# Patient Record
Sex: FEMALE | Race: WHITE | NOT HISPANIC OR LATINO | Employment: OTHER | URBAN - NONMETROPOLITAN AREA
[De-identification: names, ages, dates, MRNs, and addresses within clinical notes are randomized per-mention and may not be internally consistent; named-entity substitution may affect disease eponyms.]

---

## 2017-01-10 ENCOUNTER — OFFICE VISIT (OUTPATIENT)
Dept: CARDIOLOGY | Facility: CLINIC | Age: 82
End: 2017-01-10

## 2017-01-10 VITALS
DIASTOLIC BLOOD PRESSURE: 64 MMHG | HEIGHT: 63 IN | SYSTOLIC BLOOD PRESSURE: 140 MMHG | BODY MASS INDEX: 28.35 KG/M2 | WEIGHT: 160 LBS | HEART RATE: 76 BPM

## 2017-01-10 DIAGNOSIS — I49.5 SICK SINUS SYNDROME (HCC): Primary | ICD-10-CM

## 2017-01-10 DIAGNOSIS — E78.2 MIXED HYPERLIPIDEMIA: ICD-10-CM

## 2017-01-10 DIAGNOSIS — I47.1 SVT (SUPRAVENTRICULAR TACHYCARDIA) (HCC): ICD-10-CM

## 2017-01-10 DIAGNOSIS — R06.09 DYSPNEA ON EXERTION: ICD-10-CM

## 2017-01-10 DIAGNOSIS — I10 ESSENTIAL HYPERTENSION: ICD-10-CM

## 2017-01-10 DIAGNOSIS — R73.09 ELEVATED HEMOGLOBIN A1C: ICD-10-CM

## 2017-01-10 PROCEDURE — 99214 OFFICE O/P EST MOD 30 MIN: CPT | Performed by: INTERNAL MEDICINE

## 2017-01-10 PROCEDURE — 93000 ELECTROCARDIOGRAM COMPLETE: CPT | Performed by: INTERNAL MEDICINE

## 2017-01-10 RX ORDER — ISOSORBIDE MONONITRATE 30 MG/1
30 TABLET, EXTENDED RELEASE ORAL DAILY
Qty: 90 TABLET | Refills: 3 | Status: SHIPPED | OUTPATIENT
Start: 2017-01-10 | End: 2018-01-04 | Stop reason: SDUPTHER

## 2017-01-10 RX ORDER — VITAMIN A ACETATE, .BETA.-CAROTENE, ASCORBIC ACID, CHOLECALCIFEROL, .ALPHA.-TOCOPHEROL ACETATE, DL-, THIAMINE MONONITRATE, RIBOFLAVIN, NIACINAMIDE, PYRIDOXINE HYDROCHLORIDE, FOLIC ACID, CYANOCOBALAMIN, CALCIUM CARBONATE, FERROUS FUMARATE, ZINC OXIDE, AND CUPRIC OXIDE 2000; 2000; 120; 400; 22; 1.84; 3; 20; 10; 1; 12; 200; 27; 25; 2 [IU]/1; [IU]/1; MG/1; [IU]/1; MG/1; MG/1; MG/1; MG/1; MG/1; MG/1; UG/1; MG/1; MG/1; MG/1; MG/1
TABLET ORAL
COMMUNITY
Start: 2016-10-18 | End: 2017-10-12 | Stop reason: ALTCHOICE

## 2017-01-10 NOTE — MR AVS SNAPSHOT
Violet Fajardo   1/10/2017 8:00 AM   Office Visit    Dept Phone:  [phone]   Encounter #:  [id number]    Provider:  Chalo Torres MD   Department:  [facility]                Your Full Care Plan              Today's Medication Changes          These changes are accurate as of: 1/10/17  8:40 AM.  If you have any questions, ask your nurse or doctor.               Stop taking medication(s)listed here:     calcium citrate-vitamin d 315-250 MG-UNIT tablet tablet   Commonly known as:  CALCITRATE   Stopped by:  Chalo Torres MD           montelukast 10 MG tablet   Commonly known as:  SINGULAIR   Stopped by:  Chalo Torres MD                Where to Get Your Medications      These medications were sent to [facility] - [place], [place] - [address] [place] [address] - [phone]  - [phone] [address] [address]     Phone:  [phone]     isosorbide mononitrate 30 MG 24 hr tablet                  Your Updated Medication List          This list is accurate as of: 1/10/17  8:40 AM.  Always use your most recent med list.                aspirin 81 MG EC tablet       fish oil 1000 MG capsule capsule       hydrochlorothiazide 12.5 MG capsule   Commonly known as:  MICROZIDE   Take 1 capsule by mouth Daily.       isosorbide mononitrate 30 MG 24 hr tablet   Commonly known as:  IMDUR   Take 1 tablet by mouth Daily.       levothyroxine 25 MCG tablet   Commonly known as:  SYNTHROID, LEVOTHROID       metoprolol succinate XL 25 MG 24 hr tablet   Commonly known as:  TOPROL-XL   Take 3 tablets by mouth Daily.       OCUVITE ADULT 50+ PO       PNV PRENATAL PLUS MULTIVITAMIN 27-1 MG tablet       potassium chloride 10 MEQ CR tablet   Commonly known as:  K-DUR   Take 1 tablet by mouth Daily.       Zolpidem Tartrate 10 MG sublingual tablet               We Performed the Following     ECG 12 Lead       You Were Diagnosed With        Codes Comments     Sick sinus syndrome    -  Primary ICD-10-CM: I49.5  ICD-9-CM: 427.81     SVT (supraventricular tachycardia)     ICD-10-CM: I47.1  ICD-9-CM: 427.89     Essential hypertension     ICD-10-CM: I10  ICD-9-CM: 401.9     Dyspnea on exertion     ICD-10-CM: R06.09  ICD-9-CM: 786.09     Elevated hemoglobin A1c     ICD-10-CM: R73.09  ICD-9-CM: 790.29     Mixed hyperlipidemia     ICD-10-CM: E78.2  ICD-9-CM: 272.2       Instructions     None    Patient Instructions History      Upcoming Appointments     Visit Type Date Time Department    FOLLOW UP 1/10/2017  8:00 AM [facility]    NURSE/MA VISIT 2017  8:00 AM [facility]    FOLLOW UP 10/12/2017  8:00 AM [facility]      MyChart Signup     [facility] [place] [employer] allows you to send messages to your doctor, view your test results, renew your prescriptions, schedule appointments, and more. To sign up, go to [employer] and click on the Sign Up Now link in the New User? box. Enter your Sh[employer] Activation Code exactly as it appears below along with the last four digits of your Social Security Number and your Date of Birth () to complete the sign-up process. If you do not sign up before the expiration date, you must request a new code.    Shoutitout Activation Code: [email or web address]  Expires: 2017  8:26 AM    If you have questions, you can email [email or web address] or call [phone] to talk to our Shoutitout staff. Remember, Shoutitout is NOT to be used for urgent needs. For medical emergencies, dial 911.               Other Info from Your Visit           Your Appointments     May 24, 2017  8:00 AM CDT   Nurse Visit with [facility]   [facility] (--)    [address]  [facility] [address]   [phone]            Oct 12, 2017  8:00 AM CDT   Follow Up with Chalo Torres MD   [facility] (--)    [address]  [facility] [address]  "  [phone]           Arrive 15 minutes prior to appointment.              Allergies     Latex      Barley Grass      Codeine      Contrast Dye        Reason for Visit     Follow-up SVT. Sick Sinus. Had questions about testing that was ordered     Medication Problem Potassium Chloride hurts her stomach really bad     Results From Echo in Nov.     Med Refill Isosorbide       Vital Signs     Blood Pressure Pulse Height Weight Body Mass Index Smoking Status    140/64 (BP Location: Right arm, Patient Position: Sitting, Cuff Size: Adult) 76 63\" (160 cm) 160 lb (72.6 kg) 28.34 kg/m2 Never Smoker      Problems and Diagnoses Noted     Breathlessness on exertion    Elevated hemoglobin A1c    High blood pressure    Mixed hyperlipidemia    Sick sinus syndrome    SVT (supraventricular tachycardia)      Results     ECG 12 Lead               "

## 2017-01-10 NOTE — LETTER
January 10, 2017     No Recipients    Patient: Violet Fajardo   YOB: 1934   Date of Visit: 1/10/2017       Dear Dr. Tobias Recipients:    Thank you for referring Violet Fajardo to me for evaluation. Below are the relevant portions of my assessment and plan of care.    If you have questions, please do not hesitate to call me. I look forward to following Violet along with you.         Sincerely,        Chalo Torres MD        CC: No Recipients  Chalo Torres MD  1/10/2017  8:37 AM  Sign at close encounter  Violet Fajardo  [id number]  1934  82 y.o.  female    Referring Provider: Michael Manzo MD    Reason for Follow-up Visit: Sick sinus syndrome    Overall doing well  Denies any chest pain or excessive shortness of breath  Compliant with medications    History of present illness:  Violet Fajardo is a 82 y.o. yo female with history of SSS who presents today for   Chief Complaint   Patient presents with   • Follow-up     SVT. Sick Sinus. Had questions about testing that was ordered    • Medication Problem     Potassium Chloride hurts her stomach really bad    • Results     From Echo in Nov.    • Med Refill     Isosorbide    .    History  Past Medical History   Diagnosis Date   • Chest pain    • CHF (congestive heart failure)    • Dermatomyositis    • Dyspnea    • Hyperlipidemia    • Hypothyroidism    • Mitral valve disorder    • Mitral valve disorder    • Pacemaker    • Respiratory infection    • Sleep apnea    • Stroke    • SVT (supraventricular tachycardia)    ,   Past Surgical History   Procedure Laterality Date   • Hysterectomy     • Foot surgery     • Cholecystectomy     • Pacemaker implantation     • Insert / replace / remove pacemaker     ,   Family History   Problem Relation Age of Onset   • Coronary artery disease Mother    • Coronary artery disease Father    ,   Social History   Substance Use Topics   • Smoking status: Never Smoker   • Smokeless tobacco: Never Used   • Alcohol  "use No   ,     Medications  Current Outpatient Prescriptions   Medication Sig Dispense Refill   • aspirin 81 MG EC tablet Take 81 mg by mouth Daily.     • hydrochlorothiazide (MICROZIDE) 12.5 MG capsule Take 1 capsule by mouth Daily. 90 capsule 3   • isosorbide mononitrate (IMDUR) 30 MG 24 hr tablet Take 1 tablet by mouth Daily. 14 tablet 0   • levothyroxine (SYNTHROID, LEVOTHROID) 25 MCG tablet Take 25 mcg by mouth Daily.     • metoprolol succinate XL (TOPROL-XL) 25 MG 24 hr tablet Take 3 tablets by mouth Daily. (Patient taking differently: Take 50 mg by mouth 2 (Two) Times a Day.) 270 tablet 3   • Multiple Vitamins-Minerals (OCUVITE ADULT 50+ PO) Take  by mouth.     • Omega-3 Fatty Acids (FISH OIL) 1000 MG capsule capsule Take  by mouth Daily With Breakfast.     • Zolpidem Tartrate 10 MG sublingual tablet Place  under the tongue.     • potassium chloride (K-DUR) 10 MEQ CR tablet Take 1 tablet by mouth Daily. 90 tablet 3   • Prenatal Vit-Fe Fumarate-FA (PNV PRENATAL PLUS MULTIVITAMIN) 27-1 MG tablet        No current facility-administered medications for this visit.        Allergies:  Latex; Barley grass; Codeine; and Contrast dye    Review of Systems  Review of Systems   Constitution: Positive for malaise/fatigue.   HENT: Negative.    Eyes: Negative.    Cardiovascular: Positive for dyspnea on exertion. Negative for chest pain, claudication, cyanosis, irregular heartbeat, leg swelling, near-syncope, orthopnea, palpitations, paroxysmal nocturnal dyspnea and syncope.   Respiratory: Negative.    Endocrine: Negative.    Hematologic/Lymphatic: Negative.    Skin: Negative.    Gastrointestinal: Negative for anorexia.   Genitourinary: Negative.    Neurological: Negative.    Psychiatric/Behavioral: Negative.        Objective     Physical Exam:  Visit Vitals   • /64 (BP Location: Right arm, Patient Position: Sitting, Cuff Size: Adult)   • Pulse 76   • Ht 63\" (160 cm)   • Wt 160 lb (72.6 kg)   • BMI 28.34 kg/m2 "     Physical Exam   Constitutional: She appears well-developed.   HENT:   Head: Normocephalic.   Neck: Normal carotid pulses and no JVD present. No tracheal tenderness present. Carotid bruit is not present. No tracheal deviation and no edema present.   Cardiovascular: Regular rhythm and normal pulses.    Murmur heard.   Systolic murmur is present with a grade of 2/6   Pulmonary/Chest: Effort normal. No stridor.   Abdominal: Soft.   Neurological: She is alert. She has normal strength. No cranial nerve deficit or sensory deficit.   Skin: Skin is warm.   Psychiatric: She has a normal mood and affect. Her speech is normal and behavior is normal.       Results Review:       ECG 12 Lead  Date/Time: 1/10/2017 8:32 AM  Performed by: DIRK PEARSON  Authorized by: DIRK PEARSON   Comparison: compared with previous ECG from 10/19/2016  Similar to previous ECG  Rhythm: paced  Comments: A paced V sensed            Assessment/Plan   Patient Active Problem List   Diagnosis   • Dyspnea on exertion   • Sick sinus syndrome   • SVT (supraventricular tachycardia)   • Essential hypertension   • Elevated hemoglobin A1c   • Mixed hyperlipidemia       Stable doing well. No exertional chest pain or excessive dyspnea. Chronic shortness of breath. No palpitations. No significant pedal edema. Compliant with medications and diet. Latest labs and medications reviewed.    Plan:    Close follow up with you as scheduled.  Intensive factor modifications.  See order list.    Counseled regarding disease appropriate diet, fluid, caffeine, stimulants and sodium intake as well as importance of compliance to diet, exercise and regular follow up.    Return in about 9 months (around 10/10/2017).

## 2017-01-10 NOTE — PROGRESS NOTES
Violet Fajardo  [id number]  1934  82 y.o.  female    Referring Provider: Michael Manzo MD    Reason for Follow-up Visit: Sick sinus syndrome    Overall doing well  Denies any chest pain or excessive shortness of breath  Compliant with medications    History of present illness:  Violet Fajardo is a 82 y.o. yo female with history of SSS who presents today for   Chief Complaint   Patient presents with   • Follow-up     SVT. Sick Sinus. Had questions about testing that was ordered    • Medication Problem     Potassium Chloride hurts her stomach really bad    • Results     From Echo in Nov.    • Med Refill     Isosorbide    .    History  Past Medical History   Diagnosis Date   • Chest pain    • CHF (congestive heart failure)    • Dermatomyositis    • Dyspnea    • Hyperlipidemia    • Hypothyroidism    • Mitral valve disorder    • Mitral valve disorder    • Pacemaker    • Respiratory infection    • Sleep apnea    • Stroke    • SVT (supraventricular tachycardia)    ,   Past Surgical History   Procedure Laterality Date   • Hysterectomy     • Foot surgery     • Cholecystectomy     • Pacemaker implantation     • Insert / replace / remove pacemaker     ,   Family History   Problem Relation Age of Onset   • Coronary artery disease Mother    • Coronary artery disease Father    ,   Social History   Substance Use Topics   • Smoking status: Never Smoker   • Smokeless tobacco: Never Used   • Alcohol use No   ,     Medications  Current Outpatient Prescriptions   Medication Sig Dispense Refill   • aspirin 81 MG EC tablet Take 81 mg by mouth Daily.     • hydrochlorothiazide (MICROZIDE) 12.5 MG capsule Take 1 capsule by mouth Daily. 90 capsule 3   • isosorbide mononitrate (IMDUR) 30 MG 24 hr tablet Take 1 tablet by mouth Daily. 14 tablet 0   • levothyroxine (SYNTHROID, LEVOTHROID) 25 MCG tablet Take 25 mcg by mouth Daily.     • metoprolol succinate XL (TOPROL-XL) 25 MG 24 hr tablet Take 3 tablets by mouth Daily.  "(Patient taking differently: Take 50 mg by mouth 2 (Two) Times a Day.) 270 tablet 3   • Multiple Vitamins-Minerals (OCUVITE ADULT 50+ PO) Take  by mouth.     • Omega-3 Fatty Acids (FISH OIL) 1000 MG capsule capsule Take  by mouth Daily With Breakfast.     • Zolpidem Tartrate 10 MG sublingual tablet Place  under the tongue.     • potassium chloride (K-DUR) 10 MEQ CR tablet Take 1 tablet by mouth Daily. 90 tablet 3   • Prenatal Vit-Fe Fumarate-FA (PNV PRENATAL PLUS MULTIVITAMIN) 27-1 MG tablet        No current facility-administered medications for this visit.        Allergies:  Latex; Barley grass; Codeine; and Contrast dye    Review of Systems  Review of Systems   Constitution: Positive for malaise/fatigue.   HENT: Negative.    Eyes: Negative.    Cardiovascular: Positive for dyspnea on exertion. Negative for chest pain, claudication, cyanosis, irregular heartbeat, leg swelling, near-syncope, orthopnea, palpitations, paroxysmal nocturnal dyspnea and syncope.   Respiratory: Negative.    Endocrine: Negative.    Hematologic/Lymphatic: Negative.    Skin: Negative.    Gastrointestinal: Negative for anorexia.   Genitourinary: Negative.    Neurological: Negative.    Psychiatric/Behavioral: Negative.        Objective     Physical Exam:  Visit Vitals   • /64 (BP Location: Right arm, Patient Position: Sitting, Cuff Size: Adult)   • Pulse 76   • Ht 63\" (160 cm)   • Wt 160 lb (72.6 kg)   • BMI 28.34 kg/m2     Physical Exam   Constitutional: She appears well-developed.   HENT:   Head: Normocephalic.   Neck: Normal carotid pulses and no JVD present. No tracheal tenderness present. Carotid bruit is not present. No tracheal deviation and no edema present.   Cardiovascular: Regular rhythm and normal pulses.    Murmur heard.   Systolic murmur is present with a grade of 2/6   Pulmonary/Chest: Effort normal. No stridor.   Abdominal: Soft.   Neurological: She is alert. She has normal strength. No cranial nerve deficit or sensory " deficit.   Skin: Skin is warm.   Psychiatric: She has a normal mood and affect. Her speech is normal and behavior is normal.       Results Review:       ECG 12 Lead  Date/Time: 1/10/2017 8:32 AM  Performed by: DIRK PEARSON  Authorized by: DIRK PEARSON   Comparison: compared with previous ECG from 10/19/2016  Similar to previous ECG  Rhythm: paced  Comments: A paced V sensed            Assessment/Plan   Patient Active Problem List   Diagnosis   • Dyspnea on exertion   • Sick sinus syndrome   • SVT (supraventricular tachycardia)   • Essential hypertension   • Elevated hemoglobin A1c   • Mixed hyperlipidemia       Stable doing well. No exertional chest pain or excessive dyspnea. Chronic shortness of breath. No palpitations. No significant pedal edema. Compliant with medications and diet. Latest labs and medications reviewed.    Plan:    Close follow up with you as scheduled.  Intensive factor modifications.  See order list.    Counseled regarding disease appropriate diet, fluid, caffeine, stimulants and sodium intake as well as importance of compliance to diet, exercise and regular follow up.    Return in about 9 months (around 10/10/2017).

## 2017-01-14 RX ORDER — ZOLPIDEM TARTRATE 10 MG/1
10 TABLET ORAL NIGHTLY PRN
Qty: 90 TABLET | Refills: 0 | Status: SHIPPED | OUTPATIENT
Start: 2017-01-14 | End: 2017-01-28

## 2017-03-28 ENCOUNTER — TELEPHONE (OUTPATIENT)
Dept: CARDIOLOGY | Facility: CLINIC | Age: 82
End: 2017-03-28

## 2017-03-28 NOTE — TELEPHONE ENCOUNTER
----- Message from JON Muro sent at 3/27/2017  2:12 PM CDT -----  Please notify pt, normal pumping function/LVEF on echo. Thanks.   ----- Message -----     From: Denise Mazariegos     Sent: 3/27/2017  12:23 PM       To: JON Muro

## 2017-03-29 ENCOUNTER — OFFICE VISIT (OUTPATIENT)
Dept: PRIMARY CARE CLINIC | Age: 82
End: 2017-03-29
Payer: MEDICARE

## 2017-03-29 VITALS
BODY MASS INDEX: 27.76 KG/M2 | HEIGHT: 64 IN | HEART RATE: 96 BPM | OXYGEN SATURATION: 97 % | DIASTOLIC BLOOD PRESSURE: 64 MMHG | SYSTOLIC BLOOD PRESSURE: 116 MMHG | WEIGHT: 162.6 LBS

## 2017-03-29 DIAGNOSIS — I10 ESSENTIAL HYPERTENSION: ICD-10-CM

## 2017-03-29 DIAGNOSIS — M33.90 DERMATOMYOSITIS (HCC): ICD-10-CM

## 2017-03-29 DIAGNOSIS — E55.9 VITAMIN D DEFICIENCY: ICD-10-CM

## 2017-03-29 DIAGNOSIS — E53.8 VITAMIN B12 DEFICIENCY: ICD-10-CM

## 2017-03-29 DIAGNOSIS — J01.00 ACUTE NON-RECURRENT MAXILLARY SINUSITIS: ICD-10-CM

## 2017-03-29 DIAGNOSIS — E03.9 HYPOTHYROIDISM, UNSPECIFIED TYPE: Primary | ICD-10-CM

## 2017-03-29 DIAGNOSIS — F51.01 PRIMARY INSOMNIA: ICD-10-CM

## 2017-03-29 LAB
ANION GAP SERPL CALCULATED.3IONS-SCNC: 18 MMOL/L (ref 7–19)
BUN BLDV-MCNC: 14 MG/DL (ref 8–23)
CALCIUM SERPL-MCNC: 9.5 MG/DL (ref 8.8–10.2)
CHLORIDE BLD-SCNC: 99 MMOL/L (ref 98–111)
CO2: 23 MMOL/L (ref 22–29)
CREAT SERPL-MCNC: 0.8 MG/DL (ref 0.5–0.9)
GFR NON-AFRICAN AMERICAN: >60
GLUCOSE BLD-MCNC: 105 MG/DL (ref 74–109)
POTASSIUM SERPL-SCNC: 4.4 MMOL/L (ref 3.5–5)
SODIUM BLD-SCNC: 140 MMOL/L (ref 136–145)
VITAMIN B-12: 278 PG/ML (ref 211–946)
VITAMIN D 25-HYDROXY: 26.3 NG/ML

## 2017-03-29 PROCEDURE — G8400 PT W/DXA NO RESULTS DOC: HCPCS | Performed by: FAMILY MEDICINE

## 2017-03-29 PROCEDURE — 1090F PRES/ABSN URINE INCON ASSESS: CPT | Performed by: FAMILY MEDICINE

## 2017-03-29 PROCEDURE — G8484 FLU IMMUNIZE NO ADMIN: HCPCS | Performed by: FAMILY MEDICINE

## 2017-03-29 PROCEDURE — 1123F ACP DISCUSS/DSCN MKR DOCD: CPT | Performed by: FAMILY MEDICINE

## 2017-03-29 PROCEDURE — 99214 OFFICE O/P EST MOD 30 MIN: CPT | Performed by: FAMILY MEDICINE

## 2017-03-29 PROCEDURE — 1036F TOBACCO NON-USER: CPT | Performed by: FAMILY MEDICINE

## 2017-03-29 PROCEDURE — G8420 CALC BMI NORM PARAMETERS: HCPCS | Performed by: FAMILY MEDICINE

## 2017-03-29 PROCEDURE — 4040F PNEUMOC VAC/ADMIN/RCVD: CPT | Performed by: FAMILY MEDICINE

## 2017-03-29 PROCEDURE — G8427 DOCREV CUR MEDS BY ELIG CLIN: HCPCS | Performed by: FAMILY MEDICINE

## 2017-03-29 RX ORDER — CYANOCOBALAMIN 1000 UG/ML
1000 INJECTION INTRAMUSCULAR; SUBCUTANEOUS ONCE
Status: CANCELLED | OUTPATIENT
Start: 2017-03-29 | End: 2017-03-29

## 2017-03-29 RX ORDER — HYDROCHLOROTHIAZIDE 12.5 MG/1
CAPSULE, GELATIN COATED ORAL
COMMUNITY
Start: 2017-02-01 | End: 2021-07-12 | Stop reason: SDUPTHER

## 2017-03-29 RX ORDER — METOPROLOL SUCCINATE 25 MG
25 TABLET, EXTENDED RELEASE 24 HR ORAL 3 TIMES DAILY
COMMUNITY
Start: 2016-12-30 | End: 2022-08-22

## 2017-03-29 RX ORDER — ZOLPIDEM TARTRATE 10 MG/1
TABLET ORAL NIGHTLY PRN
COMMUNITY
End: 2017-03-29 | Stop reason: SDUPTHER

## 2017-03-29 RX ORDER — POTASSIUM CHLORIDE 750 MG/1
10 TABLET, EXTENDED RELEASE ORAL DAILY
COMMUNITY
Start: 2017-02-01

## 2017-03-29 RX ORDER — DOXYCYCLINE 100 MG/1
100 CAPSULE ORAL 2 TIMES DAILY
Qty: 20 CAPSULE | Refills: 0 | Status: SHIPPED | OUTPATIENT
Start: 2017-03-29 | End: 2017-06-29 | Stop reason: HOSPADM

## 2017-03-29 RX ORDER — ZOLPIDEM TARTRATE 10 MG/1
10 TABLET ORAL NIGHTLY PRN
Qty: 90 TABLET | Refills: 0 | Status: SHIPPED | OUTPATIENT
Start: 2017-03-29 | End: 2017-10-04 | Stop reason: SDUPTHER

## 2017-03-29 ASSESSMENT — ENCOUNTER SYMPTOMS
SHORTNESS OF BREATH: 0
COUGH: 0
COLOR CHANGE: 0

## 2017-04-03 ENCOUNTER — TELEPHONE (OUTPATIENT)
Dept: UROLOGY | Age: 82
End: 2017-04-03

## 2017-04-07 ENCOUNTER — TELEPHONE (OUTPATIENT)
Dept: PRIMARY CARE CLINIC | Age: 82
End: 2017-04-07

## 2017-04-07 RX ORDER — ERGOCALCIFEROL 1.25 MG/1
50000 CAPSULE ORAL WEEKLY
Qty: 12 CAPSULE | Refills: 3 | Status: SHIPPED | OUTPATIENT
Start: 2017-04-07 | End: 2017-11-02 | Stop reason: SDUPTHER

## 2017-05-24 ENCOUNTER — CLINICAL SUPPORT (OUTPATIENT)
Dept: CARDIOLOGY | Facility: CLINIC | Age: 82
End: 2017-05-24

## 2017-05-24 DIAGNOSIS — I49.5 SICK SINUS SYNDROME (HCC): ICD-10-CM

## 2017-05-24 DIAGNOSIS — Z95.0 CARDIAC PACEMAKER IN SITU: Primary | ICD-10-CM

## 2017-05-24 PROCEDURE — 93294 REM INTERROG EVL PM/LDLS PM: CPT | Performed by: INTERNAL MEDICINE

## 2017-05-24 PROCEDURE — 93296 REM INTERROG EVL PM/IDS: CPT | Performed by: INTERNAL MEDICINE

## 2017-06-29 ENCOUNTER — OFFICE VISIT (OUTPATIENT)
Dept: PRIMARY CARE CLINIC | Age: 82
End: 2017-06-29
Payer: MEDICARE

## 2017-06-29 VITALS
HEART RATE: 84 BPM | OXYGEN SATURATION: 96 % | WEIGHT: 160 LBS | DIASTOLIC BLOOD PRESSURE: 68 MMHG | BODY MASS INDEX: 28.35 KG/M2 | SYSTOLIC BLOOD PRESSURE: 130 MMHG | TEMPERATURE: 97.5 F | HEIGHT: 63 IN | RESPIRATION RATE: 18 BRPM

## 2017-06-29 DIAGNOSIS — E03.9 HYPOTHYROIDISM, UNSPECIFIED TYPE: ICD-10-CM

## 2017-06-29 DIAGNOSIS — B37.2 CANDIDAL INTERTRIGO: Primary | ICD-10-CM

## 2017-06-29 DIAGNOSIS — E53.8 VITAMIN B12 DEFICIENCY: ICD-10-CM

## 2017-06-29 DIAGNOSIS — E55.9 VITAMIN D DEFICIENCY: ICD-10-CM

## 2017-06-29 DIAGNOSIS — K13.0 ANGULAR CHEILITIS DUE TO BACTERIAL INFECTION: ICD-10-CM

## 2017-06-29 DIAGNOSIS — F51.01 PRIMARY INSOMNIA: ICD-10-CM

## 2017-06-29 DIAGNOSIS — A49.9 ANGULAR CHEILITIS DUE TO BACTERIAL INFECTION: ICD-10-CM

## 2017-06-29 DIAGNOSIS — Z23 NEED FOR PNEUMOCOCCAL VACCINATION: ICD-10-CM

## 2017-06-29 LAB
ALBUMIN SERPL-MCNC: 4.5 G/DL (ref 3.5–5.2)
ALP BLD-CCNC: 104 U/L (ref 35–104)
ALT SERPL-CCNC: 18 U/L (ref 5–33)
ANION GAP SERPL CALCULATED.3IONS-SCNC: 16 MMOL/L (ref 7–19)
AST SERPL-CCNC: 24 U/L (ref 5–32)
BILIRUB SERPL-MCNC: 0.3 MG/DL (ref 0.2–1.2)
BUN BLDV-MCNC: 21 MG/DL (ref 8–23)
CALCIUM SERPL-MCNC: 9.3 MG/DL (ref 8.8–10.2)
CHLORIDE BLD-SCNC: 94 MMOL/L (ref 98–111)
CO2: 26 MMOL/L (ref 22–29)
CREAT SERPL-MCNC: 0.8 MG/DL (ref 0.5–0.9)
GFR NON-AFRICAN AMERICAN: >60
GLUCOSE BLD-MCNC: 106 MG/DL (ref 74–109)
POTASSIUM SERPL-SCNC: 4.4 MMOL/L (ref 3.5–5)
SODIUM BLD-SCNC: 136 MMOL/L (ref 136–145)
T4 FREE: 1.4 NG/ML (ref 0.9–1.7)
TOTAL PROTEIN: 7.4 G/DL (ref 6.6–8.7)
TSH SERPL DL<=0.05 MIU/L-ACNC: 2.08 UIU/ML (ref 0.27–4.2)
VITAMIN B-12: 239 PG/ML (ref 211–946)
VITAMIN D 25-HYDROXY: 35.2 NG/ML

## 2017-06-29 PROCEDURE — 99214 OFFICE O/P EST MOD 30 MIN: CPT | Performed by: FAMILY MEDICINE

## 2017-06-29 PROCEDURE — 1036F TOBACCO NON-USER: CPT | Performed by: FAMILY MEDICINE

## 2017-06-29 PROCEDURE — 1123F ACP DISCUSS/DSCN MKR DOCD: CPT | Performed by: FAMILY MEDICINE

## 2017-06-29 PROCEDURE — 90670 PCV13 VACCINE IM: CPT | Performed by: FAMILY MEDICINE

## 2017-06-29 PROCEDURE — 4040F PNEUMOC VAC/ADMIN/RCVD: CPT | Performed by: FAMILY MEDICINE

## 2017-06-29 PROCEDURE — G8419 CALC BMI OUT NRM PARAM NOF/U: HCPCS | Performed by: FAMILY MEDICINE

## 2017-06-29 PROCEDURE — G8427 DOCREV CUR MEDS BY ELIG CLIN: HCPCS | Performed by: FAMILY MEDICINE

## 2017-06-29 PROCEDURE — 1090F PRES/ABSN URINE INCON ASSESS: CPT | Performed by: FAMILY MEDICINE

## 2017-06-29 PROCEDURE — G8400 PT W/DXA NO RESULTS DOC: HCPCS | Performed by: FAMILY MEDICINE

## 2017-06-29 PROCEDURE — G0009 ADMIN PNEUMOCOCCAL VACCINE: HCPCS | Performed by: FAMILY MEDICINE

## 2017-06-29 RX ORDER — ZOLPIDEM TARTRATE 10 MG/1
10 TABLET ORAL NIGHTLY PRN
Qty: 90 TABLET | Refills: 0 | Status: CANCELLED | OUTPATIENT
Start: 2017-06-29

## 2017-06-29 RX ORDER — CLOTRIMAZOLE 1 %
CREAM (GRAM) TOPICAL
Qty: 45 G | Refills: 0 | Status: SHIPPED | OUTPATIENT
Start: 2017-06-29 | End: 2017-07-06

## 2017-06-29 RX ORDER — ZOLPIDEM TARTRATE 10 MG/1
10 TABLET ORAL NIGHTLY PRN
Qty: 90 TABLET | Refills: 0 | Status: SHIPPED | OUTPATIENT
Start: 2017-06-29 | End: 2017-07-13

## 2017-06-29 ASSESSMENT — ENCOUNTER SYMPTOMS
DIARRHEA: 0
EYES NEGATIVE: 1
COUGH: 0
EYE ITCHING: 0
COLOR CHANGE: 0
RHINORRHEA: 0
RESPIRATORY NEGATIVE: 1
VOMITING: 0
SORE THROAT: 0
BACK PAIN: 0
BLOOD IN STOOL: 0
SHORTNESS OF BREATH: 0
GASTROINTESTINAL NEGATIVE: 1
NAUSEA: 0
WHEEZING: 0
EYE REDNESS: 0

## 2017-07-03 ENCOUNTER — TELEPHONE (OUTPATIENT)
Dept: PRIMARY CARE CLINIC | Age: 82
End: 2017-07-03

## 2017-07-05 NOTE — PROGRESS NOTES
I have reviewed the notes, assessments, and/or procedures performed by Gertrude Bagley , I concur with her  documentation of Violet Fajardo.

## 2017-07-17 RX ORDER — VITAMIN A ACETATE, .BETA.-CAROTENE, ASCORBIC ACID, CHOLECALCIFEROL, .ALPHA.-TOCOPHEROL ACETATE, DL-, THIAMINE MONONITRATE, RIBOFLAVIN, NIACINAMIDE, PYRIDOXINE HYDROCHLORIDE, FOLIC ACID, CYANOCOBALAMIN, CALCIUM CARBONATE, FERROUS FUMARATE, ZINC OXIDE, AND CUPRIC OXIDE 2000; 2000; 120; 400; 22; 1.84; 3; 20; 10; 1; 12; 200; 27; 25; 2 [IU]/1; [IU]/1; MG/1; [IU]/1; MG/1; MG/1; MG/1; MG/1; MG/1; MG/1; UG/1; MG/1; MG/1; MG/1; MG/1
TABLET ORAL
Qty: 90 TABLET | Refills: 2 | Status: SHIPPED | OUTPATIENT
Start: 2017-07-17 | End: 2017-11-27

## 2017-08-07 ENCOUNTER — TELEPHONE (OUTPATIENT)
Dept: PRIMARY CARE CLINIC | Age: 82
End: 2017-08-07

## 2017-08-07 DIAGNOSIS — M33.90 DERMATOMYOSITIS (HCC): Primary | ICD-10-CM

## 2017-08-08 DIAGNOSIS — M33.90 DERMATOMYOSITIS (HCC): ICD-10-CM

## 2017-08-08 LAB
ALBUMIN SERPL-MCNC: 4.2 G/DL (ref 3.5–5.2)
ALP BLD-CCNC: 94 U/L (ref 35–104)
ALT SERPL-CCNC: 16 U/L (ref 5–33)
ANION GAP SERPL CALCULATED.3IONS-SCNC: 16 MMOL/L (ref 7–19)
AST SERPL-CCNC: 23 U/L (ref 5–32)
BILIRUB SERPL-MCNC: 0.3 MG/DL (ref 0.2–1.2)
BUN BLDV-MCNC: 15 MG/DL (ref 8–23)
C-REACTIVE PROTEIN: 0.1 MG/DL (ref 0–0.5)
CALCIUM SERPL-MCNC: 9.6 MG/DL (ref 8.8–10.2)
CHLORIDE BLD-SCNC: 95 MMOL/L (ref 98–111)
CO2: 27 MMOL/L (ref 22–29)
CREAT SERPL-MCNC: 0.7 MG/DL (ref 0.5–0.9)
GFR NON-AFRICAN AMERICAN: >60
GLUCOSE BLD-MCNC: 122 MG/DL (ref 74–109)
LACTATE DEHYDROGENASE: 254 U/L (ref 91–215)
POTASSIUM SERPL-SCNC: 3.7 MMOL/L (ref 3.5–5)
SEDIMENTATION RATE, ERYTHROCYTE: 7 MM/HR (ref 0–25)
SODIUM BLD-SCNC: 138 MMOL/L (ref 136–145)
TOTAL CK: 109 U/L (ref 26–192)
TOTAL PROTEIN: 7.1 G/DL (ref 6.6–8.7)

## 2017-08-14 ENCOUNTER — TELEPHONE (OUTPATIENT)
Dept: PRIMARY CARE CLINIC | Age: 82
End: 2017-08-14

## 2017-08-14 RX ORDER — LEVOTHYROXINE SODIUM 0.03 MG/1
TABLET ORAL
Qty: 90 TABLET | Refills: 2 | Status: SHIPPED | OUTPATIENT
Start: 2017-08-14 | End: 2018-03-12 | Stop reason: SDUPTHER

## 2017-09-26 RX ORDER — METOPROLOL SUCCINATE 25 MG/1
TABLET, EXTENDED RELEASE ORAL
Qty: 270 TABLET | Refills: 3 | Status: SHIPPED | OUTPATIENT
Start: 2017-09-26 | End: 2018-04-04 | Stop reason: SDUPTHER

## 2017-10-04 ENCOUNTER — OFFICE VISIT (OUTPATIENT)
Dept: PRIMARY CARE CLINIC | Age: 82
End: 2017-10-04
Payer: MEDICARE

## 2017-10-04 VITALS
WEIGHT: 162.6 LBS | HEART RATE: 86 BPM | OXYGEN SATURATION: 96 % | TEMPERATURE: 97.5 F | SYSTOLIC BLOOD PRESSURE: 136 MMHG | BODY MASS INDEX: 28.81 KG/M2 | DIASTOLIC BLOOD PRESSURE: 80 MMHG | HEIGHT: 63 IN | RESPIRATION RATE: 17 BRPM

## 2017-10-04 DIAGNOSIS — F51.01 PRIMARY INSOMNIA: ICD-10-CM

## 2017-10-04 DIAGNOSIS — R19.7 DIARRHEA, UNSPECIFIED TYPE: Primary | ICD-10-CM

## 2017-10-04 DIAGNOSIS — Z13.820 OSTEOPOROSIS SCREENING: ICD-10-CM

## 2017-10-04 DIAGNOSIS — R19.7 DIARRHEA, UNSPECIFIED TYPE: ICD-10-CM

## 2017-10-04 LAB
ALBUMIN SERPL-MCNC: 4.2 G/DL (ref 3.5–5.2)
ALP BLD-CCNC: 87 U/L (ref 35–104)
ALT SERPL-CCNC: 22 U/L (ref 5–33)
ANION GAP SERPL CALCULATED.3IONS-SCNC: 14 MMOL/L (ref 7–19)
AST SERPL-CCNC: 23 U/L (ref 5–32)
BILIRUB SERPL-MCNC: <0.2 MG/DL (ref 0.2–1.2)
BUN BLDV-MCNC: 13 MG/DL (ref 8–23)
CALCIUM SERPL-MCNC: 9.2 MG/DL (ref 8.8–10.2)
CHLORIDE BLD-SCNC: 101 MMOL/L (ref 98–111)
CO2: 28 MMOL/L (ref 22–29)
CREAT SERPL-MCNC: 0.7 MG/DL (ref 0.5–0.9)
GFR NON-AFRICAN AMERICAN: >60
GLUCOSE BLD-MCNC: 97 MG/DL (ref 74–109)
HCT VFR BLD CALC: 35.8 % (ref 37–47)
HEMOGLOBIN: 11.4 G/DL (ref 12–16)
MCH RBC QN AUTO: 29.5 PG (ref 27–31)
MCHC RBC AUTO-ENTMCNC: 31.8 G/DL (ref 33–37)
MCV RBC AUTO: 92.5 FL (ref 81–99)
PDW BLD-RTO: 14.2 % (ref 11.5–14.5)
PLATELET # BLD: 235 K/UL (ref 130–400)
PMV BLD AUTO: 10.4 FL (ref 9.4–12.3)
POTASSIUM SERPL-SCNC: 4 MMOL/L (ref 3.5–5)
RBC # BLD: 3.87 M/UL (ref 4.2–5.4)
SODIUM BLD-SCNC: 143 MMOL/L (ref 136–145)
TOTAL PROTEIN: 6.7 G/DL (ref 6.6–8.7)
TSH SERPL DL<=0.05 MIU/L-ACNC: 1.88 UIU/ML (ref 0.27–4.2)
WBC # BLD: 7.5 K/UL (ref 4.8–10.8)

## 2017-10-04 PROCEDURE — G8427 DOCREV CUR MEDS BY ELIG CLIN: HCPCS | Performed by: NURSE PRACTITIONER

## 2017-10-04 PROCEDURE — 1123F ACP DISCUSS/DSCN MKR DOCD: CPT | Performed by: NURSE PRACTITIONER

## 2017-10-04 PROCEDURE — G8419 CALC BMI OUT NRM PARAM NOF/U: HCPCS | Performed by: NURSE PRACTITIONER

## 2017-10-04 PROCEDURE — 4040F PNEUMOC VAC/ADMIN/RCVD: CPT | Performed by: NURSE PRACTITIONER

## 2017-10-04 PROCEDURE — 99214 OFFICE O/P EST MOD 30 MIN: CPT | Performed by: NURSE PRACTITIONER

## 2017-10-04 PROCEDURE — G8400 PT W/DXA NO RESULTS DOC: HCPCS | Performed by: NURSE PRACTITIONER

## 2017-10-04 PROCEDURE — 1036F TOBACCO NON-USER: CPT | Performed by: NURSE PRACTITIONER

## 2017-10-04 PROCEDURE — G8484 FLU IMMUNIZE NO ADMIN: HCPCS | Performed by: NURSE PRACTITIONER

## 2017-10-04 PROCEDURE — 1090F PRES/ABSN URINE INCON ASSESS: CPT | Performed by: NURSE PRACTITIONER

## 2017-10-04 RX ORDER — CIPROFLOXACIN 500 MG/1
500 TABLET, FILM COATED ORAL 2 TIMES DAILY
Qty: 20 TABLET | Refills: 0 | Status: SHIPPED | OUTPATIENT
Start: 2017-10-04 | End: 2017-10-14

## 2017-10-04 RX ORDER — ZOLPIDEM TARTRATE 10 MG/1
10 TABLET ORAL NIGHTLY PRN
Qty: 90 TABLET | Refills: 0 | Status: CANCELLED | OUTPATIENT
Start: 2017-10-04

## 2017-10-04 ASSESSMENT — ENCOUNTER SYMPTOMS
ABDOMINAL PAIN: 0
DIARRHEA: 1
NAUSEA: 0
VOMITING: 0
EYES NEGATIVE: 1
ANAL BLEEDING: 0
ABDOMINAL DISTENTION: 0
RESPIRATORY NEGATIVE: 1
BLOOD IN STOOL: 1

## 2017-10-04 NOTE — PROGRESS NOTES
motion and no tenderness. Thoracic back: Normal. She exhibits normal range of motion and no tenderness. Lumbar back: Normal. She exhibits normal range of motion and no tenderness. Neurological: She is alert and oriented to person, place, and time. She has normal strength. Skin: Skin is warm, dry and intact. Psychiatric: She has a normal mood and affect. Her speech is normal and behavior is normal. Judgment and thought content normal. Cognition and memory are normal.   Nursing note and vitals reviewed. /80  Pulse 86  Temp 97.5 °F (36.4 °C) (Temporal)   Resp 17  Ht 5' 3\" (1.6 m)  Wt 162 lb 9.6 oz (73.8 kg)  SpO2 96%  BMI 28.8 kg/m2    Assessment:     1. Diarrhea, unspecified type  ciprofloxacin (CIPRO) 500 MG tablet    CBC    Comprehensive Metabolic Panel    TSH without Reflex   2. Osteoporosis screening  DEXA Bone Density Axial Skeleton   3. Primary insomnia         No results found for this visit on 10/04/17. Plan:     I am sending in cipro for 3-4 weeks of diarrhea. I am treating her prophylactic. I am also checking labs today to rule out dehydration. [facility]. DEXA scan scheduled. Next follow up if ok plan to give her vit b12 injection in office. Diatherix swab obtained in office and sent to labs. Return in about 4 weeks (around 11/1/2017) for diarrhea .     Orders Placed This Encounter   Procedures    DEXA Bone Density Axial Skeleton     Standing Status:   Future     Standing Expiration Date:   10/4/2018    CBC     Standing Status:   Future     Number of Occurrences:   1     Standing Expiration Date:   10/4/2018    Comprehensive Metabolic Panel     Standing Status:   Future     Number of Occurrences:   1     Standing Expiration Date:   10/4/2018    TSH without Reflex     Standing Status:   Future     Number of Occurrences:   1     Standing Expiration Date:   10/4/2018       Orders Placed This Encounter   Medications   

## 2017-10-04 NOTE — MR AVS SNAPSHOT
After Visit Summary             Dimitri Crabtree   10/4/2017 12:45 PM   Office Visit    Description:  Female : 1934   Provider:  JAZMIN Peres   Department:  [facility]              Your Follow-Up and Future Appointments         Below is a list of your follow-up and future appointments. This may not be a complete list as you may have made appointments directly with providers that we are not aware of or your providers may have made some for you. Please call your providers to confirm appointments. It is important to keep your appointments. Please bring your current insurance card, photo ID, co-pay, and all medication bottles to your appointment. If self-pay, payment is expected at the time of service. Your To-Do List     Future Appointments Provider Department Dept Phone    10/11/2017 2:30 PM [facility] DEXA RM 1 [facility] [address] in [address] on the fourth floor of the [address] [place] [address] at the scheduled time    2017 1:00 PM JAZMIN Peres [facility] [phone]    Please arrive 15 minutes prior to appointment, bring photo ID and insurance card. 2017 11:15 AM Elba Doll MD [address] [facility]    Future Orders Complete By Expires    CBC [HHL118 Custom]  10/4/2017 10/4/2018    Comprehensive Metabolic Panel [VPO61 Custom]  10/4/2017 10/4/2018    TSH without Reflex [DDW285 Custom]  10/4/2017 10/4/2018    DEXA Bone Density Axial Skeleton [97170 Custom]  11/3/2017 10/4/2018    Follow-Up    Return in about 4 weeks (around 2017) for diarrhea .          Information from Your Visit        Department     Name Address Phone Fax    [facility] [address] [address] 547 994 [phone]      You Were Seen for:         Comments    Diarrhea, unspecified type   [[id number]]         Vital Signs     Blood Pressure Pulse Temperature Respirations Height Weight 136/80 86 97.5 °F (36.4 °C) (Temporal) 17 5' 3\" (1.6 m) 162 lb 9.6 oz (73.8 kg)    Oxygen Saturation Body Mass Index Smoking Status             96% 28.8 kg/m2 Never Smoker         Additional Information about your Body Mass Index (BMI)           Your BMI as listed above is considered overweight (25.0-29.9). BMI is an estimate of body fat, calculated from your height and weight. The higher your BMI, the greater your risk of heart disease, high blood pressure, type 2 diabetes, stroke, gallstones, arthritis, sleep apnea, and certain cancers. BMI is not perfect. It may overestimate body fat in athletes and people who are more muscular. If your body fat is high you can improve your BMI by decreasing your calorie intake and becoming more physically active. Learn more at: [employer]             Today's Medication Changes          These changes are accurate as of: 10/4/17  1:31 PM.  If you have any questions, ask your nurse or doctor. START taking these medications           ciprofloxacin 500 MG tablet   Commonly known as:  CIPRO   Instructions: Take 1 tablet by mouth 2 times daily for 10 days   Quantity:  20 tablet   Refills:  0   Started by:  JAZMIN Hernandez            Where to Get Your Medications      These medications were sent to [facility], [address] - [phone]  [address] [place] [address]     Phone:  [phone]     ciprofloxacin 500 MG tablet               Your Current Medications Are              ciprofloxacin (CIPRO) 500 MG tablet Take 1 tablet by mouth 2 times daily for 10 days    levothyroxine (SYNTHROID) 25 MCG tablet TAKE 1 TABLET DAILY    Prenatal Vit-Fe Fumarate-FA (PNV PRENATAL PLUS MULTIVITAMIN) 27-1 MG TABS TAKE 1 TABLET DAILY    aspirin 81 MG tablet Take 81 mg by mouth daily    miconazole (ZEASORB-AF) 2 % powder Apply topically 2 times daily. 3. Enter your [employer] Access Code exactly as it appears below. You will not need to use this code after youve completed the sign-up process. If you do not sign up before the expiration date, you must request a new code. [employer] Access Code: [email or web address]  Expires: 12/3/2017  1:31 PM    4. Enter your Social Security Number ([id number]) and Date of Birth (mm/dd/yyyy) as indicated and click Submit. You will be taken to the next sign-up page. 5. Create a [employer] ID. This will be your [employer] login ID and cannot be changed, so think of one that is secure and easy to remember. 6. Create a [employer] password. You can change your password at any time. 7. Enter your Password Reset Question and Answer. This can be used at a later time if you forget your password. 8. Enter your e-mail address. You will receive e-mail notification when new information is available in [address]. 9. Click Sign Up. You can now view your medical record. Additional Information  If you have questions, please contact the physician practice where you receive care. Remember, [employer] is NOT to be used for urgent needs. For medical emergencies, dial 911. For questions regarding your [employer] account call [phone]. If you have a clinical question, please call your doctor's office.

## 2017-10-05 ENCOUNTER — TELEPHONE (OUTPATIENT)
Dept: PRIMARY CARE CLINIC | Age: 82
End: 2017-10-05

## 2017-10-05 RX ORDER — ZOLPIDEM TARTRATE 10 MG/1
10 TABLET ORAL NIGHTLY PRN
Qty: 90 TABLET | Refills: 1 | Status: SHIPPED | OUTPATIENT
Start: 2017-10-05 | End: 2018-08-14 | Stop reason: SDUPTHER

## 2017-10-06 ENCOUNTER — TELEPHONE (OUTPATIENT)
Dept: PRIMARY CARE CLINIC | Age: 82
End: 2017-10-06

## 2017-10-06 NOTE — TELEPHONE ENCOUNTER
----- Message from JAZMIN Fuller sent at 10/5/2017 10:38 AM CDT -----  Please let patient know that labs were ok and she was not dehydrated. Still waiting for diatherix to return. Thanks!

## 2017-10-10 ENCOUNTER — TELEPHONE (OUTPATIENT)
Dept: PRIMARY CARE CLINIC | Age: 82
End: 2017-10-10

## 2017-10-10 RX ORDER — PROMETHAZINE HYDROCHLORIDE 25 MG/1
25 TABLET ORAL EVERY 6 HOURS PRN
Qty: 30 TABLET | Refills: 0 | Status: SHIPPED | OUTPATIENT
Start: 2017-10-10 | End: 2017-10-17

## 2017-10-10 RX ORDER — AZITHROMYCIN 250 MG/1
TABLET, FILM COATED ORAL
Qty: 1 PACKET | Refills: 0 | Status: SHIPPED | OUTPATIENT
Start: 2017-10-10 | End: 2017-10-20

## 2017-10-12 ENCOUNTER — OFFICE VISIT (OUTPATIENT)
Dept: CARDIOLOGY | Facility: CLINIC | Age: 82
End: 2017-10-12

## 2017-10-12 VITALS
WEIGHT: 160 LBS | SYSTOLIC BLOOD PRESSURE: 100 MMHG | OXYGEN SATURATION: 97 % | DIASTOLIC BLOOD PRESSURE: 66 MMHG | BODY MASS INDEX: 28.35 KG/M2 | HEIGHT: 63 IN | HEART RATE: 83 BPM

## 2017-10-12 DIAGNOSIS — E78.2 MIXED HYPERLIPIDEMIA: ICD-10-CM

## 2017-10-12 DIAGNOSIS — I47.1 SVT (SUPRAVENTRICULAR TACHYCARDIA) (HCC): ICD-10-CM

## 2017-10-12 DIAGNOSIS — R53.83 FATIGUE, UNSPECIFIED TYPE: ICD-10-CM

## 2017-10-12 DIAGNOSIS — R09.02 EXERCISE HYPOXEMIA: ICD-10-CM

## 2017-10-12 DIAGNOSIS — I10 ESSENTIAL HYPERTENSION: ICD-10-CM

## 2017-10-12 DIAGNOSIS — I49.5 SICK SINUS SYNDROME (HCC): Primary | ICD-10-CM

## 2017-10-12 DIAGNOSIS — R73.09 ELEVATED HEMOGLOBIN A1C: ICD-10-CM

## 2017-10-12 DIAGNOSIS — R06.09 DYSPNEA ON EXERTION: ICD-10-CM

## 2017-10-12 PROCEDURE — 99214 OFFICE O/P EST MOD 30 MIN: CPT | Performed by: INTERNAL MEDICINE

## 2017-10-12 PROCEDURE — 93000 ELECTROCARDIOGRAM COMPLETE: CPT | Performed by: INTERNAL MEDICINE

## 2017-10-12 RX ORDER — AZITHROMYCIN 250 MG/1
TABLET, FILM COATED ORAL
COMMUNITY
Start: 2017-10-10 | End: 2017-10-20

## 2017-10-12 RX ORDER — ERGOCALCIFEROL 1.25 MG/1
CAPSULE ORAL
COMMUNITY
Start: 2017-04-07 | End: 2018-08-06

## 2017-10-12 NOTE — PROGRESS NOTES
Violet Fajardo  [id number]  1934  83 y.o.  female    Referring Provider: Onofre Nascimento MD    Reason for Follow-up Visit: Sick sinus syndrome  Dyspnea on exertion     Subjective  Marked Dyspnea on exertion   Overall not feeling well   No chest pain    No palpitations  Compliant with medications  Severely effort tolerance, cannot walk 1 block       History of present illness:  Violet Fajardo is a 83 y.o. yo female with history of sick sinus syndrome s/p pacemaker who presents today for   Chief Complaint   Patient presents with   • SSS     9 mo f/u   .    History  Past Medical History:   Diagnosis Date   • CHF (congestive heart failure)    • Dermatomyositis    • Dermatomyositis    • Dyspnea    • Fibromyalgia    • Hyperlipemia, mixed    • Hyperlipidemia    • Hypothyroidism    • Mitral valve disorder     History of MVP   • Mitral valve disorder    • Pacemaker    • Pacemaker    • Respiratory infection    • Sick sinus syndrome    • Sleep apnea    • Stroke    • SVT (supraventricular tachycardia)    ,   Past Surgical History:   Procedure Laterality Date   • CHOLECYSTECTOMY     • FOOT SURGERY     • HYSTERECTOMY     • INSERT / REPLACE / REMOVE PACEMAKER     • PACEMAKER IMPLANTATION  12/15/2011    [employer] (4/16/2012)-lead repositioning   ,   Family History   Problem Relation Age of Onset   • Coronary artery disease Mother    • Heart attack Mother    • Coronary artery disease Father    ,   Social History   Substance Use Topics   • Smoking status: Never Smoker   • Smokeless tobacco: Never Used   • Alcohol use No   ,     Medications  Current Outpatient Prescriptions   Medication Sig Dispense Refill   • aspirin 81 MG EC tablet Take 81 mg by mouth Daily.     • azithromycin (ZITHROMAX) 250 MG tablet Take 2 tabs (500 mg) on Day 1, and take 1 tab (250 mg) on days 2 through 5.     • hydrochlorothiazide (MICROZIDE) 12.5 MG capsule Take 1 capsule by mouth Daily. 90 capsule 3   • isosorbide mononitrate (IMDUR) 30  "MG 24 hr tablet Take 1 tablet by mouth Daily. 90 tablet 3   • levothyroxine (SYNTHROID, LEVOTHROID) 25 MCG tablet Take 25 mcg by mouth Daily.     • metoprolol succinate XL (TOPROL-XL) 25 MG 24 hr tablet TAKE 3 TABLETS DAILY 270 tablet 3   • Multiple Vitamins-Minerals (OCUVITE ADULT 50+ PO) Take  by mouth.     • Omega-3 Fatty Acids (FISH OIL) 1000 MG capsule capsule Take  by mouth Daily With Breakfast.     • potassium chloride (K-DUR) 10 MEQ CR tablet Take 1 tablet by mouth Daily. 90 tablet 3   • vitamin D (ERGOCALCIFEROL) 35153 units capsule capsule Take  by mouth.     • Zolpidem Tartrate 10 MG sublingual tablet Place  under the tongue.       No current facility-administered medications for this visit.        Allergies:  Latex; Amoxicillin-pot clavulanate; Barley grass; Codeine; Contrast dye; Fentanyl; and Midazolam    Review of Systems  Review of Systems   Constitution: Positive for malaise/fatigue.   HENT: Negative.    Eyes: Negative.    Cardiovascular: Positive for dyspnea on exertion and leg swelling. Negative for chest pain, claudication, cyanosis, irregular heartbeat, near-syncope, orthopnea, palpitations, paroxysmal nocturnal dyspnea and syncope.   Respiratory: Negative.    Endocrine: Negative.    Hematologic/Lymphatic: Negative.    Skin: Negative.    Gastrointestinal: Negative for anorexia.   Genitourinary: Negative.    Neurological: Negative.    Psychiatric/Behavioral: Negative.        Objective     Physical Exam:  /66  Pulse 83  Ht 63\" (160 cm)  Wt 160 lb (72.6 kg)  SpO2 97%  BMI 28.34 kg/m2  Physical Exam   Constitutional: She appears well-developed.   HENT:   Head: Normocephalic.   Neck: Normal carotid pulses and no JVD present. No tracheal tenderness present. Carotid bruit is not present. No tracheal deviation and no edema present.   Cardiovascular: Regular rhythm and normal pulses.    Murmur heard.   Systolic murmur is present with a grade of 2/6   Pulmonary/Chest: Effort normal. No stridor. "   Abdominal: Soft.   Neurological: She is alert. She has normal strength. No cranial nerve deficit or sensory deficit.   Skin: Skin is warm.   Psychiatric: She has a normal mood and affect. Her speech is normal and behavior is normal.       Results Review:       ECG 12 Lead  Date/Time: 10/12/2017 8:43 AM  Performed by: DIRK PEARSON  Authorized by: DIRK PEARSON   Comparison: compared with previous ECG from 1/10/2017  Comparison to previous ECG: Atrial pacemaker  Non specific ST T changes are new  Rhythm: paced  Rate: normal  Conduction: conduction normal  QRS axis: normal  Clinical impression: abnormal ECG and non-specific ECG            Assessment/Plan   Patient Active Problem List   Diagnosis   • Dyspnea on exertion   • Sick sinus syndrome   • SVT (supraventricular tachycardia)   • Essential hypertension   • Elevated hemoglobin A1c   • Mixed hyperlipidemia   • Exercise hypoxemia       Stable doing well. No exertional chest pain or excessive dyspnea. Chronic shortness of breath. No palpitations. No significant pedal edema. Compliant with medications and diet. Latest labs and medications reviewed.    Plan:    Check echocardiogram   CBC CMP BNP TSH    PFT  Flexible diuretic dosing   Close follow up with you as scheduled.  Intensive factor modifications.  See order list.    Counseled regarding disease appropriate diet, fluid, caffeine, stimulants and sodium intake as well as importance of compliance to diet, exercise and regular follow up.  Recommend oxygen therapy as as resting oxygen saturation is 95% . Desaturation with light exertion to 83%. 100 % with 2 LPM supplemental oxygen in recovery on exertion.     Return in about 4 weeks (around 11/9/2017).

## 2017-10-18 ENCOUNTER — HOSPITAL ENCOUNTER (OUTPATIENT)
Dept: PULMONOLOGY | Facility: HOSPITAL | Age: 82
Discharge: HOME | End: 2017-10-18
Attending: INTERNAL MEDICINE

## 2017-10-18 ENCOUNTER — HOSPITAL ENCOUNTER (OUTPATIENT)
Dept: CARDIOLOGY | Facility: HOSPITAL | Age: 82
Discharge: HOME | End: 2017-10-18
Attending: INTERNAL MEDICINE | Admitting: INTERNAL MEDICINE

## 2017-10-18 VITALS
WEIGHT: 160 LBS | DIASTOLIC BLOOD PRESSURE: 68 MMHG | BODY MASS INDEX: 28.35 KG/M2 | HEIGHT: 63 IN | SYSTOLIC BLOOD PRESSURE: 100 MMHG

## 2017-10-18 DIAGNOSIS — R06.09 DYSPNEA ON EXERTION: ICD-10-CM

## 2017-10-18 PROCEDURE — 93306 TTE W/DOPPLER COMPLETE: CPT

## 2017-10-18 PROCEDURE — 94729 DIFFUSING CAPACITY: CPT

## 2017-10-18 PROCEDURE — 94060 EVALUATION OF WHEEZING: CPT

## 2017-10-18 PROCEDURE — 63710000001 ALBUTEROL PER 1 MG: Performed by: INTERNAL MEDICINE

## 2017-10-18 PROCEDURE — 93306 TTE W/DOPPLER COMPLETE: CPT | Performed by: INTERNAL MEDICINE

## 2017-10-18 PROCEDURE — 94726 PLETHYSMOGRAPHY LUNG VOLUMES: CPT

## 2017-10-18 PROCEDURE — A9270 NON-COVERED ITEM OR SERVICE: HCPCS | Performed by: INTERNAL MEDICINE

## 2017-10-18 RX ORDER — ALBUTEROL SULFATE 2.5 MG/3ML
2.5 SOLUTION RESPIRATORY (INHALATION) ONCE
Status: COMPLETED | OUTPATIENT
Start: 2017-10-18 | End: 2017-10-18

## 2017-10-18 RX ADMIN — ALBUTEROL SULFATE 2.5 MG: 2.5 SOLUTION RESPIRATORY (INHALATION) at 10:23

## 2017-10-21 LAB
BH CV ECHO MEAS - AO MAX PG (FULL): 2.2 MMHG
BH CV ECHO MEAS - AO MAX PG: 5 MMHG
BH CV ECHO MEAS - AO MEAN PG (FULL): 1 MMHG
BH CV ECHO MEAS - AO MEAN PG: 2 MMHG
BH CV ECHO MEAS - AO ROOT AREA (BSA CORRECTED): 1.8
BH CV ECHO MEAS - AO ROOT AREA: 7.5 CM^2
BH CV ECHO MEAS - AO ROOT DIAM: 3.1 CM
BH CV ECHO MEAS - AO V2 MAX: 112 CM/SEC
BH CV ECHO MEAS - AO V2 MEAN: 70.8 CM/SEC
BH CV ECHO MEAS - AO V2 VTI: 22.6 CM
BH CV ECHO MEAS - AVA(I,A): 2.2 CM^2
BH CV ECHO MEAS - AVA(I,D): 2.2 CM^2
BH CV ECHO MEAS - AVA(V,A): 1.9 CM^2
BH CV ECHO MEAS - AVA(V,D): 1.9 CM^2
BH CV ECHO MEAS - BSA(HAYCOCK): 1.8 M^2
BH CV ECHO MEAS - BSA: 1.8 M^2
BH CV ECHO MEAS - BZI_BMI: 28.3 KILOGRAMS/M^2
BH CV ECHO MEAS - BZI_METRIC_HEIGHT: 160 CM
BH CV ECHO MEAS - BZI_METRIC_WEIGHT: 72.6 KG
BH CV ECHO MEAS - EDV(CUBED): 39.3 ML
BH CV ECHO MEAS - EDV(TEICH): 47.4 ML
BH CV ECHO MEAS - EF(CUBED): 72.9 %
BH CV ECHO MEAS - EF(TEICH): 65.8 %
BH CV ECHO MEAS - ESV(CUBED): 10.6 ML
BH CV ECHO MEAS - ESV(TEICH): 16.2 ML
BH CV ECHO MEAS - FS: 35.3 %
BH CV ECHO MEAS - IVS/LVPW: 1.1
BH CV ECHO MEAS - IVSD: 1 CM
BH CV ECHO MEAS - LA DIMENSION: 2.6 CM
BH CV ECHO MEAS - LA/AO: 0.84
BH CV ECHO MEAS - LAT PEAK E' VEL: 7.9 CM/SEC
BH CV ECHO MEAS - LV MASS(C)D: 91.8 GRAMS
BH CV ECHO MEAS - LV MASS(C)DI: 52.2 GRAMS/M^2
BH CV ECHO MEAS - LV MAX PG: 2.8 MMHG
BH CV ECHO MEAS - LV MEAN PG: 1 MMHG
BH CV ECHO MEAS - LV V1 MAX: 83.5 CM/SEC
BH CV ECHO MEAS - LV V1 MEAN: 54 CM/SEC
BH CV ECHO MEAS - LV V1 VTI: 19.8 CM
BH CV ECHO MEAS - LVIDD: 3.4 CM
BH CV ECHO MEAS - LVIDS: 2.2 CM
BH CV ECHO MEAS - LVOT AREA (M): 2.5 CM^2
BH CV ECHO MEAS - LVOT AREA: 2.5 CM^2
BH CV ECHO MEAS - LVOT DIAM: 1.8 CM
BH CV ECHO MEAS - LVPWD: 0.9 CM
BH CV ECHO MEAS - MED PEAK E' VEL: 8.49 CM/SEC
BH CV ECHO MEAS - MV A MAX VEL: 91.7 CM/SEC
BH CV ECHO MEAS - MV DEC TIME: 0.23 SEC
BH CV ECHO MEAS - MV E MAX VEL: 101 CM/SEC
BH CV ECHO MEAS - MV E/A: 1.1
BH CV ECHO MEAS - RAP SYSTOLE: 5 MMHG
BH CV ECHO MEAS - RVSP: 24.2 MMHG
BH CV ECHO MEAS - SI(AO): 97 ML/M^2
BH CV ECHO MEAS - SI(CUBED): 16.3 ML/M^2
BH CV ECHO MEAS - SI(LVOT): 28.6 ML/M^2
BH CV ECHO MEAS - SI(TEICH): 17.8 ML/M^2
BH CV ECHO MEAS - SV(AO): 170.6 ML
BH CV ECHO MEAS - SV(CUBED): 28.7 ML
BH CV ECHO MEAS - SV(LVOT): 50.4 ML
BH CV ECHO MEAS - SV(TEICH): 31.2 ML
BH CV ECHO MEAS - TR MAX VEL: 219 CM/SEC
E/E' RATIO: 11.9
LEFT ATRIUM VOLUME INDEX: 14.5 ML/M2
LEFT ATRIUM VOLUME: 25.5 CM3
LV EF 2D ECHO EST: 60 %

## 2017-10-30 RX ORDER — POTASSIUM CHLORIDE 750 MG/1
TABLET, EXTENDED RELEASE ORAL
Qty: 90 TABLET | Refills: 3 | Status: SHIPPED | OUTPATIENT
Start: 2017-10-30 | End: 2018-03-30 | Stop reason: SDUPTHER

## 2017-10-30 RX ORDER — HYDROCHLOROTHIAZIDE 12.5 MG/1
CAPSULE, GELATIN COATED ORAL
Qty: 90 CAPSULE | Refills: 3 | Status: SHIPPED | OUTPATIENT
Start: 2017-10-30 | End: 2019-06-05 | Stop reason: SDUPTHER

## 2017-11-02 ENCOUNTER — OFFICE VISIT (OUTPATIENT)
Dept: PRIMARY CARE CLINIC | Age: 82
End: 2017-11-02
Payer: MEDICARE

## 2017-11-02 VITALS
HEIGHT: 63 IN | TEMPERATURE: 96.6 F | SYSTOLIC BLOOD PRESSURE: 122 MMHG | HEART RATE: 69 BPM | WEIGHT: 158.5 LBS | DIASTOLIC BLOOD PRESSURE: 68 MMHG | BODY MASS INDEX: 28.08 KG/M2 | OXYGEN SATURATION: 96 %

## 2017-11-02 DIAGNOSIS — A49.8: ICD-10-CM

## 2017-11-02 DIAGNOSIS — E55.9 VITAMIN D DEFICIENCY: ICD-10-CM

## 2017-11-02 DIAGNOSIS — A09 TRAVELER'S DIARRHEA: Primary | ICD-10-CM

## 2017-11-02 PROCEDURE — 1123F ACP DISCUSS/DSCN MKR DOCD: CPT | Performed by: NURSE PRACTITIONER

## 2017-11-02 PROCEDURE — 1036F TOBACCO NON-USER: CPT | Performed by: NURSE PRACTITIONER

## 2017-11-02 PROCEDURE — G8400 PT W/DXA NO RESULTS DOC: HCPCS | Performed by: NURSE PRACTITIONER

## 2017-11-02 PROCEDURE — G8484 FLU IMMUNIZE NO ADMIN: HCPCS | Performed by: NURSE PRACTITIONER

## 2017-11-02 PROCEDURE — G8427 DOCREV CUR MEDS BY ELIG CLIN: HCPCS | Performed by: NURSE PRACTITIONER

## 2017-11-02 PROCEDURE — 1090F PRES/ABSN URINE INCON ASSESS: CPT | Performed by: NURSE PRACTITIONER

## 2017-11-02 PROCEDURE — G8419 CALC BMI OUT NRM PARAM NOF/U: HCPCS | Performed by: NURSE PRACTITIONER

## 2017-11-02 PROCEDURE — 4040F PNEUMOC VAC/ADMIN/RCVD: CPT | Performed by: NURSE PRACTITIONER

## 2017-11-02 PROCEDURE — 99214 OFFICE O/P EST MOD 30 MIN: CPT | Performed by: NURSE PRACTITIONER

## 2017-11-02 RX ORDER — SULFAMETHOXAZOLE AND TRIMETHOPRIM 800; 160 MG/1; MG/1
1 TABLET ORAL 2 TIMES DAILY
Qty: 14 TABLET | Refills: 0 | Status: SHIPPED | OUTPATIENT
Start: 2017-11-02 | End: 2017-11-09

## 2017-11-02 RX ORDER — ERGOCALCIFEROL (VITAMIN D2) 1250 MCG
50000 CAPSULE ORAL WEEKLY
Qty: 12 CAPSULE | Refills: 2 | Status: SHIPPED | OUTPATIENT
Start: 2017-11-02 | End: 2019-10-29

## 2017-11-02 RX ORDER — ERGOCALCIFEROL 1.25 MG/1
50000 CAPSULE ORAL WEEKLY
Qty: 12 CAPSULE | Refills: 3 | Status: CANCELLED | OUTPATIENT
Start: 2017-11-02 | End: 2018-05-03

## 2017-11-02 ASSESSMENT — ENCOUNTER SYMPTOMS
CONSTIPATION: 0
ANAL BLEEDING: 0
BLOOD IN STOOL: 0
EYES NEGATIVE: 1
ABDOMINAL PAIN: 0
DIARRHEA: 1
ABDOMINAL DISTENTION: 0
NAUSEA: 0
RESPIRATORY NEGATIVE: 1
VOMITING: 0

## 2017-11-02 NOTE — PROGRESS NOTES
[facility]  [address]  [address] [facility] [address]  Dept: [phone]  Dept Fax: [phone]  Loc: [phone]    Mary Ann Haines is a 80 y.o. female who presents today for her medical conditions/complaints as noted below. Mary Ann Haines is c/o of Follow-up (Follow up on diarrhea. Improving having 2 bowel movements daily. )      Chief Complaint   Patient presents with    Follow-up     Follow up on diarrhea. Improving having 2 bowel movements daily. HPI:     HPI  Patient here for follow up on diarrhea. She was seen approx 1 month ago and diagnosed with ecoli in the stool. She was unable to tolerate Cipro and was switched to azithromycin. She reports still having 2-3 episodes of loose stool daily. She denies foul odor or abd pain. Past Medical History:   Diagnosis Date    Dermatomyositis ([address]     Pancreatitis         Past Surgical History:   Procedure Laterality Date    CHOLECYSTECTOMY      HYSTERECTOMY         Social History   Substance Use Topics    Smoking status: Never Smoker    Smokeless tobacco: Not on file    Alcohol use No        Current Outpatient Prescriptions   Medication Sig Dispense Refill    sulfamethoxazole-trimethoprim (BACTRIM DS) 800-160 MG per tablet Take 1 tablet by mouth 2 times daily for 7 days 14 tablet 0    ergocalciferol (ERGOCALCIFEROL) 48280 units capsule Take 1 capsule by mouth once a week 12 capsule 2    zolpidem (AMBIEN) 10 MG tablet Take 1 tablet by mouth nightly as needed for Sleep 90 tablet 1    levothyroxine (SYNTHROID) 25 MCG tablet TAKE 1 TABLET DAILY 90 tablet 2    Prenatal Vit-Fe Fumarate-FA (PNV PRENATAL PLUS MULTIVITAMIN) 27-1 MG TABS TAKE 1 TABLET DAILY 90 tablet 2    aspirin 81 MG tablet Take 81 mg by mouth daily      miconazole (ZEASORB-AF) 2 % powder Apply topically 2 times daily.  45 g 1    hydrochlorothiazide (MICROZIDE) 12.5 MG capsule       TOPROL XL 25 MG extended release tablet       KLOR-CON M10 10 MEQ extended release tablet       isosorbide mononitrate (IMDUR) 30 MG CR tablet Take 30 mg by mouth daily        No current facility-administered medications for this visit. Allergies   Allergen Reactions    Latex     Augmentin [Amoxicillin-Pot Clavulanate]     Codeine     Fentanyl     Iodine     Versed [Midazolam]        History reviewed. No pertinent family history. Subjective:      Review of Systems   Constitutional: Negative. HENT: Negative. Eyes: Negative. Respiratory: Negative. Cardiovascular: Negative. Gastrointestinal: Positive for diarrhea. Negative for abdominal distention, abdominal pain, anal bleeding, blood in stool, constipation, nausea and vomiting. Endocrine: Negative. Genitourinary: Negative. Musculoskeletal: Negative. Skin: Negative. Neurological: Negative. Hematological: Negative. Psychiatric/Behavioral: Negative. Objective:     Physical Exam   Constitutional: She is oriented to person, place, and time. Vital signs are normal. She appears well-developed and well-nourished. HENT:   Head: Normocephalic and atraumatic. Right Ear: Hearing, tympanic membrane, external ear and ear canal normal.   Left Ear: Hearing, tympanic membrane, external ear and ear canal normal.   Nose: Nose normal.   Mouth/Throat: Uvula is midline, oropharynx is clear and moist and mucous membranes are normal.   Eyes: Conjunctivae, EOM and lids are normal. Pupils are equal, round, and reactive to light. Neck: Trachea normal and normal range of motion. Neck supple. No thyroid mass and no thyromegaly present. Cardiovascular: Normal rate, regular rhythm, normal heart sounds and normal pulses. Pulmonary/Chest: Effort normal and breath sounds normal.   Abdominal: Soft. Normal appearance. Bowel sounds are increased. There is no tenderness. Musculoskeletal: Normal range of motion.         Cervical back: Normal. She exhibits normal range of motion and no tenderness. Thoracic back: Normal. She exhibits normal range of motion and no tenderness. Lumbar back: Normal. She exhibits normal range of motion and no tenderness. Neurological: She is alert and oriented to person, place, and time. She has normal strength. Skin: Skin is warm, dry and intact. Psychiatric: She has a normal mood and affect. Her speech is normal and behavior is normal. Judgment and thought content normal. Cognition and memory are normal.   Nursing note and vitals reviewed. /68   Pulse 69   Temp 96.6 °F (35.9 °C) (Temporal)   Ht 5' 3\" (1.6 m)   Wt 158 lb 8 oz (71.9 kg)   SpO2 96%   BMI 28.08 kg/m²     Assessment:     1. Traveler's diarrhea  sulfamethoxazole-trimethoprim (BACTRIM DS) 800-160 MG per tablet   2. Infection, E coli     3. Vitamin D deficiency  ergocalciferol (ERGOCALCIFEROL) 45439 units capsule       No results found for this visit on 11/02/17. Plan:     Patient reports she can not tolerate Cipro very well, therefore I am starting her on BactrimDS as indicated for traveler's diarrhea. She is to take this for the next 7 days. If she continues to have symptoms after completion of this abx she is to call for appointment to discuss next step. If symptoms/diarrhea resolves she can wait for follow up as scheduled in Dec with Dr Helen Whitmore. Patient verbalized understanding and no questions were asked. Return for Keep follow up as scheduled. No orders of the defined types were placed in this encounter. Orders Placed This Encounter   Medications    sulfamethoxazole-trimethoprim (BACTRIM DS) 800-160 MG per tablet     Sig: Take 1 tablet by mouth 2 times daily for 7 days     Dispense:  14 tablet     Refill:  0    ergocalciferol (ERGOCALCIFEROL) 29335 units capsule     Sig: Take 1 capsule by mouth once a week     Dispense:  12 capsule     Refill:  2        Patient given educational materials - see patient instructions. Discussed use, benefit, and side effects of prescribed medications. All patient questions answered. Pt voiced understanding. Reviewed health maintenance. Instructed to continue current medications, diet and exercise. Patient agreed with treatment plan. Follow up as directed.            Electronically signed by JAZMIN Villarreal on 11/2/2017 at 3:14 PM

## 2017-11-06 ENCOUNTER — LAB (OUTPATIENT)
Dept: LAB | Facility: HOSPITAL | Age: 82
End: 2017-11-06
Attending: INTERNAL MEDICINE

## 2017-11-06 ENCOUNTER — OFFICE VISIT (OUTPATIENT)
Dept: CARDIOLOGY | Facility: CLINIC | Age: 82
End: 2017-11-06

## 2017-11-06 VITALS
OXYGEN SATURATION: 98 % | SYSTOLIC BLOOD PRESSURE: 132 MMHG | BODY MASS INDEX: 28 KG/M2 | WEIGHT: 158 LBS | HEART RATE: 91 BPM | DIASTOLIC BLOOD PRESSURE: 74 MMHG | HEIGHT: 63 IN

## 2017-11-06 DIAGNOSIS — R06.09 DYSPNEA ON EXERTION: ICD-10-CM

## 2017-11-06 DIAGNOSIS — I10 ESSENTIAL HYPERTENSION: ICD-10-CM

## 2017-11-06 DIAGNOSIS — Z01.810 PREOP CARDIOVASCULAR EXAM: ICD-10-CM

## 2017-11-06 DIAGNOSIS — I49.5 SICK SINUS SYNDROME (HCC): Primary | ICD-10-CM

## 2017-11-06 DIAGNOSIS — R73.09 ELEVATED HEMOGLOBIN A1C: ICD-10-CM

## 2017-11-06 DIAGNOSIS — R53.83 FATIGUE, UNSPECIFIED TYPE: ICD-10-CM

## 2017-11-06 DIAGNOSIS — E78.2 MIXED HYPERLIPIDEMIA: ICD-10-CM

## 2017-11-06 DIAGNOSIS — I47.1 SVT (SUPRAVENTRICULAR TACHYCARDIA) (HCC): ICD-10-CM

## 2017-11-06 DIAGNOSIS — R09.02 EXERCISE HYPOXEMIA: ICD-10-CM

## 2017-11-06 LAB
ALBUMIN SERPL-MCNC: 4.7 G/DL (ref 3.5–5)
ALBUMIN/GLOB SERPL: 1.8 G/DL (ref 1.1–2.5)
ALP SERPL-CCNC: 102 U/L (ref 24–120)
ALT SERPL W P-5'-P-CCNC: 34 U/L (ref 0–54)
ANION GAP SERPL CALCULATED.3IONS-SCNC: 12 MMOL/L (ref 4–13)
AST SERPL-CCNC: 33 U/L (ref 7–45)
BASOPHILS # BLD AUTO: 0.07 10*3/MM3 (ref 0–0.2)
BASOPHILS NFR BLD AUTO: 1.1 % (ref 0–2)
BILIRUB SERPL-MCNC: 0.3 MG/DL (ref 0.1–1)
BUN BLD-MCNC: 18 MG/DL (ref 5–21)
BUN/CREAT SERPL: 15.5 (ref 7–25)
CALCIUM SPEC-SCNC: 9.6 MG/DL (ref 8.4–10.4)
CHLORIDE SERPL-SCNC: 97 MMOL/L (ref 98–110)
CO2 SERPL-SCNC: 28 MMOL/L (ref 24–31)
CREAT BLD-MCNC: 1.16 MG/DL (ref 0.5–1.4)
DEPRECATED RDW RBC AUTO: 46.4 FL (ref 40–54)
EOSINOPHIL # BLD AUTO: 0.29 10*3/MM3 (ref 0–0.7)
EOSINOPHIL NFR BLD AUTO: 4.4 % (ref 0–4)
ERYTHROCYTE [DISTWIDTH] IN BLOOD BY AUTOMATED COUNT: 14.1 % (ref 12–15)
GFR SERPL CREATININE-BSD FRML MDRD: 45 ML/MIN/1.73
GLOBULIN UR ELPH-MCNC: 2.6 GM/DL
GLUCOSE BLD-MCNC: 103 MG/DL (ref 70–100)
HCT VFR BLD AUTO: 36.8 % (ref 37–47)
HGB BLD-MCNC: 11.9 G/DL (ref 12–16)
IMM GRANULOCYTES # BLD: 0.02 10*3/MM3 (ref 0–0.03)
IMM GRANULOCYTES NFR BLD: 0.3 % (ref 0–5)
LYMPHOCYTES # BLD AUTO: 2.56 10*3/MM3 (ref 0.72–4.86)
LYMPHOCYTES NFR BLD AUTO: 38.7 % (ref 15–45)
MCH RBC QN AUTO: 29 PG (ref 28–32)
MCHC RBC AUTO-ENTMCNC: 32.3 G/DL (ref 33–36)
MCV RBC AUTO: 89.8 FL (ref 82–98)
MONOCYTES # BLD AUTO: 0.68 10*3/MM3 (ref 0.19–1.3)
MONOCYTES NFR BLD AUTO: 10.3 % (ref 4–12)
NEUTROPHILS # BLD AUTO: 2.99 10*3/MM3 (ref 1.87–8.4)
NEUTROPHILS NFR BLD AUTO: 45.2 % (ref 39–78)
NT-PROBNP SERPL-MCNC: 381 PG/ML (ref 0–1800)
PLATELET # BLD AUTO: 223 10*3/MM3 (ref 130–400)
PMV BLD AUTO: 10.6 FL (ref 6–12)
POTASSIUM BLD-SCNC: 4.4 MMOL/L (ref 3.5–5.3)
PROT SERPL-MCNC: 7.3 G/DL (ref 6.3–8.7)
RBC # BLD AUTO: 4.1 10*6/MM3 (ref 4.2–5.4)
SODIUM BLD-SCNC: 137 MMOL/L (ref 135–145)
TSH SERPL DL<=0.05 MIU/L-ACNC: 1.52 MIU/ML (ref 0.47–4.68)
WBC NRBC COR # BLD: 6.61 10*3/MM3 (ref 4.8–10.8)

## 2017-11-06 PROCEDURE — 80053 COMPREHEN METABOLIC PANEL: CPT | Performed by: INTERNAL MEDICINE

## 2017-11-06 PROCEDURE — 85025 COMPLETE CBC W/AUTO DIFF WBC: CPT | Performed by: INTERNAL MEDICINE

## 2017-11-06 PROCEDURE — 36415 COLL VENOUS BLD VENIPUNCTURE: CPT

## 2017-11-06 PROCEDURE — 99214 OFFICE O/P EST MOD 30 MIN: CPT | Performed by: INTERNAL MEDICINE

## 2017-11-06 PROCEDURE — 84443 ASSAY THYROID STIM HORMONE: CPT | Performed by: INTERNAL MEDICINE

## 2017-11-06 PROCEDURE — 83880 ASSAY OF NATRIURETIC PEPTIDE: CPT | Performed by: INTERNAL MEDICINE

## 2017-11-06 RX ORDER — SULFAMETHOXAZOLE AND TRIMETHOPRIM 800; 160 MG/1; MG/1
TABLET ORAL
COMMUNITY
Start: 2017-11-02 | End: 2017-11-09

## 2017-11-06 RX ORDER — LEVALBUTEROL TARTRATE 45 UG/1
1-2 AEROSOL, METERED ORAL EVERY 4 HOURS PRN
Qty: 1 INHALER | Refills: 11 | Status: SHIPPED | OUTPATIENT
Start: 2017-11-06 | End: 2018-02-06 | Stop reason: SDUPTHER

## 2017-11-06 NOTE — PROGRESS NOTES
Violet Fajardo  [id number]  1934  83 y.o.  female    Referring Provider: Onofre Nascimento MD    Reason for Follow-up Visit:  Routine follow up.   Here for follow up after cardiac testing.   Sick sinus syndrome  Dyspnea on exertion     Subjective    Marked Dyspnea on exertion   Overall not feeling well   No chest pain    No palpitations  Compliant with medications  Severely effort tolerance, cannot walk 1 block  Moderate to severe exertional shortness of breath on exertion relieved with rest  No significant cough or wheezing  Going on for several months  No palpitations  No associated chest pain  No significant pedal edema  No fever or chills  No significant expectoration  No hemoptysis  No presyncope or syncope        History of present illness:  Violet Fajardo is a 83 y.o. yo female with history of sick sinus syndrome s/p pacemaker who presents today for   Chief Complaint   Patient presents with   • SSS     4 WK FU-TEST RESULTS   • Shortness of Breath     WITH EXERTION    .    History  Past Medical History:   Diagnosis Date   • CHF (congestive heart failure)    • Dermatomyositis    • Dermatomyositis    • Dyspnea    • Fibromyalgia    • Hyperlipemia, mixed    • Hyperlipidemia    • Hypothyroidism    • Mitral valve disorder     History of MVP   • Mitral valve disorder    • Pacemaker    • Pacemaker    • Respiratory infection    • Sick sinus syndrome    • Sleep apnea    • Stroke    • SVT (supraventricular tachycardia)    ,   Past Surgical History:   Procedure Laterality Date   • CARDIAC CATHETERIZATION     • CHOLECYSTECTOMY     • FOOT SURGERY     • HYSTERECTOMY     • INSERT / REPLACE / REMOVE PACEMAKER     • PACEMAKER IMPLANTATION  12/15/2011    [employer] (4/16/2012)-lead repositioning   ,   Family History   Problem Relation Age of Onset   • Coronary artery disease Mother    • Heart attack Mother    • Coronary artery disease Father    ,   Social History   Substance Use Topics   • Smoking status: Never  Smoker   • Smokeless tobacco: Never Used   • Alcohol use No   ,     Medications  Current Outpatient Prescriptions   Medication Sig Dispense Refill   • aspirin 81 MG EC tablet Take 81 mg by mouth Daily.     • ergocalciferol (ERGOCALCIFEROL) 35314 units capsule Take  by mouth.     • hydrochlorothiazide (MICROZIDE) 12.5 MG capsule TAKE 1 CAPSULE DAILY 90 capsule 3   • isosorbide mononitrate (IMDUR) 30 MG 24 hr tablet Take 1 tablet by mouth Daily. 90 tablet 3   • levothyroxine (SYNTHROID, LEVOTHROID) 25 MCG tablet Take 25 mcg by mouth Daily.     • metoprolol succinate XL (TOPROL-XL) 25 MG 24 hr tablet TAKE 3 TABLETS DAILY 270 tablet 3   • Multiple Vitamins-Minerals (OCUVITE ADULT 50+ PO) Take  by mouth.     • Omega-3 Fatty Acids (FISH OIL) 1000 MG capsule capsule Take  by mouth Daily With Breakfast.     • potassium chloride (K-DUR,KLOR-CON) 10 MEQ CR tablet TAKE 1 TABLET DAILY 90 tablet 3   • sulfamethoxazole-trimethoprim (BACTRIM DS,SEPTRA DS) 800-160 MG per tablet Take  by mouth.     • vitamin D (ERGOCALCIFEROL) 08757 units capsule capsule Take  by mouth.     • Zolpidem Tartrate 10 MG sublingual tablet Place  under the tongue.     • ipratropium (ATROVENT HFA) 17 MCG/ACT inhaler Inhale 2 puffs 4 (Four) Times a Day. 1 inhaler 11   • levalbuterol (XOPENEX HFA) 45 MCG/ACT inhaler Inhale 1-2 puffs Every 4 (Four) Hours As Needed for Wheezing. 1 inhaler 11     No current facility-administered medications for this visit.        Allergies:  Latex; Amoxicillin-pot clavulanate; Barley grass; Codeine; Contrast dye; Fentanyl; and Midazolam    Review of Systems  Review of Systems   Constitution: Positive for malaise/fatigue.   HENT: Negative.    Eyes: Negative.    Cardiovascular: Positive for dyspnea on exertion and leg swelling. Negative for chest pain, claudication, cyanosis, irregular heartbeat, near-syncope, orthopnea, palpitations, paroxysmal nocturnal dyspnea and syncope.   Respiratory: Negative.    Endocrine: Negative.   "  Hematologic/Lymphatic: Negative.    Skin: Negative.    Gastrointestinal: Negative for anorexia.   Genitourinary: Negative.    Neurological: Negative.    Psychiatric/Behavioral: Negative.        Objective     Physical Exam:  /74  Pulse 91  Ht 63\" (160 cm)  Wt 158 lb (71.7 kg)  SpO2 98%  BMI 27.99 kg/m2  Physical Exam   Constitutional: She appears well-developed.   HENT:   Head: Normocephalic.   Neck: Normal carotid pulses and no JVD present. No tracheal tenderness present. Carotid bruit is not present. No tracheal deviation and no edema present.   Cardiovascular: Regular rhythm and normal pulses.    Murmur heard.   Systolic murmur is present with a grade of 2/6   Pulmonary/Chest: Effort normal. No stridor.   Abdominal: Soft.   Neurological: She is alert. She has normal strength. No cranial nerve deficit or sensory deficit.   Skin: Skin is warm.   Psychiatric: She has a normal mood and affect. Her speech is normal and behavior is normal.       Results Review:     Procedures    Assessment/Plan   Patient Active Problem List   Diagnosis   • Dyspnea on exertion   • Sick sinus syndrome   • SVT (supraventricular tachycardia)   • Essential hypertension   • Elevated hemoglobin A1c   • Mixed hyperlipidemia   • Exercise hypoxemia   • Preop cardiovascular exam eye surgery for left intraocular mass       Stable doing well. No exertional chest pain or excessive dyspnea. Chronic shortness of breath. No palpitations. No significant pedal edema. Compliant with medications and diet. Latest labs and medications reviewed.  Echo normal LVEF  No pulmonary hypertension  CBC CMP BNP TSH  pending    Plan:    Increased but acceptable cardiovascular risk of eye surgery.  Can proceed with surgery with usual caution and perioperative hemodynamic and cardiac rhythm monitoring.   Need to monitor lung isues  Continue same medications   Keep f/u with Dr Romeo in 2 weeks  Flexible diuretic dosing   Close follow up with you as " scheduled.  Intensive factor modifications.  See order list.    Counseled regarding disease appropriate diet, fluid, caffeine, stimulants and sodium intake as well as importance of compliance to diet, exercise and regular follow up.  Continue continues oxygen therapy as as resting oxygen saturation is 95% . Desaturation with light exertion to 83%. 100 % with 2 LPM supplemental oxygen in recovery on exertion.  Atrovent inhaler 2 puffs QID  Xopenex 2 puffs QID  Monitor for any signs of bleeding including red or dark stools. Fall precautions.   Patient is asked to monitor BP at home or work, several times per month and return with written values at next office visit.   Monitor cardiac rhythm device function regularly per established schedule or PRN      Return in about 3 months (around 2/6/2018).

## 2017-11-14 ENCOUNTER — TELEPHONE (OUTPATIENT)
Dept: CARDIOLOGY | Facility: CLINIC | Age: 82
End: 2017-11-14

## 2017-11-14 NOTE — TELEPHONE ENCOUNTER
PT STATES THAT SHE SAW DR PARSONS & THAT HE FEELS AS IF SHE DOES NOT NEED OXYGEN & THAT THE INHALERS SHE HAS ARE ENOUGH. PT STATES SHE HAS BEEN DIAGNOSED WITH ASTHMA & COPD.    PT WANTS YOUR OPINION BEFORE DISCONTINUING OXYGEN THERAPY. IF YOU FEEL IT WOULD BENEFIT HER HEART, SHE WILL CONTINUE O2.     PLEASE ADVISE.

## 2017-11-27 ENCOUNTER — OFFICE VISIT (OUTPATIENT)
Dept: PRIMARY CARE CLINIC | Age: 82
End: 2017-11-27
Payer: MEDICARE

## 2017-11-27 VITALS
BODY MASS INDEX: 28.42 KG/M2 | DIASTOLIC BLOOD PRESSURE: 82 MMHG | HEIGHT: 63 IN | TEMPERATURE: 97.4 F | SYSTOLIC BLOOD PRESSURE: 124 MMHG | HEART RATE: 81 BPM | OXYGEN SATURATION: 99 % | WEIGHT: 160.4 LBS

## 2017-11-27 DIAGNOSIS — N89.8 VAGINAL DRYNESS: ICD-10-CM

## 2017-11-27 DIAGNOSIS — N30.00 ACUTE CYSTITIS WITHOUT HEMATURIA: ICD-10-CM

## 2017-11-27 DIAGNOSIS — K13.79 MOUTH SORE: Primary | ICD-10-CM

## 2017-11-27 LAB
APPEARANCE FLUID: NORMAL
BILIRUBIN, POC: NORMAL
BLOOD URINE, POC: NORMAL
CLARITY, POC: CLEAR
COLOR, POC: YELLOW
GLUCOSE URINE, POC: NORMAL
KETONES, POC: NORMAL
LEUKOCYTE EST, POC: NORMAL
NITRITE, POC: NORMAL
PH, POC: 5
PROTEIN, POC: NORMAL
SPECIFIC GRAVITY, POC: 1.01
UROBILINOGEN, POC: 0.2

## 2017-11-27 PROCEDURE — G8400 PT W/DXA NO RESULTS DOC: HCPCS | Performed by: FAMILY MEDICINE

## 2017-11-27 PROCEDURE — G8427 DOCREV CUR MEDS BY ELIG CLIN: HCPCS | Performed by: FAMILY MEDICINE

## 2017-11-27 PROCEDURE — 1036F TOBACCO NON-USER: CPT | Performed by: FAMILY MEDICINE

## 2017-11-27 PROCEDURE — 1090F PRES/ABSN URINE INCON ASSESS: CPT | Performed by: FAMILY MEDICINE

## 2017-11-27 PROCEDURE — 99213 OFFICE O/P EST LOW 20 MIN: CPT | Performed by: FAMILY MEDICINE

## 2017-11-27 PROCEDURE — G8419 CALC BMI OUT NRM PARAM NOF/U: HCPCS | Performed by: FAMILY MEDICINE

## 2017-11-27 PROCEDURE — 1123F ACP DISCUSS/DSCN MKR DOCD: CPT | Performed by: FAMILY MEDICINE

## 2017-11-27 PROCEDURE — G8484 FLU IMMUNIZE NO ADMIN: HCPCS | Performed by: FAMILY MEDICINE

## 2017-11-27 PROCEDURE — 4040F PNEUMOC VAC/ADMIN/RCVD: CPT | Performed by: FAMILY MEDICINE

## 2017-11-27 RX ORDER — NITROFURANTOIN 25; 75 MG/1; MG/1
100 CAPSULE ORAL 2 TIMES DAILY
Qty: 20 CAPSULE | Refills: 0 | Status: SHIPPED | OUTPATIENT
Start: 2017-11-27 | End: 2017-12-07

## 2017-11-27 RX ORDER — LEVALBUTEROL TARTRATE 45 UG/1
1-2 AEROSOL, METERED ORAL
COMMUNITY
Start: 2017-11-06 | End: 2021-11-11 | Stop reason: ALTCHOICE

## 2017-11-27 RX ORDER — ESTRADIOL 0.1 MG/G
1 CREAM VAGINAL DAILY
Qty: 1 TUBE | Refills: 3 | Status: SHIPPED | OUTPATIENT
Start: 2017-11-27 | End: 2019-10-31

## 2017-11-27 ASSESSMENT — ENCOUNTER SYMPTOMS
COUGH: 0
SHORTNESS OF BREATH: 0
COLOR CHANGE: 0

## 2017-11-29 LAB — URINE CULTURE, ROUTINE: NORMAL

## 2017-12-06 ENCOUNTER — CLINICAL SUPPORT (OUTPATIENT)
Dept: CARDIOLOGY | Facility: CLINIC | Age: 82
End: 2017-12-06

## 2017-12-06 DIAGNOSIS — I49.5 SICK SINUS SYNDROME (HCC): ICD-10-CM

## 2017-12-06 PROCEDURE — 93288 INTERROG EVL PM/LDLS PM IP: CPT | Performed by: PHYSICIAN ASSISTANT

## 2017-12-10 NOTE — PROGRESS NOTES
Dual Chamber Pacemaker Evaluation Report  Clinic Interrogation    December 10, 2017    Primary Cardiologist: Melissa  : [employer]onic Model: Adapta  Implant date: 2/24/16    Reason for evaluation: routine  Indication for pacemaker: sick sinus syndrome    Measurements  Atrial sensing - P wave: n/r  Atrial threshold: 0.5 V@ 0.4 ms  Atrial lead impedance: 357 ohms  Ventricular sensing - R wave: 5.6-11.2 mV  Ventricular threshold: 0.5 V @ 0.4 ms  Ventricular lead impedance:   1302 ohms     Diagnostic Data  Atrial paced: 60 %  Ventricular paced: 130 %  Other: 2 episodes of rapid af.  Longest is 4 hours  Battery status: satisfactory         Final Parameters  Mode:  AAIR+  Lower rate: 60 bpm   Upper rate: 130 bpm  AV Delay: paced- 150 ms  Sensed-120 ms  Atrial - Amplitude: 1.5 V   Pulse width: 0.4 ms   Sensitivity: 0.5 mV     Ventricular - Amplitude: 2.0 V  Pulse width: 0.4 ms  Sensitivity: 2.0 mV    Changes made: none  Conclusions: normal pacemaker function    Follow up: 6 months

## 2017-12-28 NOTE — PROGRESS NOTES
I have reviewed the notes, assessments, and/or procedures performed by  Myra Garcia PA-C  , I concur with her  documentation of   Violet Fajardo.

## 2017-12-28 NOTE — PROGRESS NOTES
I have reviewed the notes, assessments, and/or procedures performed by Julee Hui , I concur with her  documentation of  Violet Fajardo.

## 2018-01-08 RX ORDER — ISOSORBIDE MONONITRATE 30 MG/1
TABLET, EXTENDED RELEASE ORAL
Qty: 90 TABLET | Refills: 3 | Status: SHIPPED | OUTPATIENT
Start: 2018-01-08 | End: 2018-01-24 | Stop reason: SDUPTHER

## 2018-01-24 RX ORDER — ISOSORBIDE MONONITRATE 30 MG/1
30 TABLET, EXTENDED RELEASE ORAL DAILY
Qty: 90 TABLET | Refills: 3 | Status: SHIPPED | OUTPATIENT
Start: 2018-01-24 | End: 2018-02-06 | Stop reason: SDUPTHER

## 2018-02-06 ENCOUNTER — OFFICE VISIT (OUTPATIENT)
Dept: CARDIOLOGY | Facility: CLINIC | Age: 83
End: 2018-02-06

## 2018-02-06 VITALS
HEART RATE: 75 BPM | BODY MASS INDEX: 28.49 KG/M2 | SYSTOLIC BLOOD PRESSURE: 130 MMHG | WEIGHT: 160.8 LBS | HEIGHT: 63 IN | DIASTOLIC BLOOD PRESSURE: 68 MMHG

## 2018-02-06 DIAGNOSIS — I49.5 SICK SINUS SYNDROME (HCC): Primary | ICD-10-CM

## 2018-02-06 DIAGNOSIS — R73.09 ELEVATED HEMOGLOBIN A1C: ICD-10-CM

## 2018-02-06 DIAGNOSIS — I47.1 SVT (SUPRAVENTRICULAR TACHYCARDIA) (HCC): ICD-10-CM

## 2018-02-06 DIAGNOSIS — R09.02 EXERCISE HYPOXEMIA: ICD-10-CM

## 2018-02-06 DIAGNOSIS — E78.2 MIXED HYPERLIPIDEMIA: ICD-10-CM

## 2018-02-06 DIAGNOSIS — R06.09 DYSPNEA ON EXERTION: ICD-10-CM

## 2018-02-06 DIAGNOSIS — Z01.810 PREOP CARDIOVASCULAR EXAM: ICD-10-CM

## 2018-02-06 DIAGNOSIS — I10 ESSENTIAL HYPERTENSION: ICD-10-CM

## 2018-02-06 PROCEDURE — 99214 OFFICE O/P EST MOD 30 MIN: CPT | Performed by: INTERNAL MEDICINE

## 2018-02-06 PROCEDURE — 93000 ELECTROCARDIOGRAM COMPLETE: CPT | Performed by: INTERNAL MEDICINE

## 2018-02-06 RX ORDER — LEVALBUTEROL TARTRATE 45 UG/1
1-2 AEROSOL, METERED ORAL EVERY 4 HOURS PRN
Qty: 1 INHALER | Refills: 11 | Status: SHIPPED | OUTPATIENT
Start: 2018-02-06 | End: 2018-08-06 | Stop reason: ALTCHOICE

## 2018-02-06 RX ORDER — ISOSORBIDE MONONITRATE 30 MG/1
30 TABLET, EXTENDED RELEASE ORAL DAILY
Qty: 90 TABLET | Refills: 3 | Status: SHIPPED | OUTPATIENT
Start: 2018-02-06 | End: 2018-03-30 | Stop reason: SDUPTHER

## 2018-02-06 NOTE — PROGRESS NOTES
Violet Fajardo  [id number]  1934  83 y.o.  female    Referring Provider: Onofre Nascimento MD    Reason for Follow-up Visit:  Routine follow up.     Subjective    Subjective    Overall feeling well   No chest pain   shortness of breath markedly better after starting bronchodilators  No palpitations  No significant pedal edema  Compliant with medications and dietary advice  Decreased effort tolerance  No presyncope or syncope  Compliant with medications           History of present illness:  Violet Fajardo is a 83 y.o. yo female with history of sick sinus syndrome s/p pacemaker who presents today for   Chief Complaint   Patient presents with   • Sick Sinus Syndrome      3 month Follow up    • Med Refill   .    History  Past Medical History:   Diagnosis Date   • CHF (congestive heart failure)    • Dermatomyositis    • Dermatomyositis    • Dyspnea    • E-coli UTI    • Fibromyalgia    • Hyperlipemia, mixed    • Hyperlipidemia    • Hypothyroidism    • Mitral valve disorder     History of MVP   • Mitral valve disorder    • Pacemaker    • Pacemaker    • Respiratory infection    • Sick sinus syndrome    • Sleep apnea    • Stroke    • SVT (supraventricular tachycardia)    ,   Past Surgical History:   Procedure Laterality Date   • CARDIAC CATHETERIZATION     • CHOLECYSTECTOMY     • FOOT SURGERY     • HYSTERECTOMY     • INSERT / REPLACE / REMOVE PACEMAKER     • PACEMAKER IMPLANTATION  12/15/2011    [employer]hythm (4/16/2012)-lead repositioning   ,   Family History   Problem Relation Age of Onset   • Coronary artery disease Mother    • Heart attack Mother    • Coronary artery disease Father    ,   Social History   Substance Use Topics   • Smoking status: Never Smoker   • Smokeless tobacco: Never Used   • Alcohol use No   ,     Medications  Current Outpatient Prescriptions   Medication Sig Dispense Refill   • aspirin 81 MG EC tablet Take 81 mg by mouth Daily.     • hydrochlorothiazide (MICROZIDE) 12.5 MG capsule  "TAKE 1 CAPSULE DAILY 90 capsule 3   • ipratropium (ATROVENT HFA) 17 MCG/ACT inhaler Inhale 2 puffs 4 (Four) Times a Day. 1 inhaler 11   • isosorbide mononitrate (IMDUR) 30 MG 24 hr tablet Take 1 tablet by mouth Daily. 90 tablet 3   • levalbuterol (XOPENEX HFA) 45 MCG/ACT inhaler Inhale 1-2 puffs Every 4 (Four) Hours As Needed for Wheezing. 1 inhaler 11   • levothyroxine (SYNTHROID, LEVOTHROID) 25 MCG tablet Take 25 mcg by mouth Daily.     • metoprolol succinate XL (TOPROL-XL) 25 MG 24 hr tablet TAKE 3 TABLETS DAILY 270 tablet 3   • Multiple Vitamins-Minerals (OCUVITE ADULT 50+ PO) Take  by mouth.     • Omega-3 Fatty Acids (FISH OIL) 1000 MG capsule capsule Take  by mouth Daily With Breakfast.     • potassium chloride (K-DUR,KLOR-CON) 10 MEQ CR tablet TAKE 1 TABLET DAILY 90 tablet 3   • vitamin D (ERGOCALCIFEROL) 61173 units capsule capsule Take  by mouth.     • Zolpidem Tartrate 10 MG sublingual tablet Place  under the tongue.     • ergocalciferol (ERGOCALCIFEROL) 78586 units capsule Take  by mouth.       No current facility-administered medications for this visit.        Allergies:  Latex; Amoxicillin-pot clavulanate; Barley grass; Codeine; Contrast dye; Fentanyl; and Midazolam    Review of Systems  Review of Systems   Constitution: Positive for malaise/fatigue.   HENT: Negative.    Eyes: Negative.    Cardiovascular: Positive for dyspnea on exertion and leg swelling. Negative for chest pain, claudication, cyanosis, irregular heartbeat, near-syncope, orthopnea, palpitations, paroxysmal nocturnal dyspnea and syncope.   Respiratory: Negative.    Endocrine: Negative.    Hematologic/Lymphatic: Negative.    Skin: Negative.    Gastrointestinal: Negative for anorexia.   Genitourinary: Negative.    Neurological: Negative.    Psychiatric/Behavioral: Negative.        Objective     Physical Exam:  /68 (BP Location: Right arm, Patient Position: Sitting, Cuff Size: Adult)  Pulse 75  Ht 160 cm (63\")  Wt 72.9 kg (160 lb " 12.8 oz)  BMI 28.48 kg/m2  Physical Exam   Constitutional: She appears well-developed.   HENT:   Head: Normocephalic.   Neck: Normal carotid pulses and no JVD present. No tracheal tenderness present. Carotid bruit is not present. No tracheal deviation and no edema present.   Cardiovascular: Regular rhythm and normal pulses.    Murmur heard.   Systolic murmur is present with a grade of 2/6   Pulmonary/Chest: Effort normal. No stridor.   Abdominal: Soft.   Neurological: She is alert. She has normal strength. No cranial nerve deficit or sensory deficit.   Skin: Skin is warm.   Psychiatric: She has a normal mood and affect. Her speech is normal and behavior is normal.       Results Review:       ECG 12 Lead  Date/Time: 2/6/2018 9:52 AM  Performed by: DIRK PEARSON  Authorized by: DIRK PEARSON   Comparison: compared with previous ECG from 10/12/2017  Rhythm: paced  Rate: normal  T depression: II, III, aVF, V4, V5 and V6  QRS axis: normal  Clinical impression: abnormal ECG  Comments: atrial premature contractions            Assessment/Plan   Patient Active Problem List   Diagnosis   • Dyspnea on exertion   • Sick sinus syndrome   • SVT (supraventricular tachycardia)   • Essential hypertension   • Elevated hemoglobin A1c   • Mixed hyperlipidemia   • Exercise hypoxemia   • Preop cardiovascular exam eye surgery for left intraocular mass     Results for orders placed during the hospital encounter of 10/18/17   Adult Transthoracic Echo Complete W/ Cont if Necessary Per Protocol    Narrative · Left ventricular systolic function is normal. Estimated EF = 60%.  · No evidence of pulmonary hypertension is present.          Plan:    Continue same medications  Low salt/ HTN/ Heart healthy carbohydrate restricted cardiac diet as applicable to this patient's current diagnoses.   This handout has relevant information regarding shopping for food, preparing meals, what to eat at restaurants, tracking of food intake, information regarding  "sodium intake and salt content, how to read food labels, knowing what to eat, tips reagarding physical activity, calorie count and calorie expenditure. What foods to avoid. Information regarding alcoholic drinks along with \"good\" and \"bad\" fats.  Relevant printed educational materials given pertinent to above diagnoses     Keep using oxygen  Gave a copy of my notes and relevant tests/ prior ECG etc for the patient to review and follow specific advise and relevant findings if any, prognosis, along with my current and future plans.   Monitor CBC, CMP, TSH and Lipid Panel by primary  No additional cardiac testing required at this point in time.   Monitor for any signs of bleeding including red or dark stools. Fall precautions.   Patient is asked to monitor BP at home or work, several times per month and return with written values at next office visit.   Monitor cardiac rhythm device function regularly per established schedule or PRN    Requested Prescriptions     Signed Prescriptions Disp Refills   • isosorbide mononitrate (IMDUR) 30 MG 24 hr tablet 90 tablet 3     Sig: Take 1 tablet by mouth Daily.   • ipratropium (ATROVENT HFA) 17 MCG/ACT inhaler 1 inhaler 11     Sig: Inhale 2 puffs 4 (Four) Times a Day.   • levalbuterol (XOPENEX HFA) 45 MCG/ACT inhaler 1 inhaler 11     Sig: Inhale 1-2 puffs Every 4 (Four) Hours As Needed for Wheezing.   Weigh yourself frequently, at least weekly, preferably daily, call me if more than 2 pounds a day or 5 pounds a week weight gain      Return in about 6 months (around 8/6/2018).                "

## 2018-03-12 ENCOUNTER — CARE COORDINATION (OUTPATIENT)
Dept: CARE COORDINATION | Age: 83
End: 2018-03-12

## 2018-03-12 ENCOUNTER — OFFICE VISIT (OUTPATIENT)
Dept: PRIMARY CARE CLINIC | Age: 83
End: 2018-03-12
Payer: MEDICARE

## 2018-03-12 VITALS
BODY MASS INDEX: 27.9 KG/M2 | TEMPERATURE: 97.4 F | OXYGEN SATURATION: 98 % | HEART RATE: 70 BPM | DIASTOLIC BLOOD PRESSURE: 68 MMHG | SYSTOLIC BLOOD PRESSURE: 122 MMHG | WEIGHT: 163.4 LBS | HEIGHT: 64 IN

## 2018-03-12 DIAGNOSIS — I47.1 PAT (PAROXYSMAL ATRIAL TACHYCARDIA) (HCC): ICD-10-CM

## 2018-03-12 DIAGNOSIS — I10 ESSENTIAL HYPERTENSION: ICD-10-CM

## 2018-03-12 DIAGNOSIS — Z00.00 ROUTINE GENERAL MEDICAL EXAMINATION AT A HEALTH CARE FACILITY: Primary | ICD-10-CM

## 2018-03-12 DIAGNOSIS — E03.9 HYPOTHYROIDISM, UNSPECIFIED TYPE: ICD-10-CM

## 2018-03-12 DIAGNOSIS — F51.01 PRIMARY INSOMNIA: ICD-10-CM

## 2018-03-12 PROCEDURE — G0438 PPPS, INITIAL VISIT: HCPCS | Performed by: FAMILY MEDICINE

## 2018-03-12 RX ORDER — LEVOTHYROXINE SODIUM 0.03 MG/1
TABLET ORAL
Qty: 90 TABLET | Refills: 2 | Status: SHIPPED | OUTPATIENT
Start: 2018-03-12 | End: 2019-05-20 | Stop reason: SDUPTHER

## 2018-03-12 ASSESSMENT — ANXIETY QUESTIONNAIRES: GAD7 TOTAL SCORE: 0

## 2018-03-12 ASSESSMENT — LIFESTYLE VARIABLES: HOW OFTEN DO YOU HAVE A DRINK CONTAINING ALCOHOL: 0

## 2018-03-12 ASSESSMENT — PATIENT HEALTH QUESTIONNAIRE - PHQ9: SUM OF ALL RESPONSES TO PHQ QUESTIONS 1-9: 0

## 2018-03-13 NOTE — CARE COORDINATION
Ambulatory Care Coordination Note  3/13/2018  CM Risk Score: 1  Maureen Mortality Risk Score: 13.12    ACC: Audrey Petty, VALENCIA    Summary Note: Spoke with [address] today about patient concerns, Anatoliy Cannon asked that I have the patient reach out to [facility] AT [place] [phone] to see what assistance they can provide since she is now a self pay for medications and PADD office [phone] and ask for the [facility] coordinator. Pt called and given this information. She voiced understanding         Care Coordination Interventions    Program Enrollment:  Rising Risk  Referral from Primary Care Provider:  Yes  Suggested Interventions and Community Resources  Medication Assistance Program:  In Process (Comment[facility] )  Other Services: In Process (Comment: 3/13/18-REFERRAL TO PADD OFFICE FOR THE [facility] COORDINATOR [phone])  Other Services or Interventions:  3/12/15-ACC ENROLLMENT COMPLETED          Goals Addressed      Community Resource Goal                  I will complete referral to community agency Medication Assistance for assistance. I will complete application for assistance to Medication Assistance for HELP WITH DRUG COST R/T LOSE OF DRUG PLAN COVERAGE . Barriers: none  Plan for overcoming my barriers: N/A  Confidence: 6/10  Anticipated Goal Completion Date: 5/1/18       Medication Management                  I will notify my provider/Care Coordinator if I am unable to afford my medications. Barriers: none  Plan for overcoming my barriers: N/A  Confidence: 6/10  Anticipated Goal Completion Date: 5/1/18            Prior to Admission medications    Medication Sig Start Date End Date Taking?  Authorizing Provider   levothyroxine (SYNTHROID) 25 MCG tablet TAKE 1 TABLET DAILY 3/12/18   Kamryn Wilson MD   ipratropium (ATROVENT HFA) 17 MCG/ACT inhaler Inhale 2 puffs into the lungs 11/6/17   Historical Provider, MD   levalbuterol [facility] HFA) 45 MCG/ACT inhaler
hydrochlorothiazide (MICROZIDE) 12.5 MG capsule  2/1/17   Historical Provider, MD   TOPROL XL 25 MG extended release tablet  12/30/16   Historical Provider, MD   KLOR-CON M10 10 MEQ extended release tablet  2/1/17   Historical Provider, MD   isosorbide mononitrate (IMDUR) 30 MG CR tablet Take 30 mg by mouth daily  2/10/16   Historical Provider, MD       Future Appointments  Date Time Provider [facility]   3/14/2019 10:45 AM Umang Mcduffie MD [address]

## 2018-03-14 ENCOUNTER — CARE COORDINATION (OUTPATIENT)
Dept: CARE COORDINATION | Age: 83
End: 2018-03-14

## 2018-03-14 DIAGNOSIS — E03.9 HYPOTHYROIDISM, UNSPECIFIED TYPE: ICD-10-CM

## 2018-03-14 DIAGNOSIS — I10 ESSENTIAL HYPERTENSION: ICD-10-CM

## 2018-03-14 LAB
ALBUMIN SERPL-MCNC: 4.4 G/DL (ref 3.5–5.2)
ALP BLD-CCNC: 101 U/L (ref 35–104)
ALT SERPL-CCNC: 14 U/L (ref 5–33)
ANION GAP SERPL CALCULATED.3IONS-SCNC: 18 MMOL/L (ref 7–19)
AST SERPL-CCNC: 22 U/L (ref 5–32)
BILIRUB SERPL-MCNC: 0.4 MG/DL (ref 0.2–1.2)
BUN BLDV-MCNC: 22 MG/DL (ref 8–23)
CALCIUM SERPL-MCNC: 9.6 MG/DL (ref 8.8–10.2)
CHLORIDE BLD-SCNC: 97 MMOL/L (ref 98–111)
CHOLESTEROL, TOTAL: 268 MG/DL (ref 160–199)
CO2: 26 MMOL/L (ref 22–29)
CREAT SERPL-MCNC: 0.7 MG/DL (ref 0.5–0.9)
GFR NON-AFRICAN AMERICAN: >60
GLUCOSE BLD-MCNC: 111 MG/DL (ref 74–109)
HDLC SERPL-MCNC: 78 MG/DL (ref 65–121)
LDL CHOLESTEROL CALCULATED: 166 MG/DL
POTASSIUM SERPL-SCNC: 4.6 MMOL/L (ref 3.5–5)
SODIUM BLD-SCNC: 141 MMOL/L (ref 136–145)
TOTAL PROTEIN: 7.1 G/DL (ref 6.6–8.7)
TRIGL SERPL-MCNC: 118 MG/DL (ref 0–149)
TSH SERPL DL<=0.05 MIU/L-ACNC: 2.27 UIU/ML (ref 0.27–4.2)

## 2018-03-15 ENCOUNTER — TELEPHONE (OUTPATIENT)
Dept: PRIMARY CARE CLINIC | Age: 83
End: 2018-03-15

## 2018-03-20 ENCOUNTER — CARE COORDINATION (OUTPATIENT)
Dept: CARE COORDINATION | Age: 83
End: 2018-03-20

## 2018-03-20 NOTE — CARE COORDINATION
KLOR-CON M10 10 MEQ extended release tablet  2/1/17   Historical Provider, MD   isosorbide mononitrate (IMDUR) 30 MG CR tablet Take 30 mg by mouth daily  2/10/16   Historical Provider, MD       Future Appointments  Date Time Provider Marvin Lucero   3/14/2019 10:45 AM Umang Mcduffie MD [address]

## 2018-03-26 ENCOUNTER — TELEPHONE (OUTPATIENT)
Dept: PRIMARY CARE CLINIC | Age: 83
End: 2018-03-26

## 2018-04-02 RX ORDER — METOPROLOL SUCCINATE 25 MG/1
25 TABLET, EXTENDED RELEASE ORAL 3 TIMES DAILY
Qty: 270 TABLET | Refills: 3 | Status: CANCELLED | OUTPATIENT
Start: 2018-04-02

## 2018-04-03 RX ORDER — POTASSIUM CHLORIDE 750 MG/1
10 TABLET, EXTENDED RELEASE ORAL DAILY
Qty: 90 TABLET | Refills: 3 | Status: SHIPPED | OUTPATIENT
Start: 2018-04-03

## 2018-04-03 RX ORDER — ISOSORBIDE MONONITRATE 30 MG/1
30 TABLET, EXTENDED RELEASE ORAL DAILY
Qty: 90 TABLET | Refills: 3 | Status: SHIPPED | OUTPATIENT
Start: 2018-04-03 | End: 2019-06-05 | Stop reason: SDUPTHER

## 2018-04-03 NOTE — TELEPHONE ENCOUNTER
PLEASE REVIEW RX FOR METOPROLOL & CHANGE RX IF APPROPRIATE.     THIS NEEDS REFILLED & PRINTED AS WELL (90 DAY SUPPLY - 3 REFILLS)

## 2018-04-04 RX ORDER — METOPROLOL SUCCINATE 25 MG/1
75 TABLET, EXTENDED RELEASE ORAL DAILY
Qty: 270 TABLET | Refills: 3 | Status: SHIPPED | OUTPATIENT
Start: 2018-04-04 | End: 2018-07-03

## 2018-04-04 RX ORDER — METOPROLOL SUCCINATE 25 MG/1
75 TABLET, EXTENDED RELEASE ORAL DAILY
Qty: 270 TABLET | Refills: 3 | Status: SHIPPED | OUTPATIENT
Start: 2018-04-04 | End: 2018-04-04 | Stop reason: SDUPTHER

## 2018-04-13 ENCOUNTER — TELEPHONE (OUTPATIENT)
Dept: PRIMARY CARE CLINIC | Age: 83
End: 2018-04-13

## 2018-05-15 ENCOUNTER — TELEPHONE (OUTPATIENT)
Dept: PRIMARY CARE CLINIC | Age: 83
End: 2018-05-15

## 2018-05-17 ENCOUNTER — CARE COORDINATION (OUTPATIENT)
Dept: CARE COORDINATION | Age: 83
End: 2018-05-17

## 2018-08-06 ENCOUNTER — OFFICE VISIT (OUTPATIENT)
Dept: CARDIOLOGY | Facility: CLINIC | Age: 83
End: 2018-08-06

## 2018-08-06 VITALS
HEART RATE: 76 BPM | BODY MASS INDEX: 30.11 KG/M2 | OXYGEN SATURATION: 99 % | DIASTOLIC BLOOD PRESSURE: 62 MMHG | SYSTOLIC BLOOD PRESSURE: 110 MMHG | WEIGHT: 170 LBS

## 2018-08-06 DIAGNOSIS — R73.09 ELEVATED HEMOGLOBIN A1C: ICD-10-CM

## 2018-08-06 DIAGNOSIS — E78.2 MIXED HYPERLIPIDEMIA: ICD-10-CM

## 2018-08-06 DIAGNOSIS — I49.5 SICK SINUS SYNDROME (HCC): Primary | ICD-10-CM

## 2018-08-06 DIAGNOSIS — I47.1 SVT (SUPRAVENTRICULAR TACHYCARDIA) (HCC): ICD-10-CM

## 2018-08-06 DIAGNOSIS — I48.0 PAF (PAROXYSMAL ATRIAL FIBRILLATION) (HCC): ICD-10-CM

## 2018-08-06 DIAGNOSIS — R06.09 DYSPNEA ON EXERTION: ICD-10-CM

## 2018-08-06 DIAGNOSIS — I10 ESSENTIAL HYPERTENSION: ICD-10-CM

## 2018-08-06 PROBLEM — R09.02 EXERCISE HYPOXEMIA: Status: RESOLVED | Noted: 2017-10-12 | Resolved: 2018-08-06

## 2018-08-06 PROBLEM — Z01.810 PREOP CARDIOVASCULAR EXAM: Status: RESOLVED | Noted: 2017-11-06 | Resolved: 2018-08-06

## 2018-08-06 PROCEDURE — 93000 ELECTROCARDIOGRAM COMPLETE: CPT | Performed by: INTERNAL MEDICINE

## 2018-08-06 PROCEDURE — 99214 OFFICE O/P EST MOD 30 MIN: CPT | Performed by: INTERNAL MEDICINE

## 2018-08-06 RX ORDER — ALBUTEROL SULFATE 90 UG/1
2 AEROSOL, METERED RESPIRATORY (INHALATION) EVERY 4 HOURS PRN
COMMUNITY
End: 2019-06-05 | Stop reason: SDUPTHER

## 2018-08-06 NOTE — PROGRESS NOTES
Violet Fajardo  [id number]  1934  84 y.o.  female    Referring Provider: Onofre Nascimento MD    Reason for Follow-up Visit:  Routine follow up.     Subjective    Subjective    Overall feeling well   No chest pain   shortness of breath markedly better after starting bronchodilators  No palpitations  No significant pedal edema  Compliant with medications and dietary advice  Decreased effort tolerance  No presyncope or syncope  Compliant with medications           History of present illness:  Violet Fajardo is a 84 y.o. yo female with history of sick sinus syndrome s/p pacemaker who presents today for   Chief Complaint   Patient presents with   • sick sinus syndrome     6 mo f/u   • Shortness of Breath   • Edema     at night both feet   .    History  Past Medical History:   Diagnosis Date   • CHF (congestive heart failure) (CMS/HCC)    • Dermatomyositis (CMS/HCC)    • Dermatomyositis (CMS/HCC)    • Dyspnea    • E-coli UTI    • Fibromyalgia    • Hyperlipemia, mixed    • Hyperlipidemia    • Hypothyroidism    • Mitral valve disorder     History of MVP   • Mitral valve disorder    • Pacemaker    • Pacemaker    • PAF (paroxysmal atrial fibrillation) (CMS/HCC) new onset 8/6/2018   • Respiratory infection    • Sick sinus syndrome (CMS/HCC)    • Sleep apnea    • Stroke (CMS/HCC)    • SVT (supraventricular tachycardia) (CMS/HCC)    ,   Past Surgical History:   Procedure Laterality Date   • CARDIAC CATHETERIZATION     • CHOLECYSTECTOMY     • FOOT SURGERY     • HYSTERECTOMY     • INSERT / REPLACE / REMOVE PACEMAKER     • PACEMAKER IMPLANTATION  12/15/2011    [employer]Rhythm (4/16/2012)-lead repositioning   ,   Family History   Problem Relation Age of Onset   • Coronary artery disease Mother    • Heart attack Mother    • Coronary artery disease Father    ,   Social History   Substance Use Topics   • Smoking status: Never Smoker   • Smokeless tobacco: Never Used   • Alcohol use No   ,     Medications  Current  Outpatient Prescriptions   Medication Sig Dispense Refill   • albuterol (PROVENTIL HFA;VENTOLIN HFA) 108 (90 Base) MCG/ACT inhaler Inhale 2 puffs Every 4 (Four) Hours As Needed for Wheezing.     • aspirin 81 MG EC tablet Take 81 mg by mouth Daily.     • ipratropium (ATROVENT HFA) 17 MCG/ACT inhaler Inhale 2 puffs 4 (Four) Times a Day. 1 inhaler 11   • isosorbide mononitrate (IMDUR) 30 MG 24 hr tablet Take 1 tablet by mouth Daily. 90 tablet 3   • levothyroxine (SYNTHROID, LEVOTHROID) 25 MCG tablet Take 25 mcg by mouth Daily.     • Omega-3 Fatty Acids (FISH OIL) 1000 MG capsule capsule Take  by mouth Daily With Breakfast.     • ergocalciferol (ERGOCALCIFEROL) 39882 units capsule Take  by mouth.     • hydrochlorothiazide (MICROZIDE) 12.5 MG capsule TAKE 1 CAPSULE DAILY 90 capsule 3   • potassium chloride (K-DUR,KLOR-CON) 10 MEQ CR tablet Take 1 tablet by mouth Daily. 90 tablet 3   • Zolpidem Tartrate 10 MG sublingual tablet Place  under the tongue.       No current facility-administered medications for this visit.        Allergies:  Latex; Amoxicillin-pot clavulanate; Barley grass; Codeine; Contrast dye; Fentanyl; and Midazolam    Review of Systems  Review of Systems   Constitution: Positive for malaise/fatigue.   HENT: Negative.    Eyes: Negative.    Cardiovascular: Positive for dyspnea on exertion and leg swelling. Negative for chest pain, claudication, cyanosis, irregular heartbeat, near-syncope, orthopnea, palpitations, paroxysmal nocturnal dyspnea and syncope.   Respiratory: Negative.    Endocrine: Negative.    Hematologic/Lymphatic: Negative.    Skin: Negative.    Musculoskeletal: Positive for arthritis and joint pain.   Gastrointestinal: Negative for anorexia.   Genitourinary: Negative.    Neurological: Negative.    Psychiatric/Behavioral: Negative.        Objective     Physical Exam:  /62   Pulse 76   Wt 77.1 kg (170 lb)   SpO2 99%   BMI 30.11 kg/m²   Physical Exam   Constitutional: She appears  well-developed.   HENT:   Head: Normocephalic.   Neck: Normal carotid pulses and no JVD present. No tracheal tenderness present. Carotid bruit is not present. No tracheal deviation and no edema present.   Cardiovascular: Normal pulses.  An irregularly irregular rhythm present.   Murmur heard.   Systolic murmur is present with a grade of 2/6   Pulmonary/Chest: Effort normal. No stridor.   Abdominal: Soft.   Neurological: She is alert. She has normal strength. No cranial nerve deficit or sensory deficit.   Skin: Skin is warm.   Psychiatric: She has a normal mood and affect. Her speech is normal and behavior is normal.       Results Review:  Results for orders placed during the hospital encounter of 10/18/17   Adult Transthoracic Echo Complete W/ Cont if Necessary Per Protocol    Narrative · Left ventricular systolic function is normal. Estimated EF = 60%.  · No evidence of pulmonary hypertension is present.              ECG 12 Lead  Date/Time: 8/6/2018 12:04 PM  Performed by: DIRK PEARSON  Authorized by: DIRK PEARSON   Comparison: compared with previous ECG from 2/6/2018  Comparison to previous ECG: atrial fibrillation  replaced atrial pacing   Rhythm: atrial fibrillation  Ectopy: PVCs  Rate: normal  Conduction: conduction normal  ST Segments: ST segments normal  QRS axis: normal  Clinical impression: abnormal ECG            Assessment/Plan   Patient Active Problem List   Diagnosis   • Dyspnea on exertion   • Sick sinus syndrome (CMS/HCC)   • SVT (supraventricular tachycardia) (CMS/HCC)   • Essential hypertension   • Elevated hemoglobin A1c   • Mixed hyperlipidemia   • PAF (paroxysmal atrial fibrillation) (CMS/HCC) new onset     Results for orders placed during the hospital encounter of 10/18/17   Adult Transthoracic Echo Complete W/ Cont if Necessary Per Protocol    Narrative · Left ventricular systolic function is normal. Estimated EF = 60%.  · No evidence of pulmonary hypertension is present.     "      Plan:    Needs anticoagulation for atrial fibrillation   Due to monthly intraocular injections needs eye MD to approve anticoagulation with Coumadin/Xarelto/Eliquis etc    Low salt/ HTN/ Heart healthy carbohydrate restricted cardiac diet as applicable to this patient's current diagnoses.   This handout has relevant information regarding shopping for food, preparing meals, what to eat at restaurants, tracking of food intake, information regarding sodium intake and salt content, how to read food labels, knowing what to eat, tips reagarding physical activity, calorie count and calorie expenditure. What foods to avoid. Information regarding alcoholic drinks along with \"good\" and \"bad\" fats.  Reiterated prior recommendations     The patient is advised to reduce or avoid caffeine or other cardiac stimulants.     The patient was advised that NSAID-type medications have three  very important potential side effects: cardiovascular complications, gastrointestinal irritation including hemorrhage and renal injuries.  Do not use anti-inflammatories such as Motrin/ibuprofen, Alleve/naprosyn, Mobic and like medications Use Tylenol instead.The patient expresses understanding of these issues and questions were answered.   Monitor for any signs of bleeding including red or dark stools. Fall precautions.       Monitor for any signs of bleeding including red or dark stools. Fall precautions.   Patient is asked to monitor BP at home or work, several times per month and return with written values at next office visit.     Advised staying uptodate with immunizations per established standard guidelines.    Reviewed available prior notes, consults, prior visits, laboratory findings, radiology And cardiology relevant reports. Updated chart as applicable. I have reviewed the patient's medical history in detail and updated the computerized patient record as relevant.    Updated patient regarding any new or relevant abnormalities on " review of records or any new findings on physical exam. Mentioned to patient about purpose of visit and desirable health short and long term goals and objectives.    Offered to give patient  a copy of my notes and relevant tests/ prior ECG etc for the patient to review and follow specific advise and relevant findings if any, prognosis, along with my current and future plans.      Questions were encouraged, asked and answered to the patient's  understanding and satisfaction. Questions if any regarding current medications and side effects, need for refills and importance of compliance to medications stressed.    Reviewed available prior notes, consults, prior visits, laboratory findings, radiology and cardiology relevant reports. Updated chart as applicable. I have reviewed the patient's medical history in detail and updated the computerized patient record as relevant.    Updated patient regarding any new or relevant abnormalities on review of records or any new findings on physical exam. Mentioned to patient about purpose of visit and desirable health short and long term goals and objectives.    Monitor CBC, CMP, TSH (as indicated) and Lipid Panel by primary        Return in about 2 weeks (around 8/20/2018).

## 2018-08-07 ENCOUNTER — DOCUMENTATION (OUTPATIENT)
Dept: CARDIOLOGY | Facility: CLINIC | Age: 83
End: 2018-08-07

## 2018-08-07 NOTE — PROGRESS NOTES
PT BROUGHT IN CLEARANCE FOR BLOOD THINNERS FROM HER OPTHALMOLOGIST, DR MARY NASSAR.    PER DR PEARSON - PT CAN START ELIQUIS. SAMPLES GIVEN IN OFFICE. A PRINTED SCRIPT WAS ALSO GIVEN. PT USES [employer] FOR HER MEDICATIONS.    (OFFICE NOTE ADDENDED - SCRIPT WAS CHANGED FROM E-SCRIBE TO PRINT. [employer] WAS REMOVED FROM CHART)

## 2018-08-08 ENCOUNTER — TELEPHONE (OUTPATIENT)
Dept: CARDIOLOGY | Facility: CLINIC | Age: 83
End: 2018-08-08

## 2018-08-08 NOTE — TELEPHONE ENCOUNTER
PT WAS GIVEN ELIQUIS ON 8/06/2018. AND IS WANTING TO KNOW IF SHE CONTINUES HER ASPIRIN OR STOPS.     PLEASE ADVISE

## 2018-08-14 DIAGNOSIS — F51.01 PRIMARY INSOMNIA: ICD-10-CM

## 2018-08-15 ENCOUNTER — TELEPHONE (OUTPATIENT)
Dept: CARDIOLOGY | Facility: CLINIC | Age: 83
End: 2018-08-15

## 2018-08-16 RX ORDER — DABIGATRAN ETEXILATE 150 MG/1
150 CAPSULE ORAL EVERY 12 HOURS SCHEDULED
Qty: 180 CAPSULE | Refills: 3 | Status: SHIPPED | OUTPATIENT
Start: 2018-08-16 | End: 2018-08-20 | Stop reason: SDUPTHER

## 2018-08-17 ENCOUNTER — LAB (OUTPATIENT)
Dept: LAB | Facility: HOSPITAL | Age: 83
End: 2018-08-17
Attending: INTERNAL MEDICINE

## 2018-08-17 DIAGNOSIS — I48.0 PAF (PAROXYSMAL ATRIAL FIBRILLATION) (HCC): ICD-10-CM

## 2018-08-17 LAB
ALBUMIN SERPL-MCNC: 4.7 G/DL (ref 3.5–5)
ALBUMIN/GLOB SERPL: 1.6 G/DL (ref 1.1–2.5)
ALP SERPL-CCNC: 105 U/L (ref 24–120)
ALT SERPL W P-5'-P-CCNC: 19 U/L (ref 0–54)
ANION GAP SERPL CALCULATED.3IONS-SCNC: 10 MMOL/L (ref 4–13)
AST SERPL-CCNC: 33 U/L (ref 7–45)
BASOPHILS # BLD AUTO: 0.1 10*3/MM3 (ref 0–0.2)
BASOPHILS NFR BLD AUTO: 1.2 % (ref 0–2)
BILIRUB SERPL-MCNC: 0.4 MG/DL (ref 0.1–1)
BUN BLD-MCNC: 18 MG/DL (ref 5–21)
BUN/CREAT SERPL: 23.1 (ref 7–25)
CALCIUM SPEC-SCNC: 9.5 MG/DL (ref 8.4–10.4)
CHLORIDE SERPL-SCNC: 93 MMOL/L (ref 98–110)
CO2 SERPL-SCNC: 31 MMOL/L (ref 24–31)
CREAT BLD-MCNC: 0.78 MG/DL (ref 0.5–1.4)
DEPRECATED RDW RBC AUTO: 44.8 FL (ref 40–54)
EOSINOPHIL # BLD AUTO: 0.43 10*3/MM3 (ref 0–0.7)
EOSINOPHIL NFR BLD AUTO: 5.1 % (ref 0–4)
ERYTHROCYTE [DISTWIDTH] IN BLOOD BY AUTOMATED COUNT: 13.8 % (ref 12–15)
GFR SERPL CREATININE-BSD FRML MDRD: 70 ML/MIN/1.73
GLOBULIN UR ELPH-MCNC: 3 GM/DL
GLUCOSE BLD-MCNC: 94 MG/DL (ref 70–100)
HCT VFR BLD AUTO: 43 % (ref 37–47)
HGB BLD-MCNC: 13.8 G/DL (ref 12–16)
IMM GRANULOCYTES # BLD: 0.04 10*3/MM3 (ref 0–0.03)
IMM GRANULOCYTES NFR BLD: 0.5 % (ref 0–5)
LYMPHOCYTES # BLD AUTO: 3.13 10*3/MM3 (ref 0.72–4.86)
LYMPHOCYTES NFR BLD AUTO: 37.3 % (ref 15–45)
MCH RBC QN AUTO: 28.5 PG (ref 28–32)
MCHC RBC AUTO-ENTMCNC: 32.1 G/DL (ref 33–36)
MCV RBC AUTO: 88.7 FL (ref 82–98)
MONOCYTES # BLD AUTO: 0.91 10*3/MM3 (ref 0.19–1.3)
MONOCYTES NFR BLD AUTO: 10.8 % (ref 4–12)
NEUTROPHILS # BLD AUTO: 3.78 10*3/MM3 (ref 1.87–8.4)
NEUTROPHILS NFR BLD AUTO: 45.1 % (ref 39–78)
NRBC BLD MANUAL-RTO: 0 /100 WBC (ref 0–0)
PLATELET # BLD AUTO: 248 10*3/MM3 (ref 130–400)
PMV BLD AUTO: 10.3 FL (ref 6–12)
POTASSIUM BLD-SCNC: 4.4 MMOL/L (ref 3.5–5.3)
PROT SERPL-MCNC: 7.7 G/DL (ref 6.3–8.7)
RBC # BLD AUTO: 4.85 10*6/MM3 (ref 4.2–5.4)
SODIUM BLD-SCNC: 134 MMOL/L (ref 135–145)
WBC NRBC COR # BLD: 8.39 10*3/MM3 (ref 4.8–10.8)

## 2018-08-17 PROCEDURE — 80053 COMPREHEN METABOLIC PANEL: CPT | Performed by: INTERNAL MEDICINE

## 2018-08-17 PROCEDURE — 85025 COMPLETE CBC W/AUTO DIFF WBC: CPT | Performed by: INTERNAL MEDICINE

## 2018-08-17 PROCEDURE — 36415 COLL VENOUS BLD VENIPUNCTURE: CPT

## 2018-08-17 RX ORDER — ZOLPIDEM TARTRATE 10 MG/1
10 TABLET ORAL NIGHTLY PRN
Qty: 90 TABLET | Refills: 1 | Status: SHIPPED | OUTPATIENT
Start: 2018-08-17 | End: 2018-11-15

## 2018-08-20 ENCOUNTER — OFFICE VISIT (OUTPATIENT)
Dept: CARDIOLOGY | Facility: CLINIC | Age: 83
End: 2018-08-20

## 2018-08-20 VITALS
HEART RATE: 98 BPM | DIASTOLIC BLOOD PRESSURE: 70 MMHG | OXYGEN SATURATION: 100 % | HEIGHT: 63 IN | BODY MASS INDEX: 30.12 KG/M2 | SYSTOLIC BLOOD PRESSURE: 126 MMHG | WEIGHT: 170 LBS

## 2018-08-20 DIAGNOSIS — E78.2 MIXED HYPERLIPIDEMIA: ICD-10-CM

## 2018-08-20 DIAGNOSIS — I49.5 SICK SINUS SYNDROME (HCC): ICD-10-CM

## 2018-08-20 DIAGNOSIS — I48.0 PAF (PAROXYSMAL ATRIAL FIBRILLATION) (HCC): Primary | ICD-10-CM

## 2018-08-20 DIAGNOSIS — R73.03 PREDIABETES: ICD-10-CM

## 2018-08-20 DIAGNOSIS — I47.1 SVT (SUPRAVENTRICULAR TACHYCARDIA) (HCC): ICD-10-CM

## 2018-08-20 DIAGNOSIS — I10 ESSENTIAL HYPERTENSION: ICD-10-CM

## 2018-08-20 DIAGNOSIS — R06.09 DYSPNEA ON EXERTION: ICD-10-CM

## 2018-08-20 DIAGNOSIS — R73.09 ELEVATED HEMOGLOBIN A1C: ICD-10-CM

## 2018-08-20 PROCEDURE — 93000 ELECTROCARDIOGRAM COMPLETE: CPT | Performed by: INTERNAL MEDICINE

## 2018-08-20 PROCEDURE — 99214 OFFICE O/P EST MOD 30 MIN: CPT | Performed by: INTERNAL MEDICINE

## 2018-08-20 RX ORDER — DABIGATRAN ETEXILATE 150 MG/1
150 CAPSULE ORAL EVERY 12 HOURS SCHEDULED
Qty: 180 CAPSULE | Refills: 3 | Status: SHIPPED | OUTPATIENT
Start: 2018-08-20 | End: 2019-10-07 | Stop reason: SDUPTHER

## 2018-08-20 NOTE — PROGRESS NOTES
Violet Fajardo  [id number]  1934  84 y.o.  female    Referring Provider: Onofre Nascimento MD    Reason for Follow-up Visit:  Routine follow up.   sick sinus syndrome s/p pacemaker   Paroxysmal atrial fibrillation     Subjective    Subjective    Overall feeling well   No chest pain   shortness of breath markedly better after starting bronchodilators  No palpitations  No significant pedal edema  Compliant with medications and dietary advice  Decreased effort tolerance  No presyncope or syncope  Compliant with medications  Feels woozy at times           History of present illness:  Violet Fajardo is a 84 y.o. yo female with history of sick sinus syndrome s/p pacemaker who presents today for   Chief Complaint   Patient presents with   • Atrial Fibrillation     2 WK FU    • SSS   • Nausea   • Shortness of Breath   .    History  Past Medical History:   Diagnosis Date   • CHF (congestive heart failure) (CMS/HCC)    • Dermatomyositis (CMS/HCC)    • Dermatomyositis (CMS/HCC)    • Dyspnea    • E-coli UTI    • Fibromyalgia    • Hyperlipemia, mixed    • Hyperlipidemia    • Hypothyroidism    • Mitral valve disorder     History of MVP   • Mitral valve disorder    • Pacemaker    • Pacemaker    • PAF (paroxysmal atrial fibrillation) (CMS/HCC) new onset 8/6/2018   • Respiratory infection    • Sick sinus syndrome (CMS/HCC)    • Sleep apnea    • Stroke (CMS/HCC)    • SVT (supraventricular tachycardia) (CMS/HCC)    ,   Past Surgical History:   Procedure Laterality Date   • CARDIAC CATHETERIZATION     • CHOLECYSTECTOMY     • FOOT SURGERY     • HYSTERECTOMY     • INSERT / REPLACE / REMOVE PACEMAKER     • PACEMAKER IMPLANTATION  12/15/2011    [employer] (4/16/2012)-lead repositioning   ,   Family History   Problem Relation Age of Onset   • Coronary artery disease Mother    • Heart attack Mother    • Coronary artery disease Father    ,   Social History   Substance Use Topics   • Smoking status: Never Smoker   •  Smokeless tobacco: Never Used   • Alcohol use No   ,     Medications  Current Outpatient Prescriptions   Medication Sig Dispense Refill   • albuterol (PROVENTIL HFA;VENTOLIN HFA) 108 (90 Base) MCG/ACT inhaler Inhale 2 puffs Every 4 (Four) Hours As Needed for Wheezing.     • aspirin 81 MG EC tablet Take 81 mg by mouth Daily.     • dabigatran etexilate (PRADAXA) 150 MG capsu Take 1 capsule by mouth Every 12 (Twelve) Hours. 180 capsule 3   • ergocalciferol (ERGOCALCIFEROL) 07018 units capsule Take  by mouth.     • hydrochlorothiazide (MICROZIDE) 12.5 MG capsule TAKE 1 CAPSULE DAILY 90 capsule 3   • ipratropium (ATROVENT HFA) 17 MCG/ACT inhaler Inhale 2 puffs 4 (Four) Times a Day. 1 inhaler 11   • isosorbide mononitrate (IMDUR) 30 MG 24 hr tablet Take 1 tablet by mouth Daily. 90 tablet 3   • levothyroxine (SYNTHROID, LEVOTHROID) 25 MCG tablet Take 25 mcg by mouth Daily.     • Omega-3 Fatty Acids (FISH OIL) 1000 MG capsule capsule Take  by mouth Daily With Breakfast.     • potassium chloride (K-DUR,KLOR-CON) 10 MEQ CR tablet Take 1 tablet by mouth Daily. 90 tablet 3   • Zolpidem Tartrate 10 MG sublingual tablet Place  under the tongue.       No current facility-administered medications for this visit.        Allergies:  Latex; Amoxicillin-pot clavulanate; Barley grass; Codeine; Contrast dye; Fentanyl; and Midazolam    Review of Systems  Review of Systems   Constitution: Positive for malaise/fatigue.   HENT: Negative.    Eyes: Negative.    Cardiovascular: Positive for dyspnea on exertion and leg swelling. Negative for chest pain, claudication, cyanosis, irregular heartbeat, near-syncope, orthopnea, palpitations, paroxysmal nocturnal dyspnea and syncope.   Respiratory: Negative.    Endocrine: Negative.    Hematologic/Lymphatic: Negative.    Skin: Negative.    Musculoskeletal: Positive for arthritis and joint pain.   Gastrointestinal: Negative for anorexia.   Genitourinary: Negative.    Neurological: Negative.   "  Psychiatric/Behavioral: Negative.        Objective     Physical Exam:  /70   Pulse 98   Ht 160 cm (62.99\")   Wt 77.1 kg (170 lb)   SpO2 100%   BMI 30.12 kg/m²   Physical Exam   Constitutional: She appears well-developed.   HENT:   Head: Normocephalic.   Neck: Normal carotid pulses and no JVD present. No tracheal tenderness present. Carotid bruit is not present. No tracheal deviation and no edema present.   Cardiovascular: Normal pulses.  An irregularly irregular rhythm present.   Murmur heard.   Systolic murmur is present with a grade of 2/6   Pulmonary/Chest: Effort normal. No stridor.   Abdominal: Soft.   Neurological: She is alert. She has normal strength. No cranial nerve deficit or sensory deficit.   Skin: Skin is warm.   Psychiatric: She has a normal mood and affect. Her speech is normal and behavior is normal.       Results Review:  Results for orders placed during the hospital encounter of 10/18/17   Adult Transthoracic Echo Complete W/ Cont if Necessary Per Protocol    Narrative · Left ventricular systolic function is normal. Estimated EF = 60%.  · No evidence of pulmonary hypertension is present.              ECG 12 Lead  Date/Time: 8/20/2018 1:02 PM  Performed by: DIRK PEARSON  Authorized by: DIRK PEARSON   Comparison: compared with previous ECG from 8/6/2018  Comparison to previous ECG: V pacing replaced atrial fibrillation    Rhythm: ventricular tachycardia  Rate: normal            Assessment/Plan   Patient Active Problem List   Diagnosis   • Dyspnea on exertion   • Sick sinus syndrome (CMS/HCC)   • SVT (supraventricular tachycardia) (CMS/HCC)   • Essential hypertension   • Elevated hemoglobin A1c   • Mixed hyperlipidemia   • PAF (paroxysmal atrial fibrillation) (CMS/HCC) new onset   • Prediabetes     Results for orders placed during the hospital encounter of 10/18/17   Adult Transthoracic Echo Complete W/ Cont if Necessary Per Protocol    Narrative · Left ventricular systolic function is " "normal. Estimated EF = 60%.  · No evidence of pulmonary hypertension is present.          Plan:    Needs anticoagulation for atrial fibrillation   Due to monthly intraocular injections  eye MD approved anticoagulation with Pradaxa that her insurance will pay for  Requested Prescriptions     Signed Prescriptions Disp Refills   • dabigatran etexilate (PRADAXA) 150 MG capsu 180 capsule 3     Sig: Take 1 capsule by mouth Every 12 (Twelve) Hours.       Monitor cardiac rhythm device function regularly per established schedule or PRN     Low salt/ HTN/ Heart healthy carbohydrate restricted cardiac diet as applicable to this patient's current diagnoses.   This handout has relevant information regarding shopping for food, preparing meals, what to eat at restaurants, tracking of food intake, information regarding sodium intake and salt content, how to read food labels, knowing what to eat, tips reagarding physical activity, calorie count and calorie expenditure. What foods to avoid. Information regarding alcoholic drinks along with \"good\" and \"bad\" fats.  Reiterated prior recommendations     The patient is advised to reduce or avoid caffeine or other cardiac stimulants.     The patient was advised that NSAID-type medications have three  very important potential side effects: cardiovascular complications, gastrointestinal irritation including hemorrhage and renal injuries.  Do not use anti-inflammatories such as Motrin/ibuprofen, Alleve/naprosyn, Mobic and like medications Use Tylenol instead.The patient expresses understanding of these issues and questions were answered.   Monitor for any signs of bleeding including red or dark stools. Fall precautions.       Monitor for any signs of bleeding including red or dark stools. Fall precautions.   Patient is asked to monitor BP at home or work, several times per month and return with written values at next office visit.     Advised staying uptodate with immunizations per " established standard guidelines.    Reviewed available prior notes, consults, prior visits, laboratory findings, radiology And cardiology relevant reports. Updated chart as applicable. I have reviewed the patient's medical history in detail and updated the computerized patient record as relevant.    Updated patient regarding any new or relevant abnormalities on review of records or any new findings on physical exam. Mentioned to patient about purpose of visit and desirable health short and long term goals and objectives.    Offered to give patient  a copy of my notes and relevant tests/ prior ECG etc for the patient to review and follow specific advise and relevant findings if any, prognosis, along with my current and future plans.      Questions were encouraged, asked and answered to the patient's  understanding and satisfaction. Questions if any regarding current medications and side effects, need for refills and importance of compliance to medications stressed.    Reviewed available prior notes, consults, prior visits, laboratory findings, radiology and cardiology relevant reports. Updated chart as applicable. I have reviewed the patient's medical history in detail and updated the computerized patient record as relevant.    Updated patient regarding any new or relevant abnormalities on review of records or any new findings on physical exam. Mentioned to patient about purpose of visit and desirable health short and long term goals and objectives.    Keep A1c less than 7 Primary to monitor  Keep LDL below 70 mg/dl. Monitor liver and renal functions.   Monitor CBC, CMP, TSH (as indicated) and Lipid Panel by primary     Avoid beta agonists  She declined Electrophysiology consultation for Paroxysmal atrial fibrillation ablation        Return in about 3 months (around 11/20/2018).

## 2018-09-07 RX ORDER — DABIGATRAN ETEXILATE 150 MG/1
150 CAPSULE ORAL EVERY 12 HOURS SCHEDULED
Qty: 60 CAPSULE | Refills: 0 | COMMUNITY
Start: 2018-09-07 | End: 2019-04-23 | Stop reason: SDUPTHER

## 2018-09-07 NOTE — TELEPHONE ENCOUNTER
PT CAME INTO OFFICE WITH PT ASSISTANCE PAPERWORK TO BE SIGNED BY LILI FOR PRADAXA.    PT IS TO FINISH ELIQUIS SAMPLES PER DR. PEARSON (ELIQUIS WAS NOT COVERED BY PATIENTS INSURANCE) & THEN START PRADAXA.    PT VERBALIZED UNDERSDTANDING     - PRADAXA 150 MG SAMPLES GIVEN.  PLEASE SIGN

## 2018-10-01 ENCOUNTER — TELEPHONE (OUTPATIENT)
Dept: PRIMARY CARE CLINIC | Age: 83
End: 2018-10-01

## 2018-10-24 ENCOUNTER — TELEPHONE (OUTPATIENT)
Dept: PRIMARY CARE CLINIC | Age: 83
End: 2018-10-24

## 2018-10-25 ENCOUNTER — CARE COORDINATION (OUTPATIENT)
Dept: CARE COORDINATION | Age: 83
End: 2018-10-25

## 2018-12-17 ENCOUNTER — OFFICE VISIT (OUTPATIENT)
Dept: CARDIOLOGY | Facility: CLINIC | Age: 83
End: 2018-12-17

## 2018-12-17 ENCOUNTER — CLINICAL SUPPORT NO REQUIREMENTS (OUTPATIENT)
Dept: CARDIOLOGY | Facility: CLINIC | Age: 83
End: 2018-12-17

## 2018-12-17 VITALS
OXYGEN SATURATION: 98 % | SYSTOLIC BLOOD PRESSURE: 120 MMHG | HEART RATE: 82 BPM | DIASTOLIC BLOOD PRESSURE: 70 MMHG | BODY MASS INDEX: 32.2 KG/M2 | WEIGHT: 175 LBS | HEIGHT: 62 IN

## 2018-12-17 DIAGNOSIS — R73.09 ELEVATED HEMOGLOBIN A1C: ICD-10-CM

## 2018-12-17 DIAGNOSIS — Z95.0 PACEMAKER: Primary | ICD-10-CM

## 2018-12-17 DIAGNOSIS — I47.1 SVT (SUPRAVENTRICULAR TACHYCARDIA) (HCC): ICD-10-CM

## 2018-12-17 DIAGNOSIS — R06.09 DYSPNEA ON EXERTION: Primary | ICD-10-CM

## 2018-12-17 DIAGNOSIS — I48.20 CHRONIC ATRIAL FIBRILLATION (HCC): ICD-10-CM

## 2018-12-17 DIAGNOSIS — I49.5 SICK SINUS SYNDROME (HCC): ICD-10-CM

## 2018-12-17 DIAGNOSIS — E78.2 MIXED HYPERLIPIDEMIA: ICD-10-CM

## 2018-12-17 DIAGNOSIS — R73.03 PREDIABETES: ICD-10-CM

## 2018-12-17 DIAGNOSIS — I10 ESSENTIAL HYPERTENSION: ICD-10-CM

## 2018-12-17 PROCEDURE — 93288 INTERROG EVL PM/LDLS PM IP: CPT | Performed by: INTERNAL MEDICINE

## 2018-12-17 PROCEDURE — 99214 OFFICE O/P EST MOD 30 MIN: CPT | Performed by: INTERNAL MEDICINE

## 2018-12-17 PROCEDURE — 93000 ELECTROCARDIOGRAM COMPLETE: CPT | Performed by: INTERNAL MEDICINE

## 2018-12-17 NOTE — PROGRESS NOTES
Violet Fajardo  [id number]  1934  84 y.o.  female    Referring Provider: Onofre Nascimento MD    Reason for Follow-up Visit:  Routine follow up.   sick sinus syndrome s/p pacemaker   chronic atrial fibrillation     Subjective    Subjective    Overall feeling well   No chest pain     shortness of breath markedly better after starting bronchodilators  No palpitations  No significant pedal edema    Compliant with medications and dietary advice  Decreased effort tolerance  No presyncope or syncope    Compliant with medications  No further dizziness             History of present illness:  Violet Fajardo is a 84 y.o. yo female with history of sick sinus syndrome s/p pacemaker who presents today for   Chief Complaint   Patient presents with   • Atrial Fibrillation     4 MO F/U (PRINT SCRIPTS FOR PT - SHE USES [facility])   • SSS   .    History  Past Medical History:   Diagnosis Date   • CHF (congestive heart failure) (CMS/HCC)    • Dermatomyositis (CMS/HCC)    • Dermatomyositis (CMS/HCC)    • Dyspnea    • E-coli UTI    • Fibromyalgia    • Hyperlipemia, mixed    • Hyperlipidemia    • Hypothyroidism    • Mitral valve disorder     History of MVP   • Mitral valve disorder    • Pacemaker    • Pacemaker    • PAF (paroxysmal atrial fibrillation) (CMS/HCC) new onset 8/6/2018   • Respiratory infection    • Sick sinus syndrome (CMS/HCC)    • Sleep apnea    • Stroke (CMS/HCC)    • SVT (supraventricular tachycardia) (CMS/HCC)    ,   Past Surgical History:   Procedure Laterality Date   • CARDIAC CATHETERIZATION     • CHOLECYSTECTOMY     • FOOT SURGERY     • HYSTERECTOMY     • INSERT / REPLACE / REMOVE PACEMAKER     • PACEMAKER IMPLANTATION  12/15/2011    [facility] (4/16/2012)-lead repositioning   ,   Family History   Problem Relation Age of Onset   • Coronary artery disease Mother    • Heart attack Mother    • Coronary artery disease Father    ,   Social History     Tobacco Use   • Smoking status: Never Smoker   •  Smokeless tobacco: Never Used   Substance Use Topics   • Alcohol use: No   • Drug use: No   ,     Medications  Current Outpatient Medications   Medication Sig Dispense Refill   • albuterol (PROVENTIL HFA;VENTOLIN HFA) 108 (90 Base) MCG/ACT inhaler Inhale 2 puffs Every 4 (Four) Hours As Needed for Wheezing.     • aspirin 81 MG EC tablet Take 81 mg by mouth Daily.     • dabigatran etexilate (PRADAXA) 150 MG capsu Take 1 capsule by mouth Every 12 (Twelve) Hours. 180 capsule 3   • dabigatran etexilate (PRADAXA) 150 MG capsu Take 1 capsule by mouth Every 12 (Twelve) Hours. 60 capsule 0   • ergocalciferol (ERGOCALCIFEROL) 86121 units capsule Take  by mouth.     • hydrochlorothiazide (MICROZIDE) 12.5 MG capsule TAKE 1 CAPSULE DAILY 90 capsule 3   • ipratropium (ATROVENT HFA) 17 MCG/ACT inhaler Inhale 2 puffs 4 (Four) Times a Day. 1 inhaler 11   • isosorbide mononitrate (IMDUR) 30 MG 24 hr tablet Take 1 tablet by mouth Daily. 90 tablet 3   • levothyroxine (SYNTHROID, LEVOTHROID) 25 MCG tablet Take 25 mcg by mouth Daily.     • Omega-3 Fatty Acids (FISH OIL) 1000 MG capsule capsule Take  by mouth Daily With Breakfast.     • potassium chloride (K-DUR,KLOR-CON) 10 MEQ CR tablet Take 1 tablet by mouth Daily. 90 tablet 3   • Zolpidem Tartrate 10 MG sublingual tablet Place  under the tongue.       No current facility-administered medications for this visit.        Allergies:  Latex; Amoxicillin-pot clavulanate; Barley grass; Codeine; Contrast dye; Fentanyl; and Midazolam    Review of Systems  Review of Systems   Constitution: Positive for malaise/fatigue.   HENT: Negative.    Eyes: Negative.    Cardiovascular: Positive for dyspnea on exertion and leg swelling. Negative for chest pain, claudication, cyanosis, irregular heartbeat, near-syncope, orthopnea, palpitations, paroxysmal nocturnal dyspnea and syncope.   Respiratory: Negative.    Endocrine: Negative.    Hematologic/Lymphatic: Negative.    Skin: Negative.    Musculoskeletal:  "Positive for arthritis and joint pain.   Gastrointestinal: Negative for anorexia.   Genitourinary: Negative.    Neurological: Negative.    Psychiatric/Behavioral: Negative.        Objective     Physical Exam:  /70   Pulse 82   Ht 157.5 cm (62\")   Wt 79.4 kg (175 lb)   SpO2 98%   BMI 32.01 kg/m²   Physical Exam   Constitutional: She appears well-developed.   HENT:   Head: Normocephalic.   Neck: Normal carotid pulses and no JVD present. No tracheal tenderness present. Carotid bruit is not present. No tracheal deviation and no edema present.   Cardiovascular: Normal pulses. An irregularly irregular rhythm present.   Murmur heard.   Systolic murmur is present with a grade of 2/6.  Pulmonary/Chest: Effort normal. No stridor.   Abdominal: Soft.   Neurological: She is alert. She has normal strength. No cranial nerve deficit or sensory deficit.   Skin: Skin is warm.   Psychiatric: She has a normal mood and affect. Her speech is normal and behavior is normal.       Results Review:          ECG 12 Lead  Date/Time: 12/17/2018 9:39 AM  Performed by: Chalo Torres MD  Authorized by: Chalo Torres MD   Comparison: compared with previous ECG from 8/20/2018  Comparison to previous ECG: Demand pacing   Rhythm: atrial fibrillation and paced  Rate: normal  T depression: II, III, aVF, V4, V5 and V6  QRS axis: normal  Clinical impression: abnormal ECG            Assessment/Plan   Patient Active Problem List   Diagnosis   • Dyspnea on exertion   • Sick sinus syndrome (CMS/HCC)   • SVT (supraventricular tachycardia) (CMS/HCC)   • Essential hypertension   • Elevated hemoglobin A1c   • Mixed hyperlipidemia   • PAF (paroxysmal atrial fibrillation) (CMS/HCC) new onset   • Prediabetes     Results for orders placed during the hospital encounter of 10/18/17   Adult Transthoracic Echo Complete W/ Cont if Necessary Per Protocol    Narrative · Left ventricular systolic function is normal. Estimated EF = 60%.  · No evidence of pulmonary " "hypertension is present.            Plan        No additional cardiac testing required at this point in time.     Monitor cardiac rhythm device function regularly per established schedule or PRN         xxxxxxxxxxxxxxxxxxxxxxxxxxxxxxxxxxxxxxxxxxxxxxxxxxxxxxxxxxxxxxxxxxxxxxxxxxxxxxxxxxxxxxxxxxxxxxxxxxxxxxxxxxxxxxxxxxxxxxxxxxxxxxxxxxxxxxxxxxxxxxxxxxxxxxxxxxxxxxxxxxxxxxxxxxxxxxxxxxxxxxxxxxxxxxxxxxxxxxxxxxxxxxxxxxxxxxxxxxxxxxxxxxxxxxxxxxxxxxxxxxxxxxxx  Health maintenance    Low salt/ HTN/ Heart healthy carbohydrate restricted cardiac diet as applicable to this patient's current diagnoses.   This handout has relevant information regarding shopping for food, preparing meals, what to eat at restaurants, tracking of food intake, information regarding sodium intake and salt content, how to read food labels, knowing what to eat, tips reagarding physical activity, calorie count and calorie expenditure. What foods to avoid. Information regarding alcoholic drinks along with \"good\" and \"bad\" fats.  Reiterated prior recommendations     The patient is advised to reduce or avoid caffeine or other cardiac stimulants.     The patient was advised that NSAID-type medications have three  very important potential side effects: cardiovascular complications, gastrointestinal irritation including hemorrhage and renal injuries.  Do not use anti-inflammatories such as Motrin/ibuprofen, Alleve/naprosyn, Mobic and like medications Use Tylenol instead.The patient expresses understanding of these issues and questions were answered.   Monitor for any signs of bleeding including red or dark stools. Fall precautions.       Monitor for any signs of bleeding including red or dark stools. Fall precautions.   Patient is asked to monitor BP at home or work, several times per month and return with written values at next office visit.     Advised staying uptodate with immunizations per established standard guidelines.      Offered to give patient  a copy of " my notes and relevant tests/ prior ECG etc for the patient to review and follow specific advise and relevant findings if any, prognosis, along with my current and future plans.      Questions were encouraged, asked and answered to the patient's  understanding and satisfaction. Questions if any regarding current medications and side effects, need for refills and importance of compliance to medications stressed.    Reviewed available prior notes, consults, prior visits, laboratory findings, radiology and cardiology relevant reports. Updated chart as applicable. I have reviewed the patient's medical history in detail and updated the computerized patient record as relevant.      Updated patient regarding any new or relevant abnormalities on review of records or any new findings on physical exam. Mentioned to patient about purpose of visit and desirable health short and long term goals and objectives.        xxxxxxxxxxxxxxxxxxxxxxxxxxxxxxxxxxxxxxxxxxxxxxxxxxxxxxxxxxxxxxxxxxxxxxxxxxxxxxxxxxxxxxxxxxxxxxxxxxxxxxxxxxxxxxxxxxxxxxxxxxxxxxxxxxxxxxxxxxxxxxxxxxxxxxxxxxxxxxxxxxxxxxxxxxxxxxxxxxxxxxxxxxxxxxxxxxxxxxxxxxxxxxxxxxxxxxxxxxxxxxxxxxxxxxxxxxxxxxxxxxxxxxxx    Return in about 6 months (around 6/17/2019).

## 2018-12-19 NOTE — PROGRESS NOTES
Dual Chamber Pacemaker Evaluation Report  IN OFFICE INTERROGATION    December 19, 2018     Interrogation Date:  12/17/2018    Primary Cardiologist: Melissa  : [employer]onic Model: Adapta [id number]  Implant date: 2/24/2016    Reason for evaluation: routine  Indication for pacemaker: sick sinus syndrome    Measurements  Atrial sensing - P wave: <0.7- 1 mV  Atrial threshold: N/R--in AF  Atrial lead impedance: 362 ohms  Ventricular sensing - R wave: 5.6-16 mV  Ventricular threshold: 0.625 V @ 0.4 ms  Ventricular lead impedance:   1719 ohms (chronically elevated)    Diagnostic Data  Atrial paced: 66.1 %  Ventricular paced: 27.3 %    Episodes recorded since 12/6/2018  Paroxysmal AF noted:  [place] 50.7% (appears to be 100% since June 2018), anticoagulated with Pradaxa.  No ventricular high rates.    Battery status: satisfactory, 2.78V, estimated 9.5 years remaining      Final Parameters  Mode:  AAIR+  Lower rate: 60 bpm   Upper rate: 130 bpm  AV Delay: paced- 150 ms  Sensed-120 ms  Atrial - Amplitude: 1.5 V   Pulse width: 0.4 ms   Sensitivity: 0.18 mV     Ventricular - Amplitude: 2 V  Pulse width: 0.76 ms  Sensitivity: 2.8 mV    Changes made: No changes made.  Conclusions: normal pacemaker function, stable pacing and sensing thresholds, adequate battery reserve and elevated ventricular lead impedance    Follow up: 6 months via [facility], annually in office

## 2018-12-23 NOTE — PROGRESS NOTES
I have reviewed the notes, assessments, and/or procedures performed by  Sherlyn Naqvi RN, I concur with her  documentation  of Violet Fajardo.

## 2019-02-01 ENCOUNTER — ANTI-COAG VISIT (OUTPATIENT)
Dept: CARDIOLOGY | Age: 84
End: 2019-02-01

## 2019-02-06 ENCOUNTER — TELEPHONE (OUTPATIENT)
Dept: PRIMARY CARE CLINIC | Age: 84
End: 2019-02-06

## 2019-02-11 ENCOUNTER — TELEPHONE (OUTPATIENT)
Dept: PRIMARY CARE CLINIC | Age: 84
End: 2019-02-11

## 2019-04-22 ENCOUNTER — OFFICE VISIT (OUTPATIENT)
Dept: PRIMARY CARE CLINIC | Age: 84
End: 2019-04-22
Payer: MEDICARE

## 2019-04-22 ENCOUNTER — TELEPHONE (OUTPATIENT)
Dept: PRIMARY CARE CLINIC | Age: 84
End: 2019-04-22

## 2019-04-22 VITALS
OXYGEN SATURATION: 98 % | HEART RATE: 86 BPM | TEMPERATURE: 98.4 F | HEIGHT: 63 IN | DIASTOLIC BLOOD PRESSURE: 74 MMHG | BODY MASS INDEX: 31.08 KG/M2 | SYSTOLIC BLOOD PRESSURE: 120 MMHG | WEIGHT: 175.4 LBS

## 2019-04-22 DIAGNOSIS — R73.9 HYPERGLYCEMIA: ICD-10-CM

## 2019-04-22 DIAGNOSIS — I10 ESSENTIAL HYPERTENSION: ICD-10-CM

## 2019-04-22 DIAGNOSIS — Z00.00 ROUTINE GENERAL MEDICAL EXAMINATION AT A HEALTH CARE FACILITY: Primary | ICD-10-CM

## 2019-04-22 DIAGNOSIS — G47.62 SLEEP RELATED LEG CRAMPS: ICD-10-CM

## 2019-04-22 DIAGNOSIS — E03.9 HYPOTHYROIDISM, UNSPECIFIED TYPE: ICD-10-CM

## 2019-04-22 LAB — HBA1C MFR BLD: 5.9 %

## 2019-04-22 PROCEDURE — 83036 HEMOGLOBIN GLYCOSYLATED A1C: CPT | Performed by: FAMILY MEDICINE

## 2019-04-22 PROCEDURE — G0439 PPPS, SUBSEQ VISIT: HCPCS | Performed by: FAMILY MEDICINE

## 2019-04-22 PROCEDURE — 1123F ACP DISCUSS/DSCN MKR DOCD: CPT | Performed by: FAMILY MEDICINE

## 2019-04-22 PROCEDURE — 4040F PNEUMOC VAC/ADMIN/RCVD: CPT | Performed by: FAMILY MEDICINE

## 2019-04-22 RX ORDER — ZOLPIDEM TARTRATE 10 MG/1
10 TABLET ORAL NIGHTLY PRN
COMMUNITY
End: 2019-07-23 | Stop reason: SDUPTHER

## 2019-04-22 RX ORDER — FAMOTIDINE 20 MG/1
20 TABLET, FILM COATED ORAL 2 TIMES DAILY PRN
Qty: 60 TABLET | Refills: 5 | COMMUNITY
Start: 2019-04-22 | End: 2019-10-29

## 2019-04-22 ASSESSMENT — PATIENT HEALTH QUESTIONNAIRE - PHQ9
SUM OF ALL RESPONSES TO PHQ QUESTIONS 1-9: 0
SUM OF ALL RESPONSES TO PHQ QUESTIONS 1-9: 0

## 2019-04-22 ASSESSMENT — LIFESTYLE VARIABLES: HOW OFTEN DO YOU HAVE A DRINK CONTAINING ALCOHOL: 0

## 2019-04-22 ASSESSMENT — ANXIETY QUESTIONNAIRES: GAD7 TOTAL SCORE: 0

## 2019-04-22 NOTE — TELEPHONE ENCOUNTER
This [facility] called and let the patient know that we called Dr. Tomasa Ybarra office and her appointment has been moved up to tomorrow 04/23/2019 at 9:45am.

## 2019-04-22 NOTE — PROGRESS NOTES
Medicare Annual Wellness Visit  Name: Zelda Hodgkin Date: 2019   MRN: [id number] Sex: Female   Age: 80 y.o. Ethnicity: Non-/Non    : 1934 Race: Brady Garner is here for Medicare AWV    Screenings for behavioral, psychosocial and functional/safety risks, and cognitive dysfunction are all negative except as indicated below. These results, as well as other patient data from the [address] form, are documented in Flowsheets linked to this Encounter. Allergies   Allergen Reactions    Latex     Augmentin [Amoxicillin-Pot Clavulanate]     Codeine     Fentanyl     Iodine     Versed [Midazolam]      Prior to Visit Medications    Medication Sig Taking? Authorizing Provider   zolpidem (AMBIEN) 10 MG tablet Take 10 mg by mouth nightly as needed for Sleep.  Yes Historical Provider, MD   OXYGEN Inhale into the lungs nightly Yes Historical Provider, MD   levothyroxine (SYNTHROID) 25 MCG tablet TAKE 1 TABLET DAILY Yes Noemi Sharma MD   ipratropium (ATROVENT HFA) 17 MCG/ACT inhaler Inhale 2 puffs into the lungs Yes Historical Provider, MD   levalbuterol (XOPENEX HFA) 45 MCG/ACT inhaler Inhale 1-2 puffs into the lungs Yes Historical Provider, MD   estradiol (ESTRACE VAGINAL) 0.1 MG/GM vaginal cream Place 1 g vaginally daily Yes Noemi Sharma MD   ergocalciferol (ERGOCALCIFEROL) 44571 units capsule Take 1 capsule by mouth once a week Yes JAZMIN Hale   hydrochlorothiazide (MICROZIDE) 12.5 MG capsule  Yes Historical Provider, MD   TOPROL XL 25 MG extended release tablet  Yes Historical Provider, MD   KLOR-CON M10 10 MEQ extended release tablet  Yes Historical Provider, MD   isosorbide mononitrate (IMDUR) 30 MG CR tablet Take 30 mg by mouth daily  Yes Historical Provider, MD     Past Medical History:   Diagnosis Date    Asthma     [facility]     Dermatomyositis ([facility] [address] [facility]     Pancreatitis      Past Surgical History:   Procedure Laterality Date    CHOLECYSTECTOMY      HYSTERECTOMY       History reviewed. No pertinent family history. CareTeam (Including outside providers/suppliers regularly involved in providing care):   Patient Care Team:  Jason Lopez MD as PCP - General (Family Medicine)  Jason Lopez MD as PCP - S Attributed Provider    Wt Readings from Last 3 Encounters:   04/22/19 175 lb 6.4 oz (79.6 kg)   03/12/18 163 lb 6.4 oz (74.1 kg)   11/27/17 160 lb 6.4 oz (72.8 kg)     Vitals:    04/22/19 1321   BP: 120/74   Pulse: 86   Temp: 98.4 °F (36.9 °C)   SpO2: 98%   Weight: 175 lb 6.4 oz (79.6 kg)   Height: 5' 3\" (1.6 m)     Body mass index is 31.07 kg/m². Based upon direct observation of the patient, evaluation of cognition reveals recent and remote memory intact.     General Appearance: alert and oriented to person, place and time, well developed and well- nourished, in no acute distress  Skin: warm and dry, no rash or erythema  Head: normocephalic and atraumatic  Eyes: pupils equal, round, and reactive to light, extraocular eye movements intact, conjunctivae normal  ENT: tympanic membrane, external ear and ear canal normal bilaterally, nose without deformity, nasal mucosa and turbinates normal without polyps  Neck: supple and non-tender without mass, no thyromegaly or thyroid nodules, no cervical lymphadenopathy  Pulmonary/Chest: clear to auscultation bilaterally- no wheezes, rales or rhonchi, normal air movement, no respiratory distress  Cardiovascular: normal rate, regular rhythm, normal S1 and S2, no murmurs, rubs, clicks, or gallops, distal pulses intact, no carotid bruits  Abdomen: soft, non-tender, non-distended, normal bowel sounds, no masses or organomegaly  Extremities: no cyanosis, clubbing or edema  Musculoskeletal: muscle tenderness bilateral paraspinal muscles  normal range of motion, no joint swelling, deformity or tenderness  Neurologic: reflexes normal and symmetric, no cranial nerve deficit, gait, coordination and speech normal    Patient's complete Health Risk Assessment and screening values have been reviewed and are found in Flowsheets. The following problems were reviewed today and where indicated follow up appointments were made and/or referrals ordered. Positive Risk Factor Screenings with Interventions:     General Health:  General  In general, how would you say your health is?: Very Good  In the past 7 days, have you experienced any of the following? New or Increased Pain, New or Increased Fatigue, Loneliness, Social Isolation, Stress or Anger?: (!) New or Increased Pain, New or Increased Fatigue  Do you get the social and emotional support that you need?: Yes  Do you have a Living Will?: Yes  General Health Risk Interventions:  · concerned for fibromyalgia    Health Habits/Nutrition:  Health Habits/Nutrition  Do you exercise for at least 20 minutes 2-3 times per week?: (!) No  Have you lost any weight without trying in the past 3 months?: No  Do you eat fewer than 2 meals per day?: No  Have you seen a dentist within the past year?: (!) No  Body mass index is 31.07 kg/m².   Health Habits/Nutrition Interventions:  · none    Safety:  Safety  Do you have working smoke detectors?: Yes  Have all throw rugs been removed or fastened?: (!) No  Do you have non-slip mats in all bathtubs?: Yes  Do all of your stairways have a railing or banister?: Yes  Are your doorways, halls and stairs free of clutter?: Yes  Do you always fasten your seatbelt when you are in a car?: Yes  Safety Interventions:  · Home safety tips provided    Personalized Preventive Plan   Current Health Maintenance Status  Immunization History   Administered Date(s) Administered    Influenza, Quadv, 3 yrs and older, IM, PF (Fluzone 3 yrs and older or Afluria 5 yrs and older) 10/18/2016    Pneumococcal 13-valent Conjugate ([id number]) 06/29/2017    Pneumococcal Polysaccharide ([id number]) 03/29/2015    Tdap (Boostrix, Adacel) 03/29/2010        Health Maintenance   Topic Date Due    Shingles Vaccine (1 of 2) 02/10/1984    DEXA (modify frequency per FRAX score)  02/10/1999    Potassium monitoring  03/14/2019    Creatinine monitoring  03/14/2019    Flu vaccine (Season Ended) 09/01/2019    DTaP/Tdap/Td vaccine (2 - Td) 03/29/2020    Pneumococcal 65+ years Vaccine  Completed     Recommendations for Preventive Services Due: see orders and patient instructions/AVS.  . Recommended screening schedule for the next 5-10 years is provided to the patient in written form: see Patient Instructions/AVS.          ICD-10-CM    1. Routine general medical examination at a health care facility Z00.00    2. Hyperglycemia R73.9 POCT glycosylated hemoglobin (Hb A1C) 5.9   3. Hypothyroidism, unspecified type E03.9 TSH without Reflex   4. Essential hypertension I10 The current medical regimen is effective;  continue present plan and medications. Lipid Panel     Comprehensive Metabolic Panel   5. Sleep related leg cramps G47.62 Will check BMP.

## 2019-04-22 NOTE — PATIENT INSTRUCTIONS
Personalized Preventive Plan for Silvio Schneider - 4/22/2019  Medicare offers a range of preventive health benefits. Some of the tests and screenings are paid in full while other may be subject to a deductible, co-insurance, and/or copay. Some of these benefits include a comprehensive review of your medical history including lifestyle, illnesses that may run in your family, and various assessments and screenings as appropriate. After reviewing your medical record and screening and assessments performed today your provider may have ordered immunizations, labs, imaging, and/or referrals for you. A list of these orders (if applicable) as well as your Preventive Care list are included within your After Visit Summary for your review. Other Preventive Recommendations:    · A preventive eye exam performed by an eye specialist is recommended every 1-2 years to screen for glaucoma; cataracts, macular degeneration, and other eye disorders. · A preventive dental visit is recommended every 6 months. · Try to get at least 150 minutes of exercise per week or 10,000 steps per day on a pedometer . · Order or download the FREE \"Exercise & Physical Activity: Your Everyday Guide\" from [employer]. Call 5-321.454.2381 or search [employer] online. · You need 0655-1813 mg of calcium and 0566-7568 IU of vitamin D per day. It is possible to meet your calcium requirement with diet alone, but a vitamin D supplement is usually necessary to meet this goal.  · When exposed to the sun, use a sunscreen that protects against both UVA and UVB radiation with an SPF of 30 or greater. Reapply every 2 to 3 hours or after sweating, drying off with a towel, or swimming. · Always wear a seat belt when traveling in a car. Always wear a helmet when riding a bicycle or motorcycle.

## 2019-04-23 ENCOUNTER — OFFICE VISIT (OUTPATIENT)
Dept: CARDIOLOGY | Facility: CLINIC | Age: 84
End: 2019-04-23

## 2019-04-23 VITALS
BODY MASS INDEX: 32.02 KG/M2 | OXYGEN SATURATION: 98 % | WEIGHT: 174 LBS | HEIGHT: 62 IN | HEART RATE: 84 BPM | SYSTOLIC BLOOD PRESSURE: 118 MMHG | DIASTOLIC BLOOD PRESSURE: 58 MMHG

## 2019-04-23 DIAGNOSIS — I48.0 PAF (PAROXYSMAL ATRIAL FIBRILLATION) (HCC): ICD-10-CM

## 2019-04-23 DIAGNOSIS — E03.9 HYPOTHYROIDISM, UNSPECIFIED TYPE: ICD-10-CM

## 2019-04-23 DIAGNOSIS — R73.03 PREDIABETES: ICD-10-CM

## 2019-04-23 DIAGNOSIS — R06.09 DYSPNEA ON EXERTION: ICD-10-CM

## 2019-04-23 DIAGNOSIS — I10 ESSENTIAL HYPERTENSION: ICD-10-CM

## 2019-04-23 DIAGNOSIS — I47.1 SVT (SUPRAVENTRICULAR TACHYCARDIA) (HCC): Primary | ICD-10-CM

## 2019-04-23 DIAGNOSIS — I49.5 SICK SINUS SYNDROME (HCC): ICD-10-CM

## 2019-04-23 DIAGNOSIS — E78.2 MIXED HYPERLIPIDEMIA: ICD-10-CM

## 2019-04-23 DIAGNOSIS — R73.09 ELEVATED HEMOGLOBIN A1C: ICD-10-CM

## 2019-04-23 LAB
ALBUMIN SERPL-MCNC: 4.6 G/DL (ref 3.5–5.2)
ALP BLD-CCNC: 77 U/L (ref 35–104)
ALT SERPL-CCNC: 21 U/L (ref 5–33)
ANION GAP SERPL CALCULATED.3IONS-SCNC: 17 MMOL/L (ref 7–19)
AST SERPL-CCNC: 28 U/L (ref 5–32)
BILIRUB SERPL-MCNC: 0.6 MG/DL (ref 0.2–1.2)
BUN BLDV-MCNC: 18 MG/DL (ref 8–23)
CALCIUM SERPL-MCNC: 9.9 MG/DL (ref 8.8–10.2)
CHLORIDE BLD-SCNC: 104 MMOL/L (ref 98–111)
CHOLESTEROL, TOTAL: 221 MG/DL (ref 160–199)
CO2: 25 MMOL/L (ref 22–29)
CREAT SERPL-MCNC: 0.8 MG/DL (ref 0.5–0.9)
GFR NON-AFRICAN AMERICAN: >60
GLUCOSE BLD-MCNC: 101 MG/DL (ref 74–109)
HDLC SERPL-MCNC: 68 MG/DL (ref 65–121)
LDL CHOLESTEROL CALCULATED: 134 MG/DL
POTASSIUM SERPL-SCNC: 4.4 MMOL/L (ref 3.5–5)
SODIUM BLD-SCNC: 146 MMOL/L (ref 136–145)
TOTAL PROTEIN: 7.1 G/DL (ref 6.6–8.7)
TRIGL SERPL-MCNC: 93 MG/DL (ref 0–149)
TSH SERPL DL<=0.05 MIU/L-ACNC: 1.92 UIU/ML (ref 0.27–4.2)

## 2019-04-23 PROCEDURE — 99214 OFFICE O/P EST MOD 30 MIN: CPT | Performed by: INTERNAL MEDICINE

## 2019-04-23 RX ORDER — METOPROLOL SUCCINATE 25 MG/1
25 TABLET, EXTENDED RELEASE ORAL 3 TIMES DAILY
COMMUNITY
End: 2020-07-08 | Stop reason: SDUPTHER

## 2019-04-23 NOTE — PROGRESS NOTES
Violet Fajardo  [id number]  1934  85 y.o.  female    Referring Provider: Onofre Nascimento MD    Reason for Follow-up Visit:  Routine follow up.   sick sinus syndrome s/p pacemaker   chronic atrial fibrillation     Subjective    Marked worsening exertional shortness of breath on exertion relieved with rest  Class 3-4   Cannot walk even 30 feet    No significant cough or wheezing  Going on for several months or longer    No palpitations  No associated chest pain  No significant pedal edema    No fever or chills  No significant expectoration    No hemoptysis  No presyncope or syncope              History of present illness:  Violet Fajardo is a 85 y.o. yo female with history of sick sinus syndrome s/p pacemaker who presents today for   Chief Complaint   Patient presents with   • Shortness of Breath     6 mo f/u - PT USES [facility] (NEEDS ALL SCRIPTS PRINTED)   • Atrial Fibrillation   • SSS   .    History  Past Medical History:   Diagnosis Date   • Asthma    • CHF (congestive heart failure) (CMS/HCC)    • COPD (chronic obstructive pulmonary disease) (CMS/HCC)    • Dermatomyositis (CMS/HCC)    • Dermatomyositis (CMS/HCC)    • Dyspnea    • E-coli UTI    • Fibromyalgia    • Hyperlipemia, mixed    • Hyperlipidemia    • Hypothyroidism    • Mitral valve disorder     History of MVP   • Mitral valve disorder    • Pacemaker    • Pacemaker    • PAF (paroxysmal atrial fibrillation) (CMS/HCC) new onset 8/6/2018   • Respiratory infection    • Sick sinus syndrome (CMS/HCC)    • Sleep apnea    • Stroke (CMS/HCC)    • SVT (supraventricular tachycardia) (CMS/HCC)    ,   Past Surgical History:   Procedure Laterality Date   • CARDIAC CATHETERIZATION     • CHOLECYSTECTOMY     • FOOT SURGERY     • HYSTERECTOMY     • INSERT / REPLACE / REMOVE PACEMAKER     • PACEMAKER IMPLANTATION  12/15/2011    [employer] (4/16/2012)-lead repositioning   ,   Family History   Problem Relation Age of Onset   • Coronary artery  disease Mother    • Heart attack Mother    • Coronary artery disease Father    ,   Social History     Tobacco Use   • Smoking status: Never Smoker   • Smokeless tobacco: Never Used   Substance Use Topics   • Alcohol use: No   • Drug use: No   ,     Medications  Current Outpatient Medications   Medication Sig Dispense Refill   • albuterol (PROVENTIL HFA;VENTOLIN HFA) 108 (90 Base) MCG/ACT inhaler Inhale 2 puffs Every 4 (Four) Hours As Needed for Wheezing.     • dabigatran etexilate (PRADAXA) 150 MG capsu Take 1 capsule by mouth Every 12 (Twelve) Hours. 180 capsule 3   • ergocalciferol (ERGOCALCIFEROL) 78471 units capsule Take  by mouth 1 (One) Time Per Week.     • Famotidine (PEPCID PO) Take  by mouth Daily.     • hydrochlorothiazide (MICROZIDE) 12.5 MG capsule TAKE 1 CAPSULE DAILY 90 capsule 3   • ipratropium (ATROVENT HFA) 17 MCG/ACT inhaler Inhale 2 puffs 4 (Four) Times a Day. 1 inhaler 11   • isosorbide mononitrate (IMDUR) 30 MG 24 hr tablet Take 1 tablet by mouth Daily. 90 tablet 3   • levothyroxine (SYNTHROID, LEVOTHROID) 25 MCG tablet Take 25 mcg by mouth Daily.     • metoprolol succinate XL (TOPROL XL) 25 MG 24 hr tablet Take 25 mg by mouth 3 (Three) Times a Day.     • Omega-3 Fatty Acids (FISH OIL) 1000 MG capsule capsule Take  by mouth Daily With Breakfast.     • potassium chloride (K-DUR,KLOR-CON) 10 MEQ CR tablet Take 1 tablet by mouth Daily. 90 tablet 3   • Zolpidem Tartrate 10 MG sublingual tablet Place  under the tongue.     • O2 (OXYGEN) Inhale Every Night.       No current facility-administered medications for this visit.        Allergies:  Latex; Amoxicillin-pot clavulanate; Barley grass; Codeine; Contrast dye; Fentanyl; and Midazolam    Review of Systems  Review of Systems   Constitution: Positive for malaise/fatigue.   HENT: Negative.    Eyes: Negative.    Cardiovascular: Positive for dyspnea on exertion and leg swelling. Negative for chest pain, claudication, cyanosis, irregular heartbeat,  "near-syncope, orthopnea, palpitations, paroxysmal nocturnal dyspnea and syncope.   Respiratory: Negative.    Endocrine: Negative.    Hematologic/Lymphatic: Negative.    Skin: Negative.    Musculoskeletal: Positive for arthritis and joint pain.   Gastrointestinal: Negative for anorexia.   Genitourinary: Negative.    Neurological: Negative.    Psychiatric/Behavioral: Negative.        Objective     Physical Exam:  /58   Pulse 84   Ht 157.5 cm (62\")   Wt 78.9 kg (174 lb)   SpO2 98%   BMI 31.83 kg/m²   Physical Exam   Constitutional: She appears well-developed.   HENT:   Head: Normocephalic.   Neck: Normal carotid pulses and no JVD present. No tracheal tenderness present. Carotid bruit is not present. No tracheal deviation and no edema present.   Cardiovascular: Normal pulses. An irregularly irregular rhythm present.   Murmur heard.   Systolic murmur is present with a grade of 2/6.  Pulmonary/Chest: Effort normal. No stridor.   Abdominal: Soft.   Neurological: She is alert. She has normal strength. No cranial nerve deficit or sensory deficit.   Skin: Skin is warm.   Psychiatric: She has a normal mood and affect. Her speech is normal and behavior is normal.       Results Review:        Procedures    Assessment/Plan   Patient Active Problem List   Diagnosis   • Dyspnea on exertion   • Sick sinus syndrome (CMS/HCC)   • SVT (supraventricular tachycardia) (CMS/HCC)   • Essential hypertension   • Elevated hemoglobin A1c   • Mixed hyperlipidemia   • PAF (paroxysmal atrial fibrillation) (CMS/HCC) new onset   • Prediabetes     Results for orders placed during the hospital encounter of 10/18/17   Adult Transthoracic Echo Complete W/ Cont if Necessary Per Protocol    Narrative · Left ventricular systolic function is normal. Estimated EF = 60%.  · No evidence of pulmonary hypertension is present.            Plan      Taper and stop beta blocker therapy  Stop Imdur    Orders Placed This Encounter   Procedures   • Stress " Test With Myocardial Perfusion One Day     Standing Status:   Future     Standing Expiration Date:   4/22/2020     Order Specific Question:   What stress agent will be used?     Answer:   Regadenoson (Lexiscan)     Order Specific Question:   Difficulty walking criteria?     Answer:   Musculoskeletal (hips, knees, feet, back, amputee)     Order Specific Question:   Difficulty walking criteria?     Answer:   LBBB     Order Specific Question:   Reason for exam?     Answer:   Heart Failure   • Adult Transthoracic Echo Complete W/ Cont if Necessary Per Protocol     Standing Status:   Future     Standing Expiration Date:   4/22/2020     Order Specific Question:   Reason for exam?     Answer:   Dyspnea      ____________________________________________________________________________________________________________________________________________  Health maintenance and recommendations      Low salt/ HTN/ Heart healthy carbohydrate restricted cardiac diet   The patient is advised to reduce or avoid caffeine or other cardiac stimulants.     The patient was advised to avoid long term NSAIDS , use Tylenol PRN instead  Avoid cardiac stimulants including common drugs like Pseudoephedrine or excessive amounts of caffeine    Monitor for any signs of bleeding including red or dark stools. Fall precautions.        Advised staying uptodate with immunizations per established standard guidelines.      Offered to give patient  a copy      Questions were encouraged, asked and answered to the patient's  understanding and satisfaction. Questions if any regarding current medications and side effects, need for refills and importance of compliance to medications stressed.    Reviewed available prior notes, consults, prior visits, laboratory findings, radiology and cardiology relevant reports. Updated chart as applicable. I have reviewed the patient's medical history in detail and updated the computerized patient record as relevant.       Updated patient regarding any new or relevant abnormalities on review of records or any new findings on physical exam. Mentioned to patient about purpose of visit and desirable health short and long term goals and objectives.    Primary to monitor CBC CMP Lipid panel and TSH as applicable    ___________________________________________________________________________________________________________________________________________          Return in about 6 weeks (around 6/4/2019).

## 2019-05-08 ENCOUNTER — HOSPITAL ENCOUNTER (OUTPATIENT)
Dept: CARDIOLOGY | Facility: HOSPITAL | Age: 84
Discharge: HOME | End: 2019-05-08

## 2019-05-08 ENCOUNTER — HOSPITAL ENCOUNTER (OUTPATIENT)
Dept: CARDIOLOGY | Facility: HOSPITAL | Age: 84
Discharge: HOME | End: 2019-05-08
Admitting: INTERNAL MEDICINE

## 2019-05-08 VITALS
BODY MASS INDEX: 32.01 KG/M2 | SYSTOLIC BLOOD PRESSURE: 134 MMHG | HEART RATE: 64 BPM | DIASTOLIC BLOOD PRESSURE: 76 MMHG | HEIGHT: 62 IN | WEIGHT: 173.94 LBS

## 2019-05-08 VITALS
HEIGHT: 62 IN | SYSTOLIC BLOOD PRESSURE: 133 MMHG | WEIGHT: 173.94 LBS | DIASTOLIC BLOOD PRESSURE: 70 MMHG | BODY MASS INDEX: 32.01 KG/M2

## 2019-05-08 DIAGNOSIS — R06.09 DYSPNEA ON EXERTION: ICD-10-CM

## 2019-05-08 LAB
BH CV NUCLEAR PRIOR STUDY: 3
BH CV STRESS BP STAGE 1: NORMAL
BH CV STRESS COMMENTS STAGE 1: NORMAL
BH CV STRESS DOSE REGADENOSON STAGE 1: 0.4
BH CV STRESS DURATION MIN STAGE 1: 0
BH CV STRESS DURATION SEC STAGE 1: 10
BH CV STRESS HR STAGE 1: 135
BH CV STRESS PROTOCOL 1: NORMAL
BH CV STRESS RECOVERY BP: NORMAL MMHG
BH CV STRESS RECOVERY HR: 93 BPM
BH CV STRESS STAGE 1: 1
LV EF NUC BP: 65 %
MAXIMAL PREDICTED HEART RATE: 135 BPM
PERCENT MAX PREDICTED HR: 100 %
STRESS BASELINE BP: NORMAL MMHG
STRESS BASELINE HR: 86 BPM
STRESS PERCENT HR: 118 %
STRESS POST EXERCISE DUR MIN: 0 MIN
STRESS POST EXERCISE DUR SEC: 10 SEC
STRESS POST PEAK BP: NORMAL MMHG
STRESS POST PEAK HR: 135 BPM
STRESS TARGET HR: 115 BPM

## 2019-05-08 PROCEDURE — 78452 HT MUSCLE IMAGE SPECT MULT: CPT

## 2019-05-08 PROCEDURE — 93306 TTE W/DOPPLER COMPLETE: CPT

## 2019-05-08 PROCEDURE — 93306 TTE W/DOPPLER COMPLETE: CPT | Performed by: INTERNAL MEDICINE

## 2019-05-08 PROCEDURE — 93018 CV STRESS TEST I&R ONLY: CPT | Performed by: INTERNAL MEDICINE

## 2019-05-08 PROCEDURE — 0 TECHNETIUM SESTAMIBI: Performed by: INTERNAL MEDICINE

## 2019-05-08 PROCEDURE — 78452 HT MUSCLE IMAGE SPECT MULT: CPT | Performed by: INTERNAL MEDICINE

## 2019-05-08 PROCEDURE — 93017 CV STRESS TEST TRACING ONLY: CPT

## 2019-05-08 PROCEDURE — A9500 TC99M SESTAMIBI: HCPCS | Performed by: INTERNAL MEDICINE

## 2019-05-08 PROCEDURE — 25010000002 REGADENOSON 0.4 MG/5ML SOLUTION: Performed by: INTERNAL MEDICINE

## 2019-05-08 RX ADMIN — TECHNETIUM TC 99M SESTAMIBI 1 DOSE: 1 INJECTION INTRAVENOUS at 09:24

## 2019-05-08 RX ADMIN — REGADENOSON 0.4 MG: 0.08 INJECTION, SOLUTION INTRAVENOUS at 10:44

## 2019-05-08 RX ADMIN — TECHNETIUM TC 99M SESTAMIBI 1 DOSE: 1 INJECTION INTRAVENOUS at 11:00

## 2019-05-09 LAB
BH CV ECHO MEAS - AO MAX PG (FULL): 2.5 MMHG
BH CV ECHO MEAS - AO MAX PG: 5 MMHG
BH CV ECHO MEAS - AO MEAN PG (FULL): 2 MMHG
BH CV ECHO MEAS - AO MEAN PG: 3 MMHG
BH CV ECHO MEAS - AO ROOT AREA (BSA CORRECTED): 1.7
BH CV ECHO MEAS - AO ROOT AREA: 7.1 CM^2
BH CV ECHO MEAS - AO ROOT DIAM: 3 CM
BH CV ECHO MEAS - AO V2 MAX: 112 CM/SEC
BH CV ECHO MEAS - AO V2 MEAN: 78.9 CM/SEC
BH CV ECHO MEAS - AO V2 VTI: 22.3 CM
BH CV ECHO MEAS - AVA(I,A): 2.3 CM^2
BH CV ECHO MEAS - AVA(I,D): 2.3 CM^2
BH CV ECHO MEAS - AVA(V,A): 2.2 CM^2
BH CV ECHO MEAS - AVA(V,D): 2.2 CM^2
BH CV ECHO MEAS - BSA(HAYCOCK): 1.9 M^2
BH CV ECHO MEAS - BSA: 1.8 M^2
BH CV ECHO MEAS - BZI_BMI: 31.8 KILOGRAMS/M^2
BH CV ECHO MEAS - BZI_METRIC_HEIGHT: 157.5 CM
BH CV ECHO MEAS - BZI_METRIC_WEIGHT: 78.9 KG
BH CV ECHO MEAS - EDV(CUBED): 35 ML
BH CV ECHO MEAS - EDV(MOD-SP4): 35.9 ML
BH CV ECHO MEAS - EDV(TEICH): 43.2 ML
BH CV ECHO MEAS - EF(CUBED): 74.3 %
BH CV ECHO MEAS - EF(MOD-SP4): 52.9 %
BH CV ECHO MEAS - EF(TEICH): 67.4 %
BH CV ECHO MEAS - ESV(CUBED): 9 ML
BH CV ECHO MEAS - ESV(MOD-SP4): 16.9 ML
BH CV ECHO MEAS - ESV(TEICH): 14.1 ML
BH CV ECHO MEAS - FS: 36.4 %
BH CV ECHO MEAS - IVS/LVPW: 0.9
BH CV ECHO MEAS - IVSD: 0.95 CM
BH CV ECHO MEAS - LA DIMENSION: 3.8 CM
BH CV ECHO MEAS - LA/AO: 1.3
BH CV ECHO MEAS - LAT PEAK E' VEL: 12.8 CM/SEC
BH CV ECHO MEAS - LV DIASTOLIC VOL/BSA (35-75): 19.9 ML/M^2
BH CV ECHO MEAS - LV MASS(C)D: 93.1 GRAMS
BH CV ECHO MEAS - LV MASS(C)DI: 51.7 GRAMS/M^2
BH CV ECHO MEAS - LV MAX PG: 2.5 MMHG
BH CV ECHO MEAS - LV MEAN PG: 1 MMHG
BH CV ECHO MEAS - LV SYSTOLIC VOL/BSA (12-30): 9.4 ML/M^2
BH CV ECHO MEAS - LV V1 MAX: 79.2 CM/SEC
BH CV ECHO MEAS - LV V1 MEAN: 55 CM/SEC
BH CV ECHO MEAS - LV V1 VTI: 16.2 CM
BH CV ECHO MEAS - LVIDD: 3.3 CM
BH CV ECHO MEAS - LVIDS: 2.1 CM
BH CV ECHO MEAS - LVLD AP4: 6.3 CM
BH CV ECHO MEAS - LVLS AP4: 6.3 CM
BH CV ECHO MEAS - LVOT AREA (M): 3.1 CM^2
BH CV ECHO MEAS - LVOT AREA: 3.1 CM^2
BH CV ECHO MEAS - LVOT DIAM: 2 CM
BH CV ECHO MEAS - LVPWD: 1.1 CM
BH CV ECHO MEAS - MED PEAK E' VEL: 9.79 CM/SEC
BH CV ECHO MEAS - MV DEC TIME: 0.14 SEC
BH CV ECHO MEAS - MV E MAX VEL: 126 CM/SEC
BH CV ECHO MEAS - RAP SYSTOLE: 5 MMHG
BH CV ECHO MEAS - RVSP: 26.7 MMHG
BH CV ECHO MEAS - SI(AO): 87.5 ML/M^2
BH CV ECHO MEAS - SI(CUBED): 14.4 ML/M^2
BH CV ECHO MEAS - SI(LVOT): 28.2 ML/M^2
BH CV ECHO MEAS - SI(MOD-SP4): 10.5 ML/M^2
BH CV ECHO MEAS - SI(TEICH): 16.2 ML/M^2
BH CV ECHO MEAS - SV(AO): 157.6 ML
BH CV ECHO MEAS - SV(CUBED): 26 ML
BH CV ECHO MEAS - SV(LVOT): 50.9 ML
BH CV ECHO MEAS - SV(MOD-SP4): 19 ML
BH CV ECHO MEAS - SV(TEICH): 29.1 ML
BH CV ECHO MEAS - TR MAX VEL: 233 CM/SEC
BH CV ECHO MEASUREMENTS AVERAGE E/E' RATIO: 11.16
LEFT ATRIUM VOLUME INDEX: 21.3 ML/M2
LEFT ATRIUM VOLUME: 38.3 CM3
LV EF 2D ECHO EST: 65 %
MAXIMAL PREDICTED HEART RATE: 135 BPM
STRESS TARGET HR: 115 BPM

## 2019-05-14 ENCOUNTER — TELEPHONE (OUTPATIENT)
Dept: CARDIOLOGY | Facility: CLINIC | Age: 84
End: 2019-05-14

## 2019-05-14 NOTE — TELEPHONE ENCOUNTER
Patient called and would like results to recent testing and she was taken off metoprolol  and isosorbide would you  like her to start it again.

## 2019-05-14 NOTE — TELEPHONE ENCOUNTER
Low risk stress test with normal LVEF   Who took her off these medications  Keep follow up as scheduled or PRN sooner if any new or worsening problem.

## 2019-05-15 NOTE — TELEPHONE ENCOUNTER
From what she stated you wanted her off the medications before the tests and she didn't know if she was supposed to resume them after the tests.

## 2019-05-16 ENCOUNTER — TELEPHONE (OUTPATIENT)
Dept: PRIMARY CARE CLINIC | Age: 84
End: 2019-05-16

## 2019-05-20 DIAGNOSIS — E03.9 HYPOTHYROIDISM, UNSPECIFIED TYPE: Primary | ICD-10-CM

## 2019-05-20 RX ORDER — LEVOTHYROXINE SODIUM 0.03 MG/1
TABLET ORAL
Qty: 14 TABLET | Refills: 0 | Status: SHIPPED | OUTPATIENT
Start: 2019-05-20 | End: 2019-07-10 | Stop reason: SDUPTHER

## 2019-05-20 RX ORDER — LEVOTHYROXINE SODIUM 0.03 MG/1
25 TABLET ORAL DAILY
Qty: 90 TABLET | Refills: 1 | Status: SHIPPED | OUTPATIENT
Start: 2019-05-20 | End: 2019-07-11 | Stop reason: SDUPTHER

## 2019-05-20 RX ORDER — LEVOTHYROXINE SODIUM 0.03 MG/1
25 TABLET ORAL DAILY
Qty: 90 TABLET | Refills: 1 | Status: SHIPPED | OUTPATIENT
Start: 2019-05-20 | End: 2019-05-20 | Stop reason: SDUPTHER

## 2019-05-20 NOTE — TELEPHONE ENCOUNTER
Pt. Needs Rx printed for JAMIE Montoya for thyroid medication. She has also run out and needs short term Rx sent to  on [facility]. I have sent in short term Rx and will print a Rx for pt. To  for [facility] [place].

## 2019-06-05 ENCOUNTER — CARE COORDINATION (OUTPATIENT)
Dept: CARE COORDINATION | Age: 84
End: 2019-06-05

## 2019-06-05 ENCOUNTER — OFFICE VISIT (OUTPATIENT)
Dept: CARDIOLOGY | Facility: CLINIC | Age: 84
End: 2019-06-05

## 2019-06-05 VITALS
HEIGHT: 62 IN | OXYGEN SATURATION: 99 % | BODY MASS INDEX: 31.65 KG/M2 | DIASTOLIC BLOOD PRESSURE: 80 MMHG | WEIGHT: 172 LBS | SYSTOLIC BLOOD PRESSURE: 120 MMHG | HEART RATE: 96 BPM

## 2019-06-05 DIAGNOSIS — E78.2 MIXED HYPERLIPIDEMIA: ICD-10-CM

## 2019-06-05 DIAGNOSIS — R06.09 DYSPNEA ON EXERTION: ICD-10-CM

## 2019-06-05 DIAGNOSIS — I49.5 SICK SINUS SYNDROME (HCC): ICD-10-CM

## 2019-06-05 DIAGNOSIS — I48.0 PAF (PAROXYSMAL ATRIAL FIBRILLATION) (HCC): ICD-10-CM

## 2019-06-05 DIAGNOSIS — I47.1 SVT (SUPRAVENTRICULAR TACHYCARDIA) (HCC): Primary | ICD-10-CM

## 2019-06-05 DIAGNOSIS — R73.03 PREDIABETES: ICD-10-CM

## 2019-06-05 DIAGNOSIS — R73.09 ELEVATED HEMOGLOBIN A1C: ICD-10-CM

## 2019-06-05 DIAGNOSIS — I10 ESSENTIAL HYPERTENSION: ICD-10-CM

## 2019-06-05 PROCEDURE — 99213 OFFICE O/P EST LOW 20 MIN: CPT | Performed by: INTERNAL MEDICINE

## 2019-06-05 PROCEDURE — 93000 ELECTROCARDIOGRAM COMPLETE: CPT | Performed by: INTERNAL MEDICINE

## 2019-06-05 RX ORDER — HYDROCHLOROTHIAZIDE 12.5 MG/1
12.5 CAPSULE, GELATIN COATED ORAL DAILY
Qty: 90 CAPSULE | Refills: 3 | Status: SHIPPED | OUTPATIENT
Start: 2019-06-05 | End: 2019-06-05 | Stop reason: SDUPTHER

## 2019-06-05 RX ORDER — HYDROCHLOROTHIAZIDE 12.5 MG/1
12.5 CAPSULE, GELATIN COATED ORAL DAILY
Qty: 90 CAPSULE | Refills: 3 | Status: SHIPPED | OUTPATIENT
Start: 2019-06-05

## 2019-06-05 RX ORDER — ISOSORBIDE MONONITRATE 30 MG/1
30 TABLET, EXTENDED RELEASE ORAL DAILY
Qty: 90 TABLET | Refills: 3 | Status: SHIPPED | OUTPATIENT
Start: 2019-06-05 | End: 2019-10-07 | Stop reason: SDUPTHER

## 2019-06-05 RX ORDER — ALBUTEROL SULFATE 90 UG/1
2 AEROSOL, METERED RESPIRATORY (INHALATION) EVERY 4 HOURS PRN
Qty: 1 INHALER | Refills: 3 | Status: SHIPPED | OUTPATIENT
Start: 2019-06-05 | End: 2021-03-29

## 2019-06-05 NOTE — PROGRESS NOTES
Violet Fajardo  [id number]  1934  85 y.o.  female    Referring Provider: Onofre Nascimento MD    Reason for Follow-up Visit:  Here for follow up after cardiac testing.   sick sinus syndrome s/p pacemaker   chronic atrial fibrillation     Subjective    Marked worsening exertional shortness of breath on exertion relieved with rest  Class 3-4   Cannot walk even 30 feet    No significant cough or wheezing  Going on for several months or longer    No palpitations  No associated chest pain  No significant pedal edema    No fever or chills  No significant expectoration    No hemoptysis  No presyncope or syncope      Low risk stress test with normal LVEF    History of present illness:  Violet Fajardo is a 85 y.o. yo female with history of sick sinus syndrome s/p pacemaker who presents today for   Chief Complaint   Patient presents with   • SSS     6 WK FU/ RESULTS   • Fatigue   .    History  Past Medical History:   Diagnosis Date   • Asthma    • CHF (congestive heart failure) (CMS/HCC)    • COPD (chronic obstructive pulmonary disease) (CMS/HCC)    • Dermatomyositis (CMS/HCC)    • Dermatomyositis (CMS/HCC)    • Dyspnea    • E-coli UTI    • Fibromyalgia    • Hyperlipemia, mixed    • Hyperlipidemia    • Hypothyroidism    • Mitral valve disorder     History of MVP   • Mitral valve disorder    • Pacemaker    • Pacemaker    • PAF (paroxysmal atrial fibrillation) (CMS/HCC) new onset 8/6/2018   • Respiratory infection    • Sick sinus syndrome (CMS/HCC)    • Sleep apnea    • Stroke (CMS/HCC)    • SVT (supraventricular tachycardia) (CMS/HCC)    ,   Past Surgical History:   Procedure Laterality Date   • CARDIAC CATHETERIZATION     • CHOLECYSTECTOMY     • FOOT SURGERY     • HYSTERECTOMY     • INSERT / REPLACE / REMOVE PACEMAKER     • PACEMAKER IMPLANTATION  12/15/2011    [employer] (4/16/2012)-lead repositioning   ,   Family History   Problem Relation Age of Onset   • Coronary artery disease Mother    • Heart attack  Mother    • Coronary artery disease Father    ,   Social History     Tobacco Use   • Smoking status: Never Smoker   • Smokeless tobacco: Never Used   Substance Use Topics   • Alcohol use: No   • Drug use: No   ,     Medications  Current Outpatient Medications   Medication Sig Dispense Refill   • albuterol (PROVENTIL HFA;VENTOLIN HFA) 108 (90 Base) MCG/ACT inhaler Inhale 2 puffs Every 4 (Four) Hours As Needed for Wheezing.     • dabigatran etexilate (PRADAXA) 150 MG capsu Take 1 capsule by mouth Every 12 (Twelve) Hours. 180 capsule 3   • ergocalciferol (ERGOCALCIFEROL) 62226 units capsule Take  by mouth 1 (One) Time Per Week.     • Famotidine (PEPCID PO) Take  by mouth Daily.     • hydrochlorothiazide (MICROZIDE) 12.5 MG capsule TAKE 1 CAPSULE DAILY 90 capsule 3   • ipratropium (ATROVENT HFA) 17 MCG/ACT inhaler Inhale 2 puffs 4 (Four) Times a Day. 1 inhaler 11   • isosorbide mononitrate (IMDUR) 30 MG 24 hr tablet Take 1 tablet by mouth Daily. 90 tablet 3   • levothyroxine (SYNTHROID, LEVOTHROID) 25 MCG tablet Take 25 mcg by mouth Daily.     • metoprolol succinate XL (TOPROL XL) 25 MG 24 hr tablet Take 25 mg by mouth 3 (Three) Times a Day.     • O2 (OXYGEN) Inhale Every Night.     • Omega-3 Fatty Acids (FISH OIL) 1000 MG capsule capsule Take  by mouth Daily With Breakfast.     • potassium chloride (K-DUR,KLOR-CON) 10 MEQ CR tablet Take 1 tablet by mouth Daily. 90 tablet 3   • Zolpidem Tartrate 10 MG sublingual tablet Place  under the tongue.       No current facility-administered medications for this visit.        Allergies:  Latex; Amoxicillin-pot clavulanate; Barley grass; Codeine; Contrast dye; Fentanyl; and Midazolam    Review of Systems  Review of Systems   Constitution: Positive for malaise/fatigue.   HENT: Negative.    Eyes: Negative.    Cardiovascular: Positive for dyspnea on exertion and leg swelling. Negative for chest pain, claudication, cyanosis, irregular heartbeat, near-syncope, orthopnea, palpitations,  "paroxysmal nocturnal dyspnea and syncope.   Respiratory: Negative.    Endocrine: Negative.    Hematologic/Lymphatic: Negative.    Skin: Negative.    Musculoskeletal: Positive for arthritis and joint pain.   Gastrointestinal: Negative for anorexia.   Genitourinary: Negative.    Neurological: Negative.    Psychiatric/Behavioral: Negative.        Objective     Physical Exam:  /80   Pulse 96   Ht 157.5 cm (62.01\")   Wt 78 kg (172 lb)   SpO2 99%   BMI 31.45 kg/m²   Physical Exam   Constitutional: She appears well-developed.   HENT:   Head: Normocephalic.   Neck: Normal carotid pulses and no JVD present. No tracheal tenderness present. Carotid bruit is not present. No tracheal deviation and no edema present.   Cardiovascular: Normal pulses. An irregularly irregular rhythm present.   Murmur heard.   Systolic murmur is present with a grade of 2/6.  Pulmonary/Chest: Effort normal. No stridor.   Abdominal: Soft.   Neurological: She is alert. She has normal strength. No cranial nerve deficit or sensory deficit.   Skin: Skin is warm.   Psychiatric: She has a normal mood and affect. Her speech is normal and behavior is normal.       Results Review:    myocardial perfusion scan  5/19    · Left ventricular ejection fraction is normal (Calculated EF = 65%).  · Myocardial perfusion imaging indicates a normal myocardial perfusion study with no evidence of ischemia.  · Breast attenuation artifact is present.  · Raw images reviewed with the following abnormalities noted: vertical motion.  · Impressions are consistent with a low risk study.      Results for orders placed during the hospital encounter of 05/08/19   Adult Transthoracic Echo Complete W/ Cont if Necessary Per Protocol    Narrative · Estimated EF = 65%.  · Left ventricular systolic function is normal.  · No evidence of pulmonary hypertension is present.                 ECG 12 Lead  Date/Time: 6/5/2019 1:36 PM  Performed by: Chalo Torres MD  Authorized by: Melissa, " MD Chalo   Comparison: compared with previous ECG from 12/17/2018  Comparison to previous ECG: V pacing replaced atrial fibrillation    Rhythm: paced  Rate: normal    Clinical impression: abnormal EKG            Assessment/Plan   Patient Active Problem List   Diagnosis   • Dyspnea on exertion   • Sick sinus syndrome (CMS/HCC)   • SVT (supraventricular tachycardia) (CMS/HCC)   • Essential hypertension   • Elevated hemoglobin A1c   • Mixed hyperlipidemia   • PAF (paroxysmal atrial fibrillation) (CMS/HCC) new onset   • Prediabetes     Results for orders placed during the hospital encounter of 05/08/19   Adult Transthoracic Echo Complete W/ Cont if Necessary Per Protocol    Narrative · Estimated EF = 65%.  · Left ventricular systolic function is normal.  · No evidence of pulmonary hypertension is present.            Plan      The current medical regimen is effective;  continue present plan and medications.     Weigh yourself frequently, at least weekly, preferably daily, call me if more than 2 pounds a day or 5 pounds a week weight gain.  Flexible diuretic dosing    Monitor cardiac rhythm device function regularly per established schedule or PRN   ____________________________________________________________________________________________________________________________________________  Health maintenance and recommendations      Similar recommendations as last visit      shortness of breath likely pulmonary in origin, says bronchodilators helping      Offered to give patient  a copy      Questions were encouraged, asked and answered to the patient's  understanding and satisfaction. Questions if any regarding current medications and side effects, need for refills and importance of compliance to medications stressed.    Reviewed available prior notes, consults, prior visits, laboratory findings, radiology and cardiology relevant reports. Updated chart as applicable. I have reviewed the patient's medical history in  detail and updated the computerized patient record as relevant.      Updated patient regarding any new or relevant abnormalities on review of records or any new findings on physical exam. Mentioned to patient about purpose of visit and desirable health short and long term goals and objectives.    Primary to monitor CBC CMP Lipid panel and TSH as applicable    ___________________________________________________________________________________________________________________________________________          Return in about 6 months (around 12/5/2019).

## 2019-06-05 NOTE — CARE COORDINATION
Kelly with [facility] telephoned. She stated the patient was with her. They had completed prescription assistance applications for the patient's meds. Estela Orozco will deliver apps on Friday, 06.07.2019, for provider completion. Estela Orozco asked if Dr Carrillo Scherer would sign the applications for Atrovent and Ventolin as Dr Black Freeman prescribes these inhalers, but the patient is struggling to get out of her house due to the weather. Informed Estela Orozco that Dr Carrillo Scherer was out of the office for two weeks. This CHW stated she would ask Layne Stoddard if she will sign. Estela Orozco stated she will change all applications to [facility] for signatures so applications can be sent as soon as possible. Routing message to [facility] to let her know of pending applications and request for signatures on inhaler applications.     Submitted by Juan Antonio/BRETT

## 2019-06-14 ENCOUNTER — CLINICAL SUPPORT (OUTPATIENT)
Dept: CARDIOLOGY | Facility: CLINIC | Age: 84
End: 2019-06-14

## 2019-06-14 DIAGNOSIS — I49.5 SICK SINUS SYNDROME (HCC): ICD-10-CM

## 2019-06-14 PROCEDURE — 93294 REM INTERROG EVL PM/LDLS PM: CPT | Performed by: PHYSICIAN ASSISTANT

## 2019-06-14 PROCEDURE — 93296 REM INTERROG EVL PM/IDS: CPT | Performed by: PHYSICIAN ASSISTANT

## 2019-06-18 NOTE — PROGRESS NOTES
Dual Chamber Pacemaker Evaluation Report  [facility]    June 18, 2019    Primary Cardiologist: Melissa  : Medtronic Model: Adapta  Implant date: 2/24/216    Reason for evaluation: routine  Indication for pacemaker: sick sinus syndrome    Measurements  Atrial sensing - P wave: 0.7-1.0 mV  Atrial threshold: 0.5 V@ 0.4 ms  Atrial lead impedance: 386 ohms  Ventricular sensing - R wave: 5.6-16.0 mV  Ventricular threshold: 0.875 V @ 0.4 ms  Ventricular lead impedance:   1547 ohms     Diagnostic Data  Atrial paced: 1.2 %  Ventricular paced: 2.1 %  Other: no new event since last transfmission  Battery status: satisfactory         Final Parameters  Mode:  AAIR+  Lower rate: 60 bpm   Upper rate: 130 bpm  AV Delay: paced- 150 ms  Sensed-120 ms  Atrial - Amplitude: 1.5 V   Pulse width: 0.4 ms   Sensitivity: 0.18 mV     Ventricular - Amplitude: 2.0 V  Pulse width: 0.4 ms  Sensitivity: 2.8 mV    Changes made: n/a  Conclusions: normal pacemaker function    Follow up: 3 months

## 2019-07-03 ENCOUNTER — ANTI-COAG VISIT (OUTPATIENT)
Dept: CARDIOLOGY | Age: 84
End: 2019-07-03

## 2019-07-10 ENCOUNTER — ANTI-COAG VISIT (OUTPATIENT)
Dept: CARDIOLOGY | Age: 84
End: 2019-07-10

## 2019-07-10 DIAGNOSIS — E03.9 HYPOTHYROIDISM, UNSPECIFIED TYPE: ICD-10-CM

## 2019-07-10 RX ORDER — LEVOTHYROXINE SODIUM 0.03 MG/1
TABLET ORAL
Qty: 30 TABLET | Refills: 0 | Status: SHIPPED | OUTPATIENT
Start: 2019-07-10 | End: 2019-07-11

## 2019-07-10 NOTE — TELEPHONE ENCOUNTER
Derrell Garcia called to request a refill on her medication. Last office visit : 4/22/2019   Next office visit : 10/29/2019     Requested Prescriptions     Signed Prescriptions Disp Refills    levothyroxine (SYNTHROID) 25 MCG tablet 30 tablet 0     Sig: TAKE 1 TABLET DAILY     Authorizing Provider: Mahendra Marx     Ordering User: Linnette Love MA    Pt picked up her RX for this medication but she hasn't received her medication yet.

## 2019-07-11 DIAGNOSIS — E03.9 HYPOTHYROIDISM, UNSPECIFIED TYPE: ICD-10-CM

## 2019-07-11 RX ORDER — LEVOTHYROXINE SODIUM 0.03 MG/1
25 TABLET ORAL DAILY
Qty: 90 TABLET | Refills: 1 | Status: SHIPPED | OUTPATIENT
Start: 2019-07-11 | End: 2020-07-06 | Stop reason: SDUPTHER

## 2019-07-23 DIAGNOSIS — F51.01 PRIMARY INSOMNIA: Primary | ICD-10-CM

## 2019-07-23 RX ORDER — ZOLPIDEM TARTRATE 10 MG/1
10 TABLET ORAL NIGHTLY PRN
Qty: 90 TABLET | Refills: 2 | Status: SHIPPED | OUTPATIENT
Start: 2019-07-23 | End: 2019-07-30 | Stop reason: SDUPTHER

## 2019-07-30 ENCOUNTER — TELEPHONE (OUTPATIENT)
Dept: FAMILY MEDICINE CLINIC | Age: 84
End: 2019-07-30

## 2019-07-30 DIAGNOSIS — F51.01 PRIMARY INSOMNIA: ICD-10-CM

## 2019-07-30 RX ORDER — ZOLPIDEM TARTRATE 10 MG/1
10 TABLET ORAL NIGHTLY PRN
Qty: 90 TABLET | Refills: 0 | OUTPATIENT
Start: 2019-07-30 | End: 2019-10-29 | Stop reason: SDUPTHER

## 2019-08-20 ENCOUNTER — TELEPHONE (OUTPATIENT)
Dept: PRIMARY CARE CLINIC | Age: 84
End: 2019-08-20

## 2019-09-30 ENCOUNTER — TELEPHONE (OUTPATIENT)
Dept: CARDIOLOGY | Facility: CLINIC | Age: 84
End: 2019-09-30

## 2019-10-02 ENCOUNTER — ANTI-COAG VISIT (OUTPATIENT)
Dept: CARDIOLOGY | Age: 84
End: 2019-10-02

## 2019-10-07 RX ORDER — DABIGATRAN ETEXILATE 150 MG/1
150 CAPSULE ORAL EVERY 12 HOURS SCHEDULED
Qty: 180 CAPSULE | Refills: 3 | Status: SHIPPED | OUTPATIENT
Start: 2019-10-07 | End: 2019-12-31

## 2019-10-07 RX ORDER — ISOSORBIDE MONONITRATE 30 MG/1
30 TABLET, EXTENDED RELEASE ORAL DAILY
Qty: 90 TABLET | Refills: 3 | Status: SHIPPED | OUTPATIENT
Start: 2019-10-07 | End: 2020-10-19

## 2019-10-29 ENCOUNTER — OFFICE VISIT (OUTPATIENT)
Dept: PRIMARY CARE CLINIC | Age: 84
End: 2019-10-29
Payer: MEDICARE

## 2019-10-29 ENCOUNTER — TELEPHONE (OUTPATIENT)
Dept: PRIMARY CARE CLINIC | Age: 84
End: 2019-10-29

## 2019-10-29 VITALS
OXYGEN SATURATION: 98 % | HEART RATE: 105 BPM | RESPIRATION RATE: 17 BRPM | BODY MASS INDEX: 28.85 KG/M2 | TEMPERATURE: 97.6 F | WEIGHT: 169 LBS | SYSTOLIC BLOOD PRESSURE: 122 MMHG | DIASTOLIC BLOOD PRESSURE: 82 MMHG | HEIGHT: 64 IN

## 2019-10-29 DIAGNOSIS — L65.9 ALOPECIA: ICD-10-CM

## 2019-10-29 DIAGNOSIS — I10 ESSENTIAL HYPERTENSION: ICD-10-CM

## 2019-10-29 DIAGNOSIS — E55.9 VITAMIN D DEFICIENCY: ICD-10-CM

## 2019-10-29 DIAGNOSIS — E03.9 HYPOTHYROIDISM, UNSPECIFIED TYPE: ICD-10-CM

## 2019-10-29 DIAGNOSIS — L65.9 ALOPECIA: Primary | ICD-10-CM

## 2019-10-29 DIAGNOSIS — I48.0 PAROXYSMAL ATRIAL FIBRILLATION (HCC): ICD-10-CM

## 2019-10-29 DIAGNOSIS — B37.2 YEAST INFECTION OF THE SKIN: ICD-10-CM

## 2019-10-29 DIAGNOSIS — F51.01 PRIMARY INSOMNIA: ICD-10-CM

## 2019-10-29 LAB
ALBUMIN SERPL-MCNC: 4.7 G/DL (ref 3.5–5.2)
ALP BLD-CCNC: 109 U/L (ref 35–104)
ALT SERPL-CCNC: 23 U/L (ref 5–33)
ANION GAP SERPL CALCULATED.3IONS-SCNC: 18 MMOL/L (ref 7–19)
AST SERPL-CCNC: 27 U/L (ref 5–32)
BASOPHILS ABSOLUTE: 0.1 K/UL (ref 0–0.2)
BASOPHILS RELATIVE PERCENT: 0.8 % (ref 0–1)
BILIRUB SERPL-MCNC: 0.3 MG/DL (ref 0.2–1.2)
BUN BLDV-MCNC: 16 MG/DL (ref 8–23)
CALCIUM SERPL-MCNC: 9.5 MG/DL (ref 8.8–10.2)
CHLORIDE BLD-SCNC: 103 MMOL/L (ref 98–111)
CO2: 27 MMOL/L (ref 22–29)
CREAT SERPL-MCNC: 0.8 MG/DL (ref 0.5–0.9)
EOSINOPHILS ABSOLUTE: 0.3 K/UL (ref 0–0.6)
EOSINOPHILS RELATIVE PERCENT: 3.6 % (ref 0–5)
FOLATE: 9.3 NG/ML (ref 4.8–37.3)
GFR NON-AFRICAN AMERICAN: >60
GLUCOSE BLD-MCNC: 129 MG/DL (ref 74–109)
HCT VFR BLD CALC: 43.6 % (ref 37–47)
HEMOGLOBIN: 13.6 G/DL (ref 12–16)
IMMATURE GRANULOCYTES #: 0 K/UL
LYMPHOCYTES ABSOLUTE: 3 K/UL (ref 1.1–4.5)
LYMPHOCYTES RELATIVE PERCENT: 34.1 % (ref 20–40)
MCH RBC QN AUTO: 29.8 PG (ref 27–31)
MCHC RBC AUTO-ENTMCNC: 31.2 G/DL (ref 33–37)
MCV RBC AUTO: 95.6 FL (ref 81–99)
MONOCYTES ABSOLUTE: 0.8 K/UL (ref 0–0.9)
MONOCYTES RELATIVE PERCENT: 9 % (ref 0–10)
NEUTROPHILS ABSOLUTE: 4.6 K/UL (ref 1.5–7.5)
NEUTROPHILS RELATIVE PERCENT: 52.2 % (ref 50–65)
PDW BLD-RTO: 14.5 % (ref 11.5–14.5)
PLATELET # BLD: 243 K/UL (ref 130–400)
PMV BLD AUTO: 10.7 FL (ref 9.4–12.3)
POTASSIUM SERPL-SCNC: 4.2 MMOL/L (ref 3.5–5)
RBC # BLD: 4.56 M/UL (ref 4.2–5.4)
SODIUM BLD-SCNC: 148 MMOL/L (ref 136–145)
TOTAL PROTEIN: 7.5 G/DL (ref 6.6–8.7)
TSH REFLEX FT4: 2.27 UIU/ML (ref 0.35–5.5)
VITAMIN B-12: 265 PG/ML (ref 211–946)
VITAMIN D 25-HYDROXY: 26.7 NG/ML
WBC # BLD: 8.8 K/UL (ref 4.8–10.8)

## 2019-10-29 PROCEDURE — 1123F ACP DISCUSS/DSCN MKR DOCD: CPT | Performed by: NURSE PRACTITIONER

## 2019-10-29 PROCEDURE — G8417 CALC BMI ABV UP PARAM F/U: HCPCS | Performed by: NURSE PRACTITIONER

## 2019-10-29 PROCEDURE — G8484 FLU IMMUNIZE NO ADMIN: HCPCS | Performed by: NURSE PRACTITIONER

## 2019-10-29 PROCEDURE — G8427 DOCREV CUR MEDS BY ELIG CLIN: HCPCS | Performed by: NURSE PRACTITIONER

## 2019-10-29 PROCEDURE — 4040F PNEUMOC VAC/ADMIN/RCVD: CPT | Performed by: NURSE PRACTITIONER

## 2019-10-29 PROCEDURE — G8400 PT W/DXA NO RESULTS DOC: HCPCS | Performed by: NURSE PRACTITIONER

## 2019-10-29 PROCEDURE — 1090F PRES/ABSN URINE INCON ASSESS: CPT | Performed by: NURSE PRACTITIONER

## 2019-10-29 PROCEDURE — 99214 OFFICE O/P EST MOD 30 MIN: CPT | Performed by: NURSE PRACTITIONER

## 2019-10-29 PROCEDURE — 1036F TOBACCO NON-USER: CPT | Performed by: NURSE PRACTITIONER

## 2019-10-29 RX ORDER — ZOLPIDEM TARTRATE 10 MG/1
10 TABLET ORAL NIGHTLY PRN
Qty: 90 TABLET | Refills: 0 | Status: SHIPPED | OUTPATIENT
Start: 2019-10-29 | End: 2020-06-18 | Stop reason: SDUPTHER

## 2019-10-29 RX ORDER — NYSTATIN 100000 U/G
CREAM TOPICAL
Qty: 30 G | Refills: 1 | Status: SHIPPED | OUTPATIENT
Start: 2019-10-29 | End: 2020-03-03

## 2019-10-31 PROBLEM — E03.9 HYPOTHYROIDISM: Status: ACTIVE | Noted: 2019-10-31

## 2019-10-31 PROBLEM — I48.0 PAROXYSMAL ATRIAL FIBRILLATION (HCC): Status: ACTIVE | Noted: 2019-10-31

## 2019-10-31 PROBLEM — E55.9 VITAMIN D DEFICIENCY: Status: ACTIVE | Noted: 2019-10-31

## 2019-10-31 ASSESSMENT — ENCOUNTER SYMPTOMS
EYES NEGATIVE: 1
RECTAL PAIN: 1
RESPIRATORY NEGATIVE: 1

## 2019-11-05 ENCOUNTER — OFFICE VISIT (OUTPATIENT)
Dept: PRIMARY CARE CLINIC | Age: 84
End: 2019-11-05
Payer: MEDICARE

## 2019-11-05 DIAGNOSIS — E53.8 B12 DEFICIENCY: ICD-10-CM

## 2019-11-05 PROCEDURE — G8428 CUR MEDS NOT DOCUMENT: HCPCS | Performed by: NURSE PRACTITIONER

## 2019-11-05 PROCEDURE — G8484 FLU IMMUNIZE NO ADMIN: HCPCS | Performed by: NURSE PRACTITIONER

## 2019-11-05 PROCEDURE — G8400 PT W/DXA NO RESULTS DOC: HCPCS | Performed by: NURSE PRACTITIONER

## 2019-11-05 PROCEDURE — 4040F PNEUMOC VAC/ADMIN/RCVD: CPT | Performed by: NURSE PRACTITIONER

## 2019-11-05 PROCEDURE — 1090F PRES/ABSN URINE INCON ASSESS: CPT | Performed by: NURSE PRACTITIONER

## 2019-11-05 PROCEDURE — G8417 CALC BMI ABV UP PARAM F/U: HCPCS | Performed by: NURSE PRACTITIONER

## 2019-11-05 PROCEDURE — 1123F ACP DISCUSS/DSCN MKR DOCD: CPT | Performed by: NURSE PRACTITIONER

## 2019-11-05 PROCEDURE — 1036F TOBACCO NON-USER: CPT | Performed by: NURSE PRACTITIONER

## 2019-11-05 PROCEDURE — 96372 THER/PROPH/DIAG INJ SC/IM: CPT | Performed by: NURSE PRACTITIONER

## 2019-11-05 RX ORDER — CYANOCOBALAMIN 1000 UG/ML
1000 INJECTION INTRAMUSCULAR; SUBCUTANEOUS ONCE
Status: COMPLETED | OUTPATIENT
Start: 2019-11-05 | End: 2019-11-05

## 2019-11-05 RX ADMIN — CYANOCOBALAMIN 1000 MCG: 1000 INJECTION INTRAMUSCULAR; SUBCUTANEOUS at 14:03

## 2019-11-08 ENCOUNTER — ANTI-COAG VISIT (OUTPATIENT)
Dept: CARDIOLOGY | Age: 84
End: 2019-11-08

## 2019-11-20 ENCOUNTER — TELEPHONE (OUTPATIENT)
Dept: PRIMARY CARE CLINIC | Age: 84
End: 2019-11-20

## 2019-11-22 ENCOUNTER — ANTI-COAG VISIT (OUTPATIENT)
Dept: CARDIOLOGY | Age: 84
End: 2019-11-22

## 2019-11-26 ENCOUNTER — OFFICE VISIT (OUTPATIENT)
Dept: PRIMARY CARE CLINIC | Age: 84
End: 2019-11-26
Payer: MEDICARE

## 2019-11-26 VITALS
WEIGHT: 169 LBS | TEMPERATURE: 97.9 F | DIASTOLIC BLOOD PRESSURE: 76 MMHG | OXYGEN SATURATION: 95 % | HEIGHT: 64 IN | RESPIRATION RATE: 17 BRPM | HEART RATE: 65 BPM | SYSTOLIC BLOOD PRESSURE: 116 MMHG | BODY MASS INDEX: 28.85 KG/M2

## 2019-11-26 DIAGNOSIS — K62.89 ANUS IRRITATION: ICD-10-CM

## 2019-11-26 DIAGNOSIS — L65.9 ALOPECIA: Primary | ICD-10-CM

## 2019-11-26 DIAGNOSIS — E55.9 VITAMIN D DEFICIENCY: ICD-10-CM

## 2019-11-26 DIAGNOSIS — E53.8 B12 DEFICIENCY: ICD-10-CM

## 2019-11-26 DIAGNOSIS — E03.9 HYPOTHYROIDISM, UNSPECIFIED TYPE: ICD-10-CM

## 2019-11-26 PROCEDURE — 99214 OFFICE O/P EST MOD 30 MIN: CPT | Performed by: NURSE PRACTITIONER

## 2019-11-26 PROCEDURE — 1036F TOBACCO NON-USER: CPT | Performed by: NURSE PRACTITIONER

## 2019-11-26 PROCEDURE — G8427 DOCREV CUR MEDS BY ELIG CLIN: HCPCS | Performed by: NURSE PRACTITIONER

## 2019-11-26 PROCEDURE — G8417 CALC BMI ABV UP PARAM F/U: HCPCS | Performed by: NURSE PRACTITIONER

## 2019-11-26 PROCEDURE — G8484 FLU IMMUNIZE NO ADMIN: HCPCS | Performed by: NURSE PRACTITIONER

## 2019-11-26 PROCEDURE — 1090F PRES/ABSN URINE INCON ASSESS: CPT | Performed by: NURSE PRACTITIONER

## 2019-11-26 PROCEDURE — G8400 PT W/DXA NO RESULTS DOC: HCPCS | Performed by: NURSE PRACTITIONER

## 2019-11-26 PROCEDURE — 96372 THER/PROPH/DIAG INJ SC/IM: CPT | Performed by: NURSE PRACTITIONER

## 2019-11-26 PROCEDURE — 1123F ACP DISCUSS/DSCN MKR DOCD: CPT | Performed by: NURSE PRACTITIONER

## 2019-11-26 PROCEDURE — 4040F PNEUMOC VAC/ADMIN/RCVD: CPT | Performed by: NURSE PRACTITIONER

## 2019-11-26 RX ORDER — CYANOCOBALAMIN 1000 UG/ML
1000 INJECTION INTRAMUSCULAR; SUBCUTANEOUS ONCE
Status: COMPLETED | OUTPATIENT
Start: 2019-11-26 | End: 2019-11-26

## 2019-11-26 RX ADMIN — CYANOCOBALAMIN 1000 MCG: 1000 INJECTION INTRAMUSCULAR; SUBCUTANEOUS at 14:58

## 2019-11-26 ASSESSMENT — ENCOUNTER SYMPTOMS
GASTROINTESTINAL NEGATIVE: 1
ROS SKIN COMMENTS: RECTAL IRRITATION
RESPIRATORY NEGATIVE: 1
EYES NEGATIVE: 1

## 2019-12-05 ENCOUNTER — ANTI-COAG VISIT (OUTPATIENT)
Dept: CARDIOLOGY | Age: 84
End: 2019-12-05

## 2019-12-09 ENCOUNTER — TELEPHONE (OUTPATIENT)
Dept: PRIMARY CARE CLINIC | Age: 84
End: 2019-12-09

## 2019-12-20 ENCOUNTER — ANTI-COAG VISIT (OUTPATIENT)
Dept: CARDIOLOGY | Age: 84
End: 2019-12-20

## 2019-12-26 ENCOUNTER — CLINICAL SUPPORT NO REQUIREMENTS (OUTPATIENT)
Dept: CARDIOLOGY | Facility: CLINIC | Age: 84
End: 2019-12-26

## 2019-12-26 ENCOUNTER — OFFICE VISIT (OUTPATIENT)
Dept: CARDIOLOGY | Facility: CLINIC | Age: 84
End: 2019-12-26

## 2019-12-26 VITALS
SYSTOLIC BLOOD PRESSURE: 128 MMHG | BODY MASS INDEX: 29.95 KG/M2 | WEIGHT: 169 LBS | HEART RATE: 88 BPM | OXYGEN SATURATION: 99 % | HEIGHT: 63 IN | DIASTOLIC BLOOD PRESSURE: 72 MMHG

## 2019-12-26 DIAGNOSIS — R73.09 ELEVATED HEMOGLOBIN A1C: ICD-10-CM

## 2019-12-26 DIAGNOSIS — I47.1 SVT (SUPRAVENTRICULAR TACHYCARDIA) (HCC): Primary | ICD-10-CM

## 2019-12-26 DIAGNOSIS — I49.5 SICK SINUS SYNDROME (HCC): ICD-10-CM

## 2019-12-26 DIAGNOSIS — I10 ESSENTIAL HYPERTENSION: ICD-10-CM

## 2019-12-26 DIAGNOSIS — R06.09 DYSPNEA ON EXERTION: ICD-10-CM

## 2019-12-26 DIAGNOSIS — R73.03 PREDIABETES: ICD-10-CM

## 2019-12-26 DIAGNOSIS — E78.2 MIXED HYPERLIPIDEMIA: ICD-10-CM

## 2019-12-26 DIAGNOSIS — I48.0 PAF (PAROXYSMAL ATRIAL FIBRILLATION) (HCC): ICD-10-CM

## 2019-12-26 DIAGNOSIS — G62.9 PERIPHERAL POLYNEUROPATHY: ICD-10-CM

## 2019-12-26 DIAGNOSIS — Z95.0 PACEMAKER: Primary | ICD-10-CM

## 2019-12-26 PROCEDURE — 93000 ELECTROCARDIOGRAM COMPLETE: CPT | Performed by: INTERNAL MEDICINE

## 2019-12-26 PROCEDURE — 93288 INTERROG EVL PM/LDLS PM IP: CPT | Performed by: INTERNAL MEDICINE

## 2019-12-26 PROCEDURE — 99214 OFFICE O/P EST MOD 30 MIN: CPT | Performed by: INTERNAL MEDICINE

## 2019-12-26 NOTE — PROGRESS NOTES
Violet Fajardo  [id number]  1934  85 y.o.  female    Referring Provider: Belem Marshall APRN    Reason for Follow-up Visit:  Here for follow up after cardiac testing.   sick sinus syndrome s/p pacemaker   chronic atrial fibrillation   In past Dr Nascimento changed from Pradaxa to Eliquis     Subjective      Feels same overall as during last visit except now with tingling and numbness both arms and legs  No new cardiac events or complaints since last visit   Overall the patient feels no major change from baseline symptoms   Similar symptoms as during last visit      Moderate exertional shortness of breath on exertion relieved with rest  Chronic   on anticoagulation  Says more hair loss with Eliquis     No significant cough or wheezing  Going on for several months or longer    No palpitations  No associated chest pain  No significant pedal edema    No fever or chills  No significant expectoration    No hemoptysis  No presyncope or syncope      Low risk stress test with normal LVEF    History of present illness:  Violet Fajardo is a 85 y.o. yo female with history of sick sinus syndrome s/p pacemaker who presents today for   Chief Complaint   Patient presents with   • SSS     6 month follow up. Patient stated since she has been on Eliquis she has lost most of her hair.     • Numbness     Patient stated hands and feet tingle alot.    .    History  Past Medical History:   Diagnosis Date   • Asthma    • CHF (congestive heart failure) (CMS/HCC)    • COPD (chronic obstructive pulmonary disease) (CMS/HCC)    • Dermatomyositis (CMS/HCC)    • Dermatomyositis (CMS/HCC)    • Dyspnea    • E-coli UTI    • Fibromyalgia    • Hyperlipemia, mixed    • Hyperlipidemia    • Hypothyroidism    • Mitral valve disorder     History of MVP   • Mitral valve disorder    • Pacemaker    • Pacemaker    • PAF (paroxysmal atrial fibrillation) (CMS/HCC) new onset 8/6/2018   • Respiratory infection    • Sick sinus syndrome (CMS/HCC)    • Sleep  apnea    • Stroke (CMS/HCC)    • SVT (supraventricular tachycardia) (CMS/HCC)    ,   Past Surgical History:   Procedure Laterality Date   • CARDIAC CATHETERIZATION     • CHOLECYSTECTOMY     • FOOT SURGERY     • HYSTERECTOMY     • INSERT / REPLACE / REMOVE PACEMAKER     • PACEMAKER IMPLANTATION  12/15/2011    [employer] (4/16/2012)-lead repositioning   ,   Family History   Problem Relation Age of Onset   • Coronary artery disease Mother    • Heart attack Mother    • Coronary artery disease Father    ,   Social History     Tobacco Use   • Smoking status: Never Smoker   • Smokeless tobacco: Never Used   Substance Use Topics   • Alcohol use: No   • Drug use: No   ,     Medications  Current Outpatient Medications   Medication Sig Dispense Refill   • albuterol sulfate  (90 Base) MCG/ACT inhaler Inhale 2 puffs Every 4 (Four) Hours As Needed for Wheezing. 1 inhaler 3   • dabigatran etexilate (PRADAXA) 150 MG capsu Take 1 capsule by mouth Every 12 (Twelve) Hours. 180 capsule 3   • ergocalciferol (ERGOCALCIFEROL) 81979 units capsule Take  by mouth 1 (One) Time Per Week.     • Famotidine (PEPCID PO) Take  by mouth Daily.     • FOLIC ACID-B6-B12-D PO Take  by mouth.     • hydrochlorothiazide (MICROZIDE) 12.5 MG capsule Take 1 capsule by mouth Daily. 90 capsule 3   • ipratropium (ATROVENT HFA) 17 MCG/ACT inhaler Inhale 2 puffs 4 (Four) Times a Day. 1 inhaler 11   • isosorbide mononitrate (IMDUR) 30 MG 24 hr tablet Take 1 tablet by mouth Daily. 90 tablet 3   • levothyroxine (SYNTHROID, LEVOTHROID) 25 MCG tablet Take 25 mcg by mouth Daily.     • metoprolol succinate XL (TOPROL XL) 25 MG 24 hr tablet Take 25 mg by mouth 3 (Three) Times a Day.     • O2 (OXYGEN) Inhale Every Night.     • Omega-3 Fatty Acids (FISH OIL) 1000 MG capsule capsule Take  by mouth Daily With Breakfast.     • potassium chloride (K-DUR,KLOR-CON) 10 MEQ CR tablet Take 1 tablet by mouth Daily. 90 tablet 3   • Zolpidem Tartrate 10 MG sublingual tablet Place   "under the tongue.       No current facility-administered medications for this visit.        Allergies:  Latex; Amoxicillin-pot clavulanate; Barley grass; Codeine; Contrast dye; Fentanyl; and Midazolam    Review of Systems  Review of Systems   Constitution: Positive for malaise/fatigue.   HENT: Negative.    Eyes: Negative.    Cardiovascular: Positive for dyspnea on exertion. Negative for chest pain, claudication, cyanosis, irregular heartbeat, leg swelling, near-syncope, orthopnea, palpitations, paroxysmal nocturnal dyspnea and syncope.   Respiratory: Negative.    Endocrine: Negative.    Hematologic/Lymphatic: Negative.    Skin: Negative.    Musculoskeletal: Positive for arthritis and joint pain.   Gastrointestinal: Negative for anorexia.   Genitourinary: Negative.    Neurological: Negative.    Psychiatric/Behavioral: Negative.        Objective     Physical Exam:  /72 (BP Location: Left arm, Patient Position: Sitting, Cuff Size: Adult)   Pulse 88   Ht 160 cm (63\")   Wt 76.7 kg (169 lb)   SpO2 99%   BMI 29.94 kg/m²   Physical Exam   Constitutional: She appears well-developed.   HENT:   Head: Normocephalic.   Neck: Normal carotid pulses and no JVD present. No tracheal tenderness present. Carotid bruit is not present. No tracheal deviation and no edema present.   Cardiovascular: Normal pulses. An irregularly irregular rhythm present.   Murmur heard.   Systolic murmur is present with a grade of 2/6.  Pulmonary/Chest: Effort normal. No stridor.   Abdominal: Soft. She exhibits no distension. There is no hepatosplenomegaly. There is no tenderness.   Neurological: She is alert. She has normal strength. No cranial nerve deficit or sensory deficit.   Skin: Skin is warm.   Psychiatric: She has a normal mood and affect. Her speech is normal and behavior is normal.       Results Review:    myocardial perfusion scan  5/19    · Left ventricular ejection fraction is normal (Calculated EF = 65%).  · Myocardial perfusion " imaging indicates a normal myocardial perfusion study with no evidence of ischemia.  · Breast attenuation artifact is present.  · Raw images reviewed with the following abnormalities noted: vertical motion.  · Impressions are consistent with a low risk study.      Results for orders placed during the hospital encounter of 05/08/19   Adult Transthoracic Echo Complete W/ Cont if Necessary Per Protocol    Narrative · Estimated EF = 65%.  · Left ventricular systolic function is normal.  · No evidence of pulmonary hypertension is present.                 ECG 12 Lead  Date/Time: 12/26/2019 10:07 AM  Performed by: Chalo Torres MD  Authorized by: Chalo Torres MD   Comparison: compared with previous ECG from 6/15/2019  Similar to previous ECG  Comparison to previous ECG: V pacing   Rhythm: paced  Rate: normal  QRS axis: left    Clinical impression: abnormal EKG            Assessment/Plan   Patient Active Problem List   Diagnosis   • Dyspnea on exertion   • Sick sinus syndrome (CMS/HCC)   • SVT (supraventricular tachycardia) (CMS/HCC)   • Essential hypertension   • Elevated hemoglobin A1c   • Mixed hyperlipidemia   • PAF (paroxysmal atrial fibrillation) (CMS/HCC) new onset   • Prediabetes   • Peripheral polyneuropathy     Results for orders placed during the hospital encounter of 05/08/19   Adult Transthoracic Echo Complete W/ Cont if Necessary Per Protocol    Narrative · Estimated EF = 65%.  · Left ventricular systolic function is normal.  · No evidence of pulmonary hypertension is present.            Plan      The current medical regimen is effective;  continue present plan and medications.     Weigh yourself frequently, at least weekly, preferably daily, call me if more than 2 pounds a day or 5 pounds a week weight gain.  Flexible diuretic dosing    Monitor cardiac rhythm device function regularly per established schedule or PRN     Orders Placed This Encounter   Procedures   • Ambulatory Referral to Neurology      Referral Priority:   Routine     Referral Type:   Consultation     Referral Reason:   Specialty Services Required     Requested Specialty:   Neurology     Number of Visits Requested:   1   • Cardioversion External in Cardiology Department     Standing Status:   Future     Standing Expiration Date:   12/25/2020     Order Specific Question:   What type of sedation does the patient require?     Answer:   Monitored Anesthesia Care     Order Specific Question:   Reason for Exam:     Answer:   AF   • ECG 12 Lead     This order was created via procedure documentation      In future after 6 weeks of Cardioversion will switch back to Pradaxa and stop Eliquis due to reported hair loss per patient  ____________________________________________________________________________________________________________________________________________  Health maintenance and recommendations      Similar recommendations as last visit      shortness of breath likely pulmonary in origin, says bronchodilators helping      Offered to give patient  a copy      Questions were encouraged, asked and answered to the patient's  understanding and satisfaction. Questions if any regarding current medications and side effects, need for refills and importance of compliance to medications stressed.    Reviewed available prior notes, consults, prior visits, laboratory findings, radiology and cardiology relevant reports. Updated chart as applicable. I have reviewed the patient's medical history in detail and updated the computerized patient record as relevant.      Updated patient regarding any new or relevant abnormalities on review of records or any new findings on physical exam. Mentioned to patient about purpose of visit and desirable health short and long term goals and objectives.    Primary to monitor CBC CMP Lipid panel and TSH as  applicable    ___________________________________________________________________________________________________________________________________________          Return in about 6 weeks (around 2/6/2020).

## 2019-12-31 ENCOUNTER — ANESTHESIA (OUTPATIENT)
Dept: CARDIOLOGY | Facility: HOSPITAL | Age: 84
End: 2019-12-31

## 2019-12-31 ENCOUNTER — HOSPITAL ENCOUNTER (OUTPATIENT)
Dept: CARDIOLOGY | Facility: HOSPITAL | Age: 84
Discharge: HOME | End: 2019-12-31
Admitting: INTERNAL MEDICINE

## 2019-12-31 ENCOUNTER — ANESTHESIA EVENT (OUTPATIENT)
Dept: CARDIOLOGY | Facility: HOSPITAL | Age: 84
End: 2019-12-31

## 2019-12-31 VITALS
HEART RATE: 64 BPM | DIASTOLIC BLOOD PRESSURE: 80 MMHG | WEIGHT: 167 LBS | OXYGEN SATURATION: 97 % | RESPIRATION RATE: 17 BRPM | TEMPERATURE: 98 F | BODY MASS INDEX: 29.59 KG/M2 | HEIGHT: 63 IN | SYSTOLIC BLOOD PRESSURE: 142 MMHG

## 2019-12-31 DIAGNOSIS — I48.0 PAF (PAROXYSMAL ATRIAL FIBRILLATION) (HCC): ICD-10-CM

## 2019-12-31 PROCEDURE — 25010000002 PROPOFOL 10 MG/ML EMULSION: Performed by: NURSE ANESTHETIST, CERTIFIED REGISTERED

## 2019-12-31 PROCEDURE — 92960 CARDIOVERSION ELECTRIC EXT: CPT | Performed by: INTERNAL MEDICINE

## 2019-12-31 PROCEDURE — 93005 ELECTROCARDIOGRAM TRACING: CPT | Performed by: INTERNAL MEDICINE

## 2019-12-31 PROCEDURE — 93005 ELECTROCARDIOGRAM TRACING: CPT

## 2019-12-31 PROCEDURE — 93010 ELECTROCARDIOGRAM REPORT: CPT | Performed by: INTERNAL MEDICINE

## 2019-12-31 PROCEDURE — 92960 CARDIOVERSION ELECTRIC EXT: CPT

## 2019-12-31 RX ORDER — PROPOFOL 10 MG/ML
VIAL (ML) INTRAVENOUS AS NEEDED
Status: DISCONTINUED | OUTPATIENT
Start: 2019-12-31 | End: 2019-12-31 | Stop reason: SURG

## 2019-12-31 RX ORDER — SODIUM CHLORIDE 9 MG/ML
INJECTION, SOLUTION INTRAVENOUS CONTINUOUS PRN
Status: DISCONTINUED | OUTPATIENT
Start: 2019-12-31 | End: 2019-12-31 | Stop reason: SURG

## 2019-12-31 RX ADMIN — SODIUM CHLORIDE: 9 INJECTION, SOLUTION INTRAVENOUS at 13:47

## 2019-12-31 RX ADMIN — LIDOCAINE HYDROCHLORIDE 80 MG: 20 INJECTION, SOLUTION INTRAVENOUS at 13:51

## 2019-12-31 RX ADMIN — PROPOFOL 50 MG: 10 INJECTION, EMULSION INTRAVENOUS at 13:51

## 2019-12-31 NOTE — ANESTHESIA PREPROCEDURE EVALUATION
Anesthesia Evaluation     Patient summary reviewed   history of anesthetic complications: PONV  NPO Solid Status: > 8 hours             Airway   Mallampati: II  TM distance: >3 FB  Neck ROM: full  Dental    (+) upper dentures and partials    Pulmonary    (+) COPD,   (-) asthma, sleep apnea, not a smoker  Cardiovascular   Exercise tolerance: good (4-7 METS)    PT is on anticoagulation therapy    (+) pacemaker (maedtronic) pacemaker, hypertension, dysrhythmias Atrial Fib,   (-) past MI, angina, CHF, cardiac stents      Neuro/Psych  (+) CVA,     (-) seizures, TIA  GI/Hepatic/Renal/Endo    (+)   thyroid problem hypothyroidism  (-) GERD, liver disease, no renal disease, diabetes    Musculoskeletal     Abdominal    Substance History      OB/GYN          Other                        Anesthesia Plan    ASA 3     MAC       Anesthetic plan, all risks, benefits, and alternatives have been provided, discussed and informed consent has been obtained with: patient.

## 2019-12-31 NOTE — ANESTHESIA POSTPROCEDURE EVALUATION
"Patient: Violet Fajardo    Procedure Summary     Date:  12/31/19 Room / Location:  [facility] CATH LAB    Anesthesia Start:  1347 Anesthesia Stop:  1358    Procedure:  CARDIOVERSION EXTERNAL IN CARDIOLOGY DEPARTMENT Diagnosis:       PAF (paroxysmal atrial fibrillation) (CMS/HCC)      (AF)    Scheduled Providers:  Chalo Torres MD Provider:  Kelsey Cruz CRNA    Anesthesia Type:  MAC ASA Status:  3          Anesthesia Type: MAC    Vitals  Vitals Value Taken Time   /73 12/31/2019  2:30 PM   Temp     Pulse 60 12/31/2019  2:30 PM   Resp 17 12/31/2019  2:30 PM   SpO2 98 % 12/31/2019  2:30 PM           Post Anesthesia Care and Evaluation    Patient location during evaluation: PACU  Patient participation: complete - patient participated  Level of consciousness: awake and awake and alert  Pain score: 0  Pain management: adequate  Airway patency: patent  Anesthetic complications: No anesthetic complications  PONV Status: none  Cardiovascular status: acceptable  Respiratory status: acceptable  Hydration status: acceptable    Comments: Patient discharged according to acceptable Heber score per RN assessment. See nursing records for further information.     Blood pressure 140/73, pulse 60, temperature 98 °F (36.7 °C), temperature source Tympanic, resp. rate 17, height 160 cm (63\"), weight 75.8 kg (167 lb), SpO2 98 %.        "

## 2020-01-29 ENCOUNTER — TELEPHONE (OUTPATIENT)
Dept: CARDIOLOGY | Facility: CLINIC | Age: 85
End: 2020-01-29

## 2020-02-19 ENCOUNTER — OFFICE VISIT (OUTPATIENT)
Dept: CARDIOLOGY | Facility: CLINIC | Age: 85
End: 2020-02-19

## 2020-02-19 VITALS
BODY MASS INDEX: 29.41 KG/M2 | HEIGHT: 63 IN | SYSTOLIC BLOOD PRESSURE: 130 MMHG | HEART RATE: 97 BPM | DIASTOLIC BLOOD PRESSURE: 80 MMHG | OXYGEN SATURATION: 95 % | WEIGHT: 166 LBS

## 2020-02-19 DIAGNOSIS — E78.2 MIXED HYPERLIPIDEMIA: ICD-10-CM

## 2020-02-19 DIAGNOSIS — R73.03 PREDIABETES: ICD-10-CM

## 2020-02-19 DIAGNOSIS — R06.09 DYSPNEA ON EXERTION: ICD-10-CM

## 2020-02-19 DIAGNOSIS — I49.5 SICK SINUS SYNDROME (HCC): ICD-10-CM

## 2020-02-19 DIAGNOSIS — I47.1 SVT (SUPRAVENTRICULAR TACHYCARDIA) (HCC): ICD-10-CM

## 2020-02-19 DIAGNOSIS — G62.9 PERIPHERAL POLYNEUROPATHY: ICD-10-CM

## 2020-02-19 DIAGNOSIS — R73.09 ELEVATED HEMOGLOBIN A1C: ICD-10-CM

## 2020-02-19 DIAGNOSIS — I48.0 PAF (PAROXYSMAL ATRIAL FIBRILLATION) (HCC): Primary | ICD-10-CM

## 2020-02-19 DIAGNOSIS — I10 ESSENTIAL HYPERTENSION: ICD-10-CM

## 2020-02-19 PROCEDURE — 93000 ELECTROCARDIOGRAM COMPLETE: CPT | Performed by: INTERNAL MEDICINE

## 2020-02-19 PROCEDURE — 99214 OFFICE O/P EST MOD 30 MIN: CPT | Performed by: INTERNAL MEDICINE

## 2020-02-19 NOTE — PROGRESS NOTES
Violet Fajardo  [id number]  1934  86 y.o.  female    Referring Provider: Belem Marshall APRN    Reason for Follow-up Visit:  Here for follow up after cardiac testing.   sick sinus syndrome s/p pacemaker   chronic atrial fibrillation       Subjective      Overall feels dramatically better since cardioversion     Marked decrease in exertional shortness of breath on exertion relieved with rest  Chronic   on anticoagulation  Says more hair loss with Eliquis and wants top switch to Xarelto    No significant cough or wheezing  Going on for several months or longer    No palpitations  No associated chest pain  No significant pedal edema    No fever or chills  No significant expectoration    No hemoptysis  No presyncope or syncope      Low risk stress test with normal LVEF    History of present illness:  Violet Fajardo is a 86 y.o. yo female with history of sick sinus syndrome s/p pacemaker who presents today for   Chief Complaint   Patient presents with   • Atrial Fibrillation     6 wk - s/p cardioversion   .    History  Past Medical History:   Diagnosis Date   • Asthma    • CHF (congestive heart failure) (CMS/HCC)    • COPD (chronic obstructive pulmonary disease) (CMS/HCC)    • Dermatomyositis (CMS/HCC)    • Dermatomyositis (CMS/HCC)    • Dyspnea    • E-coli UTI    • Fibromyalgia    • Hyperlipemia, mixed    • Hyperlipidemia    • Hypothyroidism    • Mitral valve disorder     History of MVP   • Mitral valve disorder    • Pacemaker    • Pacemaker    • PAF (paroxysmal atrial fibrillation) (CMS/HCC) new onset 8/6/2018   • Respiratory infection    • Sick sinus syndrome (CMS/HCC)    • Sleep apnea    • Stroke (CMS/HCC)    • SVT (supraventricular tachycardia) (CMS/HCC)    ,   Past Surgical History:   Procedure Laterality Date   • CARDIAC CATHETERIZATION     • CARDIOVERSION     • CHOLECYSTECTOMY     • FOOT SURGERY     • HYSTERECTOMY     • INSERT / REPLACE / REMOVE PACEMAKER     • PACEMAKER IMPLANTATION  12/15/2011     Héctorythelida (4/16/2012)-lead repositioning   ,   Family History   Problem Relation Age of Onset   • Coronary artery disease Mother    • Heart attack Mother    • Coronary artery disease Father    ,   Social History     Tobacco Use   • Smoking status: Never Smoker   • Smokeless tobacco: Never Used   Substance Use Topics   • Alcohol use: No   • Drug use: No   ,     Medications  Current Outpatient Medications   Medication Sig Dispense Refill   • albuterol sulfate  (90 Base) MCG/ACT inhaler Inhale 2 puffs Every 4 (Four) Hours As Needed for Wheezing. 1 inhaler 3   • ergocalciferol (ERGOCALCIFEROL) 41854 units capsule Take  by mouth 1 (One) Time Per Week.     • Famotidine (PEPCID PO) Take  by mouth Daily.     • FOLIC ACID-B6-B12-D PO Take  by mouth.     • hydrochlorothiazide (MICROZIDE) 12.5 MG capsule Take 1 capsule by mouth Daily. 90 capsule 3   • ipratropium (ATROVENT HFA) 17 MCG/ACT inhaler Inhale 2 puffs 4 (Four) Times a Day. 1 inhaler 11   • isosorbide mononitrate (IMDUR) 30 MG 24 hr tablet Take 1 tablet by mouth Daily. 90 tablet 3   • levothyroxine (SYNTHROID, LEVOTHROID) 25 MCG tablet Take 25 mcg by mouth Daily.     • metoprolol succinate XL (TOPROL XL) 25 MG 24 hr tablet Take 25 mg by mouth 3 (Three) Times a Day.     • O2 (OXYGEN) Inhale Every Night.     • Omega-3 Fatty Acids (FISH OIL) 1000 MG capsule capsule Take  by mouth Daily With Breakfast.     • potassium chloride (K-DUR,KLOR-CON) 10 MEQ CR tablet Take 1 tablet by mouth Daily. 90 tablet 3   • Zolpidem Tartrate 10 MG sublingual tablet Place  under the tongue.     • rivaroxaban (XARELTO) 20 MG tablet Take 1 tablet by mouth Daily. 30 tablet 11     No current facility-administered medications for this visit.        Allergies:  Latex; Amoxicillin-pot clavulanate; Barley grass; Codeine; Contrast dye; Fentanyl; and Midazolam    Review of Systems  Review of Systems   Constitution: Positive for malaise/fatigue.   HENT: Negative.    Eyes: Negative.   "  Cardiovascular: Positive for dyspnea on exertion. Negative for chest pain, claudication, cyanosis, irregular heartbeat, leg swelling, near-syncope, orthopnea, palpitations, paroxysmal nocturnal dyspnea and syncope.   Respiratory: Negative.    Endocrine: Negative.    Hematologic/Lymphatic: Negative.    Skin: Negative.    Musculoskeletal: Positive for arthritis and joint pain.   Gastrointestinal: Negative for anorexia.   Genitourinary: Negative.    Neurological: Negative.    Psychiatric/Behavioral: Negative.        Objective     Physical Exam:  /80   Pulse 97   Ht 160 cm (63\")   Wt 75.3 kg (166 lb)   SpO2 95%   BMI 29.41 kg/m²   Physical Exam   Constitutional: She appears well-developed and well-nourished.   HENT:   Head: Normocephalic.   Eyes: Lids are normal.   Neck: Normal carotid pulses and no JVD present. No tracheal tenderness present. Carotid bruit is not present. No tracheal deviation and no edema present.   Cardiovascular: Regular rhythm, S1 normal, S2 normal and normal pulses.   Murmur heard.   Systolic murmur is present with a grade of 2/6.  Pulmonary/Chest: Effort normal. No stridor.   Abdominal: Soft. Normal appearance. She exhibits no distension. There is no hepatosplenomegaly. There is no tenderness.   Neurological: She is alert. She has normal strength. No cranial nerve deficit or sensory deficit.   Skin: Skin is warm.   Psychiatric: She has a normal mood and affect. Her speech is normal and behavior is normal. Thought content normal. Cognition and memory are normal.       Results Review:    myocardial perfusion scan  5/19    · Left ventricular ejection fraction is normal (Calculated EF = 65%).  · Myocardial perfusion imaging indicates a normal myocardial perfusion study with no evidence of ischemia.  · Breast attenuation artifact is present.  · Raw images reviewed with the following abnormalities noted: vertical motion.  · Impressions are consistent with a low risk study.      Results for " orders placed during the hospital encounter of 05/08/19   Adult Transthoracic Echo Complete W/ Cont if Necessary Per Protocol    Narrative · Estimated EF = 65%.  · Left ventricular systolic function is normal.  · No evidence of pulmonary hypertension is present.                 ECG 12 Lead  Date/Time: 2/19/2020 12:00 PM  Performed by: Chalo Torres MD  Authorized by: Chalo Torres MD   Comparison: compared with previous ECG from 12/31/2019  Similar to previous ECG  Comparison to previous ECG: Atrial pacing   Rate: normal  ST Segments: ST segments normal  QRS axis: normal  Other: no other findings    Clinical impression: abnormal EKG            Assessment/Plan   Patient Active Problem List   Diagnosis   • Dyspnea on exertion   • Sick sinus syndrome (CMS/HCC)   • SVT (supraventricular tachycardia) (CMS/HCC)   • Essential hypertension   • Elevated hemoglobin A1c   • Mixed hyperlipidemia   • PAF (paroxysmal atrial fibrillation) (CMS/HCC) new onset   • Prediabetes   • Peripheral polyneuropathy     Results for orders placed during the hospital encounter of 05/08/19   Adult Transthoracic Echo Complete W/ Cont if Necessary Per Protocol    Narrative · Estimated EF = 65%.  · Left ventricular systolic function is normal.  · No evidence of pulmonary hypertension is present.            Plan    Due to hair loss will stop Eliquis and start Xarelto   The current medical regimen is effective;  continue present plan and medications.     Weigh yourself frequently, at least weekly, preferably daily, call me if more than 2 pounds a day or 5 pounds a week weight gain.  Flexible diuretic dosing    Monitor cardiac rhythm device function regularly per established schedule or PRN        ____________________________________________________________________________________________________________________________________________  Health maintenance and recommendations      Similar recommendations as last visit     Offered to give patient  a copy       Questions were encouraged, asked and answered to the patient's  understanding and satisfaction. Questions if any regarding current medications and side effects, need for refills and importance of compliance to medications stressed.    Reviewed available prior notes, consults, prior visits, laboratory findings, radiology and cardiology relevant reports. Updated chart as applicable. I have reviewed the patient's medical history in detail and updated the computerized patient record as relevant.      Updated patient regarding any new or relevant abnormalities on review of records or any new findings on physical exam. Mentioned to patient about purpose of visit and desirable health short and long term goals and objectives.    Primary to monitor CBC CMP Lipid panel and TSH as applicable    ___________________________________________________________________________________________________________________________________________          Return in about 6 months (around 8/19/2020).

## 2020-02-21 ENCOUNTER — ANTI-COAG VISIT (OUTPATIENT)
Dept: CARDIOLOGY | Age: 85
End: 2020-02-21

## 2020-03-03 ENCOUNTER — OFFICE VISIT (OUTPATIENT)
Dept: PRIMARY CARE CLINIC | Age: 85
End: 2020-03-03
Payer: MEDICARE

## 2020-03-03 VITALS
HEIGHT: 64 IN | OXYGEN SATURATION: 97 % | DIASTOLIC BLOOD PRESSURE: 78 MMHG | TEMPERATURE: 97.7 F | SYSTOLIC BLOOD PRESSURE: 144 MMHG | BODY MASS INDEX: 28.51 KG/M2 | HEART RATE: 79 BPM | WEIGHT: 167 LBS | RESPIRATION RATE: 20 BRPM

## 2020-03-03 PROCEDURE — 99214 OFFICE O/P EST MOD 30 MIN: CPT | Performed by: NURSE PRACTITIONER

## 2020-03-03 PROCEDURE — 1090F PRES/ABSN URINE INCON ASSESS: CPT | Performed by: NURSE PRACTITIONER

## 2020-03-03 PROCEDURE — G8427 DOCREV CUR MEDS BY ELIG CLIN: HCPCS | Performed by: NURSE PRACTITIONER

## 2020-03-03 PROCEDURE — 4040F PNEUMOC VAC/ADMIN/RCVD: CPT | Performed by: NURSE PRACTITIONER

## 2020-03-03 PROCEDURE — G8417 CALC BMI ABV UP PARAM F/U: HCPCS | Performed by: NURSE PRACTITIONER

## 2020-03-03 PROCEDURE — 1123F ACP DISCUSS/DSCN MKR DOCD: CPT | Performed by: NURSE PRACTITIONER

## 2020-03-03 PROCEDURE — 96372 THER/PROPH/DIAG INJ SC/IM: CPT | Performed by: NURSE PRACTITIONER

## 2020-03-03 PROCEDURE — 1036F TOBACCO NON-USER: CPT | Performed by: NURSE PRACTITIONER

## 2020-03-03 PROCEDURE — G8484 FLU IMMUNIZE NO ADMIN: HCPCS | Performed by: NURSE PRACTITIONER

## 2020-03-03 RX ORDER — CYANOCOBALAMIN 1000 UG/ML
1000 INJECTION INTRAMUSCULAR; SUBCUTANEOUS ONCE
Status: COMPLETED | OUTPATIENT
Start: 2020-03-03 | End: 2020-03-03

## 2020-03-03 RX ORDER — NYSTATIN 100000 U/G
OINTMENT TOPICAL
Qty: 45 G | Refills: 1 | Status: SHIPPED | OUTPATIENT
Start: 2020-03-03 | End: 2020-03-04 | Stop reason: SDUPTHER

## 2020-03-03 RX ORDER — ZOLPIDEM TARTRATE 10 MG/1
10 TABLET ORAL NIGHTLY PRN
Qty: 90 TABLET | Refills: 0 | Status: SHIPPED | OUTPATIENT
Start: 2020-03-03 | End: 2020-10-15

## 2020-03-03 RX ADMIN — CYANOCOBALAMIN 1000 MCG: 1000 INJECTION INTRAMUSCULAR; SUBCUTANEOUS at 16:03

## 2020-03-03 ASSESSMENT — ENCOUNTER SYMPTOMS
RESPIRATORY NEGATIVE: 1
EYES NEGATIVE: 1
ROS SKIN COMMENTS: RECTAL IRRITATION
GASTROINTESTINAL NEGATIVE: 1

## 2020-03-03 NOTE — PROGRESS NOTES
[facility] PRIMARY CARE  [address] [facility] [address] [address]  Dept: [phone]  Dept Fax: [phone]  Loc: [phone]    Man Vincent is a 80 y.o. female who presents today for her medical conditions/complaints as noted below. Man Vincent is c/o of Other (needs form signed form heart [place] from eliquis to xeralto 20mg 1x daily per Dr. 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 She reports doing well on her medications. She is needing refills on Ambien. Patient did have meds switched from Eliquis to Xarelto recently from cardiology. Patient also reports having cardioversion that switched her to normal sinus rhythm. She has still noticed some irritation around her anus. She has been using her creams that were given last visit. She does feel like the area has gotten a touch better.      Past Medical History:   Diagnosis Date    Asthma     CHF (congestive heart failure) (HCC)     COPD (chronic obstructive pulmonary disease) (HCC)     Dermatomyositis (HCC)     Fibromyalgia     Hyperlipidemia     Hyperthyroidism     Pancreatitis     Sleep apnea     [facility]        Past Surgical History:   Procedure Laterality Date    CHOLECYSTECTOMY      HYSTERECTOMY      PACEMAKER PLACEMENT         Social History     Tobacco Use    Smoking status: Never Smoker    Smokeless tobacco: Never Used   Substance Use Topics    Alcohol use: No        Current Outpatient Medications   Medication Sig Dispense Refill    zolpidem (AMBIEN) 10 MG tablet Take 1 tablet by mouth nightly as needed for Sleep for up to 90 days. 90 tablet 0    nystatin (MYCOSTATIN) 989718 UNIT/GM ointment Combine with equal parts of zinc oxide and hydrocortisone cream--15 grams each 45 g 1    Minoxidil 5 % FOAM Apply once daily after shampooing, leave on scalp 60 g 2    levothyroxine (SYNTHROID) 25 MCG tablet Take 1 tablet by mouth daily 90 tablet 1    OXYGEN Inhale into the lungs nightly      ipratropium (ATROVENT HFA) 17 MCG/ACT inhaler Inhale 2 puffs into the lungs      levalbuterol (XOPENEX HFA) 45 MCG/ACT inhaler Inhale 1-2 puffs into the lungs      hydrochlorothiazide (MICROZIDE) 12.5 MG capsule       KLOR-CON M10 10 MEQ extended release tablet       isosorbide mononitrate (IMDUR) 30 MG CR tablet Take 30 mg by mouth daily       TOPROL XL 25 MG extended release tablet 25 mg 3 times daily        No current facility-administered medications for this visit. Allergies   Allergen Reactions    Latex     Augmentin [Amoxicillin-Pot Clavulanate]     Codeine     Fentanyl     Iodine     Versed [Midazolam]        No family history on file. Subjective:      Review of Systems   Constitutional: Negative. HENT: Negative. Eyes: Negative. Respiratory: Negative. Cardiovascular: Negative. Gastrointestinal: Negative. Endocrine: Negative. Genitourinary: Negative. Musculoskeletal: Negative. Skin:        Rectal irritation   Neurological: Negative. Hematological: Negative. Psychiatric/Behavioral: Negative. Objective:     Physical Exam  Vitals signs and nursing note reviewed. Constitutional:       Appearance: Normal appearance. She is normal weight. HENT:      Head: Normocephalic and atraumatic. Jaw: There is normal jaw occlusion. Right Ear: Hearing, tympanic membrane, ear canal and external ear normal.      Left Ear: Hearing, tympanic membrane, ear canal and external ear normal.      Nose: Nose normal.      Mouth/Throat:      Lips: Pink. Mouth: Mucous membranes are moist.      Pharynx: Oropharynx is clear. Eyes:      General: Lids are normal.      Extraocular Movements: Extraocular movements intact. Conjunctiva/sclera: Conjunctivae normal.      Pupils: Pupils are equal, round, and reactive to light. Neck:      Musculoskeletal: Full passive range of motion without pain, normal range of motion and neck supple. Thyroid: No thyromegaly. Trachea: Trachea normal.   Cardiovascular:      Rate and Rhythm: Normal rate and regular rhythm. Pulses: Normal pulses. Dorsalis pedis pulses are 2+ on the right side and 2+ on the left side. Posterior tibial pulses are 2+ on the right side and 2+ on the left side. Heart sounds: Normal heart sounds. No murmur. Pulmonary:      Effort: Pulmonary effort is normal.      Breath sounds: Normal breath sounds and air entry. Abdominal:      General: Bowel sounds are normal.      Palpations: Abdomen is soft. Genitourinary:      Musculoskeletal:      Cervical back: She exhibits normal range of motion. Thoracic back: She exhibits normal range of motion. Lumbar back: She exhibits normal range of motion. Right lower leg: No edema. Left lower leg: No edema. Lymphadenopathy:      Cervical: No cervical adenopathy. Skin:     General: Skin is warm and dry. Capillary Refill: Capillary refill takes less than 2 seconds. Neurological:      General: No focal deficit present. Mental Status: She is alert and oriented to person, place, and time. Mental status is at baseline.    Psychiatric:         Attention and Perception: Attention normal.         Mood and Affect: Mood normal.         Speech: Speech normal.         Behavior: Behavior normal. nonsensical words or phrases to be inadvertently transcribed.  Although I have reviewed the note for such errors, some may still exist.    Electronically signed by JAZMIN Gallego on 3/3/2020 at 4:17 PM

## 2020-03-04 RX ORDER — NYSTATIN 100000 U/G
OINTMENT TOPICAL
Qty: 45 G | Refills: 1 | Status: SHIPPED | OUTPATIENT
Start: 2020-03-04 | End: 2021-03-17 | Stop reason: SDUPTHER

## 2020-03-19 RX ORDER — ALBUTEROL SULFATE 90 UG/1
2 AEROSOL, METERED RESPIRATORY (INHALATION) EVERY 6 HOURS PRN
Qty: 3 INHALER | Refills: 3 | Status: SHIPPED | OUTPATIENT
Start: 2020-03-19 | End: 2022-10-07

## 2020-04-03 ENCOUNTER — ANTI-COAG VISIT (OUTPATIENT)
Dept: CARDIOLOGY | Age: 85
End: 2020-04-03

## 2020-04-10 ENCOUNTER — ANTI-COAG VISIT (OUTPATIENT)
Dept: CARDIOLOGY | Age: 85
End: 2020-04-10

## 2020-04-21 ENCOUNTER — CARE COORDINATION (OUTPATIENT)
Dept: CARE COORDINATION | Age: 85
End: 2020-04-21

## 2020-04-21 NOTE — CARE COORDINATION
Kelly with [employer] telephoned. She stated [employer] did not received Xarelto assistance forms. Patient delivered forms to office on 03.03.2020. Reviewed chart. Found forms but proof of income paperwork not scanned in [employer]. Valentine Braden asked for paperwork to be faxed to her. Faxed completed assistance forms to [phone]. Valentine Braden will let me know if she does not receive the forms.     Submitted by Juan Antonio/BRETT

## 2020-04-24 ENCOUNTER — ANTI-COAG VISIT (OUTPATIENT)
Dept: CARDIOLOGY | Age: 85
End: 2020-04-24

## 2020-06-11 ENCOUNTER — VIRTUAL VISIT (OUTPATIENT)
Dept: PRIMARY CARE CLINIC | Age: 85
End: 2020-06-11
Payer: MEDICARE

## 2020-06-11 DIAGNOSIS — M33.90 DERMATOMYOSITIS (HCC): ICD-10-CM

## 2020-06-11 LAB
ALBUMIN SERPL-MCNC: 4.8 G/DL (ref 3.5–5.2)
ALP BLD-CCNC: 104 U/L (ref 35–104)
ALT SERPL-CCNC: 18 U/L (ref 5–33)
ANION GAP SERPL CALCULATED.3IONS-SCNC: 15 MMOL/L (ref 7–19)
AST SERPL-CCNC: 25 U/L (ref 5–32)
BASOPHILS ABSOLUTE: 0.1 K/UL (ref 0–0.2)
BASOPHILS RELATIVE PERCENT: 0.9 % (ref 0–1)
BILIRUB SERPL-MCNC: 0.3 MG/DL (ref 0.2–1.2)
BUN BLDV-MCNC: 12 MG/DL (ref 8–23)
C-REACTIVE PROTEIN: 0.18 MG/DL (ref 0–0.5)
CALCIUM SERPL-MCNC: 9.6 MG/DL (ref 8.8–10.2)
CHLORIDE BLD-SCNC: 100 MMOL/L (ref 98–111)
CO2: 27 MMOL/L (ref 22–29)
CREAT SERPL-MCNC: 0.8 MG/DL (ref 0.5–0.9)
EOSINOPHILS ABSOLUTE: 0.2 K/UL (ref 0–0.6)
EOSINOPHILS RELATIVE PERCENT: 2.4 % (ref 0–5)
GFR NON-AFRICAN AMERICAN: >60
GLUCOSE BLD-MCNC: 112 MG/DL (ref 74–109)
HCT VFR BLD CALC: 41 % (ref 37–47)
HEMOGLOBIN: 12.8 G/DL (ref 12–16)
IMMATURE GRANULOCYTES #: 0 K/UL
LYMPHOCYTES ABSOLUTE: 3 K/UL (ref 1.1–4.5)
LYMPHOCYTES RELATIVE PERCENT: 33.4 % (ref 20–40)
MCH RBC QN AUTO: 29.9 PG (ref 27–31)
MCHC RBC AUTO-ENTMCNC: 31.2 G/DL (ref 33–37)
MCV RBC AUTO: 95.8 FL (ref 81–99)
MONOCYTES ABSOLUTE: 0.9 K/UL (ref 0–0.9)
MONOCYTES RELATIVE PERCENT: 9.6 % (ref 0–10)
NEUTROPHILS ABSOLUTE: 4.8 K/UL (ref 1.5–7.5)
NEUTROPHILS RELATIVE PERCENT: 53.5 % (ref 50–65)
PDW BLD-RTO: 13.7 % (ref 11.5–14.5)
PLATELET # BLD: 237 K/UL (ref 130–400)
PMV BLD AUTO: 10.6 FL (ref 9.4–12.3)
POTASSIUM SERPL-SCNC: 4.1 MMOL/L (ref 3.5–5)
RBC # BLD: 4.28 M/UL (ref 4.2–5.4)
SEDIMENTATION RATE, ERYTHROCYTE: 11 MM/HR (ref 0–25)
SODIUM BLD-SCNC: 142 MMOL/L (ref 136–145)
TOTAL CK: 106 U/L (ref 26–192)
TOTAL PROTEIN: 7 G/DL (ref 6.6–8.7)
WBC # BLD: 9 K/UL (ref 4.8–10.8)

## 2020-06-11 PROCEDURE — 99443 PR PHYS/QHP TELEPHONE EVALUATION 21-30 MIN: CPT | Performed by: NURSE PRACTITIONER

## 2020-06-11 ASSESSMENT — ENCOUNTER SYMPTOMS
GASTROINTESTINAL NEGATIVE: 1
VOMITING: 0
DIARRHEA: 0
EYES NEGATIVE: 1
RESPIRATORY NEGATIVE: 1

## 2020-06-11 NOTE — PROGRESS NOTES
Hyperlipidemia     Hyperthyroidism     Pancreatitis     Sleep apnea     Supraventricular tachycardia ([facility] 10/19/2016       Current Outpatient Medications   Medication Sig Dispense Refill    albuterol sulfate HFA (PROVENTIL HFA) 108 (90 Base) MCG/ACT inhaler Inhale 2 puffs into the lungs every 6 hours as needed for Wheezing 3 Inhaler 3    nystatin (MYCOSTATIN) 294325 UNIT/GM ointment Combine with equal parts of zinc oxide and hydrocortisone cream--15 grams each 45 g 1    Minoxidil 5 % FOAM Apply once daily after shampooing, leave on scalp 60 g 2    levothyroxine (SYNTHROID) 25 MCG tablet Take 1 tablet by mouth daily 90 tablet 1    OXYGEN Inhale into the lungs nightly      ipratropium (ATROVENT HFA) 17 MCG/ACT inhaler Inhale 2 puffs into the lungs      levalbuterol (XOPENEX HFA) 45 MCG/ACT inhaler Inhale 1-2 puffs into the lungs      hydrochlorothiazide (MICROZIDE) 12.5 MG capsule       TOPROL XL 25 MG extended release tablet 25 mg 3 times daily       KLOR-CON M10 10 MEQ extended release tablet       isosorbide mononitrate (IMDUR) 30 MG CR tablet Take 30 mg by mouth daily        No current facility-administered medications for this visit. Allergies   Allergen Reactions    Latex     Augmentin [Amoxicillin-Pot Clavulanate]     Codeine     Fentanyl     Iodine     Versed [Midazolam]        Past Surgical History:   Procedure Laterality Date    CHOLECYSTECTOMY      HYSTERECTOMY      PACEMAKER PLACEMENT         Social History     Tobacco Use    Smoking status: Never Smoker    Smokeless tobacco: Never Used   Substance Use Topics    Alcohol use: No    Drug use: No       No family history on file. Assessment:    ICD-10-CM    1. Dermatomyositis ([facility] M33.90 CK     C-Reactive Protein     Sedimentation Rate     CBC Auto Differential     Comprehensive Metabolic Panel     BHAVESH - Khai Feliciano MD, Rheumatology, [place]   2. Rash of face R21    3.  Encounter for counseling Z71.9 Plan:   1. Dermatomyositis (HCC)  - CK; Future  - C-Reactive Protein; Future  - Sedimentation Rate; Future  - CBC Auto Differential; Future  - Comprehensive Metabolic Panel; Future    2. Rash of face  - will consider prednisone taper depending on results  - referral to rheumatology recommend for continued care    3. Encounter for counseling  - labs, prednisone, referral, and follow-up    Over 50% of the total visit time of 30 minutes was spent on counseling and or coordination of care of consultation with JAZMIN Worrell, labs, medications, diagnosis, and follow-up. Orders Placed This Encounter   Procedures    CK     Standing Status:   Future     Number of Occurrences:   1     Standing Expiration Date:   6/11/2021    C-Reactive Protein     Standing Status:   Future     Number of Occurrences:   1     Standing Expiration Date:   6/11/2021    Sedimentation Rate     Standing Status:   Future     Number of Occurrences:   1     Standing Expiration Date:   6/11/2021    CBC Auto Differential     Standing Status:   Future     Number of Occurrences:   1     Standing Expiration Date:   6/11/2021    Comprehensive Metabolic Panel     Standing Status:   Future     Number of Occurrences:   1     Standing Expiration Date:   6/11/2021    BHAVESH - Siddharth Ortiz MD, Rheumatology, [place]     Referral Priority:   Routine     Referral Type:   Eval and Treat     Referral Reason:   Specialty Services Required     Requested Specialty:   Rheumatology     Number of Visits Requested:   1     No orders of the defined types were placed in this encounter. There are no discontinued medications. There are no Patient Instructions on file for this visit. Patient given educational handouts and has had all questions answered. Patient voices understanding and agrees to plans along with risks and benefits of plan. Patient isinstructed to continue prior meds, diet, and exercise plans unless instructed otherwise.  Patient agrees

## 2020-06-12 RX ORDER — PREDNISONE 10 MG/1
10 TABLET ORAL DAILY
Qty: 30 TABLET | Refills: 0 | OUTPATIENT
Start: 2020-06-12 | End: 2021-09-09

## 2020-06-15 ENCOUNTER — TELEPHONE (OUTPATIENT)
Dept: PRIMARY CARE CLINIC | Age: 85
End: 2020-06-15

## 2020-06-15 NOTE — TELEPHONE ENCOUNTER
----- Message from Pam Gomez, [address] - NP sent at 6/12/2020  9:34 AM CDT -----  Labs normal. We can start patient on a prednisone taper dose to help with cutaneous issues.  Prednisone 40 mg x 3 days, 30 mg x 3 days, 20 mg x 3 days, 10 mg x 3 days

## 2020-06-18 ENCOUNTER — VIRTUAL VISIT (OUTPATIENT)
Dept: PRIMARY CARE CLINIC | Age: 85
End: 2020-06-18
Payer: MEDICARE

## 2020-06-18 PROCEDURE — 99442 PR PHYS/QHP TELEPHONE EVALUATION 11-20 MIN: CPT | Performed by: NURSE PRACTITIONER

## 2020-06-18 RX ORDER — ZOLPIDEM TARTRATE 10 MG/1
10 TABLET ORAL NIGHTLY PRN
Qty: 90 TABLET | Refills: 0 | OUTPATIENT
Start: 2020-06-18 | End: 2020-09-16

## 2020-06-18 NOTE — PROGRESS NOTES
Maxwell Rodriguez is a 80 y.o. female evaluated via telephone on 6/18/2020. Consent:  She and/or health care decision maker is aware that that she may receive a bill for this telephone service, depending on her insurance coverage, and has provided verbal consent to proceed: Yes      Documentation:  I communicated with the patient and/or health care decision maker about rash follow-up. Details of this discussion including any medical advice provided: see below    Rash   The current episode started in the past 7 days. The problem has been gradually improving (started prednisone on Tuesday) since onset. The affected locations include the neck and face. The rash is characterized by itchiness and redness. Associated with: auto-immune. Associated symptoms include fatigue. Pertinent negatives include no anorexia, cough, diarrhea, fever, shortness of breath or vomiting. Past treatments include oral steroids and topical steroids. The treatment provided mild relief. No change in PMH, family, social, or surgical history unless mentioned above. Review of Systems   Constitutional: Positive for fatigue. Negative for fever. HENT: Negative. Eyes: Negative. Respiratory: Negative. Negative for cough and shortness of breath. Cardiovascular: Negative. Gastrointestinal: Negative. Negative for anorexia, diarrhea and vomiting. Endocrine: Negative. Musculoskeletal: Positive for arthralgias. Skin: Positive for rash. Neurological: Negative. Hematological: Negative. Psychiatric/Behavioral: Negative for self-injury and suicidal ideas. The patient is nervous/anxious.         Past Medical History:   Diagnosis Date    Asthma     CHF (congestive heart failure) (HCC)     COPD (chronic obstructive pulmonary disease) (HCC)     Dermatomyositis (HCC)     Fibromyalgia     Hyperlipidemia     Hyperthyroidism     Pancreatitis     Sleep apnea     [facility] ([facility] 10/19/2016 Dermatomyositis ([facility]  - continue prednisone   - follow-up two weeks     2. Rash of face  - continue prednisone and topical steroids PRN      No orders of the defined types were placed in this encounter. No orders of the defined types were placed in this encounter. There are no discontinued medications. There are no Patient Instructions on file for this visit. Patient given educational handouts and has had all questions answered. Patient voices understanding and agrees to plans along with risks and benefits of plan. Patient isinstructed to continue prior meds, diet, and exercise plans unless instructed otherwise. Patient agrees to follow up as instructed and sooner if needed. Patient agrees to go to ER if condition becomes emergent. Notesmay be completed with dictation device and spelling errors may occur. Return in about 2 weeks (around 7/2/2020) for Rash. I affirm this is a Patient Initiated Episode with an Established Patient who has not had a related appointment within my department in the past 7 days or scheduled within the next 24 hours. Total Time: minutes: 11-20 minutes    Note: not billable if this call serves to triage the patient into an appointment for the relevant concern      [address] [place] [address] were provided through a telephone visit to substitute for in-person clinic visit. Patient and provider were located at their individual homes. Pursuant to the emergency declaration under the [address] [facility], [address] waiver authority and the [employer] Act, this Virtual  Visit was conducted, with patient's consent, to reduce the patient's risk of exposure to COVID-19 and provide continuity of care for an established patient.

## 2020-06-18 NOTE — TELEPHONE ENCOUNTER
Soo Melendez called to request a refill on her medication. VO per Yanira Larkin reorder med 90 day supply. Last office visit : 3/3/2020   Next office visit : Visit date not found     Last Vee Dahl: 6/18/2020  Medication Contract:  Not on file  Last UDS:   Last Rx: 3/3/2020    No results found for: LABAMPH, LABBARB, LABBENZ, BUPRENUR, COCAIMETSCRU, GABAPENTIN, MDMA, METAMPU, OPIATESCREENURINE, OXTCOSU, PHENCYCLIDINESCREENURINE, PROPOXYPHENE, THCSCREENUR, TRICYUR        Requested Prescriptions     Pending Prescriptions Disp Refills    zolpidem (AMBIEN) 10 MG tablet 90 tablet 0     Sig: Take 1 tablet by mouth nightly as needed for Sleep for up to 90 days. Please approve or refuse this medication.    Arjun Larson

## 2020-06-20 ASSESSMENT — ENCOUNTER SYMPTOMS
VOMITING: 0
DIARRHEA: 0
RESPIRATORY NEGATIVE: 1
GASTROINTESTINAL NEGATIVE: 1
COUGH: 0
EYES NEGATIVE: 1
SHORTNESS OF BREATH: 0

## 2020-06-26 ENCOUNTER — CLINICAL SUPPORT (OUTPATIENT)
Dept: CARDIOLOGY | Facility: CLINIC | Age: 85
End: 2020-06-26

## 2020-06-26 DIAGNOSIS — I49.5 SICK SINUS SYNDROME (HCC): ICD-10-CM

## 2020-06-26 PROCEDURE — 93294 REM INTERROG EVL PM/LDLS PM: CPT | Performed by: PHYSICIAN ASSISTANT

## 2020-06-26 PROCEDURE — 93296 REM INTERROG EVL PM/IDS: CPT | Performed by: PHYSICIAN ASSISTANT

## 2020-07-04 NOTE — PROGRESS NOTES
Agree with assessment and plan of mid level provider as below with any changes if made as noted by Myra Garcia PA-C     of Violet Fajardo.

## 2020-07-06 RX ORDER — LEVOTHYROXINE SODIUM 0.03 MG/1
25 TABLET ORAL DAILY
Qty: 90 TABLET | Refills: 1 | Status: SHIPPED | OUTPATIENT
Start: 2020-07-06 | End: 2021-02-19 | Stop reason: SDUPTHER

## 2020-07-06 NOTE — TELEPHONE ENCOUNTER
Prerna Mayer called to request a refill on her medication.       Last office visit : 3/3/2020   Next office visit : Visit date not found     Requested Prescriptions     Pending Prescriptions Disp Refills    levothyroxine (SYNTHROID) 25 MCG tablet 90 tablet 1     Sig: Take 1 tablet by mouth daily            Robbie Apgar

## 2020-07-08 RX ORDER — METOPROLOL SUCCINATE 25 MG/1
75 TABLET, EXTENDED RELEASE ORAL DAILY
Qty: 270 TABLET | Refills: 4 | OUTPATIENT
Start: 2020-07-08

## 2020-07-08 RX ORDER — METOPROLOL SUCCINATE 25 MG/1
75 TABLET, EXTENDED RELEASE ORAL DAILY
Qty: 270 TABLET | Refills: 4 | Status: SHIPPED | OUTPATIENT
Start: 2020-07-08 | End: 2021-03-29 | Stop reason: SDUPTHER

## 2020-07-08 NOTE — TELEPHONE ENCOUNTER
Please refill.  Verified this is what she has been taking with the patient.  She states she has been taking this amount for almost a year now.

## 2020-07-24 ENCOUNTER — CARE COORDINATION (OUTPATIENT)
Dept: CARE COORDINATION | Age: 85
End: 2020-07-24

## 2020-07-24 NOTE — CARE COORDINATION
Shahana Flowers,  with [employer], telephoned. She stated the patient's application for Pradaxa assistance is missing JAZMIN Barriga's, signatures. She, also, stated the faxed pages did not print on full sheets. The pages came across showing half of the information. She cannot submit the application to the . I told [employer] Renan would contact Cassie José, [address] [place] [address] for Maynor Sidneykelsy, to obtain signatures on the patient's application and refax. I, also, told Shahana Flowers that the printed pages may be a fax machine error not human, but I will ask Carlene Gonsalves to refax the entire application. Shahana Flowers verbalized understanding. Submitted by Juan Antonio/BRETT    In basket message sent to Carlene Gonsalves.     Submitted by Juan Antonio/BRETT

## 2020-07-27 NOTE — TELEPHONE ENCOUNTER
Tayler Solomon called to request a refill on her medication.       Last office visit : 3/3/2020   Next office visit : Visit date not found     Requested Prescriptions     Pending Prescriptions Disp Refills    ipratropium (ATROVENT HFA) 17 MCG/ACT inhaler 1 Inhaler 2     Sig: Inhale 2 puffs into the lungs 2 times daily            Juliana Dahl

## 2020-08-24 ENCOUNTER — TELEPHONE (OUTPATIENT)
Dept: PRIMARY CARE CLINIC | Age: 85
End: 2020-08-24

## 2020-08-24 NOTE — TELEPHONE ENCOUNTER
[facility] called requesting we sent in a prescription for Pradaxa 150mg to Patient assistance. I do not see anything that indicates this patient has been prescribed this medication. Could you review the chart and let me know. Upon further inspection with JAMIE Montoya they state they contacted our office in 9/2019 to confirm whether pt was taking this medication and what dosage but they never received a response back.

## 2020-08-31 NOTE — TELEPHONE ENCOUNTER
Lashonda Lira called back and was informed about the Xarelto, and said that they will work on pt assistance for that medication.

## 2020-09-14 ENCOUNTER — ANTI-COAG VISIT (OUTPATIENT)
Dept: CARDIOLOGY | Age: 85
End: 2020-09-14

## 2020-09-28 ENCOUNTER — OFFICE VISIT (OUTPATIENT)
Dept: CARDIOLOGY | Facility: CLINIC | Age: 85
End: 2020-09-28

## 2020-09-28 VITALS
WEIGHT: 157 LBS | SYSTOLIC BLOOD PRESSURE: 119 MMHG | HEIGHT: 63 IN | DIASTOLIC BLOOD PRESSURE: 71 MMHG | OXYGEN SATURATION: 97 % | BODY MASS INDEX: 27.82 KG/M2 | HEART RATE: 52 BPM

## 2020-09-28 DIAGNOSIS — I48.0 PAF (PAROXYSMAL ATRIAL FIBRILLATION) (HCC): ICD-10-CM

## 2020-09-28 DIAGNOSIS — I10 ESSENTIAL HYPERTENSION: ICD-10-CM

## 2020-09-28 DIAGNOSIS — R06.09 DYSPNEA ON EXERTION: ICD-10-CM

## 2020-09-28 DIAGNOSIS — I49.5 SICK SINUS SYNDROME (HCC): ICD-10-CM

## 2020-09-28 DIAGNOSIS — I47.1 SVT (SUPRAVENTRICULAR TACHYCARDIA) (HCC): Primary | ICD-10-CM

## 2020-09-28 DIAGNOSIS — E78.2 MIXED HYPERLIPIDEMIA: ICD-10-CM

## 2020-09-28 PROCEDURE — 99214 OFFICE O/P EST MOD 30 MIN: CPT | Performed by: INTERNAL MEDICINE

## 2020-09-28 PROCEDURE — 93000 ELECTROCARDIOGRAM COMPLETE: CPT | Performed by: INTERNAL MEDICINE

## 2020-09-28 NOTE — PROGRESS NOTES
Violet Fajardo  [id number]  1934  86 y.o.  female    Referring Provider: Belem Marshall APRN    Reason for Follow-up Visit:  Here for follow up after cardiac testing.   sick sinus syndrome s/p pacemaker   chronic atrial fibrillation   Cardiac workup test results as below     Subjective      Overall feels dramatically better since cardioversion  Hair has grown back after stopping Eliquis and starting Xarelto     Marked decrease in exertional shortness of breath on exertion relieved with rest  Chronic   shortness of breath much better after starting inhaled bronchodilators    No significant cough or wheezing  Going on for several months or longer    No palpitations  No associated chest pain  No significant pedal edema    No fever or chills  No significant expectoration    No hemoptysis  No presyncope or syncope      Low risk stress test with normal LVEF  BP well controlled at home.       History of present illness:  Violet Fajardo is a 86 y.o. yo female with history of sick sinus syndrome s/p pacemaker who presents today for   Chief Complaint   Patient presents with   • Atrial Fibrillation     6 MON FU    .    History  Past Medical History:   Diagnosis Date   • Asthma    • CHF (congestive heart failure) (CMS/HCC)    • COPD (chronic obstructive pulmonary disease) (CMS/HCC)    • Dermatomyositis (CMS/HCC)    • Dermatomyositis (CMS/HCC)    • Dyspnea    • E-coli UTI    • Fibromyalgia    • Hyperlipemia, mixed    • Hyperlipidemia    • Hypothyroidism    • Mitral valve disorder     History of MVP   • Mitral valve disorder    • Pacemaker    • Pacemaker    • PAF (paroxysmal atrial fibrillation) (CMS/HCC) new onset 8/6/2018   • Respiratory infection    • Sick sinus syndrome (CMS/HCC)    • Sleep apnea    • Stroke (CMS/HCC)    • SVT (supraventricular tachycardia) (CMS/HCC)    ,   Past Surgical History:   Procedure Laterality Date   • CARDIAC CATHETERIZATION     • CARDIOVERSION     • CHOLECYSTECTOMY     • FOOT SURGERY      • HYSTERECTOMY     • INSERT / REPLACE / REMOVE PACEMAKER     • PACEMAKER IMPLANTATION  12/15/2011    [employer] (4/16/2012)-lead repositioning   ,   Family History   Problem Relation Age of Onset   • Coronary artery disease Mother    • Heart attack Mother    • Coronary artery disease Father    ,   Social History     Tobacco Use   • Smoking status: Never Smoker   • Smokeless tobacco: Never Used   Substance Use Topics   • Alcohol use: No   • Drug use: No   ,     Medications  Current Outpatient Medications   Medication Sig Dispense Refill   • albuterol sulfate  (90 Base) MCG/ACT inhaler Inhale 2 puffs Every 4 (Four) Hours As Needed for Wheezing. 1 inhaler 3   • ergocalciferol (ERGOCALCIFEROL) 79418 units capsule Take  by mouth 1 (One) Time Per Week.     • Famotidine (PEPCID PO) Take  by mouth Daily.     • FOLIC ACID-B6-B12-D PO Take  by mouth.     • hydrochlorothiazide (MICROZIDE) 12.5 MG capsule Take 1 capsule by mouth Daily. 90 capsule 3   • ipratropium (ATROVENT HFA) 17 MCG/ACT inhaler Inhale 2 puffs 4 (Four) Times a Day. 1 inhaler 11   • isosorbide mononitrate (IMDUR) 30 MG 24 hr tablet Take 1 tablet by mouth Daily. 90 tablet 3   • levothyroxine (SYNTHROID, LEVOTHROID) 25 MCG tablet Take 25 mcg by mouth Daily.     • metoprolol succinate XL (Toprol XL) 25 MG 24 hr tablet Take 3 tablets by mouth Daily. 270 tablet 4   • O2 (OXYGEN) Inhale Every Night.     • Omega-3 Fatty Acids (FISH OIL) 1000 MG capsule capsule Take  by mouth Daily With Breakfast.     • potassium chloride (K-DUR,KLOR-CON) 10 MEQ CR tablet Take 1 tablet by mouth Daily. 90 tablet 3   • rivaroxaban (XARELTO) 20 MG tablet Take 1 tablet by mouth Daily. 30 tablet 11   • Zolpidem Tartrate 10 MG sublingual tablet Place  under the tongue.       No current facility-administered medications for this visit.        Allergies:  Latex, Amoxicillin-pot clavulanate, Barley grass, Codeine, Contrast dye, Fentanyl, and Midazolam    Review of Systems  Review of  "Systems   Constitution: Positive for malaise/fatigue.   HENT: Negative.    Eyes: Negative.    Cardiovascular: Positive for dyspnea on exertion. Negative for chest pain, claudication, cyanosis, irregular heartbeat, leg swelling, near-syncope, orthopnea, palpitations, paroxysmal nocturnal dyspnea and syncope.   Respiratory: Negative.    Endocrine: Negative.    Hematologic/Lymphatic: Negative.    Skin: Negative.    Musculoskeletal: Positive for arthritis and joint pain.   Gastrointestinal: Negative for anorexia.   Genitourinary: Negative.    Neurological: Negative.    Psychiatric/Behavioral: Negative.        Objective     Physical Exam:  /71   Pulse 52   Ht 160 cm (62.99\")   Wt 71.2 kg (157 lb)   SpO2 97%   BMI 27.82 kg/m²   Physical Exam   Constitutional: She appears well-developed.   HENT:   Head: Normocephalic.   Eyes: Lids are normal.   Neck: Normal carotid pulses and no JVD present. No tracheal tenderness present. Carotid bruit is not present. No tracheal deviation and no edema present.   Cardiovascular: Regular rhythm, S1 normal, S2 normal and normal pulses.   Murmur heard.   Systolic murmur is present with a grade of 2/6.  Pulmonary/Chest: Effort normal. No stridor.   Abdominal: Soft. Normal appearance. She exhibits no distension. There is no abdominal tenderness.   Neurological: She is alert. No cranial nerve deficit or sensory deficit.   Skin: Skin is warm.   Psychiatric: Her speech is normal and behavior is normal. Thought content normal.       Results Review:    myocardial perfusion scan  5/19    · Left ventricular ejection fraction is normal (Calculated EF = 65%).  · Myocardial perfusion imaging indicates a normal myocardial perfusion study with no evidence of ischemia.  · Breast attenuation artifact is present.  · Raw images reviewed with the following abnormalities noted: vertical motion.  · Impressions are consistent with a low risk study.      Results for orders placed during the hospital " encounter of 05/08/19   Adult Transthoracic Echo Complete W/ Cont if Necessary Per Protocol    Narrative · Estimated EF = 65%.  · Left ventricular systolic function is normal.  · No evidence of pulmonary hypertension is present.                 ECG 12 Lead    Date/Time: 9/28/2020 10:03 AM  Performed by: Chalo Torres MD  Authorized by: Chalo Torres MD   Comparison: compared with previous ECG from 2/19/2020  Similar to previous ECG  Comparison to previous ECG: Atrial pacing   Rhythm: paced  Rate: normal  Conduction: conduction normal  ST Segments: ST segments normal  T Waves: T waves normal  QRS axis: normal  Other: no other findings    Clinical impression: abnormal EKG            Assessment/Plan   Patient Active Problem List   Diagnosis   • Dyspnea on exertion   • Sick sinus syndrome (CMS/HCC)   • SVT (supraventricular tachycardia) (CMS/HCC)   • Essential hypertension   • Elevated hemoglobin A1c   • Mixed hyperlipidemia   • PAF (paroxysmal atrial fibrillation) (CMS/HCC) new onset   • Prediabetes   • Peripheral polyneuropathy       ____________________________________________________________________________________________________________________________________________  Health maintenance and recommendations  Similar recommendations as last visit     Offered to give patient  a copy      Questions were encouraged, asked and answered to the patient's  understanding and satisfaction. Questions if any regarding current medications and side effects, need for refills and importance of compliance to medications stressed.    Reviewed available prior notes, consults, prior visits, laboratory findings, radiology and cardiology relevant reports. Updated chart as applicable. I have reviewed the patient's medical history in detail and updated the computerized patient record as relevant.      Updated patient regarding any new or relevant abnormalities on review of records or any new findings on physical exam. Mentioned to  patient about purpose of visit and desirable health short and long term goals and objectives.    Primary to monitor CBC CMP Lipid panel and TSH as applicable    ___________________________________________________________________________________________________________________________________________        Plan    Due to hair loss stopped Eliquis and started Xarelto   Now hair has grown back   The current medical regimen is effective;  continue present plan and medications.     Continue bronchodilators   shortness of breath much better     Weigh yourself frequently, at least weekly, preferably daily, call me if more than 2 pounds a day or 5 pounds a week weight gain.  Flexible diuretic dosing    Monitor cardiac rhythm device function regularly per established schedule or PRN     Discussed results of prior testing with patient  Patient expressed understanding  Encouraged and answered all questions   Discussed with the patient and all questioned fully answered. She will call me if any problems arise.               Return in about 6 months (around 3/28/2021).

## 2020-10-15 RX ORDER — ZOLPIDEM TARTRATE 10 MG/1
10 TABLET ORAL NIGHTLY PRN
Qty: 90 TABLET | Refills: 0 | Status: SHIPPED | OUTPATIENT
Start: 2020-10-15 | End: 2021-02-23

## 2020-10-15 NOTE — TELEPHONE ENCOUNTER
Manish Akers called to request a refill on her medication. Last office visit : 6/18/2020   Next office visit : Visit date not found     Last UDS: No results found for: Alanda Fabiana, LABBENZ, BUPRENUR, COCAIMETSCRU, GABAPENTIN, MDMA, METAMPU, [address], OXTCOSU, [place], PROPOXYPHENE, THCSCREENUR, TRICYUR    Last Genie Forth: 10/15/2020  Medication Contract: not on file   Last Fill: 06/18/2020    Requested Prescriptions     Pending Prescriptions Disp Refills    zolpidem (AMBIEN) 10 MG tablet [Pharmacy Med Name: Zolpidem Tartrate 10 MG Oral Tablet] 90 tablet 0     Sig: TAKE 1 TABLET BY MOUTH NIGHTLY AS NEEDED FOR SLEEP FOR  UP  TO  90  DAYS         Please approve or refuse this medication.    Jana Foster

## 2020-10-19 RX ORDER — ISOSORBIDE MONONITRATE 30 MG/1
TABLET, EXTENDED RELEASE ORAL
Qty: 90 TABLET | Refills: 4 | Status: SHIPPED | OUTPATIENT
Start: 2020-10-19 | End: 2022-02-07 | Stop reason: SDUPTHER

## 2020-12-31 ENCOUNTER — CLINICAL SUPPORT NO REQUIREMENTS (OUTPATIENT)
Dept: CARDIOLOGY | Facility: CLINIC | Age: 85
End: 2020-12-31

## 2020-12-31 DIAGNOSIS — I49.5 SICK SINUS SYNDROME (HCC): ICD-10-CM

## 2020-12-31 PROCEDURE — 93288 INTERROG EVL PM/LDLS PM IP: CPT | Performed by: PHYSICIAN ASSISTANT

## 2021-01-05 NOTE — PROGRESS NOTES
Dual Chamber Pacemaker Evaluation Report  Clinic Interrogation    January 5, 2021    Primary Cardiologist: Melissa  : Medtronic Model: Adapta  Implant date: 2/24/16    Reason for evaluation: routine  Indication for pacemaker: sick sinus syndrome    Measurements  Atrial sensing - P wave: >2.8 mV  Atrial threshold: 0.5 V@ 0.4 ms  Atrial lead impedance: 372 ohms  Ventricular sensing - R wave: <5.6-8.0 mV  Ventricular threshold: 0.75 V @ 0.4 ms  Ventricular lead impedance:   1244 ohms     Diagnostic Data  Atrial paced: 70.5 %  Ventricular paced: 1.1 %  Other: 2 episodes of NSVT; longest episode is 6 sec.  Battery status: satisfactory      Final Parameters  Mode:  AAIR+  Lower rate: 60 bpm   Upper rate: 130 bpm  AV Delay: paced- 150 ms  Sensed-120 ms  Atrial - Amplitude: 1.5 V   Pulse width: 0.4 ms   Sensitivity: 0.5 mV     Ventricular - Amplitude: 2.0 V  Pulse width: 0.4 ms  Sensitivity: 2.0 mV    Changes made: none  Conclusions: normal pacemaker function    Follow up: 6 months via [facility], annually in office

## 2021-02-19 DIAGNOSIS — F51.01 PRIMARY INSOMNIA: ICD-10-CM

## 2021-02-19 DIAGNOSIS — E03.9 HYPOTHYROIDISM, UNSPECIFIED TYPE: ICD-10-CM

## 2021-02-19 RX ORDER — ZOLPIDEM TARTRATE 10 MG/1
10 TABLET ORAL NIGHTLY PRN
Qty: 90 TABLET | Refills: 0 | Status: CANCELLED | OUTPATIENT
Start: 2021-02-19 | End: 2021-05-20

## 2021-02-19 RX ORDER — LEVOTHYROXINE SODIUM 0.03 MG/1
25 TABLET ORAL DAILY
Qty: 90 TABLET | Refills: 1 | Status: SHIPPED | OUTPATIENT
Start: 2021-02-19 | End: 2021-11-16 | Stop reason: SDUPTHER

## 2021-02-19 NOTE — PROGRESS NOTES
Felix Carlos called to request a refill on her medication.       Last office visit : 6/18/2020   Next office visit : Visit date not found     Requested Prescriptions     Pending Prescriptions Disp Refills    levothyroxine (SYNTHROID) 25 MCG tablet 90 tablet 1     Sig: Take 1 tablet by mouth daily            Josué Marr

## 2021-02-19 NOTE — PROGRESS NOTES
Alice Dash called to request a refill on her medication. Last Jason Mutton: 10/19/2020  Medication Contract: pt will need new contract   Last Fill: 10/15/2020        Last office visit : 6/18/2020   Next office visit : Visit date not found     Last UDS: No results found for: LABAMPH, LABBARB, LABBENZ, BUPRENUR, COCAIMETSCRU, GABAPENTIN, MDMA, METAMPU, OPIATESCREENURINE, OXTCOSU, PHENCYCLIDINESCREENURINE, PROPOXYPHENE, THCSCREENUR, TRICYUR        Requested Prescriptions     Signed Prescriptions Disp Refills    levothyroxine (SYNTHROID) 25 MCG tablet 90 tablet 1     Sig: Take 1 tablet by mouth daily         Please approve or refuse this medication.    Parnell Rinne, [place]

## 2021-02-23 DIAGNOSIS — F51.01 PRIMARY INSOMNIA: ICD-10-CM

## 2021-02-23 RX ORDER — ZOLPIDEM TARTRATE 10 MG/1
10 TABLET ORAL NIGHTLY PRN
Qty: 90 TABLET | Refills: 0 | Status: SHIPPED | OUTPATIENT
Start: 2021-02-23 | End: 2021-04-21 | Stop reason: SDUPTHER

## 2021-02-23 NOTE — TELEPHONE ENCOUNTER
Carlos Cid called to request a refill on her medication. Last office visit : 6/18/2020   Next office visit : Visit date not found     Last UDS: No results found for: Susan Red, LABBENZ, BUPRENUR, COCAIMETSCRU, GABAPENTIN, MDMA, METAMPU, [address], OXTCOSU, [place], PROPOXYPHENE, THCSCREENUR, TRICYUR    Last Yuri Aver: 2/23/2021  Medication Contract:not on file    Last Fill: 10/15/2020    Requested Prescriptions     Pending Prescriptions Disp Refills    zolpidem (AMBIEN) 10 MG tablet [Pharmacy Med Name: Zolpidem Tartrate 10 MG Oral Tablet] 90 tablet 0     Sig: TAKE 1 TABLET BY MOUTH NIGHTLY AS NEEDED FOR SLEEP FOR  UP  TO  90  DAYS         Please approve or refuse this medication.    Bibi Young

## 2021-03-10 RX ORDER — RIVAROXABAN 20 MG/1
TABLET, FILM COATED ORAL
Qty: 90 TABLET | Refills: 4 | Status: SHIPPED | OUTPATIENT
Start: 2021-03-10 | End: 2022-05-02

## 2021-03-17 DIAGNOSIS — K62.89 ANUS IRRITATION: ICD-10-CM

## 2021-03-17 RX ORDER — NYSTATIN 100000 U/G
OINTMENT TOPICAL
Qty: 45 G | Refills: 1 | Status: SHIPPED | OUTPATIENT
Start: 2021-03-17 | End: 2021-10-29

## 2021-03-17 NOTE — TELEPHONE ENCOUNTER
Pam Roberts called to request a refill on her medication.       Last office visit : 6/18/2020   Next office visit : Visit date not found     Requested Prescriptions      No prescriptions requested or ordered in this encounter            Renato Cueva MA

## 2021-03-29 ENCOUNTER — OFFICE VISIT (OUTPATIENT)
Dept: CARDIOLOGY | Facility: CLINIC | Age: 86
End: 2021-03-29

## 2021-03-29 VITALS
SYSTOLIC BLOOD PRESSURE: 142 MMHG | BODY MASS INDEX: 27.64 KG/M2 | WEIGHT: 156 LBS | DIASTOLIC BLOOD PRESSURE: 86 MMHG | OXYGEN SATURATION: 98 % | HEART RATE: 88 BPM | HEIGHT: 63 IN

## 2021-03-29 DIAGNOSIS — I48.0 PAF (PAROXYSMAL ATRIAL FIBRILLATION) (HCC): Primary | ICD-10-CM

## 2021-03-29 DIAGNOSIS — Z79.01 CURRENT USE OF LONG TERM ANTICOAGULATION: ICD-10-CM

## 2021-03-29 DIAGNOSIS — Z86.73 HISTORY OF STROKE: ICD-10-CM

## 2021-03-29 DIAGNOSIS — E78.2 MIXED HYPERLIPIDEMIA: ICD-10-CM

## 2021-03-29 DIAGNOSIS — Z95.0 PACEMAKER: ICD-10-CM

## 2021-03-29 DIAGNOSIS — J44.9 CHRONIC OBSTRUCTIVE PULMONARY DISEASE, UNSPECIFIED COPD TYPE (HCC): ICD-10-CM

## 2021-03-29 DIAGNOSIS — I47.1 PSVT (PAROXYSMAL SUPRAVENTRICULAR TACHYCARDIA) (HCC): ICD-10-CM

## 2021-03-29 DIAGNOSIS — Z98.890 HISTORY OF CARDIOVERSION: ICD-10-CM

## 2021-03-29 DIAGNOSIS — I10 ESSENTIAL HYPERTENSION: ICD-10-CM

## 2021-03-29 DIAGNOSIS — I49.5 SICK SINUS SYNDROME (HCC): ICD-10-CM

## 2021-03-29 PROBLEM — Z92.89 HISTORY OF CARDIOVERSION: Status: ACTIVE | Noted: 2021-03-29

## 2021-03-29 PROCEDURE — 99214 OFFICE O/P EST MOD 30 MIN: CPT | Performed by: NURSE PRACTITIONER

## 2021-03-29 PROCEDURE — 93000 ELECTROCARDIOGRAM COMPLETE: CPT | Performed by: NURSE PRACTITIONER

## 2021-03-29 RX ORDER — METOPROLOL SUCCINATE 50 MG/1
50 TABLET, EXTENDED RELEASE ORAL DAILY
Qty: 90 TABLET | Refills: 3 | Status: SHIPPED | OUTPATIENT
Start: 2021-03-29 | End: 2021-09-22 | Stop reason: SDUPTHER

## 2021-03-29 NOTE — PROGRESS NOTES
Chief Complaint  Atrial Fibrillation (6mo F/U. Has been doing well. No chest pain, palpitations, SOA, or edema. )    Subjective          Violet Fajardo presents to [facility] for routine follow up. She has a history of paroxysmal atrial fibrillation s/p cardioversion 12/31/19 on chronic anticoagulation, sick sinus syndrome s/p pacemaker, paroxysmal supraventricular tachycardia, hypertension, hyperlipidemia, COPD and cerebrovascular accident. She reports chronic dyspnea with exertion that occurs when carrying heavy objects. She states that this has been ongoing for years and unchanged. The patient denies chest pain, palpitations, dizziness, syncope, orthopnea, PND, swelling or decreased stamina. She denies any signs of bleeding. .    Atrial Fibrillation  Presents for follow-up visit. Symptoms are negative for an AICD problem, bradycardia, chest pain, dizziness, hemodynamic instability, hypertension, hypotension, pacemaker problem, palpitations, shortness of breath, syncope, tachycardia and weakness. The symptoms have been stable. Past medical history includes atrial fibrillation and hyperlipidemia. There are no medication compliance problems.   Hypertension  This is a chronic problem. The current episode started more than 1 year ago. The problem is controlled. Pertinent negatives include no anxiety, blurred vision, chest pain, headaches, malaise/fatigue, neck pain, orthopnea, palpitations, peripheral edema, PND, shortness of breath or sweats. Risk factors for coronary artery disease include dyslipidemia and post-menopausal state. Current antihypertension treatment includes diuretics and beta blockers. The current treatment provides significant improvement. Hypertensive end-organ damage includes CVA.   Hyperlipidemia  This is a chronic problem. The current episode started more than 1 year ago. Pertinent negatives include no chest pain or shortness of breath. She is currently on no  "antihyperlipidemic treatment. Risk factors for coronary artery disease include dyslipidemia, hypertension and post-menopausal.       Objective   Vital Signs:   /86   Pulse 88   Ht 160 cm (63\")   Wt 70.8 kg (156 lb)   SpO2 98%   BMI 27.63 kg/m²     Vitals and nursing note reviewed.   Constitutional:       General: Not in acute distress.     Appearance: Normal and healthy appearance. Well-developed, normal weight and not in distress. Not diaphoretic.   Eyes:      General: Lids are normal.         Right eye: No discharge.         Left eye: No discharge.      Conjunctiva/sclera: Conjunctivae normal.      Pupils: Pupils are equal, round, and reactive to light.   HENT:      Head: Normocephalic and atraumatic.      Jaw: There is normal jaw occlusion.      Right Ear: External ear normal.      Left Ear: External ear normal.      Nose: Nose normal.   Neck:      Thyroid: No thyromegaly.      Vascular: No carotid bruit, JVD or JVR. JVD normal.      Trachea: Trachea normal. No tracheal deviation.   Pulmonary:      Effort: Pulmonary effort is normal. No respiratory distress.      Breath sounds: Examination of the right-upper field reveals wheezing. Examination of the right-middle field reveals wheezing. Examination of the right-lower field reveals wheezing. No decreased breath sounds. Wheezing present. No rhonchi. No rales.   Chest:      Chest wall: Not tender to palpatation.   Cardiovascular:      PMI at left midclavicular line. Normal rate. Regular rhythm. Normal S1. Normal S2.      Murmurs: There is no murmur.      No gallop. No click. No rub.   Pulses:     Intact distal pulses. No decreased pulses.   Edema:     Peripheral edema absent.   Abdominal:      General: Bowel sounds are normal. There is no distension.      Palpations: Abdomen is soft.      Tenderness: There is no abdominal tenderness.   Musculoskeletal: Normal range of motion.         General: No tenderness or deformity.      Cervical back: Normal range of " motion and neck supple. Skin:     General: Skin is warm and dry.      Coloration: Skin is not pale.      Findings: No erythema or rash.   Neurological:      General: No focal deficit present.      Mental Status: Alert, oriented to person, place, and time and oriented to person, place and time.   Psychiatric:         Attention and Perception: Attention and perception normal.         Mood and Affect: Mood and affect normal.         Speech: Speech normal.         Behavior: Behavior normal.         Thought Content: Thought content normal.         Cognition and Memory: Cognition and memory normal.         Judgment: Judgment normal.        Result Review :   The following data was reviewed by: JON Ochoa on 03/29/2021:  Common labs    Common Labsle 6/11/20 6/11/20    [id number]   Glucose  112 (A)   BUN  12   Creatinine  0.8   eGFR Non African Am  >60   Sodium  142   Potassium  4.1   Chloride  100   Calcium  9.6   Albumin  4.8   Total Bilirubin  0.3   Alkaline Phosphatase  104   AST (SGOT)  25   ALT (SGPT)  18   WBC 9.0    Hemoglobin 12.8    Hematocrit 41.0    Platelets 237    (A) Abnormal value       Comments are available for some flowsheets but are not being displayed.           Data reviewed: Cardiology studies 2D echo 5/8/19, lexiscan 5/8/19 and cardioversion 12/31/19     ECG 12 Lead    Date/Time: 3/29/2021 1:33 PM  Performed by: Yana Agustin APRN  Authorized by: Yana Agustin APRN   Comparison: compared with previous ECG from 9/28/2020  Rhythm: paced  Rate: normal  BPM: 88    Clinical impression: abnormal EKG              Assessment and Plan    Diagnoses and all orders for this visit:    1. PAF (paroxysmal atrial fibrillation) (CMS/HCC) new onset (Primary)- A-paced rhythm today. Anticoagulated.     2. History of cardioversion-12/31/2019.    3. Current use of long term anticoagulation-on Xarelto.  Patient denies bleeding.    4. Sick sinus syndrome (CMS/HCC)-status post pacemaker.    5.  Pacemaker    6. PSVT (paroxysmal supraventricular tachycardia) (CMS/[facility])- Interrogated 1/5/2021.  70.5% a paced, 1.1% V paced, 2 episodes of nonsustained ventricular tachycardia with longest episode being 6 seconds in duration.  Normal function, stable thresholds and adequate battery.    7. Essential hypertension-blood pressures well controlled.  Continue metoprolol succinate and HCTZ.  Monitor and record daily blood pressure. Report readings consistently higher than 140/90 or consistently lower than 100/60.     8. Mixed hyperlipidemia- management per PCP.  Patient is not on statin.    9. History of stroke- remotely.     10. Chronic obstructive pulmonary disease, unspecified COPD type (CMS/[facility])-  Pt sees Dr. Goodman with pulmonology for asthma and COPD. Stable on room air.      Advance Care Planning   ACP discussion was held with the patient during this visit. Patient does not have an advance directive, information provided.        Follow Up   Return in about 6 months (around 9/29/2021) for Next scheduled follow up with Dr. Torres.  Patient was given instructions and counseling regarding her condition or for health maintenance advice. Please see specific information pulled into the AVS if appropriate.

## 2021-03-29 NOTE — PATIENT INSTRUCTIONS
Advance Care Planning and Advance Directives     You make decisions on a daily basis - decisions about where you want to live, your career, your home, your life. Perhaps one of the most important decisions you face is your choice for future medical care. Take time to talk with your family and your healthcare team and start planning today.  Advance Care Planning is a process that can help you:  · Understand possible future healthcare decisions in light of your own experiences  · Reflect on those decision in light of your goals and values  · Discuss your decisions with those closest to you and the healthcare professionals that care for you  · Make a plan by creating a document that reflects your wishes    Surrogate Decision Maker  In the event of a medical emergency, which has left you unable to communicate or to make your own decisions, you would need someone to make decisions for you.  It is important to discuss your preferences for medical treatment with this person while you are in good health.     Qualities of a surrogate decision maker:  • Willing to take on this role and responsibility  • Knows what you want for future medical care  • Willing to follow your wishes even if they don't agree with them  • Able to make difficult medical decisions under stressful circumstances    Advance Directives  These are legal documents you can create that will guide your healthcare team and decision maker(s) when needed. These documents can be stored in the electronic medical record.    · Living Will - a legal document to guide your care if you have a terminal condition or a serious illness and are unable to communicate. States vary by statute in document names/types, but most forms may include one or more of the following:        -  Directions regarding life-prolonging treatments        -  Directions regarding artificially provided nutrition/hydration        -  Choosing a healthcare decision maker        -  Direction  regarding organ/tissue donation    · Durable Power of  for Healthcare - this document names an -in-fact to make medical decisions for you, but it may also allow this person to make personal and financial decisions for you. Please seek the advice of an  if you need this type of document.    **Advance Directives are not required and no one may discriminate against you if you do not sign one.    Medical Orders  Many states allow specific forms/orders signed by your physician to record your wishes for medical treatment in your current state of health. This form, signed in personal communication with your physician, addresses resuscitation and other medical interventions that you may or may not want.      For more information or to schedule a time with a [facility] Advance Care Planning Facilitator contact: [email or web address] or call [phone] and someone will contact you directly.

## 2021-04-05 ENCOUNTER — TELEPHONE (OUTPATIENT)
Dept: PRIMARY CARE CLINIC | Age: 86
End: 2021-04-05

## 2021-04-21 DIAGNOSIS — F51.01 PRIMARY INSOMNIA: ICD-10-CM

## 2021-04-21 RX ORDER — ZOLPIDEM TARTRATE 10 MG/1
10 TABLET ORAL NIGHTLY PRN
Qty: 90 TABLET | Refills: 0 | Status: SHIPPED | OUTPATIENT
Start: 2021-04-21 | End: 2021-07-20

## 2021-04-21 RX ORDER — BUDESONIDE AND FORMOTEROL FUMARATE DIHYDRATE 160; 4.5 UG/1; UG/1
2 AEROSOL RESPIRATORY (INHALATION) 2 TIMES DAILY
Qty: 1 INHALER | Refills: 0 | Status: SHIPPED | OUTPATIENT
Start: 2021-04-21 | End: 2021-08-09 | Stop reason: SDUPTHER

## 2021-04-21 NOTE — TELEPHONE ENCOUNTER
Emanuel Rocha called to request a refill on her medication. Last office visit : 6/18/2020   Next office visit : Visit date not found     Last UDS: No results found for: Carmelia Splinter, LABBENZ, BUPRENUR, COCAIMETSCRU, GABAPENTIN, MDMA, METAMPU, OPIATESCREENURINE, OXTCOSU, [place], PROPOXYPHENE, THCSCREENUR, TRICYUR    Last Karine Fruit: 2/21/2021  Medication Contract: not on file    Last Fill: 2/23/2021    Requested Prescriptions     Pending Prescriptions Disp Refills    zolpidem (AMBIEN) 10 MG tablet 90 tablet 0     Sig: Take 1 tablet by mouth nightly as needed for Sleep for up to 90 days.  budesonide-formoterol (SYMBICORT) 160-4.5 MCG/ACT AERO 1 Inhaler 5     Sig: Inhale 2 puffs into the lungs 2 times daily         Please approve or refuse this medication.    Jason Jimenes

## 2021-07-08 PROCEDURE — 93296 REM INTERROG EVL PM/IDS: CPT | Performed by: INTERNAL MEDICINE

## 2021-07-08 PROCEDURE — 93294 REM INTERROG EVL PM/LDLS PM: CPT | Performed by: INTERNAL MEDICINE

## 2021-07-12 ENCOUNTER — OFFICE VISIT (OUTPATIENT)
Dept: PRIMARY CARE CLINIC | Age: 86
End: 2021-07-12
Payer: MEDICARE

## 2021-07-12 VITALS
TEMPERATURE: 96.1 F | HEART RATE: 76 BPM | SYSTOLIC BLOOD PRESSURE: 130 MMHG | HEIGHT: 63 IN | WEIGHT: 167 LBS | OXYGEN SATURATION: 95 % | DIASTOLIC BLOOD PRESSURE: 80 MMHG | BODY MASS INDEX: 29.59 KG/M2

## 2021-07-12 DIAGNOSIS — Z00.00 ENCOUNTER FOR MEDICAL EXAMINATION TO ESTABLISH CARE: ICD-10-CM

## 2021-07-12 DIAGNOSIS — I50.9 CONGESTIVE HEART FAILURE, UNSPECIFIED HF CHRONICITY, UNSPECIFIED HEART FAILURE TYPE (HCC): ICD-10-CM

## 2021-07-12 DIAGNOSIS — E03.9 HYPOTHYROIDISM, UNSPECIFIED TYPE: Primary | ICD-10-CM

## 2021-07-12 DIAGNOSIS — E55.9 VITAMIN D DEFICIENCY: ICD-10-CM

## 2021-07-12 DIAGNOSIS — I48.0 PAROXYSMAL ATRIAL FIBRILLATION (HCC): ICD-10-CM

## 2021-07-12 LAB
ALBUMIN SERPL-MCNC: 4.8 G/DL (ref 3.5–5.2)
ALP BLD-CCNC: 104 U/L (ref 35–104)
ALT SERPL-CCNC: 17 U/L (ref 5–33)
ANION GAP SERPL CALCULATED.3IONS-SCNC: 12 MMOL/L (ref 7–19)
AST SERPL-CCNC: 25 U/L (ref 5–32)
BILIRUB SERPL-MCNC: 0.4 MG/DL (ref 0.2–1.2)
BUN BLDV-MCNC: 19 MG/DL (ref 8–23)
CALCIUM SERPL-MCNC: 9.8 MG/DL (ref 8.8–10.2)
CHLORIDE BLD-SCNC: 100 MMOL/L (ref 98–111)
CO2: 28 MMOL/L (ref 22–29)
CREAT SERPL-MCNC: 0.8 MG/DL (ref 0.5–0.9)
GFR AFRICAN AMERICAN: >59
GFR NON-AFRICAN AMERICAN: >60
GLUCOSE BLD-MCNC: 118 MG/DL (ref 74–109)
HCT VFR BLD CALC: 42.1 % (ref 37–47)
HEMOGLOBIN: 13.1 G/DL (ref 12–16)
MCH RBC QN AUTO: 29.8 PG (ref 27–31)
MCHC RBC AUTO-ENTMCNC: 31.1 G/DL (ref 33–37)
MCV RBC AUTO: 95.7 FL (ref 81–99)
PDW BLD-RTO: 14.3 % (ref 11.5–14.5)
PLATELET # BLD: 249 K/UL (ref 130–400)
PMV BLD AUTO: 10.7 FL (ref 9.4–12.3)
POTASSIUM SERPL-SCNC: 4.3 MMOL/L (ref 3.5–5)
RBC # BLD: 4.4 M/UL (ref 4.2–5.4)
SODIUM BLD-SCNC: 140 MMOL/L (ref 136–145)
T4 FREE: 1.32 NG/DL (ref 0.93–1.7)
TOTAL PROTEIN: 7.4 G/DL (ref 6.6–8.7)
TSH SERPL DL<=0.05 MIU/L-ACNC: 2.03 UIU/ML (ref 0.27–4.2)
VITAMIN D 25-HYDROXY: 29.2 NG/ML
WBC # BLD: 7.3 K/UL (ref 4.8–10.8)

## 2021-07-12 PROCEDURE — G8417 CALC BMI ABV UP PARAM F/U: HCPCS | Performed by: NURSE PRACTITIONER

## 2021-07-12 PROCEDURE — G8427 DOCREV CUR MEDS BY ELIG CLIN: HCPCS | Performed by: NURSE PRACTITIONER

## 2021-07-12 PROCEDURE — 4040F PNEUMOC VAC/ADMIN/RCVD: CPT | Performed by: NURSE PRACTITIONER

## 2021-07-12 PROCEDURE — 1090F PRES/ABSN URINE INCON ASSESS: CPT | Performed by: NURSE PRACTITIONER

## 2021-07-12 PROCEDURE — 1036F TOBACCO NON-USER: CPT | Performed by: NURSE PRACTITIONER

## 2021-07-12 PROCEDURE — 1123F ACP DISCUSS/DSCN MKR DOCD: CPT | Performed by: NURSE PRACTITIONER

## 2021-07-12 PROCEDURE — 99214 OFFICE O/P EST MOD 30 MIN: CPT | Performed by: NURSE PRACTITIONER

## 2021-07-12 RX ORDER — HYDROCHLOROTHIAZIDE 12.5 MG/1
12.5 CAPSULE, GELATIN COATED ORAL DAILY PRN
Qty: 30 CAPSULE | Refills: 2 | Status: SHIPPED | OUTPATIENT
Start: 2021-07-12 | End: 2021-12-09

## 2021-07-12 ASSESSMENT — PATIENT HEALTH QUESTIONNAIRE - PHQ9
2. FEELING DOWN, DEPRESSED OR HOPELESS: 0
SUM OF ALL RESPONSES TO PHQ9 QUESTIONS 1 & 2: 0
1. LITTLE INTEREST OR PLEASURE IN DOING THINGS: 0
SUM OF ALL RESPONSES TO PHQ QUESTIONS 1-9: 0

## 2021-07-12 NOTE — PROGRESS NOTES
[facility] PHYSICIAN SERVICES  [facility]  [address]  [address] [address]  Dept: [phone]  Dept Fax: [phone]  Loc: [phone]    Tip Stuart is a 80 y.o. female who presents today for her medical conditions/complaints as noted below. Tip Stuart is c/o of [facility] (Former Dr. Charmel Hodgkins patient. Needs HCTZ refilled today and needs to discuss Xarelto due to expense.  )        HPI:     HPI   This 42-year-old female presents today to \[facility]\"" care. Previously a patient of Dr. Vamsi Skaggs. She is a good historian and states that she has a history of atrial fib with Xarelto use. She states that she has previously been assisted with the Xarelto medication through the [facility] [employer] patient assistance in [facility] patient assistance. Currently her Xarelto is costing approximately $1000 for 3 months supply and she is unable to afford this. She is currently relying on samples to get her through. She states that she needs a refill on her HCTZ. She states that she only takes this as needed and her prescription has . She denies any new complaints or concerns. She states that it has been sometime since she has had basic labs to check for her conditions such as CHF, hypothyroidism, vitamin D deficiency. Needs patient assistance with xarelto. [facility]. Chief Complaint   Patient presents with   [address]     Former Dr. Charmel Hodgkins patient. Needs HCTZ refilled today and needs to discuss Xarelto due to expense.        Past Medical History:   Diagnosis Date    Asthma     CHF (congestive heart failure) (HCC)     COPD (chronic obstructive pulmonary disease) (HCC)     Dermatomyositis (HCC)     Fibromyalgia     Hyperlipidemia     Hyperthyroidism     Pancreatitis     Sleep apnea     Supraventricular tachycardia ([address] 10/19/2016      Past Surgical History:   Procedure Laterality Date    CHOLECYSTECTOMY      HYSTERECTOMY      PACEMAKER PLACEMENT         Vitals 7/12/2021 3/3/2020 3/3/2020 11/26/2019 10/29/2019 1/94/7056   SYSTOLIC 083 198 234 022 164 264   DIASTOLIC 80 78 78 76 82 74   Site - - - - - -   Position - - - - - -   Cuff Size - - - - - -   Pulse 76 - 79 65 105 86   Temp 96.1 - 97.7 97.9 97.6 98.4   Resp - - 20 17 17 -   SpO2 95 - 97 95 98 98   Weight 167 lb - 167 lb 169 lb 169 lb 175 lb 6.4 oz   Height 5' 3\" - 5' 3.5\" 5' 3.5\" 5' 3.5\" 5' 3\"   Body mass index 29.58 kg/m2 - 29.12 kg/m2 29.46 kg/m2 29.46 kg/m2 31.07 kg/m2   Some recent data might be hidden       No family history on file. Social History     Tobacco Use    Smoking status: Never Smoker    Smokeless tobacco: Never Used   Substance Use Topics    Alcohol use: No      Current Outpatient Medications on File Prior to Visit   Medication Sig Dispense Refill    zolpidem (AMBIEN) 10 MG tablet Take 1 tablet by mouth nightly as needed for Sleep for up to 90 days.  90 tablet 0    budesonide-formoterol (SYMBICORT) 160-4.5 MCG/ACT AERO Inhale 2 puffs into the lungs 2 times daily 1 Inhaler 0    levothyroxine (SYNTHROID) 25 MCG tablet Take 1 tablet by mouth daily 90 tablet 1    ipratropium (ATROVENT HFA) 17 MCG/ACT inhaler Inhale 2 puffs into the lungs 2 times daily 1 Inhaler 2    albuterol sulfate HFA (PROVENTIL HFA) 108 (90 Base) MCG/ACT inhaler Inhale 2 puffs into the lungs every 6 hours as needed for Wheezing 3 Inhaler 3    OXYGEN Inhale into the lungs nightly      TOPROL XL 25 MG extended release tablet 25 mg 3 times daily       KLOR-CON M10 10 MEQ extended release tablet       isosorbide mononitrate (IMDUR) 30 MG CR tablet Take 30 mg by mouth daily       nystatin (MYCOSTATIN) 188006 UNIT/GM ointment Combine with equal parts of zinc oxide and hydrocortisone cream--15 grams each (Patient not taking: Reported on 7/12/2021) 45 g 1    predniSONE (DELTASONE) 10 MG tablet Take 1 tablet by mouth daily Take 4 Tablets for 3 days   Take 3 Tablets for 3 days   Take 2 Tablets for 3 days  Take 1 Tablet   for 3 days (Patient not taking: Reported on 7/12/2021) 30 tablet 0    Minoxidil 5 % FOAM Apply once daily after shampooing, leave on scalp (Patient not taking: Reported on 7/12/2021) 60 g 2    levalbuterol (XOPENEX HFA) 45 MCG/ACT inhaler Inhale 1-2 puffs into the lungs (Patient not taking: Reported on 7/12/2021)       No current facility-administered medications on file prior to visit. Allergies   Allergen Reactions    Latex     Augmentin [Amoxicillin-Pot Clavulanate]     Codeine     Fentanyl     Iodine     Versed [Midazolam]        Health Maintenance   Topic Date Due    COVID-19 Vaccine (1) Never done    Shingles Vaccine (1 of 2) Never done   [employer] Visit (AWV)  Never done    DTaP/Tdap/Td vaccine (2 - Td or Tdap) 03/29/2020    Flu vaccine (1) 09/01/2021    TSH testing  07/12/2022    Potassium monitoring  07/12/2022    Creatinine monitoring  07/12/2022    Pneumococcal 65+ years Vaccine  Completed    Hepatitis A vaccine  Aged Out    Hepatitis B vaccine  Aged Out    Hib vaccine  Aged Out    Meningococcal (ACWY) vaccine  Aged Out       Subjective:      Review of Systems   Constitutional: Negative for chills, fatigue and fever. HENT: Negative. Eyes: Negative. Respiratory: Negative for cough, shortness of breath and wheezing. Cardiovascular: Negative for chest pain, palpitations and leg swelling. Gastrointestinal: Negative for abdominal pain, constipation, diarrhea, nausea and vomiting. Endocrine: Negative. Genitourinary: Negative for difficulty urinating and dysuria. Musculoskeletal: Negative for arthralgias and back pain. Skin: Negative for rash. Allergic/Immunologic: Negative. Neurological: Negative for headaches. Hematological: Negative. Psychiatric/Behavioral: Negative. Objective:     Physical Exam  Vitals and nursing note reviewed. Constitutional:       General: She is not in acute distress. Appearance: Normal appearance.  She is not ill-appearing or toxic-appearing. HENT:      Head: Normocephalic and atraumatic. Nose: Nose normal.      Mouth/Throat:      Mouth: Mucous membranes are moist.      Pharynx: Oropharynx is clear. Eyes:      Extraocular Movements: Extraocular movements intact. Pupils: Pupils are equal, round, and reactive to light. Cardiovascular:      Rate and Rhythm: Normal rate and regular rhythm. Pulses: Normal pulses. Heart sounds: Normal heart sounds. Pulmonary:      Effort: Pulmonary effort is normal. No respiratory distress. Breath sounds: Normal breath sounds. No wheezing or rales. Musculoskeletal:         General: Normal range of motion. Cervical back: Normal range of motion and neck supple. Skin:     General: Skin is warm and dry. Coloration: Skin is not jaundiced or pale. Findings: No erythema or rash. Neurological:      Mental Status: She is alert and oriented to person, place, and time. Psychiatric:         Mood and Affect: Mood normal.         Behavior: Behavior normal.         Thought Content: Thought content normal.         Judgment: Judgment normal.       /80   Pulse 76   Temp 96.1 °F (35.6 °C)   Ht 5' 3\" (1.6 m)   Wt 167 lb (75.8 kg)   SpO2 95%   BMI 29.58 kg/m²     Assessment:       Diagnosis Orders   1. Hypothyroidism, unspecified type  TSH without Reflex    T4, Free   2. Vitamin D deficiency  Vitamin D 25 Hydroxy   3. Congestive heart failure, unspecified HF chronicity, unspecified heart failure type ([facility]  CBC    Comprehensive Metabolic Panel   4. Encounter for medical examination to establish care     5. Paroxysmal atrial fibrillation (HCC)           Plan:     1. Check TSH and T4 today in office. 2.  Lab for vitamin D insufficiency today    3. CMP and CBC in office today. Refills for HCTZ sent to pharmacy. Continue to use as needed for swelling. 4. Welcome to [facility].  We encourage our patients to set-up and use [employer] for convenient confidential communication with our office. One of our goals is to meet our patient's needs by being available for unforeseen developments after-hours, nights and weekends. One of our providers is on call 24/7. If you have an after-hours need just call the office and you will directed to the provider on call. 5.  Rate controlled in office today. Refills for Xarelto sent to pharmacy. Patient reports having difficulty with current cost of medication. She states that 3-month supply was on cost almost $1000. She states she is unable to afford this. She was previously getting assistance through the [employer]. We will need to find out information through her previous provider. She also needs to contact her healthcare insurance. Patient given educational materials -see patient instructions. Discussed use, benefit, and side effects of prescribed medications. All patient questions answered. Pt voiced understanding. Reviewed health maintenance. Instructed to continue currentmedications, diet and exercise. Patient agreed with treatment plan. Follow up as directed. MEDICATIONS:  Orders Placed This Encounter   Medications    hydroCHLOROthiazide (MICROZIDE) 12.5 MG capsule     Sig: Take 1 capsule by mouth daily as needed (as needed for leg edema)     Dispense:  30 capsule     Refill:  2    rivaroxaban (XARELTO) 20 MG TABS tablet     Sig: Take 1 tablet by mouth daily (with breakfast)     Dispense:  30 tablet     Refill:  1         ORDERS:  Orders Placed This Encounter   Procedures    CBC    Comprehensive Metabolic Panel    TSH without Reflex    T4, Free    Vitamin D 25 Hydroxy       Follow-up:  Return in about 4 weeks (around 8/9/2021) for for annual wellness visit, to see me. PATIENT INSTRUCTIONS:  There are no Patient Instructions on file for this visit.   Electronically signed by JAZMIN Jara NP on 7/13/2021 at 5:58 PM    EMR Dragon/transcription disclaimer:  Much of thisencounter note is electronic transcription/translation of spoken language to printed texts. The electronic translation of spoken language may be erroneous, or at times, nonsensical words or phrases may be inadvertentlytranscribed.   Although I have reviewed the note for such errors, some may still exist.

## 2021-07-13 ENCOUNTER — TELEPHONE (OUTPATIENT)
Dept: PRIMARY CARE CLINIC | Age: 86
End: 2021-07-13

## 2021-07-13 ASSESSMENT — ENCOUNTER SYMPTOMS
NAUSEA: 0
BACK PAIN: 0
DIARRHEA: 0
CONSTIPATION: 0
SHORTNESS OF BREATH: 0
ABDOMINAL PAIN: 0
ALLERGIC/IMMUNOLOGIC NEGATIVE: 1
WHEEZING: 0
COUGH: 0
VOMITING: 0
EYES NEGATIVE: 1

## 2021-07-13 NOTE — TELEPHONE ENCOUNTER
Talk with her and check to find out if she was able to contact her previous provider about the [employer] patient assistance or for the Xarelto? Xarelto and Eliquis on the 2 main medications used for her atrial fibrillation. There are no other less expensive medications. She may also need to contact her provider and find out if this is due to her being in the donut hole?

## 2021-07-14 NOTE — TELEPHONE ENCOUNTER
Pt states her previous provider never heard anything and has turned everything over to us. She states she does not have Prescription coverage. Pt will call [facility] as she has used them previously. Pt will check with us periodically for samples.

## 2021-07-20 NOTE — TELEPHONE ENCOUNTER
Please notify her that I have her some samples of Xarelto at the office. Also let her know she is going to need to try and sign up for some type of medication benefits. Been able to get anywhere with any type of assistance.

## 2021-08-09 ENCOUNTER — OFFICE VISIT (OUTPATIENT)
Dept: PRIMARY CARE CLINIC | Age: 86
End: 2021-08-09
Payer: MEDICARE

## 2021-08-09 VITALS
WEIGHT: 168 LBS | HEIGHT: 63 IN | BODY MASS INDEX: 29.77 KG/M2 | SYSTOLIC BLOOD PRESSURE: 126 MMHG | OXYGEN SATURATION: 97 % | DIASTOLIC BLOOD PRESSURE: 70 MMHG | TEMPERATURE: 98.1 F | HEART RATE: 67 BPM

## 2021-08-09 DIAGNOSIS — K64.3 GRADE IV HEMORRHOIDS: ICD-10-CM

## 2021-08-09 DIAGNOSIS — Z00.00 ROUTINE GENERAL MEDICAL EXAMINATION AT A HEALTH CARE FACILITY: Primary | ICD-10-CM

## 2021-08-09 DIAGNOSIS — L90.0 LICHEN SCLEROSUS OF ANUS: ICD-10-CM

## 2021-08-09 PROCEDURE — G0439 PPPS, SUBSEQ VISIT: HCPCS | Performed by: NURSE PRACTITIONER

## 2021-08-09 PROCEDURE — 4040F PNEUMOC VAC/ADMIN/RCVD: CPT | Performed by: NURSE PRACTITIONER

## 2021-08-09 PROCEDURE — 1036F TOBACCO NON-USER: CPT | Performed by: NURSE PRACTITIONER

## 2021-08-09 PROCEDURE — 1123F ACP DISCUSS/DSCN MKR DOCD: CPT | Performed by: NURSE PRACTITIONER

## 2021-08-09 PROCEDURE — 1090F PRES/ABSN URINE INCON ASSESS: CPT | Performed by: NURSE PRACTITIONER

## 2021-08-09 PROCEDURE — G8427 DOCREV CUR MEDS BY ELIG CLIN: HCPCS | Performed by: NURSE PRACTITIONER

## 2021-08-09 PROCEDURE — G8417 CALC BMI ABV UP PARAM F/U: HCPCS | Performed by: NURSE PRACTITIONER

## 2021-08-09 PROCEDURE — 99213 OFFICE O/P EST LOW 20 MIN: CPT | Performed by: NURSE PRACTITIONER

## 2021-08-09 RX ORDER — BUDESONIDE AND FORMOTEROL FUMARATE DIHYDRATE 160; 4.5 UG/1; UG/1
2 AEROSOL RESPIRATORY (INHALATION) 2 TIMES DAILY
Qty: 3 INHALER | Refills: 3 | Status: CANCELLED | OUTPATIENT
Start: 2021-08-09

## 2021-08-09 RX ORDER — BUDESONIDE AND FORMOTEROL FUMARATE DIHYDRATE 160; 4.5 UG/1; UG/1
2 AEROSOL RESPIRATORY (INHALATION) 2 TIMES DAILY
Qty: 1 INHALER | Refills: 0 | Status: SHIPPED | OUTPATIENT
Start: 2021-08-09

## 2021-08-09 RX ORDER — HYDROCORTISONE ACETATE 25 MG/1
25 SUPPOSITORY RECTAL EVERY 12 HOURS
Qty: 30 SUPPOSITORY | Refills: 0 | Status: SHIPPED | OUTPATIENT
Start: 2021-08-09

## 2021-08-09 RX ORDER — CLOBETASOL PROPIONATE 0.5 MG/G
OINTMENT TOPICAL
Qty: 1 TUBE | Refills: 1 | Status: SHIPPED | OUTPATIENT
Start: 2021-08-09

## 2021-08-09 ASSESSMENT — LIFESTYLE VARIABLES: HOW OFTEN DO YOU HAVE A DRINK CONTAINING ALCOHOL: 0

## 2021-08-09 ASSESSMENT — PATIENT HEALTH QUESTIONNAIRE - PHQ9
SUM OF ALL RESPONSES TO PHQ QUESTIONS 1-9: 0
SUM OF ALL RESPONSES TO PHQ9 QUESTIONS 1 & 2: 0
1. LITTLE INTEREST OR PLEASURE IN DOING THINGS: 0
SUM OF ALL RESPONSES TO PHQ QUESTIONS 1-9: 0
SUM OF ALL RESPONSES TO PHQ QUESTIONS 1-9: 0
2. FEELING DOWN, DEPRESSED OR HOPELESS: 0

## 2021-08-09 NOTE — PATIENT INSTRUCTIONS
Personalized Preventive Plan for Kike Stapleton - 8/9/2021  Medicare offers a range of preventive health benefits. Some of the tests and screenings are paid in full while other may be subject to a deductible, co-insurance, and/or copay. Some of these benefits include a comprehensive review of your medical history including lifestyle, illnesses that may run in your family, and various assessments and screenings as appropriate. After reviewing your medical record and screening and assessments performed today your provider may have ordered immunizations, labs, imaging, and/or referrals for you. A list of these orders (if applicable) as well as your Preventive Care list are included within your After Visit Summary for your review. Other Preventive Recommendations:    · A preventive eye exam performed by an eye specialist is recommended every 1-2 years to screen for glaucoma; cataracts, macular degeneration, and other eye disorders. · A preventive dental visit is recommended every 6 months. · Try to get at least 150 minutes of exercise per week or 10,000 steps per day on a pedometer . · Order or download the FREE \"Exercise & Physical Activity: Your Everyday Guide\" from [employer]. Call 8-576.739.1710 or search [employer] online. · You need 3958-8484 mg of calcium and 1851-9087 IU of vitamin D per day. It is possible to meet your calcium requirement with diet alone, but a vitamin D supplement is usually necessary to meet this goal.  · When exposed to the sun, use a sunscreen that protects against both UVA and UVB radiation with an SPF of 30 or greater. Reapply every 2 to 3 hours or after sweating, drying off with a towel, or swimming. · Always wear a seat belt when traveling in a car. Always wear a helmet when riding a bicycle or motorcycle.   Patient Education        Lichen Sclerosus: Care Instructions  Your Care Instructions  Lichen sclerosus is a long-term (chronic) skin problem that causes thin, wrinkled white patches. The patches are itchy and painful. If the skin tears, bright red or purple spots may appear. In most cases, it occurs on the skin of the anus (the opening where stool leaves the body), the vulva (the area around the vagina), and the tip of the penis in men who haven't been circumcised. Doctors aren't sure what causes lichen sclerosus. It isn't caused by an infection, and it's not contagious. You can't spread it to others. If the skin patches are on the anus, vulva, or penis, they may need to be treated. If these areas aren't treated, the skin can thicken and scar. This can narrow the openings to the vagina and anus. The foreskin over the penis may tighten and shrink. If this happens, going to the bathroom and having sex can be painful. Lichen sclerosus is usually treated with strong prescription cream or ointment. The medicine stops the inflammation, but the scarring of the skin might not completely go away. Men with scarring from advanced cases on the tip of the penis may have surgery to remove the foreskin. Skin patches on any other part of the body usually go away on their own without treatment. You may have a small increased risk of skin cancer on the affected area. Your doctor will examine the skin at least once a year. Follow-up care is a key part of your treatment and safety. Be sure to make and go to all appointments, and call your doctor if you are having problems. It's also a good idea to know your test results and keep a list of the medicines you take. How can you care for yourself at home? Be safe with medicines. If your doctor prescribed a cream, apply it exactly as directed. Call your doctor if you think you are having a problem with your medicine. Put cold, wet cloths on the area to reduce itching. Wear loose-fitting clothes. Avoid nylon and other fabric that holds moisture close to the skin.  This may allow an infection to start.  If your doctor told you to use nonprescription moisturizing cream on your skin, read and follow the directions on the label. Care tips for women  Do not douche, unless your doctor tells you to. Avoid hot baths. Don't use soaps or bath products to wash the area around your vulva. Rinse with water only, and gently pat the area dry. Care tips for men  Keep your penis clean. If you haven't been circumcised, gently pull the foreskin back to wash your penis with warm water. Make sure your penis is dry before you get dressed. When should you call for help? Call your doctor now or seek immediate medical care if:    You have symptoms of infection, such as: Increased pain, swelling, warmth, or redness. Red streaks leading from the area. Pus draining from the area. A fever. Watch closely for changes in your health, and be sure to contact your doctor if:    The affected area grows or changes.     You do not get better as expected. Where can you learn more? Go to [email or web address] org and sign in to your [employer] account. Enter R674 in the [facility] box to learn more about \"Lichen Sclerosus: Care Instructions. \"     If you do not have an account, please click on the \"Sign Up Now\" link. Current as of: March 3, 2021               Content Version: 12.9  © 5716-2208 Health[employer]. Care instructions adapted under license by [facility] ([facility]). If you have questions about a medical condition or this instruction, always ask your healthcare professional. [place] [address] any warranty or liability for your use of this information. Patient Education        clobetasol topical  Pronunciation:  Phyllistine Party ta sol  Brand:  Clobex, Clodan, Impoyz, Olux, Olux-E, Temovate, Tovet  What is the most important information I should know about clobetasol topical?  Follow all directions on your medicine label and package.  Tell each of your healthcare providers about all your medical conditions, allergies, and all medicines you use. What is clobetasol topical?  Clobetasol is a highly potent steroid that helps reduce inflammation in the body. Clobetasol topical (for the skin) is used to treat inflammation and itching caused by plaque psoriasis or skin conditions that respond to steroid medication. Clobetasol topical may also be used for purposes not listed in this medication guide. What should I discuss with my healthcare provider before using clobetasol topical?  You should not use clobetasol topical if you are allergic to it. Tell your doctor if you have ever had:  any type of skin infection;  a skin reaction to any steroid medicine;  liver disease; or  an adrenal gland disorder. Steroid medicines can increase the glucose (sugar) levels in your blood or urine. Tell your doctor if you have diabetes. It is not known whether this medicine will harm an unborn baby. Tell your doctor if you are pregnant. It may not be safe to breastfeed while using this medicine. Ask your doctor about any risk. If you apply clobetasol to your chest, avoid areas that may come into contact with the baby's mouth. Clobetasol topical is not approved for use by anyone younger than 15years old. Some brands or forms of this medicine are for use only in adults 18 and over. Children can absorb larger amounts of this medicine through the skin and may be more likely to have side effects. How should I use clobetasol topical?  Follow all directions on your prescription label and read all medication guides or instruction sheets. Use the medicine exactly as directed. Do not take by mouth. Topical medicine is for use only on the skin. Rinse with water if this medicine gets in your eyes or mouth. Do not use clobetasol topical on broken or infected skin. Also avoid using this medicine in open wounds.   Wash your hands before and after using clobetasol, unless you are using the medicine to treat the skin on your hands. Apply a thin layer of medicine to the affected skin and rub it in gently. Do not apply this medicine over a large area of skin unless your doctor has told you to. Do not cover the treated skin area with a bandage or other covering unless your doctor tells you to. Covering treated areas can increase the amount of medicine absorbed through your skin and may cause harmful effects. If you are treating the diaper area, do not use plastic pants or tight-fitting diapers. This medicine is for short-term use only (2 weeks, or up to 4 weeks for scalp psoriasis). Follow your doctor's dosing instructions very carefully. If you use clobetasol to treat plaque psoriasis, you should stop using the medicine once your skin symptoms are controlled. Call your doctor if your symptoms do not improve, or if they get worse. You should not stop using clobetasol suddenly. Follow your doctor's instructions about tapering your dose. Store at room temperature away from moisture and heat. Keep from freezing. Clobetasol foam is flammable. Do not use near high heat or open flame. Do not smoke until the foam has completely dried on your skin. What happens if I miss a dose? Apply the medicine as soon as you can, but skip the missed dose if it is almost time for your next dose. Do not apply two doses at one time. What happens if I overdose? Seek emergency medical attention or call the Poison Help line at 1-449.137.4162 if anyone has accidentally swallowed the medication. High doses or long-term use of clobetasol topical can lead to thinning skin, easy bruising, changes in body fat (especially in your face, neck, back, and waist), increased acne or facial hair, menstrual problems, impotence, or loss of interest in sex. What should I avoid while using clobetasol topical?  Avoid applying this medicine to your face, underarms, or groin area.   Do not use this medicine to treat any condition that has not been checked by your doctor. Avoid using other topical steroid medications on the areas you treat with clobetasol unless your doctor tells you to. What are the possible side effects of clobetasol topical?  Get emergency medical help if you have signs of an allergic reaction: hives; difficult breathing; swelling of your face, lips, tongue, or throat. Call your doctor at once if you have:  worsening of your skin condition;  redness, warmth, swelling, oozing, or severe irritation of any treated skin;  blurred vision, tunnel vision, eye pain, or seeing halos around lights;  high blood sugar --increased thirst, increased urination, dry mouth, fruity breath odor; or  possible signs of absorbing this medicine through your skin --weight gain in your face and shoulders, slow wound healing, skin discoloration, thinning skin, increased body hair, tiredness, mood changes, menstrual changes, sexual changes. Common side effects may include:  burning, itching, swelling, or irritation of treated skin;  dry or cracking skin;  redness or crusting around your hair follicles;  spider veins;  stretch marks, thinning skin;  rash or hives;  acne; or  temporary hair loss. This is not a complete list of side effects and others may occur. Call your doctor for medical advice about side effects. You may report side effects to FDA at 5-362-FDA-9407. What other drugs will affect clobetasol topical?  Medicine used on the skin is not likely to be affected by other drugs you use. But many drugs can interact with each other. Tell each of your health care providers about all medicines you use, including prescription and over-the-counter medicines, vitamins, and herbal products. Where can I get more information? Your pharmacist can provide more information about clobetasol topical.  Remember, keep this and all other medicines out of the reach of children, never share your medicines with others, and use this medication only for the indication prescribed.    Every effort has been made to ensure that the information provided by [address] is accurate, up-to-date, and complete, but no guarantee is made to that effect. Drug information contained herein may be time sensitive. [facility] information has been compiled for use by healthcare practitioners and consumers in the [place] and therefore [facility] does not warrant that uses outside of the [place] are appropriate, unless specifically indicated otherwise. [facility]'s drug information does not endorse drugs, diagnose patients or recommend therapy. [facility]'s drug information is an informational resource designed to assist licensed healthcare practitioners in caring for their patients and/or to serve consumers viewing this service as a supplement to, and not a substitute for, the expertise, skill, knowledge and judgment of healthcare practitioners. The absence of a warning for a given drug or drug combination in no way should be construed to indicate that the drug or drug combination is safe, effective or appropriate for any given patient. [facility] does not assume any responsibility for any aspect of healthcare administered with the aid of information [facility] provides. The information contained herein is not intended to cover all possible uses, directions, precautions, warnings, drug interactions, allergic reactions, or adverse effects. If you have questions about the drugs you are taking, check with your doctor, nurse or pharmacist.  Copyright 8555-0071 [address]: 10.03. Revision date: 11/22/2019. Care instructions adapted under license by [facility] ([facility]). If you have questions about a medical condition or this instruction, always ask your healthcare professional. [place] [address] any warranty or liability for your use of this information.

## 2021-08-09 NOTE — PROGRESS NOTES
Medicare Annual Wellness Visit  Name: Adilson Hendricks Date: 2021   MRN: [id number] Sex: Female   Age: 80 y.o. Ethnicity: Non- / Non    : 1934 Race: White (non-)      Maria A Flores is here for Medicare AWV (She is here for a medicare annual wellness visit. She also complains of chronic hemorrhoids. She states Sara Waldron was treating them.)    Screenings for behavioral, psychosocial and functional/safety risks, and cognitive dysfunction are all negative except as indicated below. These results, as well as other patient data from the [address] form, are documented in Flowsheets linked to this Encounter. Allergies   Allergen Reactions    Latex     Augmentin [Amoxicillin-Pot Clavulanate]     Codeine     Fentanyl     Iodine     Versed [Midazolam]          Prior to Visit Medications    Medication Sig Taking? Authorizing Provider   hydrocortisone (ANUSOL-HC) 25 MG suppository Place 1 suppository rectally every 12 hours Yes Merla Cos, APRN - NP   clobetasol (TEMOVATE) 0.05 % ointment Apply topically 2 times daily.  Yes Merla Cos, APRN - NP   budesonide-formoterol (SYMBICORT) 160-4.5 MCG/ACT AERO Inhale 2 puffs into the lungs 2 times daily Yes Merla Cos, APRN - NP   hydroCHLOROthiazide (MICROZIDE) 12.5 MG capsule Take 1 capsule by mouth daily as needed (as needed for leg edema) Yes Merla Cos, APRN - NP   rivaroxaban (XARELTO) 20 MG TABS tablet Take 1 tablet by mouth daily (with breakfast) Yes Merla Cos, APRN - NP   nystatin (MYCOSTATIN) 842308 UNIT/GM ointment Combine with equal parts of zinc oxide and hydrocortisone cream--15 grams each Yes JAZMIN Huggins   ipratropium (ATROVENT HFA) 17 MCG/ACT inhaler Inhale 2 puffs into the lungs 2 times daily Yes JAZMIN Huggins   predniSONE (DELTASONE) 10 MG tablet Take 1 tablet by mouth daily Take 4 Tablets for 3 days   Take 3 Tablets for 3 days   Take 2 Tablets for 3 days  Take 1 Tablet for 3 days Yes JAZMIN Magallon NP   Minoxidil 5 % FOAM Apply once daily after shampooing, leave on scalp Yes JAZMIN Viera   OXYGEN Inhale into the lungs nightly Yes Historical Provider, MD   levalbuterol (XOPENEX HFA) 45 MCG/ACT inhaler Inhale 1-2 puffs into the lungs  Yes Historical Provider, MD   TOPROL XL 25 MG extended release tablet 25 mg 3 times daily  Yes Historical Provider, MD   KLOR-CON M10 10 MEQ extended release tablet  Yes Historical Provider, MD   isosorbide mononitrate (IMDUR) 30 MG CR tablet Take 30 mg by mouth daily  Yes Historical Provider, MD   levothyroxine (SYNTHROID) 25 MCG tablet Take 1 tablet by mouth daily  JAZMIN Viera   albuterol sulfate HFA (PROVENTIL HFA) 108 (90 Base) MCG/ACT inhaler Inhale 2 puffs into the lungs every 6 hours as needed for Wheezing  JAZMIN Viera         Past Medical History:   Diagnosis Date    Asthma     [facility] [facility]     COPD (chronic obstructive pulmonary disease) 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 [address] 10/19/2016       Past Surgical History:   Procedure Laterality Date    CHOLECYSTECTOMY      HYSTERECTOMY      PACEMAKER PLACEMENT         No family history on file. CareTeam (Including outside providers/suppliers regularly involved in providing care):   Patient Care Team:  JAZMIN Tian NP as PCP - General (Family Nurse Practitioner)  JAZMIN Tian NP as PCP - [facility] Provider    Wt Readings from Last 3 Encounters:   08/09/21 168 lb (76.2 kg)   07/12/21 167 lb (75.8 kg)   03/03/20 167 lb (75.8 kg)     Vitals:    08/09/21 1434   BP: 126/70   Site: Left Upper Arm   Position: Sitting   Cuff Size: Medium Adult   Pulse: 67   Temp: 98.1 °F (36.7 °C)   SpO2: 97%   Weight: 168 lb (76.2 kg)   Height: 5' 3\" (1.6 m)     Body mass index is 29.76 kg/m².     Based upon direct observation of the patient, evaluation of cognition reveals recent and remote memory intact. General Appearance: alert and oriented to person, place and time, well developed and well- nourished, in no acute distress  Skin: warm and dry, no rash or erythema  Head: normocephalic and atraumatic  Eyes: pupils equal, round, and reactive to light, extraocular eye movements intact, conjunctivae normal  ENT: tympanic membrane, external ear and ear canal normal bilaterally, nose without deformity, nasal mucosa and turbinates normal without polyps  Neck: supple and non-tender without mass, no thyromegaly or thyroid nodules, no cervical lymphadenopathy  Pulmonary/Chest: clear to auscultation bilaterally- no wheezes, rales or rhonchi, normal air movement, no respiratory distress  Cardiovascular: normal rate, regular rhythm, normal S1 and S2, no murmurs, rubs, clicks, or gallops, distal pulses intact, no carotid bruits  Abdomen: soft, non-tender, non-distended, normal bowel sounds, no masses or organomegaly  Pelvic: normal external genitalia, vulva, vagina, cervix, uterus and adnexa. Thin white skin noted perianally . Large external hemorrhoids. Extremities: no cyanosis, clubbing or edema  Musculoskeletal: normal range of motion, no joint swelling, deformity or tenderness  Neurologic: reflexes normal and symmetric, no cranial nerve deficit, gait, coordination and speech normal    Patient's complete Health Risk Assessment and screening values have been reviewed and are found in Flowsheets. The following problems were reviewed today and where indicated follow up appointments were made and/or referrals ordered. Positive Risk Factor Screenings with Interventions:            General Health and ACP:  General  In general, how would you say your health is?: Good  In the past 7 days, have you experienced any of the following?  New or Increased Pain, New or Increased Fatigue, Loneliness, Social Isolation, Stress or Anger?: None of These  Do you get the social and emotional support that you need?: Yes  Do you have a Living Will?: Yes  Advance Directives     Power of  Living Will ACP-Advance Directive ACP-Power of     Not on File Not on File Not on File Not on File      General Health Risk Interventions:  · Pain issues: pt is having pain and itching with hemorroids and lichen sclerosis    Health Habits/Nutrition:  Health Habits/Nutrition  Do you exercise for at least 20 minutes 2-3 times per week?: (!) No  Have you lost any weight without trying in the past 3 months?: No  Do you eat only one meal per day?: No  Have you seen the dentist within the past year?: Yes  Body mass index: (!) 29.76  Health Habits/Nutrition Interventions:  · Dental exam overdue:  two teeth pulled and partial made, upper dentures    Hearing/Vision:  No exam data present  Hearing/Vision  Do you or your family notice any trouble with your hearing that hasn't been managed with hearing aids?: No  Do you have difficulty driving, watching TV, or doing any of your daily activities because of your eyesight?: No  Have you had an eye exam within the past year?: (!) No  Hearing/Vision Interventions:  · Vision concerns:  up to date on eye exam, glaucoma and cataract surgery       Personalized Preventive Plan   Current Health Maintenance Status  Immunization History   Administered Date(s) Administered    Influenza, Quadv, IM, PF (6 mo and older Fluzone, Flulaval, Fluarix, and 3 yrs and older Afluria) 10/18/2016    Pneumococcal Conjugate 13-valent ([id number]) 06/29/2017    Pneumococcal Polysaccharide ([id number]) 03/29/2015    Tdap (Boostrix, Adacel) 03/29/2010        Health Maintenance   Topic Date Due    COVID-19 Vaccine (1) Never done    Shingles Vaccine (1 of 2) Never done   [employer] Visit (AWV)  Never done    DTaP/Tdap/Td vaccine (2 - Td or Tdap) 03/29/2020    Flu vaccine (1) 09/01/2021    TSH testing  07/12/2022    Potassium monitoring 07/12/2022    Creatinine monitoring  07/12/2022    Pneumococcal 65+ years Vaccine  Completed    Hepatitis A vaccine  Aged Out    Hepatitis B vaccine  Aged Out    Hib vaccine  Aged Out    Meningococcal (ACWY) vaccine  Aged Out     Recommendations for [employer] Due: see orders and patient instructions/AVS.  . Recommended screening schedule for the next 5-10 years is provided to the patient in written form: see Patient Bailey Coto was seen today for medicare awv. Diagnoses and all orders for this visit:    Routine general medical examination at a health care facility    Lichen sclerosus of anus  High potency steroid cream to your white skin above anus twice a day for 4 weeks. Other orders  -     hydrocortisone (ANUSOL-HC) 25 MG suppository; Place 1 suppository rectally every 12 hours  -     clobetasol (TEMOVATE) 0.05 % ointment; Apply topically 2 times daily. -     budesonide-formoterol (SYMBICORT) 160-4.5 MCG/ACT AERO;  Inhale 2 puffs into the lungs 2 times daily

## 2021-09-09 ENCOUNTER — OFFICE VISIT (OUTPATIENT)
Dept: PRIMARY CARE CLINIC | Age: 86
End: 2021-09-09
Payer: MEDICARE

## 2021-09-09 VITALS
WEIGHT: 154 LBS | TEMPERATURE: 96.8 F | DIASTOLIC BLOOD PRESSURE: 80 MMHG | SYSTOLIC BLOOD PRESSURE: 138 MMHG | HEART RATE: 86 BPM | BODY MASS INDEX: 27.29 KG/M2 | HEIGHT: 63 IN | OXYGEN SATURATION: 98 %

## 2021-09-09 DIAGNOSIS — M33.90 DERMATOMYOSITIS (HCC): ICD-10-CM

## 2021-09-09 DIAGNOSIS — L90.0 LICHEN SCLEROSUS OF ANUS: Primary | ICD-10-CM

## 2021-09-09 DIAGNOSIS — I48.0 PAROXYSMAL ATRIAL FIBRILLATION (HCC): ICD-10-CM

## 2021-09-09 PROBLEM — M33.13 DERMATOMYOSITIS (HCC): Status: ACTIVE | Noted: 2021-09-09

## 2021-09-09 PROCEDURE — G8427 DOCREV CUR MEDS BY ELIG CLIN: HCPCS | Performed by: NURSE PRACTITIONER

## 2021-09-09 PROCEDURE — 1036F TOBACCO NON-USER: CPT | Performed by: NURSE PRACTITIONER

## 2021-09-09 PROCEDURE — 1090F PRES/ABSN URINE INCON ASSESS: CPT | Performed by: NURSE PRACTITIONER

## 2021-09-09 PROCEDURE — 99214 OFFICE O/P EST MOD 30 MIN: CPT | Performed by: NURSE PRACTITIONER

## 2021-09-09 PROCEDURE — G8417 CALC BMI ABV UP PARAM F/U: HCPCS | Performed by: NURSE PRACTITIONER

## 2021-09-09 PROCEDURE — 4040F PNEUMOC VAC/ADMIN/RCVD: CPT | Performed by: NURSE PRACTITIONER

## 2021-09-09 PROCEDURE — 1123F ACP DISCUSS/DSCN MKR DOCD: CPT | Performed by: NURSE PRACTITIONER

## 2021-09-09 RX ORDER — BUDESONIDE, GLYCOPYRROLATE, AND FORMOTEROL FUMARATE 160; 9; 4.8 UG/1; UG/1; UG/1
2 AEROSOL, METERED RESPIRATORY (INHALATION) 2 TIMES DAILY
Qty: 1 EACH | Refills: 1 | Status: SHIPPED | OUTPATIENT
Start: 2021-09-09 | End: 2022-01-13 | Stop reason: ALTCHOICE

## 2021-09-09 NOTE — PROGRESS NOTES
Formerly [facility] PHYSICIAN SERVICES  [facility]  [address] [place]  [address] [place] [address]  Dept: [phone]  Dept Fax: [phone]  Loc: [phone]    Kendall Calles is a 80 y.o. female who presents today for her medical conditions/complaints as noted below. Kendall Calles is c/o of Follow-up (one-month-f/u.  f/u on growth around anus.  )        HPI:     HPI     This 57-year-old female presents today for follow-up on her lichen sclerosus. She states she has been using the ointment therapy as directed. Chief Complaint   Patient presents with    Follow-up     one-month-f/u.  f/u on growth around anus. Past Medical History:   Diagnosis Date    Asthma     CHF (congestive heart failure) (HCC)     COPD (chronic obstructive pulmonary disease) (HCC)     Dermatomyositis (HCC)     Fibromyalgia     Hyperlipidemia     Hyperthyroidism     Pancreatitis     Sleep apnea     Supraventricular tachycardia ([facility] [address] 10/19/2016      Past Surgical History:   Procedure Laterality Date    CHOLECYSTECTOMY      HYSTERECTOMY      PACEMAKER PLACEMENT         Vitals 9/9/2021 8/9/2021 7/12/2021 3/3/2020 3/3/2020 65/20/8609   SYSTOLIC 757 [phone]   DIASTOLIC 80 70 80 78 78 76   Site - Left Upper Arm - - - -   Position - Sitting - - - -   Cuff Size - Medium Adult - - - -   Pulse 86 67 76 - 79 65   Temp 96.8 98.1 96.1 - 97.7 97.9   Resp - - - - 20 17   SpO2 98 97 95 - 97 95   Weight 154 lb 168 lb 167 lb - 167 lb 169 lb   Height 5' 3\" 5' 3\" 5' 3\" - 5' 3.5\" 5' 3.5\"   Body mass index 27.28 kg/m2 29.76 kg/m2 29.58 kg/m2 - 29.12 kg/m2 29.46 kg/m2   Some recent data might be hidden       No family history on file.     Social History     Tobacco Use    Smoking status: Never Smoker    Smokeless tobacco: Never Used   Substance Use Topics    Alcohol use: No      Current Outpatient Medications on File Prior to Visit   Medication Sig Dispense Refill    hydrocortisone (ANUSOL-HC) 25 MG suppository Place 1 suppository rectally every 12 hours 30 suppository 0    clobetasol (TEMOVATE) 0.05 % ointment Apply topically 2 times daily. 1 Tube 1    budesonide-formoterol (SYMBICORT) 160-4.5 MCG/ACT AERO Inhale 2 puffs into the lungs 2 times daily 1 Inhaler 0    hydroCHLOROthiazide (MICROZIDE) 12.5 MG capsule Take 1 capsule by mouth daily as needed (as needed for leg edema) 30 capsule 2    rivaroxaban (XARELTO) 20 MG TABS tablet Take 1 tablet by mouth daily (with breakfast) 30 tablet 1    nystatin (MYCOSTATIN) 728290 UNIT/GM ointment Combine with equal parts of zinc oxide and hydrocortisone cream--15 grams each 45 g 1    ipratropium (ATROVENT HFA) 17 MCG/ACT inhaler Inhale 2 puffs into the lungs 2 times daily 1 Inhaler 2    Minoxidil 5 % FOAM Apply once daily after shampooing, leave on scalp 60 g 2    OXYGEN Inhale into the lungs nightly      levalbuterol (XOPENEX HFA) 45 MCG/ACT inhaler Inhale 1-2 puffs into the lungs       TOPROL XL 25 MG extended release tablet 25 mg 3 times daily       KLOR-CON M10 10 MEQ extended release tablet       isosorbide mononitrate (IMDUR) 30 MG CR tablet Take 30 mg by mouth daily       levothyroxine (SYNTHROID) 25 MCG tablet Take 1 tablet by mouth daily 90 tablet 1    albuterol sulfate HFA (PROVENTIL HFA) 108 (90 Base) MCG/ACT inhaler Inhale 2 puffs into the lungs every 6 hours as needed for Wheezing 3 Inhaler 3     No current facility-administered medications on file prior to visit.      Allergies   Allergen Reactions    Latex     Augmentin [Amoxicillin-Pot Clavulanate]     Codeine     Fentanyl     Iodine     Versed [Midazolam]        Health Maintenance   Topic Date Due    COVID-19 Vaccine (1) Never done    Shingles Vaccine (1 of 2) Never done    DTaP/Tdap/Td vaccine (2 - Td or Tdap) 03/29/2020    Flu vaccine (1) 09/01/2021    TSH testing  07/12/2022    Potassium monitoring  07/12/2022    Creatinine monitoring  07/12/2022    Annual Wellness Visit (AWV)  08/10/2022    Pneumococcal 65+ years Vaccine  Completed    Hepatitis A vaccine  Aged Out    Hepatitis B vaccine  Aged Out    Hib vaccine  Aged Out    Meningococcal (ACWY) vaccine  Aged Out       Subjective:      Review of Systems   Constitutional: Negative. HENT: Negative. Eyes: Negative. Respiratory: Negative. Cardiovascular: Negative. Gastrointestinal: Negative. Endocrine: Negative. Genitourinary: Negative. Musculoskeletal: Negative. Skin: Negative. Allergic/Immunologic: Negative. Neurological: Negative. Hematological: Negative. Psychiatric/Behavioral: Negative. Objective:     Physical Exam  Vitals and nursing note reviewed. Constitutional:       General: She is not in acute distress. Appearance: Normal appearance. She is not ill-appearing or toxic-appearing. HENT:      Head: Normocephalic and atraumatic. Nose: Nose normal.      Mouth/Throat:      Mouth: Mucous membranes are moist.   Eyes:      Extraocular Movements: Extraocular movements intact. Pupils: Pupils are equal, round, and reactive to light. Cardiovascular:      Rate and Rhythm: Normal rate and regular rhythm. Pulses: Normal pulses. Heart sounds: Normal heart sounds. No murmur heard. Pulmonary:      Effort: Pulmonary effort is normal. No respiratory distress. Breath sounds: Normal breath sounds. No wheezing, rhonchi or rales. Abdominal:      General: Bowel sounds are normal.      Palpations: Abdomen is soft. Musculoskeletal:         General: Normal range of motion. Cervical back: Normal range of motion and neck supple. Skin:     General: Skin is warm and dry. Coloration: Skin is not jaundiced or pale. Findings: No erythema or rash. Neurological:      Mental Status: She is alert and oriented to person, place, and time. Mental status is at baseline.    Psychiatric:         Mood and Affect: Mood normal.         Behavior: Behavior normal.       /80   Pulse 86   Temp 96.8 °F (36 °C)   Ht 5' 3\" (1.6 m)   Wt 154 lb (69.9 kg)   SpO2 98%   BMI 27.28 kg/m²     Assessment:       Diagnosis Orders   1. Lichen sclerosus of anus     2. Paroxysmal atrial fibrillation (HCC)     3. Dermatomyositis ([facility] [address]           Plan:   1. Site examined in office today. White patch to area around anus difficult to locate today. Much improved. Area mildly hypopigmented. Continue steroid cream for 2 weeks. 2.  Patient reports heart rate and rhythm being stable. Dr Fercho Rogers follows with pacemaker. 3. Dr. Herman Owusu follows her dermatomyositis-she reports prednisone therapy as needed is effective with flareups. Patient given educational materials -see patient instructions. Discussed use, benefit, and side effects of prescribed medications. All patient questions answered. Pt voiced understanding. Reviewed health maintenance. Instructed to continue currentmedications, diet and exercise. Patient agreed with treatment plan. Follow up as directed. MEDICATIONS:  Orders Placed This Encounter   Medications    Budeson-Glycopyrrol-Formoterol (BREZTRI AEROSPHERE) 160-9-4.8 MCG/ACT AERO     Sig: Inhale 2 puffs into the lungs 2 times daily     Dispense:  1 each     Refill:  1         ORDERS:  No orders of the defined types were placed in this encounter. Follow-up:  Return in about 4 weeks (around 10/7/2021). PATIENT INSTRUCTIONS:  There are no Patient Instructions on file for this visit. Electronically signed by JAZMIN Winn NP on 9/13/2021 at 8:11 AM    EMR Dragon/transcription disclaimer:  Much of thisencounter note is electronic transcription/translation of spoken language to printed texts. The electronic translation of spoken language may be erroneous, or at times, nonsensical words or phrases may be inadvertentlytranscribed.   Although I have reviewed the note for such errors, some may still exist.

## 2021-09-13 ASSESSMENT — ENCOUNTER SYMPTOMS
GASTROINTESTINAL NEGATIVE: 1
EYES NEGATIVE: 1
ALLERGIC/IMMUNOLOGIC NEGATIVE: 1
RESPIRATORY NEGATIVE: 1

## 2021-09-21 RX ORDER — METOPROLOL SUCCINATE 25 MG/1
TABLET, EXTENDED RELEASE ORAL
Qty: 270 TABLET | Refills: 0 | OUTPATIENT
Start: 2021-09-21

## 2021-09-22 RX ORDER — METOPROLOL SUCCINATE 50 MG/1
50 TABLET, EXTENDED RELEASE ORAL DAILY
Qty: 90 TABLET | Refills: 4 | Status: SHIPPED | OUTPATIENT
Start: 2021-09-22 | End: 2022-10-10

## 2021-10-05 ENCOUNTER — OFFICE VISIT (OUTPATIENT)
Dept: CARDIOLOGY | Facility: CLINIC | Age: 86
End: 2021-10-05

## 2021-10-05 VITALS
WEIGHT: 154 LBS | HEIGHT: 63 IN | OXYGEN SATURATION: 96 % | DIASTOLIC BLOOD PRESSURE: 60 MMHG | HEART RATE: 84 BPM | BODY MASS INDEX: 27.29 KG/M2 | SYSTOLIC BLOOD PRESSURE: 122 MMHG

## 2021-10-05 DIAGNOSIS — I49.5 SICK SINUS SYNDROME (HCC): ICD-10-CM

## 2021-10-05 DIAGNOSIS — Z98.890 HISTORY OF CARDIOVERSION: ICD-10-CM

## 2021-10-05 DIAGNOSIS — I47.1 PSVT (PAROXYSMAL SUPRAVENTRICULAR TACHYCARDIA) (HCC): ICD-10-CM

## 2021-10-05 DIAGNOSIS — Z79.01 CURRENT USE OF LONG TERM ANTICOAGULATION: ICD-10-CM

## 2021-10-05 DIAGNOSIS — I10 ESSENTIAL HYPERTENSION: ICD-10-CM

## 2021-10-05 DIAGNOSIS — Z95.0 PACEMAKER: ICD-10-CM

## 2021-10-05 DIAGNOSIS — J44.9 CHRONIC OBSTRUCTIVE PULMONARY DISEASE, UNSPECIFIED COPD TYPE (HCC): ICD-10-CM

## 2021-10-05 DIAGNOSIS — I48.0 PAF (PAROXYSMAL ATRIAL FIBRILLATION) (HCC): Primary | ICD-10-CM

## 2021-10-05 PROCEDURE — 99214 OFFICE O/P EST MOD 30 MIN: CPT | Performed by: INTERNAL MEDICINE

## 2021-10-05 PROCEDURE — 93000 ELECTROCARDIOGRAM COMPLETE: CPT | Performed by: INTERNAL MEDICINE

## 2021-10-05 NOTE — PROGRESS NOTES
Violet Fajardo  [id number]  1934  87 y.o.  female    Referring Provider: Belem Marshall APRN    Reason for Follow-up Visit:  Here for follow up after cardiac testing.   sick sinus syndrome s/p pacemaker   chronic atrial fibrillation   Cardiac workup test results as below     Subjective    No new events or complaints since last visit   Overall better     Minor hemorrhoid issues with no major bleeding   Hair has grown back after stopping Eliquis and starting Xarelto     Persistent exertional shortness of breath on exertion relieved with rest  Chronic   shortness of breath much better after starting inhaled bronchodilators and improved overall    No significant cough or wheezing  Going on for several months or longer    No palpitations  No associated chest pain  No significant pedal edema    No fever or chills  No significant expectoration    No hemoptysis  No presyncope or syncope      Low risk stress test with normal LVEF  BP well controlled at home.       History of present illness:  Violet Fajardo is a 87 y.o. yo female with history of sick sinus syndrome s/p pacemaker who presents today for   Chief Complaint   Patient presents with   • Rapid Heart Rate     6 month follow up    .    History  Past Medical History:   Diagnosis Date   • Asthma    • CHF (congestive heart failure) ([facility])    • COPD (chronic obstructive pulmonary disease) ([facility])    • Dermatomyositis ([facility])    • Dermatomyositis (HCC)    • Dyspnea    • E-coli UTI    • Fibromyalgia    • Hyperlipemia, mixed    • Hyperlipidemia    • Hypothyroidism    • Mitral valve disorder     History of MVP   • Mitral valve disorder    • Pacemaker    • Pacemaker    • PAF (paroxysmal atrial fibrillation) (CMS/HCC) new onset 8/6/2018   • Respiratory infection    • Sick sinus syndrome ([facility])    • Sleep apnea    • Stroke ([facility])    • SVT (supraventricular tachycardia) ([facility])    ,   Past Surgical History:   Procedure Laterality Date   • CARDIAC CATHETERIZATION     •  CARDIOVERSION     • CHOLECYSTECTOMY     • FOOT SURGERY     • HYSTERECTOMY     • INSERT / REPLACE / REMOVE PACEMAKER     • PACEMAKER IMPLANTATION  12/15/2011    [employer] (4/16/2012)-lead repositioning   ,   Family History   Problem Relation Age of Onset   • Coronary artery disease Mother    • Heart attack Mother    • Coronary artery disease Father    ,   Social History     Tobacco Use   • Smoking status: Never Smoker   • Smokeless tobacco: Never Used   Vaping Use   • Vaping Use: Never used   Substance Use Topics   • Alcohol use: No   • Drug use: No   ,     Medications  Current Outpatient Medications   Medication Sig Dispense Refill   • ERGOCALCIFEROL PO Take  by mouth Daily.     • FOLIC ACID-B6-B12-D PO Take  by mouth.     • hydrochlorothiazide (MICROZIDE) 12.5 MG capsule Take 1 capsule by mouth Daily. 90 capsule 3   • ipratropium (ATROVENT HFA) 17 MCG/ACT inhaler Inhale 2 puffs 4 (Four) Times a Day. 1 inhaler 11   • isosorbide mononitrate (IMDUR) 30 MG 24 hr tablet Take 1 tablet by mouth once daily 90 tablet 4   • levothyroxine (SYNTHROID, LEVOTHROID) 25 MCG tablet Take 25 mcg by mouth Daily.     • metoprolol succinate XL (TOPROL-XL) 50 MG 24 hr tablet Take 1 tablet by mouth Daily. 90 tablet 4   • O2 (OXYGEN) Inhale Every Night.     • Omega-3 Fatty Acids (FISH OIL) 1000 MG capsule capsule Take  by mouth Daily With Breakfast.     • potassium chloride (K-DUR,KLOR-CON) 10 MEQ CR tablet Take 1 tablet by mouth Daily. 90 tablet 3   • Xarelto 20 MG tablet Take 1 tablet by mouth once daily 90 tablet 4   • Zolpidem Tartrate 10 MG sublingual tablet Place  under the tongue.       No current facility-administered medications for this visit.       Allergies:  Latex, Amoxicillin-pot clavulanate, Barley grass, Codeine, Contrast dye, Fentanyl, and Midazolam    Review of Systems  Review of Systems   Constitutional: Positive for malaise/fatigue.   HENT: Negative.    Eyes: Negative.    Cardiovascular: Positive for dyspnea on exertion.  "Negative for chest pain, claudication, cyanosis, irregular heartbeat, leg swelling, near-syncope, orthopnea, palpitations, paroxysmal nocturnal dyspnea and syncope.   Respiratory: Negative.    Endocrine: Negative.    Hematologic/Lymphatic: Negative.    Skin: Negative.    Musculoskeletal: Positive for arthritis and joint pain.   Gastrointestinal: Negative for anorexia.   Genitourinary: Negative.    Neurological: Negative.    Psychiatric/Behavioral: Negative.        Objective     Physical Exam:  /60   Pulse 84   Ht 160 cm (63\")   Wt 69.9 kg (154 lb)   SpO2 96%   BMI 27.28 kg/m²   Physical Exam   Constitutional: She appears well-developed.   HENT:   Head: Normocephalic.   Eyes: Lids are normal.   Neck: Normal carotid pulses and no JVD present. No tracheal tenderness present. Carotid bruit is not present. No tracheal deviation present.   Cardiovascular: Regular rhythm, S1 normal, S2 normal and normal pulses.   Murmur heard.   Systolic murmur is present with a grade of 2/6.  Pulmonary/Chest: Effort normal. No stridor.   Abdominal: Soft. Normal appearance. She exhibits no distension. There is no abdominal tenderness.   Neurological: She is alert. No cranial nerve deficit or sensory deficit.   Skin: Skin is warm.   Psychiatric: Her speech is normal and behavior is normal. Thought content normal.       Results Review:    myocardial perfusion scan  5/19    · Left ventricular ejection fraction is normal (Calculated EF = 65%).  · Myocardial perfusion imaging indicates a normal myocardial perfusion study with no evidence of ischemia.  · Breast attenuation artifact is present.  · Raw images reviewed with the following abnormalities noted: vertical motion.  · Impressions are consistent with a low risk study.      Results for orders placed during the hospital encounter of 05/08/19    Adult Transthoracic Echo Complete W/ Cont if Necessary Per Protocol    Interpretation Summary  · Estimated EF = 65%.  · Left ventricular " systolic function is normal.  · No evidence of pulmonary hypertension is present.             ECG 12 Lead    Date/Time: 10/5/2021 9:03 AM  Performed by: Chalo Torres MD  Authorized by: Chalo Torres MD   Comparison: compared with previous ECG from 3/29/2021  Similar to previous ECG  Comparison to previous ECG: Atrial pacing   Rhythm: paced  Rate: normal  Conduction: conduction normal  ST Segments: ST segments normal  T Waves: T waves normal  QRS axis: normal  Other: no other findings    Clinical impression: abnormal EKG               Diagnoses and all orders for this visit:    1. PAF (paroxysmal atrial fibrillation) (CMS/[facility]) new onset (Primary)  Comments:  stable on AC  Orders:  -     ECG 12 Lead    2. PSVT (paroxysmal supraventricular tachycardia) ([facility])  Comments:  Stable on beta blocker   Orders:  -     ECG 12 Lead    3. Sick sinus syndrome ([facility])  Comments:  Pacer in place    4. Pacemaker    5. History of cardioversion  Comments:  in sinus rhythm/ a paced    6. Essential hypertension  Comments:  Controlled    7. Current use of long term anticoagulation    8. Chronic obstructive pulmonary disease, unspecified COPD type ([facility])  Comments:  stable on bronchodilators         Plan    Overall doing well no new cardiovascular symptoms and therefore no additional cardiac testing is required   If any interim issues arise will call me for further evaluation.     Monitor cardiac rhythm device function regularly per established schedule or PRN      Due to hair loss stopped Eliquis and started Xarelto   Now hair has grown back   The current medical regimen is effective;  continue present plan and medications.   She finds hard to afford but getting patient assistance    Continue bronchodilators   shortness of breath much better     Weigh yourself frequently, at least weekly, preferably daily, call me if more than 2 pounds a day or 5 pounds a week weight gain.  Flexible diuretic dosing     Patient undecided regarding the Covid  vaccine   Urged to consider vaccination further   I can provide more input if required   Recommend further discussion with primary care provider             Return in about 6 months (around 4/5/2022).

## 2021-10-15 RX ORDER — BUDESONIDE AND FORMOTEROL FUMARATE DIHYDRATE 160; 4.5 UG/1; UG/1
2 AEROSOL RESPIRATORY (INHALATION) 2 TIMES DAILY
COMMUNITY
End: 2021-10-15 | Stop reason: SDUPTHER

## 2021-10-15 RX ORDER — BUDESONIDE AND FORMOTEROL FUMARATE DIHYDRATE 160; 4.5 UG/1; UG/1
2 AEROSOL RESPIRATORY (INHALATION) 2 TIMES DAILY
Qty: 10.2 G | Refills: 5 | Status: SHIPPED | OUTPATIENT
Start: 2021-10-15 | End: 2021-10-29

## 2021-10-25 ENCOUNTER — TELEPHONE (OUTPATIENT)
Dept: PRIMARY CARE CLINIC | Age: 86
End: 2021-10-25

## 2021-10-25 NOTE — TELEPHONE ENCOUNTER
----- Message from Dakotah Myers sent at 10/25/2021  8:56 AM CDT -----  Subject: Message to Provider    QUESTIONS  Information for Provider? Patient called in to schedule a symptomatic   appointment for a fever of 100.3, dizziness, and weakness. I attempted to   transfer the patient to nurse triage but she disconnected the call while   holding the line for the nurse. Please contact to schedule as soon as   possible.   ---------------------------------------------------------------------------  --------------  CALL BACK INFO  What is the best way for the office to contact you? OK to leave message on   voicemail  Preferred Call Back Phone Number? [phone]  ---------------------------------------------------------------------------  --------------  SCRIPT ANSWERS  Relationship to Patient?  Self

## 2021-10-29 ENCOUNTER — OFFICE VISIT (OUTPATIENT)
Dept: PRIMARY CARE CLINIC | Age: 86
End: 2021-10-29
Payer: MEDICARE

## 2021-10-29 VITALS
WEIGHT: 157 LBS | TEMPERATURE: 98.6 F | DIASTOLIC BLOOD PRESSURE: 64 MMHG | HEIGHT: 63 IN | BODY MASS INDEX: 27.82 KG/M2 | OXYGEN SATURATION: 96 % | RESPIRATION RATE: 18 BRPM | SYSTOLIC BLOOD PRESSURE: 110 MMHG | HEART RATE: 83 BPM

## 2021-10-29 DIAGNOSIS — J44.1 COPD WITH ACUTE EXACERBATION (HCC): Primary | ICD-10-CM

## 2021-10-29 DIAGNOSIS — R50.9 FEVER, UNSPECIFIED FEVER CAUSE: ICD-10-CM

## 2021-10-29 DIAGNOSIS — R35.0 FREQUENT URINATION: ICD-10-CM

## 2021-10-29 LAB
ALBUMIN SERPL-MCNC: 3.6 G/DL (ref 3.5–5.2)
ALP BLD-CCNC: 194 U/L (ref 35–104)
ALT SERPL-CCNC: 82 U/L (ref 5–33)
ANION GAP SERPL CALCULATED.3IONS-SCNC: 13 MMOL/L (ref 7–19)
APPEARANCE FLUID: ABNORMAL
AST SERPL-CCNC: 103 U/L (ref 5–32)
BASOPHILS ABSOLUTE: 0.1 K/UL (ref 0–0.2)
BASOPHILS RELATIVE PERCENT: 0.7 % (ref 0–1)
BILIRUB SERPL-MCNC: 0.6 MG/DL (ref 0.2–1.2)
BILIRUBIN, POC: ABNORMAL
BLOOD URINE, POC: ABNORMAL
BUN BLDV-MCNC: 19 MG/DL (ref 8–23)
CALCIUM SERPL-MCNC: 9.3 MG/DL (ref 8.8–10.2)
CHLORIDE BLD-SCNC: 99 MMOL/L (ref 98–111)
CLARITY, POC: ABNORMAL
CO2: 23 MMOL/L (ref 22–29)
COLOR, POC: YELLOW
CREAT SERPL-MCNC: 0.8 MG/DL (ref 0.5–0.9)
EOSINOPHILS ABSOLUTE: 0.5 K/UL (ref 0–0.6)
EOSINOPHILS RELATIVE PERCENT: 4.6 % (ref 0–5)
GFR AFRICAN AMERICAN: >59
GFR NON-AFRICAN AMERICAN: >60
GLUCOSE BLD-MCNC: 123 MG/DL (ref 74–109)
GLUCOSE URINE, POC: ABNORMAL
HCT VFR BLD CALC: 34.9 % (ref 37–47)
HEMOGLOBIN: 10.8 G/DL (ref 12–16)
IMMATURE GRANULOCYTES #: 0.2 K/UL
KETONES, POC: ABNORMAL
LEUKOCYTE EST, POC: ABNORMAL
LYMPHOCYTES ABSOLUTE: 1.9 K/UL (ref 1.1–4.5)
LYMPHOCYTES RELATIVE PERCENT: 18.2 % (ref 20–40)
MCH RBC QN AUTO: 28.6 PG (ref 27–31)
MCHC RBC AUTO-ENTMCNC: 30.9 G/DL (ref 33–37)
MCV RBC AUTO: 92.6 FL (ref 81–99)
MONOCYTES ABSOLUTE: 1.1 K/UL (ref 0–0.9)
MONOCYTES RELATIVE PERCENT: 10.9 % (ref 0–10)
NEUTROPHILS ABSOLUTE: 6.6 K/UL (ref 1.5–7.5)
NEUTROPHILS RELATIVE PERCENT: 63.6 % (ref 50–65)
NITRITE, POC: ABNORMAL
PDW BLD-RTO: 15.2 % (ref 11.5–14.5)
PH, POC: 5
PLATELET # BLD: 376 K/UL (ref 130–400)
PMV BLD AUTO: 10.2 FL (ref 9.4–12.3)
POTASSIUM SERPL-SCNC: 4.6 MMOL/L (ref 3.5–5)
PROTEIN, POC: ABNORMAL
RBC # BLD: 3.77 M/UL (ref 4.2–5.4)
SARS-COV-2, PCR: NOT DETECTED
SODIUM BLD-SCNC: 135 MMOL/L (ref 136–145)
SPECIFIC GRAVITY, POC: 1010
TOTAL PROTEIN: 6.8 G/DL (ref 6.6–8.7)
UROBILINOGEN, POC: 0.2
WBC # BLD: 10.4 K/UL (ref 4.8–10.8)

## 2021-10-29 PROCEDURE — 1123F ACP DISCUSS/DSCN MKR DOCD: CPT | Performed by: NURSE PRACTITIONER

## 2021-10-29 PROCEDURE — G8427 DOCREV CUR MEDS BY ELIG CLIN: HCPCS | Performed by: NURSE PRACTITIONER

## 2021-10-29 PROCEDURE — G8417 CALC BMI ABV UP PARAM F/U: HCPCS | Performed by: NURSE PRACTITIONER

## 2021-10-29 PROCEDURE — G8926 SPIRO NO PERF OR DOC: HCPCS | Performed by: NURSE PRACTITIONER

## 2021-10-29 PROCEDURE — 1090F PRES/ABSN URINE INCON ASSESS: CPT | Performed by: NURSE PRACTITIONER

## 2021-10-29 PROCEDURE — 81002 URINALYSIS NONAUTO W/O SCOPE: CPT | Performed by: NURSE PRACTITIONER

## 2021-10-29 PROCEDURE — 1036F TOBACCO NON-USER: CPT | Performed by: NURSE PRACTITIONER

## 2021-10-29 PROCEDURE — 99214 OFFICE O/P EST MOD 30 MIN: CPT | Performed by: NURSE PRACTITIONER

## 2021-10-29 PROCEDURE — 3023F SPIROM DOC REV: CPT | Performed by: NURSE PRACTITIONER

## 2021-10-29 PROCEDURE — 4040F PNEUMOC VAC/ADMIN/RCVD: CPT | Performed by: NURSE PRACTITIONER

## 2021-10-29 PROCEDURE — G8484 FLU IMMUNIZE NO ADMIN: HCPCS | Performed by: NURSE PRACTITIONER

## 2021-10-29 RX ORDER — LEVOFLOXACIN 500 MG/1
500 TABLET, FILM COATED ORAL DAILY
Qty: 7 TABLET | Refills: 0 | Status: SHIPPED | OUTPATIENT
Start: 2021-10-29 | End: 2021-11-05

## 2021-10-29 RX ORDER — METHYLPREDNISOLONE 4 MG/1
TABLET ORAL
Qty: 1 KIT | Refills: 0 | Status: SHIPPED | OUTPATIENT
Start: 2021-10-29 | End: 2021-11-04

## 2021-10-29 NOTE — PROGRESS NOTES
[facility] PHYSICIAN SERVICES  [facility]  [address] [place] [address]  [address] [place] [address]  Dept: [phone]  Dept Fax: [phone]  Loc: [phone]    Jamie Hyman is a 80 y.o. female who presents today for her medical conditions/complaints as noted below. Jamie Hyman is c/o of Fever (Patient presents today with c/o fever that started last Thursday. Patient says that her fever has gotten as high as 104. Pt has been experiencing a cough and shortness of breath. She does have oxygen that she wears at home 24/7. She does say that for the past 3 days she has had it on 3 liters. She usually has it on 2.5 Liters. Patient is requesting a covid swab. Patient would also like to have her urine checked. She did bring a sample in a jar. She is not having any symptoms except for frequent urinatio)        HPI:     HPI   Chief Complaint   Patient presents with    Fever     Patient presents today with c/o fever that started last Thursday. Patient says that her fever has gotten as high as 104. Pt has been experiencing a cough and shortness of breath. She does have oxygen that she wears at home 24/7. She does say that for the past 3 days she has had it on 3 liters. She usually has it on 2.5 Liters. Patient is requesting a covid swab. Patient would also like to have her urine checked. She did bring a sample in a jar. She is not having any symptoms except for frequent urinatio   she feels weak in general , a friend brought her in to day. She has not been exposed to covid that she knows of. She does have copd and takes inhalers. She has oxygen and has been using it. She has lost weight over the last 18,months about 25 pounds with out trying she says. Denies any abd pain or blood in stool.    Past Medical History:   Diagnosis Date    Asthma     CHF (congestive heart failure) (HCC)     COPD (chronic obstructive pulmonary disease) (HCC)     Dermatomyositis (HCC)     Fibromyalgia     Hyperlipidemia     (PROVENTIL HFA) 108 (90 Base) MCG/ACT inhaler Inhale 2 puffs into the lungs every 6 hours as needed for Wheezing 3 Inhaler 3    OXYGEN Inhale into the lungs nightly      levalbuterol (XOPENEX HFA) 45 MCG/ACT inhaler Inhale 1-2 puffs into the lungs       TOPROL XL 25 MG extended release tablet 25 mg 3 times daily       KLOR-CON M10 10 MEQ extended release tablet       isosorbide mononitrate (IMDUR) 30 MG CR tablet Take 30 mg by mouth daily        No current facility-administered medications on file prior to visit. Allergies   Allergen Reactions    Latex     Augmentin [Amoxicillin-Pot Clavulanate]     Codeine     Fentanyl     Iodine     Versed [Midazolam]        Health Maintenance   Topic Date Due    COVID-19 Vaccine (1) Never done    Shingles Vaccine (1 of 2) Never done    DTaP/Tdap/Td vaccine (2 - Td or Tdap) 03/29/2020    Flu vaccine (1) 09/01/2021    TSH testing  07/12/2022    Annual Wellness Visit (AWV)  08/10/2022    Potassium monitoring  10/29/2022    Creatinine monitoring  10/29/2022    Pneumococcal 65+ years Vaccine  Completed    Hepatitis A vaccine  Aged Out    Hepatitis B vaccine  Aged Out    Hib vaccine  Aged Out    Meningococcal (ACWY) vaccine  Aged Out       Subjective:      Review of Systems   Constitutional: Positive for activity change, chills, fatigue, fever and unexpected weight change. HENT: Positive for congestion. Negative for sore throat. Respiratory: Positive for cough. Cardiovascular: Negative. Gastrointestinal: Negative. Musculoskeletal: Positive for arthralgias and myalgias. Psychiatric/Behavioral: Negative. Objective:     Physical Exam  Vitals and nursing note reviewed. Constitutional:       Appearance: She is well-developed. Comments: Frail elderly lady   HENT:      Head: Normocephalic.       Right Ear: Tympanic membrane and external ear normal.      Left Ear: Tympanic membrane and external ear normal.      Nose: Nose normal.   Eyes: Pupils: Pupils are equal, round, and reactive to light. Cardiovascular:      Rate and Rhythm: Normal rate and regular rhythm. Heart sounds: Normal heart sounds. Pulmonary:      Effort: Pulmonary effort is normal.      Breath sounds: Normal breath sounds. Chest:       Musculoskeletal:      Cervical back: Normal range of motion. Skin:     General: Skin is warm and dry. Capillary Refill: Capillary refill takes less than 2 seconds. Neurological:      General: No focal deficit present. Mental Status: She is alert and oriented to person, place, and time. Psychiatric:         Mood and Affect: Mood normal.         Behavior: Behavior normal.         Thought Content: Thought content normal.         Judgment: Judgment normal.       /64   Pulse 83   Temp 98.6 °F (37 °C) (Temporal)   Resp 18   Ht 5' 3\" (1.6 m)   Wt 157 lb (71.2 kg)   SpO2 96%   Breastfeeding No   BMI 27.81 kg/m²   Results for POC orders placed in visit on 10/29/21   POCT Urinalysis no Micro   Result Value Ref Range    Color, UA yellow     Clarity, UA cloudy     Glucose, UA POC neg     Bilirubin, UA neg     Ketones, UA neg     Spec Grav, UA 1,010     Blood, UA POC small     pH, UA 5.0     Protein, UA POC neg     Urobilinogen, UA 0.2     Leukocytes, UA 2+     Nitrite, UA neg     Appearance, Fluid Cloudy (A) Clear, Slightly Cloudy       Assessment:       Diagnosis Orders   1. COPD with acute exacerbation (HCC)  XR CHEST STANDARD (2 VW)    CBC Auto Differential    Comprehensive Metabolic Panel   2. Fever, unspecified fever cause  Culture, Urine    CBC Auto Differential    Comprehensive Metabolic Panel   3. Frequent urination  POCT Urinalysis no Micro    CBC Auto Differential    Comprehensive Metabolic Panel         Plan:   More than 50% of the time was spent counseling and coordinating care for a total time of 35 min face to face.   The urine that she brought in does have bacteria in and I am going to go ahead and start her on Levaquin that would also cover her for her COPD. The chest x-ray was unremarkable. She has inhaler she will continue to use those. We did go ahead and do Covid swab because of the fever she has had over the last week. I will call her in the morning with the results. She knows to stay quarantined until then. XR CHEST STANDARD (2 VW)    Result Date: 10/29/2021  Examination. XR CHEST (2 VW) 10/29/2021 2:58 PM History: COPD with acute exacerbation. The frontal and lateral views of the chest are compared with the previous study dated 11/10/2015. The lungs are well-expanded. There is no evidence of recent infiltrate, pleural effusion, pulmonary congestion or pneumothorax. The heart size is in the normal range atheromatous changes of the thoracic aorta are noted. A dual-chamber cardiac pacer is in place and stable since the previous study. No acute bony abnormality. Mild compression deformity of lower thoracic vertebra is slightly more pronounced in the previous study. No active cardiopulmonary disease. Signed by Dr Ace Baldwin  PDMP Monitoring:    Last PDMP Aron Senior as Reviewed [facility]):  Review User Review Instant Review Result            Urine Drug Screenings (1 yr)    No resulted procedures found. Medication Contract and Consent for Opioid Use Documents Filed     Patient Documents       Type of Document Status Date Received Received By Description     Medication Contract Signed 1/8/2016  1:57 PM Luis GUEVARA [id number]                  Patient given educational materials -see patient instructions. Discussed use, benefit, and side effects of prescribed medications. All patient questions answered. Pt voiced understanding. Reviewed health maintenance. Instructed to continue currentmedications, diet and exercise. Patient agreed with treatment plan. Follow up as directed.    MEDICATIONS:  Orders Placed This Encounter   Medications    levoFLOXacin (LEVAQUIN) 500 MG tablet     Sig: Take 1 tablet by mouth daily for 7 days     Dispense:  7 tablet     Refill:  0    methylPREDNISolone (MEDROL DOSEPACK) 4 MG tablet     Sig: Take by mouth. Dispense:  1 kit     Refill:  0         ORDERS:  Orders Placed This Encounter   Procedures    Culture, Urine    XR CHEST STANDARD (2 VW)    CBC Auto Differential    Comprehensive Metabolic Panel    [id number]    POCT Urinalysis no Micro       Follow-up:  No follow-ups on file. PATIENT INSTRUCTIONS:  Patient Instructions     continue you using your nebulizers  Add the antibiotic  You may add the steroids starting tomorrow. I will call you with your lab work and Covid test in the morning. Stay home until you hear from me. Drink 4 glasses of water and juice a day, may do gatorade. Patient Education        COPD Exacerbation Plan: Care Instructions  Your Care Instructions     If you have chronic obstructive pulmonary disease (COPD), your usual shortness of breath could suddenly get worse. You may start coughing more and have more mucus. This flare-up is called a COPD exacerbation (say \"es-BYZ-oe-BAY-travis\"). A lung infection or air pollution could set off an exacerbation. Sometimes it can happen after a quick change in temperature or being around chemicals. Work with your doctor to make a plan for dealing with an exacerbation. You can better manage it if you plan ahead. Follow-up care is a key part of your treatment and safety. Be sure to make and go to all appointments, and call your doctor if you are having problems. It's also a good idea to know your test results and keep a list of the medicines you take. How can you care for yourself at home? During an exacerbation  · Do not panic if you start to have one. Quick treatment at home may help you prevent serious breathing problems. If you have a COPD exacerbation plan that you developed with your doctor, follow it. · Take your medicines exactly as your doctor tells you.  ? Use your inhaler as directed by your doctor.  If your symptoms do not get better after you use your medicine, have someone take you to the emergency room. Call an ambulance if necessary. ? With inhaled medicines, a spacer or a nebulizer may help you get more medicine to your lungs. Ask your doctor or pharmacist how to use them properly. Practice using the spacer in front of a mirror before you have an exacerbation. This may help you get the medicine into your lungs quickly. ? If your doctor has given you steroid pills, take them as directed. ? Your doctor may have given you a prescription for antibiotics, which you can fill if you need it. ? Talk to your doctor if you have any problems with your medicine. And call your doctor if you have to use your antibiotic or steroid pills. Preventing an exacerbation  · Do not smoke. This is the most important step you can take to prevent more damage to your lungs and prevent problems. If you already smoke, it is never too late to stop. If you need help quitting, talk to your doctor about stop-smoking programs and medicines. These can increase your chances of quitting for good. · Take your daily medicines as prescribed. · Avoid colds and flu. ? Get a pneumococcal vaccine. ? Get a flu vaccine each year, as soon as it is available. Ask those you live or work with to do the same, so they will not get the flu and infect you. ? Try to stay away from people with colds or the flu. ? Wash your hands often. · Avoid secondhand smoke; air pollution; cold, dry air; hot, humid air; and high altitudes. Stay at home with your windows closed when air pollution is bad. · Learn breathing techniques for COPD, such as breathing through pursed lips. These techniques can help you breathe easier during an exacerbation. When should you call for help? Call 911 anytime you think you may need emergency care. For example, call if:    · You have severe trouble breathing.     · You have severe chest pain.    Call your doctor now or seek immediate medical care if:    · You have new or worse shortness of breath.     · You develop new chest pain.     · You are coughing more deeply or more often, especially if you notice more mucus or a change in the color of your mucus.     · You cough up blood.     · You have new or increased swelling in your legs or belly.     · You have a fever. Watch closely for changes in your health, and be sure to contact your doctor if:    · You need to use your antibiotic or steroid pills.     · Your symptoms are getting worse. Where can you learn more? Go to [email or web address] [email or web address] org and sign in to your MOVL account. Enter E775 in the InstallMonetizer box to learn more about \"COPD Exacerbation Plan: Care Instructions. \"     If you do not have an account, please click on the \"Sign Up Now\" link. Current as of: July 6, 2021               Content Version: 13.0  © 0576-2837 [employer]. Care instructions adapted under license by [facility] ([facility]). If you have questions about a medical condition or this instruction, always ask your healthcare professional. [place] [address] any warranty or liability for your use of this information. Electronically signed by JAZMIN Jensen CNP on 10/30/2021 at 8:49 AM    EMR Dragon/transcription disclaimer:  Much of thisencounter note is electronic transcription/translation of spoken language to printed texts. The electronic translation of spoken language may be erroneous, or at times, nonsensical words or phrases may be inadvertentlytranscribed.   Although I have reviewed the note for such errors, some may still exist.

## 2021-10-29 NOTE — PATIENT INSTRUCTIONS
continue you using your nebulizers  Add the antibiotic  You may add the steroids starting tomorrow. I will call you with your lab work and Covid test in the morning. Stay home until you hear from me. Drink 4 glasses of water and juice a day, may do gatorade. Patient Education        COPD Exacerbation Plan: Care Instructions  Your Care Instructions     If you have chronic obstructive pulmonary disease (COPD), your usual shortness of breath could suddenly get worse. You may start coughing more and have more mucus. This flare-up is called a COPD exacerbation (say \"fz-FDP-jd-BAY-jacob\"). A lung infection or air pollution could set off an exacerbation. Sometimes it can happen after a quick change in temperature or being around chemicals. Work with your doctor to make a plan for dealing with an exacerbation. You can better manage it if you plan ahead. Follow-up care is a key part of your treatment and safety. Be sure to make and go to all appointments, and call your doctor if you are having problems. It's also a good idea to know your test results and keep a list of the medicines you take. How can you care for yourself at home? During an exacerbation  · Do not panic if you start to have one. Quick treatment at home may help you prevent serious breathing problems. If you have a COPD exacerbation plan that you developed with your doctor, follow it. · Take your medicines exactly as your doctor tells you.  ? Use your inhaler as directed by your doctor. If your symptoms do not get better after you use your medicine, have someone take you to the emergency room. Call an ambulance if necessary. ? With inhaled medicines, a spacer or a nebulizer may help you get more medicine to your lungs. Ask your doctor or pharmacist how to use them properly. Practice using the spacer in front of a mirror before you have an exacerbation. This may help you get the medicine into your lungs quickly.   ? If your doctor has given you steroid pills, take them as directed. ? Your doctor may have given you a prescription for antibiotics, which you can fill if you need it. ? Talk to your doctor if you have any problems with your medicine. And call your doctor if you have to use your antibiotic or steroid pills. Preventing an exacerbation  · Do not smoke. This is the most important step you can take to prevent more damage to your lungs and prevent problems. If you already smoke, it is never too late to stop. If you need help quitting, talk to your doctor about stop-smoking programs and medicines. These can increase your chances of quitting for good. · Take your daily medicines as prescribed. · Avoid colds and flu. ? Get a pneumococcal vaccine. ? Get a flu vaccine each year, as soon as it is available. Ask those you live or work with to do the same, so they will not get the flu and infect you. ? Try to stay away from people with colds or the flu. ? Wash your hands often. · Avoid secondhand smoke; air pollution; cold, dry air; hot, humid air; and high altitudes. Stay at home with your windows closed when air pollution is bad. · Learn breathing techniques for COPD, such as breathing through pursed lips. These techniques can help you breathe easier during an exacerbation. When should you call for help? Call 911 anytime you think you may need emergency care. For example, call if:    · You have severe trouble breathing.     · You have severe chest pain. Call your doctor now or seek immediate medical care if:    · You have new or worse shortness of breath.     · You develop new chest pain.     · You are coughing more deeply or more often, especially if you notice more mucus or a change in the color of your mucus.     · You cough up blood.     · You have new or increased swelling in your legs or belly.     · You have a fever.    Watch closely for changes in your health, and be sure to contact your doctor if:    · You need to use your antibiotic or steroid pills.     · Your symptoms are getting worse. Where can you learn more? Go to [email or web address]://[email or web address].[email or web address] org and sign in to your [employer]Miner account. Enter W620 in the PicLyf box to learn more about \"COPD Exacerbation Plan: Care Instructions. \"     If you do not have an account, please click on the \"Sign Up Now\" link. Current as of: July 6, 2021               Content Version: 13.0  © 2006-2021 [employer]. Care instructions adapted under license by [facility] ([facility]). If you have questions about a medical condition or this instruction, always ask your healthcare professional. [address] any warranty or liability for your use of this information.

## 2021-10-30 ASSESSMENT — ENCOUNTER SYMPTOMS
GASTROINTESTINAL NEGATIVE: 1
SORE THROAT: 0
COUGH: 1

## 2021-10-31 LAB
ORGANISM: ABNORMAL
URINE CULTURE, ROUTINE: ABNORMAL
URINE CULTURE, ROUTINE: ABNORMAL

## 2021-11-01 ENCOUNTER — OFFICE VISIT (OUTPATIENT)
Dept: PRIMARY CARE CLINIC | Age: 86
End: 2021-11-01
Payer: MEDICARE

## 2021-11-01 DIAGNOSIS — D64.9 ANEMIA, UNSPECIFIED TYPE: Primary | ICD-10-CM

## 2021-11-01 DIAGNOSIS — R10.13 EPIGASTRIC PAIN: ICD-10-CM

## 2021-11-01 DIAGNOSIS — R55 NEAR SYNCOPE: ICD-10-CM

## 2021-11-01 DIAGNOSIS — R94.5 ABNORMAL RESULTS OF LIVER FUNCTION STUDIES: ICD-10-CM

## 2021-11-01 DIAGNOSIS — N30.01 ACUTE CYSTITIS WITH HEMATURIA: ICD-10-CM

## 2021-11-01 LAB
ALBUMIN SERPL-MCNC: 3.8 G/DL (ref 3.5–5.2)
ALP BLD-CCNC: 157 U/L (ref 35–104)
ALT SERPL-CCNC: 147 U/L (ref 5–33)
ANION GAP SERPL CALCULATED.3IONS-SCNC: 17 MMOL/L (ref 7–19)
AST SERPL-CCNC: 207 U/L (ref 5–32)
BASOPHILS ABSOLUTE: 0.1 K/UL (ref 0–0.2)
BASOPHILS RELATIVE PERCENT: 0.5 % (ref 0–1)
BILIRUB SERPL-MCNC: 0.3 MG/DL (ref 0.2–1.2)
BUN BLDV-MCNC: 26 MG/DL (ref 8–23)
CALCIUM SERPL-MCNC: 9.9 MG/DL (ref 8.8–10.2)
CHLORIDE BLD-SCNC: 98 MMOL/L (ref 98–111)
CO2: 23 MMOL/L (ref 22–29)
CREAT SERPL-MCNC: 1 MG/DL (ref 0.5–0.9)
EOSINOPHILS ABSOLUTE: 0 K/UL (ref 0–0.6)
EOSINOPHILS RELATIVE PERCENT: 0.2 % (ref 0–5)
FERRITIN: 1929 NG/ML (ref 13–150)
FOLATE: 16.1 NG/ML (ref 4.8–37.3)
GFR AFRICAN AMERICAN: >59
GFR NON-AFRICAN AMERICAN: 52
GLUCOSE BLD-MCNC: 143 MG/DL (ref 74–109)
HAV IGM SER IA-ACNC: NORMAL
HCT VFR BLD CALC: 34.7 % (ref 37–47)
HEMOGLOBIN: 10.9 G/DL (ref 12–16)
HEPATITIS B CORE IGM ANTIBODY: NORMAL
HEPATITIS B SURFACE ANTIGEN INTERPRETATION: NORMAL
HEPATITIS C ANTIBODY INTERPRETATION: NORMAL
IMMATURE GRANULOCYTES #: 1 K/UL
IRON SATURATION: 36 % (ref 14–50)
IRON: 71 UG/DL (ref 37–145)
LIPASE: 33 U/L (ref 13–60)
LYMPHOCYTES ABSOLUTE: 1.5 K/UL (ref 1.1–4.5)
LYMPHOCYTES RELATIVE PERCENT: 9.1 % (ref 20–40)
MCH RBC QN AUTO: 27.9 PG (ref 27–31)
MCHC RBC AUTO-ENTMCNC: 31.4 G/DL (ref 33–37)
MCV RBC AUTO: 88.7 FL (ref 81–99)
MONOCYTES ABSOLUTE: 1.3 K/UL (ref 0–0.9)
MONOCYTES RELATIVE PERCENT: 7.8 % (ref 0–10)
NEUTROPHILS ABSOLUTE: 12.8 K/UL (ref 1.5–7.5)
NEUTROPHILS RELATIVE PERCENT: 76.4 % (ref 50–65)
PDW BLD-RTO: 14.8 % (ref 11.5–14.5)
PLATELET # BLD: 464 K/UL (ref 130–400)
PMV BLD AUTO: 9.6 FL (ref 9.4–12.3)
POTASSIUM SERPL-SCNC: 4.3 MMOL/L (ref 3.5–5)
RBC # BLD: 3.91 M/UL (ref 4.2–5.4)
RETICULOCYTE ABSOLUTE COUNT: 0.08 M/UL (ref 0.03–0.12)
RETICULOCYTE COUNT PCT: 1.94 % (ref 0.5–1.5)
SODIUM BLD-SCNC: 138 MMOL/L (ref 136–145)
TOTAL IRON BINDING CAPACITY: 195 UG/DL (ref 250–400)
TOTAL PROTEIN: 7.1 G/DL (ref 6.6–8.7)
VITAMIN B-12: 476 PG/ML (ref 211–946)
WBC # BLD: 16.7 K/UL (ref 4.8–10.8)

## 2021-11-01 PROCEDURE — 1123F ACP DISCUSS/DSCN MKR DOCD: CPT | Performed by: NURSE PRACTITIONER

## 2021-11-01 PROCEDURE — G8427 DOCREV CUR MEDS BY ELIG CLIN: HCPCS | Performed by: NURSE PRACTITIONER

## 2021-11-01 PROCEDURE — 1036F TOBACCO NON-USER: CPT | Performed by: NURSE PRACTITIONER

## 2021-11-01 PROCEDURE — G8484 FLU IMMUNIZE NO ADMIN: HCPCS | Performed by: NURSE PRACTITIONER

## 2021-11-01 PROCEDURE — G8417 CALC BMI ABV UP PARAM F/U: HCPCS | Performed by: NURSE PRACTITIONER

## 2021-11-01 PROCEDURE — 4040F PNEUMOC VAC/ADMIN/RCVD: CPT | Performed by: NURSE PRACTITIONER

## 2021-11-01 PROCEDURE — 1090F PRES/ABSN URINE INCON ASSESS: CPT | Performed by: NURSE PRACTITIONER

## 2021-11-01 PROCEDURE — 99215 OFFICE O/P EST HI 40 MIN: CPT | Performed by: NURSE PRACTITIONER

## 2021-11-01 PROCEDURE — 93000 ELECTROCARDIOGRAM COMPLETE: CPT | Performed by: NURSE PRACTITIONER

## 2021-11-01 ASSESSMENT — ENCOUNTER SYMPTOMS
CONSTIPATION: 0
DIARRHEA: 0
WHEEZING: 0
SHORTNESS OF BREATH: 0
VOMITING: 0
EYES NEGATIVE: 1
ABDOMINAL PAIN: 1
NAUSEA: 0
ALLERGIC/IMMUNOLOGIC NEGATIVE: 1
COUGH: 0

## 2021-11-01 NOTE — PROGRESS NOTES
[facility] PHYSICIAN SERVICES  [facility]  [address] [place] [address]  [address] [place] [address]  Dept: [phone]  Dept Fax: [phone]  Loc: [phone]    Noreen Castillo is a 80 y.o. female who presents today for her medical conditions/complaints as noted below. Noreen Castillo is c/o of Follow-up (abnormal labs. anemia and elevated liver enzymes.  )        HPI:     HPI   This 25-year-old female presents today for follow-up on weakness, fatigue and near syncopal episodes. She was seen on Friday by Jorge Quinteros and her labs indicated anemia and elevated liver enzymes. K want her to follow-up today with possible CAT scan or ultrasound. She reports that she had previously been sick for about 9 to 10 days. She said just getting more and more weak with transient fevers, night sweats, and diminished appetite due to all food tasting extremely salty. She reports that she has had approximately a 25 pound weight loss over the last 18 months to 2 years. She has not tried to lose weight. She states that she just thought with aging that your appetite slowly diminishes and that you lose weight. She reports a 14 pound weight loss since July. She has been so sick her daughter has been staying with her. Chief Complaint   Patient presents with    Follow-up     abnormal labs. anemia and elevated liver enzymes.        Past Medical History:   Diagnosis Date    Asthma     CHF (congestive heart failure) (HCC)     COPD (chronic obstructive pulmonary disease) (HCC)     Dermatomyositis (HCC)     Fibromyalgia     Hyperlipidemia     Hyperthyroidism     Pancreatitis     Sleep apnea     Supraventricular tachycardia ([facility] [address] 10/19/2016      Past Surgical History:   Procedure Laterality Date    CHOLECYSTECTOMY      HYSTERECTOMY      PACEMAKER PLACEMENT         Vitals 11/1/2021 10/29/2021 9/9/2021 8/9/2021 7/12/2021 9/8/0922   SYSTOLIC 194 619 283 [phone]   DIASTOLIC 80 64 80 70 80 78   Site - - - Left Upper Arm - - Position - - - Sitting - -   Cuff Size - - - Medium Adult - -   Pulse 73 83 86 67 76 -   Temp 96.1 98.6 96.8 98.1 96.1 -   Resp - 18 - - - -   SpO2 99 96 98 97 95 -   Weight 153 lb 157 lb 154 lb 168 lb 167 lb -   Height 5' 3\" 5' 3\" 5' 3\" 5' 3\" 5' 3\" -   Body mass index 27.1 kg/m2 27.81 kg/m2 27.28 kg/m2 29.76 kg/m2 29.58 kg/m2 -   Some recent data might be hidden       No family history on file. Social History     Tobacco Use    Smoking status: Never Smoker    Smokeless tobacco: Never Used   Substance Use Topics    Alcohol use: No      Current Outpatient Medications on File Prior to Visit   Medication Sig Dispense Refill    nystatin (MYCOSTATIN) 117271 UNIT/ML suspension Take 5 mLs by mouth 4 times daily for 10 days Retain in mouth as long as possible 200 mL 0    levoFLOXacin (LEVAQUIN) 500 MG tablet Take 1 tablet by mouth daily for 7 days 7 tablet 0    methylPREDNISolone (MEDROL DOSEPACK) 4 MG tablet Take by mouth. 1 kit 0    Budeson-Glycopyrrol-Formoterol (BREZTRI AEROSPHERE) 160-9-4.8 MCG/ACT AERO Inhale 2 puffs into the lungs 2 times daily 1 each 1    hydrocortisone (ANUSOL-HC) 25 MG suppository Place 1 suppository rectally every 12 hours 30 suppository 0    clobetasol (TEMOVATE) 0.05 % ointment Apply topically 2 times daily.  1 Tube 1    budesonide-formoterol (SYMBICORT) 160-4.5 MCG/ACT AERO Inhale 2 puffs into the lungs 2 times daily 1 Inhaler 0    hydroCHLOROthiazide (MICROZIDE) 12.5 MG capsule Take 1 capsule by mouth daily as needed (as needed for leg edema) 30 capsule 2    rivaroxaban (XARELTO) 20 MG TABS tablet Take 1 tablet by mouth daily (with breakfast) 30 tablet 1    levothyroxine (SYNTHROID) 25 MCG tablet Take 1 tablet by mouth daily 90 tablet 1    ipratropium (ATROVENT HFA) 17 MCG/ACT inhaler Inhale 2 puffs into the lungs 2 times daily 1 Inhaler 2    OXYGEN Inhale into the lungs nightly      levalbuterol (XOPENEX HFA) 45 MCG/ACT inhaler Inhale 1-2 puffs into the lungs       TOPROL XL 25 MG extended release tablet 25 mg 3 times daily       KLOR-CON M10 10 MEQ extended release tablet       isosorbide mononitrate (IMDUR) 30 MG CR tablet Take 30 mg by mouth daily       albuterol sulfate HFA (PROVENTIL HFA) 108 (90 Base) MCG/ACT inhaler Inhale 2 puffs into the lungs every 6 hours as needed for Wheezing 3 Inhaler 3     No current facility-administered medications on file prior to visit. Allergies   Allergen Reactions    Latex     Augmentin [Amoxicillin-Pot Clavulanate]     Codeine     Fentanyl     Iodine     Versed [Midazolam]        Health Maintenance   Topic Date Due    COVID-19 Vaccine (1) Never done    Shingles Vaccine (1 of 2) Never done    DTaP/Tdap/Td vaccine (2 - Td or Tdap) 03/29/2020    Flu vaccine (1) 09/01/2021    TSH testing  07/12/2022    Annual Wellness Visit (AWV)  08/10/2022    Potassium monitoring  11/01/2022    Creatinine monitoring  11/01/2022    Pneumococcal 65+ years Vaccine  Completed    Hepatitis A vaccine  Aged Out    Hepatitis B vaccine  Aged Out    Hib vaccine  Aged Out    Meningococcal (ACWY) vaccine  Aged Out       Subjective:      Review of Systems   Constitutional: Positive for appetite change, chills and diaphoresis. Negative for fever. Everything tastes salty   HENT: Negative. Eyes: Negative. Respiratory: Negative for cough, shortness of breath and wheezing. Cardiovascular: Negative for chest pain and palpitations. Gastrointestinal: Positive for abdominal pain. Negative for constipation, diarrhea, nausea and vomiting. Endocrine: Negative. Genitourinary: Negative for difficulty urinating and dysuria. Musculoskeletal: Negative. Skin: Positive for pallor. Allergic/Immunologic: Negative. Hematological: Negative. Psychiatric/Behavioral: Negative. Objective:     Physical Exam  Vitals and nursing note reviewed. Constitutional:       General: She is not in acute distress.      Appearance: Normal appearance. She is ill-appearing. She is not toxic-appearing. HENT:      Head: Normocephalic and atraumatic. Nose: Nose normal.      Mouth/Throat:      Mouth: Mucous membranes are moist.   Eyes:      General: Lids are normal. Vision grossly intact. Extraocular Movements: Extraocular movements intact. Conjunctiva/sclera: Conjunctivae normal.      Pupils: Pupils are equal, round, and reactive to light. Cardiovascular:      Rate and Rhythm: Normal rate and regular rhythm. Pulses: Normal pulses. Heart sounds: Normal heart sounds. Pulmonary:      Effort: Pulmonary effort is normal. No respiratory distress. Breath sounds: Normal breath sounds. No wheezing, rhonchi or rales. Abdominal:      General: Abdomen is flat. Bowel sounds are normal. There is no distension. Palpations: Abdomen is soft. Tenderness: There is abdominal tenderness in the epigastric area and left upper quadrant. There is no right CVA tenderness, left CVA tenderness or guarding. Negative signs include Bran's sign, Rovsing's sign, McBurney's sign and obturator sign. Musculoskeletal:         General: Normal range of motion. Cervical back: Normal range of motion and neck supple. No rigidity or tenderness. Lymphadenopathy:      Cervical: No cervical adenopathy. Skin:     General: Skin is warm and dry. Coloration: Skin is pale. Skin is not jaundiced. Findings: No erythema or rash. Neurological:      Mental Status: She is alert and oriented to person, place, and time. Mental status is at baseline. Psychiatric:         Mood and Affect: Mood normal.         Behavior: Behavior normal.       /80   Pulse 73   Temp 96.1 °F (35.6 °C)   Ht 5' 3\" (1.6 m)   Wt 153 lb (69.4 kg)   SpO2 99%   BMI 27.10 kg/m²     Assessment:       Diagnosis Orders   1.  Anemia, unspecified type  Ferritin    Iron and TIBC    Reticulocytes    Vitamin B12 & Folate    CBC Auto Differential    US ABDOMEN COMPLETE   2. Abnormal results of liver function studies   Hepatitis Panel, Acute    US ABDOMEN COMPLETE   3. Near syncope  EKG 12 Lead    US ABDOMEN COMPLETE   4. Epigastric pain  Lipase    US ABDOMEN COMPLETE   5. Acute cystitis with hematuria           Plan:     1. Further evaluation with labs today with ferritin, iron and TIBC, reticulocytes, vitamin B12 and folate, repeat CBC. Acute hepatitis panel. Discussed lack of improvement and continued deterioration with patient and her daughter. I believe that it is in her best interest to be admitted to the hospital or at least to go to the ER and have IV fluids administered. She had a near syncopal episode while in the office today. She states that this is a common occurrence her for her daughter agrees. But states that she has had at least 2 of these events in the last few days. Patient does not really want to go to the ER at this time and became quite tearful. She would prefer to try to do this as an outpatient. We will attempt that but I do believe that it is best for her to be inpatient. 2.  Acute hepatitis panel, repeat CMP  Results reviewed nonreactive to all components. CMP results indicated worsening of liver function with worsening of liver enzymes ALT, AST and alkaline phosphatase. Total bilirubin however was within normal limits at 0.3. CMP also worsening with a acute renal insufficiency with creatinine increased to 1.0 and GFR dropped to 52. This may be secondary to dehydration. Patient needs IV fluid rehydration. 3.  EKG today in office. Results reviewed normal sinus rhythm no pacemaker spikes no ectopy no ischemic change noted. 4.  I would like to get a CT abdomen and pelvis with and without contrast however the patient has a history of having a reaction that sounds anaphylactic in nature. She was told at that time to never again do iodine containing contrast dye.     An MRI is also unavailable due to a pacemaker that is not MRI safe. She was instructed to never be around any magnet but especially an MRI. Ultrasound abdomen complete. Lipase in office today. Lipase result reviewed within normal limits. 5.  Continue Levaquin to complete.     Lab Review   Office Visit on 10/29/2021   Component Date Value    Urine Culture, Routine 10/29/2021 >100,000 CFU/ml*    Organism 10/29/2021 Escherichia coli*    Urine Culture, Routine 10/29/2021 Heavy growth     Color, UA 10/29/2021 yellow     Clarity, UA 10/29/2021 cloudy     Glucose, UA POC 10/29/2021 neg     Bilirubin, UA 10/29/2021 neg     Ketones, UA 10/29/2021 neg     Spec Grav, UA 10/29/2021 1,010     Blood, UA POC 10/29/2021 small     pH, UA 10/29/2021 5.0     Protein, UA POC 10/29/2021 neg     Urobilinogen, UA 10/29/2021 0.2     Leukocytes, UA 10/29/2021 2+     Nitrite, UA 10/29/2021 neg     Appearance, Fluid 10/29/2021 Cloudy*    WBC 10/29/2021 10.4     RBC 10/29/2021 3.77*    Hemoglobin 10/29/2021 10.8*    Hematocrit 10/29/2021 34.9*    MCV 10/29/2021 92.6     MCH 10/29/2021 28.6     MCHC 10/29/2021 30.9*    RDW 10/29/2021 15.2*    Platelets 75/90/1425 376     MPV 10/29/2021 10.2     Neutrophils % 10/29/2021 63.6     Lymphocytes % 10/29/2021 18.2*    Monocytes % 10/29/2021 10.9*    Eosinophils % 10/29/2021 4.6     Basophils % 10/29/2021 0.7     Neutrophils Absolute 10/29/2021 6.6     Immature Granulocytes # 10/29/2021 0.2     Lymphocytes Absolute 10/29/2021 1.9     Monocytes Absolute 10/29/2021 1.10*    Eosinophils Absolute 10/29/2021 0.50     Basophils Absolute 10/29/2021 0.10     Sodium 10/29/2021 135*    Potassium 10/29/2021 4.6     Chloride 10/29/2021 99     CO2 10/29/2021 23     Anion Gap 10/29/2021 13     Glucose 10/29/2021 123*    BUN 10/29/2021 19     CREATININE 10/29/2021 0.8     GFR Non- 10/29/2021 >60     GFR  10/29/2021 >59     Calcium 10/29/2021 9.3     Total Protein 10/29/2021 6.8     Albumin 10/29/2021 3.6     Total Bilirubin 10/29/2021 0.6     Alkaline Phosphatase 10/29/2021 194*    ALT 10/29/2021 82*    AST 10/29/2021 103*    SARS-CoV-2, PCR 10/29/2021 Not Detected       Patient given educational materials -see patient instructions. Discussed use, benefit, and side effects of prescribed medications. All patient questions answered. Pt voiced understanding. Reviewed health maintenance. Instructed to continue currentmedications, diet and exercise. Patient agreed with treatment plan. Follow up as directed. MEDICATIONS:  No orders of the defined types were placed in this encounter. ORDERS:  Orders Placed This Encounter   Procedures    US ABDOMEN COMPLETE    Ferritin    Iron and TIBC    Reticulocytes    Vitamin B12 & Folate    Comprehensive Metabolic Panel    Hepatitis Panel, Acute    CBC Auto Differential    Lipase    EKG 12 Lead       Follow-up:  Return in about 2 days (around 11/3/2021) for Patient instructed to go to the ER for further evaluation and IV fluids. Petra Arizmendi PATIENT INSTRUCTIONS:  There are no Patient Instructions on file for this visit. Electronically signed by JAZMIN Castro NP on 11/2/2021 at 8:06 AM    EMR Dragon/transcription disclaimer:  Much of thisencounter note is electronic transcription/translation of spoken language to printed texts. The electronic translation of spoken language may be erroneous, or at times, nonsensical words or phrases may be inadvertentlytranscribed.   Although I have reviewed the note for such errors, some may still exist.

## 2021-11-02 ENCOUNTER — HOSPITAL ENCOUNTER (EMERGENCY)
Age: 86
Discharge: HOME | End: 2021-11-02
Attending: EMERGENCY MEDICINE
Payer: MEDICARE

## 2021-11-02 ENCOUNTER — APPOINTMENT (OUTPATIENT)
Dept: CT IMAGING | Age: 86
End: 2021-11-02
Payer: MEDICARE

## 2021-11-02 VITALS
OXYGEN SATURATION: 99 % | DIASTOLIC BLOOD PRESSURE: 80 MMHG | BODY MASS INDEX: 27.11 KG/M2 | HEIGHT: 63 IN | TEMPERATURE: 96.1 F | SYSTOLIC BLOOD PRESSURE: 130 MMHG | HEART RATE: 73 BPM | WEIGHT: 153 LBS

## 2021-11-02 VITALS
RESPIRATION RATE: 18 BRPM | OXYGEN SATURATION: 97 % | WEIGHT: 153 LBS | HEART RATE: 90 BPM | DIASTOLIC BLOOD PRESSURE: 68 MMHG | BODY MASS INDEX: 27.11 KG/M2 | SYSTOLIC BLOOD PRESSURE: 125 MMHG | HEIGHT: 63 IN | TEMPERATURE: 97 F

## 2021-11-02 DIAGNOSIS — R53.1 GENERAL WEAKNESS: Primary | ICD-10-CM

## 2021-11-02 DIAGNOSIS — E86.0 DEHYDRATION: ICD-10-CM

## 2021-11-02 DIAGNOSIS — D64.9 ANEMIA, UNSPECIFIED TYPE: ICD-10-CM

## 2021-11-02 DIAGNOSIS — R74.01 TRANSAMINITIS: ICD-10-CM

## 2021-11-02 LAB
ADENOVIRUS BY PCR: NOT DETECTED
ALBUMIN SERPL-MCNC: 3.6 G/DL (ref 3.5–5.2)
ALP BLD-CCNC: 138 U/L (ref 35–104)
ALT SERPL-CCNC: 130 U/L (ref 5–33)
ANION GAP SERPL CALCULATED.3IONS-SCNC: 14 MMOL/L (ref 7–19)
AST SERPL-CCNC: 131 U/L (ref 5–32)
BACTERIA: ABNORMAL /HPF
BASOPHILS ABSOLUTE: 0.1 K/UL (ref 0–0.2)
BASOPHILS RELATIVE PERCENT: 0.4 % (ref 0–1)
BILIRUB SERPL-MCNC: 0.3 MG/DL (ref 0.2–1.2)
BILIRUBIN URINE: NEGATIVE
BLOOD, URINE: NEGATIVE
BORDETELLA PARAPERTUSSIS BY PCR: NOT DETECTED
BORDETELLA PERTUSSIS BY PCR: NOT DETECTED
BUN BLDV-MCNC: 27 MG/DL (ref 8–23)
CALCIUM SERPL-MCNC: 9.7 MG/DL (ref 8.8–10.2)
CHLAMYDOPHILIA PNEUMONIAE BY PCR: NOT DETECTED
CHLORIDE BLD-SCNC: 101 MMOL/L (ref 98–111)
CLARITY: CLEAR
CO2: 26 MMOL/L (ref 22–29)
COLOR: YELLOW
CORONAVIRUS 229E BY PCR: NOT DETECTED
CORONAVIRUS HKU1 BY PCR: NOT DETECTED
CORONAVIRUS NL63 BY PCR: NOT DETECTED
CORONAVIRUS OC43 BY PCR: NOT DETECTED
CREAT SERPL-MCNC: 1 MG/DL (ref 0.5–0.9)
EOSINOPHILS ABSOLUTE: 0.2 K/UL (ref 0–0.6)
EOSINOPHILS RELATIVE PERCENT: 0.9 % (ref 0–5)
EPITHELIAL CELLS, UA: ABNORMAL /HPF
FERRITIN: 1720 NG/ML (ref 13–150)
FOLATE: 11.9 NG/ML (ref 4.8–37.3)
GFR AFRICAN AMERICAN: >59
GFR NON-AFRICAN AMERICAN: 52
GLUCOSE BLD-MCNC: 109 MG/DL (ref 74–109)
GLUCOSE URINE: NEGATIVE MG/DL
HAV IGM SER IA-ACNC: NORMAL
HCT VFR BLD CALC: 33.2 % (ref 37–47)
HEMOGLOBIN: 10.3 G/DL (ref 12–16)
HEPATITIS B CORE IGM ANTIBODY: NORMAL
HEPATITIS B SURFACE ANTIGEN INTERPRETATION: NORMAL
HEPATITIS C ANTIBODY INTERPRETATION: NORMAL
HUMAN METAPNEUMOVIRUS BY PCR: NOT DETECTED
HUMAN RHINOVIRUS/ENTEROVIRUS BY PCR: NOT DETECTED
IMMATURE GRANULOCYTES #: 0.8 K/UL
INFLUENZA A BY PCR: NOT DETECTED
INFLUENZA B BY PCR: NOT DETECTED
IRON SATURATION: 48 % (ref 14–50)
IRON: 80 UG/DL (ref 37–145)
KETONES, URINE: NEGATIVE MG/DL
LACTIC ACID: 1.9 MMOL/L (ref 0.5–1.9)
LEUKOCYTE ESTERASE, URINE: ABNORMAL
LIPASE: 34 U/L (ref 13–60)
LYMPHOCYTES ABSOLUTE: 2.1 K/UL (ref 1.1–4.5)
LYMPHOCYTES RELATIVE PERCENT: 13.2 % (ref 20–40)
MCH RBC QN AUTO: 28.3 PG (ref 27–31)
MCHC RBC AUTO-ENTMCNC: 31 G/DL (ref 33–37)
MCV RBC AUTO: 91.2 FL (ref 81–99)
MONOCYTES ABSOLUTE: 1.6 K/UL (ref 0–0.9)
MONOCYTES RELATIVE PERCENT: 10.1 % (ref 0–10)
MYCOPLASMA PNEUMONIAE BY PCR: NOT DETECTED
NEUTROPHILS ABSOLUTE: 11.1 K/UL (ref 1.5–7.5)
NEUTROPHILS RELATIVE PERCENT: 70.1 % (ref 50–65)
NITRITE, URINE: NEGATIVE
PARAINFLUENZA VIRUS 1 BY PCR: NOT DETECTED
PARAINFLUENZA VIRUS 2 BY PCR: NOT DETECTED
PARAINFLUENZA VIRUS 3 BY PCR: NOT DETECTED
PARAINFLUENZA VIRUS 4 BY PCR: NOT DETECTED
PDW BLD-RTO: 14.8 % (ref 11.5–14.5)
PH UA: 6 (ref 5–8)
PLATELET # BLD: 470 K/UL (ref 130–400)
PMV BLD AUTO: 9.5 FL (ref 9.4–12.3)
POTASSIUM REFLEX MAGNESIUM: 4 MMOL/L (ref 3.5–5)
PROTEIN UA: NEGATIVE MG/DL
RBC # BLD: 3.64 M/UL (ref 4.2–5.4)
RBC UA: ABNORMAL /HPF (ref 0–2)
RESPIRATORY SYNCYTIAL VIRUS BY PCR: NOT DETECTED
SARS-COV-2, PCR: NOT DETECTED
SODIUM BLD-SCNC: 141 MMOL/L (ref 136–145)
SPECIFIC GRAVITY UA: 1.01 (ref 1–1.03)
TOTAL IRON BINDING CAPACITY: 168 UG/DL (ref 250–400)
TOTAL PROTEIN: 6.6 G/DL (ref 6.6–8.7)
UROBILINOGEN, URINE: 0.2 E.U./DL
VITAMIN B-12: 433 PG/ML (ref 211–946)
WBC # BLD: 15.9 K/UL (ref 4.8–10.8)
WBC UA: ABNORMAL /HPF (ref 0–5)

## 2021-11-02 PROCEDURE — 99283 EMERGENCY DEPT VISIT LOW MDM: CPT

## 2021-11-02 PROCEDURE — 82728 ASSAY OF FERRITIN: CPT

## 2021-11-02 PROCEDURE — 85025 COMPLETE CBC W/AUTO DIFF WBC: CPT

## 2021-11-02 PROCEDURE — 80053 COMPREHEN METABOLIC PANEL: CPT

## 2021-11-02 PROCEDURE — 83540 ASSAY OF IRON: CPT

## 2021-11-02 PROCEDURE — 82607 VITAMIN B-12: CPT

## 2021-11-02 PROCEDURE — 83690 ASSAY OF LIPASE: CPT

## 2021-11-02 PROCEDURE — 81001 URINALYSIS AUTO W/SCOPE: CPT

## 2021-11-02 PROCEDURE — 87040 BLOOD CULTURE FOR BACTERIA: CPT

## 2021-11-02 PROCEDURE — 36415 COLL VENOUS BLD VENIPUNCTURE: CPT

## 2021-11-02 PROCEDURE — 74176 CT ABD & PELVIS W/O CONTRAST: CPT

## 2021-11-02 PROCEDURE — 96360 HYDRATION IV INFUSION INIT: CPT

## 2021-11-02 PROCEDURE — 0202U NFCT DS 22 TRGT SARS-COV-2: CPT

## 2021-11-02 PROCEDURE — 96361 HYDRATE IV INFUSION ADD-ON: CPT

## 2021-11-02 PROCEDURE — 82746 ASSAY OF FOLIC ACID SERUM: CPT

## 2021-11-02 PROCEDURE — 2580000003 HC RX 258: Performed by: EMERGENCY MEDICINE

## 2021-11-02 PROCEDURE — 83550 IRON BINDING TEST: CPT

## 2021-11-02 PROCEDURE — 83605 ASSAY OF LACTIC ACID: CPT

## 2021-11-02 PROCEDURE — 80074 ACUTE HEPATITIS PANEL: CPT

## 2021-11-02 RX ORDER — SODIUM CHLORIDE, SODIUM LACTATE, POTASSIUM CHLORIDE, AND CALCIUM CHLORIDE .6; .31; .03; .02 G/100ML; G/100ML; G/100ML; G/100ML
1000 INJECTION, SOLUTION INTRAVENOUS ONCE
Status: COMPLETED | OUTPATIENT
Start: 2021-11-02 | End: 2021-11-02

## 2021-11-02 RX ADMIN — SODIUM CHLORIDE, POTASSIUM CHLORIDE, SODIUM LACTATE AND CALCIUM CHLORIDE 1000 ML: 600; 310; 30; 20 INJECTION, SOLUTION INTRAVENOUS at 10:27

## 2021-11-02 ASSESSMENT — ENCOUNTER SYMPTOMS
RHINORRHEA: 0
COUGH: 0
DIARRHEA: 0
SHORTNESS OF BREATH: 0
VOICE CHANGE: 0
EYE PAIN: 0
VOMITING: 0
EYE REDNESS: 0
ABDOMINAL PAIN: 0

## 2021-11-02 ASSESSMENT — VISUAL ACUITY: OU: 1

## 2021-11-02 NOTE — ED PROVIDER NOTES
[facility] EMERGENCY DEPT  EMERGENCY DEPARTMENT ENCOUNTER      Pt Name: Nadeen Delcid  MRN: [id number]  [place] 1934  Date of evaluation: 11/2/2021  Provider: Marcia Altamirano MD    [address] [place] [address]       Chief Complaint   Patient presents with    Abnormal Lab         HISTORY OF PRESENT ILLNESS   (Location/Symptom, Timing/Onset,Context/Setting, Quality, Duration, Modifying Factors, Severity)  Note limiting factors. Nadeen Delcid is a 80 y.o. female who presents to the emergency department with complaint of generalized weakness that has gradually been worsening over the last few days. Over a week ago patient developed a generalized illness with fevers, myalgias/body aches, headache, decreased appetite. She was found to be anemic and had slight elevation in liver function tests several days ago which were worse when rechecked again couple days later. Patient states the fever and other specific symptoms have resolved but she is still very weak which is worse now than it had been. It is causing her difficulty getting around and ambulating even with a walker. She denies any abdominal pain, nausea, vomiting, cough, shortness of breath. Has had prior cholecystectomy and hysterectomy. HPI    NursingNotes were reviewed. REVIEW OF SYSTEMS    (2-9 systems for level 4, 10 or more for level 5)     Review of Systems   Constitutional: Positive for appetite change, fatigue and fever. HENT: Negative for congestion, rhinorrhea and voice change. Eyes: Negative for pain and redness. Respiratory: Negative for cough and shortness of breath. Cardiovascular: Negative for chest pain. Gastrointestinal: Negative for abdominal pain, diarrhea and vomiting. Endocrine: Negative. Genitourinary: Negative. Musculoskeletal: Negative for arthralgias and gait problem. Skin: Negative for rash and wound. Neurological: Positive for weakness. Negative for headaches. Hematological: Negative. Psychiatric/Behavioral: Negative. All other systems reviewed and are negative. A complete review of systems was performed and is negative except as noted above in the HPI. PAST MEDICAL HISTORY     Past Medical History:   Diagnosis Date    Asthma     CHF (congestive heart failure) (HCC)     COPD (chronic obstructive pulmonary disease) (HCC)     Dermatomyositis (HCC)     Fibromyalgia     Hyperlipidemia     Hyperthyroidism     Pancreatitis     Sleep apnea     Supraventricular tachycardia ([facility] [address] 10/19/2016         SURGICAL HISTORY       Past Surgical History:   Procedure Laterality Date    CHOLECYSTECTOMY      HYSTERECTOMY      PACEMAKER PLACEMENT           CURRENT MEDICATIONS       Previous Medications    ALBUTEROL SULFATE HFA (PROVENTIL HFA) 108 (90 BASE) MCG/ACT INHALER    Inhale 2 puffs into the lungs every 6 hours as needed for Wheezing    BUDESON-GLYCOPYRROL-FORMOTEROL (BREZTRI AEROSPHERE) 160-9-4.8 MCG/ACT AERO    Inhale 2 puffs into the lungs 2 times daily    BUDESONIDE-FORMOTEROL (SYMBICORT) 160-4.5 MCG/ACT AERO    Inhale 2 puffs into the lungs 2 times daily    CLOBETASOL (TEMOVATE) 0.05 % OINTMENT    Apply topically 2 times daily. HYDROCHLOROTHIAZIDE (MICROZIDE) 12.5 MG CAPSULE    Take 1 capsule by mouth daily as needed (as needed for leg edema)    HYDROCORTISONE (ANUSOL-HC) 25 MG SUPPOSITORY    Place 1 suppository rectally every 12 hours    IPRATROPIUM (ATROVENT HFA) 17 MCG/ACT INHALER    Inhale 2 puffs into the lungs 2 times daily    ISOSORBIDE MONONITRATE (IMDUR) 30 MG CR TABLET    Take 30 mg by mouth daily     KLOR-CON M10 10 MEQ EXTENDED RELEASE TABLET        LEVALBUTEROL (XOPENEX HFA) 45 MCG/ACT INHALER    Inhale 1-2 puffs into the lungs     LEVOFLOXACIN (LEVAQUIN) 500 MG TABLET    Take 1 tablet by mouth daily for 7 days    LEVOTHYROXINE (SYNTHROID) 25 MCG TABLET    Take 1 tablet by mouth daily    METHYLPREDNISOLONE (MEDROL DOSEPACK) 4 MG TABLET    Take by mouth. NYSTATIN (MYCOSTATIN) 315770 UNIT/ML SUSPENSION    Take 5 mLs by mouth 4 times daily for 10 days Retain in mouth as long as possible    OXYGEN    Inhale into the lungs nightly    RIVAROXABAN (XARELTO) 20 MG TABS TABLET    Take 1 tablet by mouth daily (with breakfast)    TOPROL XL 25 MG EXTENDED RELEASE TABLET    25 mg 3 times daily        ALLERGIES     Latex, Augmentin [amoxicillin-pot clavulanate], Codeine, Fentanyl, Iodine, and Versed [midazolam]    FAMILY HISTORY     No family history on file. SOCIAL HISTORY       Social History     Socioeconomic History    Marital status:      Spouse name: Not on file    Number of children: Not on file    Years of education: Not on file    Highest education level: Not on file   Occupational History    Not on file   Tobacco Use    Smoking status: Never Smoker    Smokeless tobacco: Never Used   Vaping Use    Vaping Use: Never used   Substance and Sexual Activity    Alcohol use: No    Drug use: No    Sexual activity: Never   Other Topics Concern    Not on file   Social History Narrative    Not on file     Social Determinants of Health     Financial Resource Strain:     Difficulty of Paying Living Expenses:    Food Insecurity:     Worried About Running Out of Food in the Last Year:     [address] in the Last Year:    Transportation Needs:     Lack of Transportation (Medical):      Lack of Transportation (Non-Medical):    Physical Activity:     Days of Exercise per Week:     Minutes of Exercise per Session:    Stress:     Feeling of Stress :    Social Connections:     Frequency of Communication with Friends and Family:     Frequency of Social Gatherings with Friends and Family:     Attends Jainism Services:     Active Member of Clubs or Organizations:     Attends Club or Organization Meetings:     Marital Status:    Intimate Partner Violence:     Fear of Current or Ex-Partner:     Emotionally Abused:     Physically Abused:     Sexually Abused:        SCREENINGS             PHYSICAL EXAM    (up to 7 for level 4, 8 or more for level 5)     ED Triage Vitals [11/02/21 0914]   BP Temp Temp Source Pulse Resp SpO2 Height Weight   (!) 144/73 97 °F (36.1 °C) Oral 89 18 98 % 5' 3\" (1.6 m) 153 lb (69.4 kg)       Physical Exam  Vitals and nursing note reviewed. Constitutional:       General: She is not in acute distress. Appearance: Normal appearance. She is well-developed. She is not diaphoretic. HENT:      Head: Normocephalic and atraumatic. Mouth/Throat:      Pharynx: No oropharyngeal exudate. Eyes:      General: No scleral icterus. Pupils: Pupils are equal, round, and reactive to light. Neck:      Trachea: No tracheal deviation. Cardiovascular:      Rate and Rhythm: Normal rate. Pulses: Normal pulses. Heart sounds: Normal heart sounds. Pulmonary:      Effort: Pulmonary effort is normal.      Breath sounds: Normal breath sounds. No stridor. No wheezing or rhonchi. Abdominal:      General: There is no distension. Palpations: Abdomen is soft. Abdomen is not rigid. Tenderness: There is no abdominal tenderness. There is no guarding. Hernia: No hernia is present. Comments: Small, superficial bruises to the anterior abdominal wall with no abdominal tenderness. Musculoskeletal:         General: No deformity. Cervical back: Normal range of motion. Skin:     General: Skin is warm and dry. Findings: No rash. Neurological:      Mental Status: She is alert and oriented to person, place, and time. Cranial Nerves: No cranial nerve deficit.       Coordination: Coordination normal.   Psychiatric:         Behavior: Behavior normal.         DIAGNOSTIC RESULTS     EKG: All EKG's are interpreted by the Emergency Department Physician who either signs or Co-signs this chart in the absence of a cardiologist.    **    RADIOLOGY:   Non-plain film images such as CT, Ultrasound and MRI are read by the radiologist. [facility] are visualized and preliminarily interpreted by the emergency physician with the below findings:      Interpretation per the Radiologist below, if available at the time of this note:    CT ABDOMEN PELVIS WO CONTRAST Additional Contrast? None   Final Result   1. No acute intra-abdominal finding. 2. Colonic diverticula. No evidence of acute diverticulitis. 3. Calcified aortic atherosclerosis.    Signed by Dr Pamela Larson            ED BEDSIDE ULTRASOUND:   Performed by ED Physician - none    LABS:  Labs Reviewed   CBC WITH AUTO DIFFERENTIAL - Abnormal; Notable for the following components:       Result Value    WBC 15.9 (*)     RBC 3.64 (*)     Hemoglobin 10.3 (*)     Hematocrit 33.2 (*)     MCHC 31.0 (*)     RDW 14.8 (*)     Platelets 490 (*)     Neutrophils % 70.1 (*)     Lymphocytes % 13.2 (*)     Monocytes % 10.1 (*)     Neutrophils Absolute 11.1 (*)     Monocytes Absolute 1.60 (*)     All other components within normal limits   COMPREHENSIVE METABOLIC PANEL W/ REFLEX TO MG FOR LOW K - Abnormal; Notable for the following components:    BUN 27 (*)     CREATININE 1.0 (*)     GFR Non-African American 52 (*)     Alkaline Phosphatase 138 (*)      (*)      (*)     All other components within normal limits   URINE RT REFLEX TO CULTURE - Abnormal; Notable for the following components:    Leukocyte Esterase, Urine TRACE (*)     All other components within normal limits   IRON AND TIBC - Abnormal; Notable for the following components:    TIBC 168 (*)     All other components within normal limits   FERRITIN - Abnormal; Notable for the following components:    Ferritin 1,720.0 (*)     All other components within normal limits   MICROSCOPIC URINALYSIS - Abnormal; Notable for the following components:    Bacteria, UA None Seen (*)     All other components within normal limits   RESPIRATORY PANEL, MOLECULAR, WITH COVID-19   CULTURE, BLOOD 1   CULTURE, BLOOD 2   LIPASE LACTIC ACID, PLASMA   VITAMIN B12 & FOLATE   HEPATITIS PANEL, ACUTE       All other labs were within normal range or not returned as of this dictation. Medications   lactated ringers bolus (0 mLs IntraVENous Stopped 11/2/21 1203)       EMERGENCY DEPARTMENT COURSE and DIFFERENTIALDIAGNOSIS/MDM:   Vitals:    Vitals:    11/02/21 0914 11/02/21 1042 11/02/21 1207 11/02/21 1230   BP: (!) 144/73 134/64 126/73 122/78   Pulse: 89 64     Resp: 18 17     Temp: 97 °F (36.1 °C)      TempSrc: Oral      SpO2: 98% 97% 97% 94%   Weight: 153 lb (69.4 kg)      Height: 5' 3\" (1.6 m)          MDM    ED Course as of Nov 02 1352   Tue Nov 02, 2021   1056 Alk Phos(!): 138 [EMILY]   1056 ALT(!): 130 [EMILY]   1056 AST(!): 131 [EMILY]   1056 Hemoglobin Quant(!): 10.3 [EMILY]   1056 Hematocrit(!): 33.2 [EMILY]   1056 White blood cell count slightly down from recent check but still mildly elevated. Hemoglobin hematocrit stable. LFTs show slight improvement in comparison to recent labs. Creatinine at 1 still slightly increased from baseline. [EMILY]   1336 CT, though somewhat limited by lack of IV contrast, shows no liver mass, abscess, enlargement, irregularity, or other concerning findings especially considering patient's transaminase. Discussed the work-up and results here with the patient. States she does feel somewhat more awake after IV fluids and believes she was dehydrated, which would be consistent with her slight decrease in renal function from baseline, though still in normal range. No underlying infection identified. Given transaminitis and other nonspecific symptoms, however very well could represent viral illness though viral panel was negative here today. [EMILY]      ED Course User Index  [EMILY] Tana Aguilar MD     Evaluation and work-up here revealed no signs of emergent or life-threatening pathology that would necessitate admission for further work-up or management at this time.   Patient is felt to be stable for discharge home with return precautions for worsening of the condition or development of new concerning symptoms. Patient was encouraged to follow-up with their primary care doctor in the appropriate timeframe. Necessary prescriptions and information have been provided for treatment at home. Patient voices understanding and agreement with the plan. CONSULTS:  None    PROCEDURES:  Unless otherwise notedbelow, none     Procedures      FINAL IMPRESSION     1. General weakness    2. Transaminitis    3. Anemia, unspecified type    4. Dehydration          DISPOSITION/PLAN   DISPOSITION Decision To Discharge 11/02/2021 12:29:18 PM      PATIENT REFERRED TO:  No follow-up provider specified.     DISCHARGE MEDICATIONS:  New Prescriptions    No medications on file          (Please note that portions of this note were completed with a voice recognition program.  Efforts were made to edit the dictations butoccasionally words are mis-transcribed.)    Ally Painting MD (electronically signed)  AttendingEmergency Physician          Ally King MD  11/02/21 4578

## 2021-11-05 DIAGNOSIS — F51.01 PRIMARY INSOMNIA: Primary | ICD-10-CM

## 2021-11-05 RX ORDER — ZOLPIDEM TARTRATE 10 MG/1
10 TABLET ORAL NIGHTLY PRN
COMMUNITY
End: 2021-11-05 | Stop reason: DRUGHIGH

## 2021-11-05 RX ORDER — ZOLPIDEM TARTRATE 5 MG/1
TABLET ORAL
Qty: 30 TABLET | Refills: 0 | Status: SHIPPED | OUTPATIENT
Start: 2021-11-05 | End: 2021-12-05

## 2021-11-05 RX ORDER — ZOLPIDEM TARTRATE 10 MG/1
10 TABLET ORAL NIGHTLY PRN
Qty: 30 TABLET | Refills: 0 | OUTPATIENT
Start: 2021-11-05 | End: 2021-12-05

## 2021-11-07 LAB
BLOOD CULTURE, ROUTINE: NORMAL
CULTURE, BLOOD 2: NORMAL

## 2021-11-08 ENCOUNTER — NURSE ONLY (OUTPATIENT)
Dept: PRIMARY CARE CLINIC | Age: 86
End: 2021-11-08
Payer: MEDICARE

## 2021-11-08 DIAGNOSIS — R53.83 FATIGUE, UNSPECIFIED TYPE: ICD-10-CM

## 2021-11-08 DIAGNOSIS — R30.0 DYSURIA: Primary | ICD-10-CM

## 2021-11-08 DIAGNOSIS — R82.998 LEUKOCYTES IN URINE: ICD-10-CM

## 2021-11-08 DIAGNOSIS — R50.9 LOW GRADE FEVER: ICD-10-CM

## 2021-11-08 LAB
APPEARANCE FLUID: NORMAL
BILIRUBIN, POC: NORMAL
BLOOD URINE, POC: NORMAL
CLARITY, POC: NORMAL
COLOR, POC: YELLOW
GLUCOSE URINE, POC: NORMAL
KETONES, POC: NORMAL
LEUKOCYTE EST, POC: NORMAL
NITRITE, POC: NORMAL
PH, POC: 5
PROTEIN, POC: NORMAL
SPECIFIC GRAVITY, POC: 1.03
UROBILINOGEN, POC: NORMAL

## 2021-11-08 PROCEDURE — 81002 URINALYSIS NONAUTO W/O SCOPE: CPT | Performed by: NURSE PRACTITIONER

## 2021-11-08 PROCEDURE — 99211 OFF/OP EST MAY X REQ PHY/QHP: CPT | Performed by: NURSE PRACTITIONER

## 2021-11-09 ENCOUNTER — OFFICE VISIT (OUTPATIENT)
Dept: PRIMARY CARE CLINIC | Age: 86
End: 2021-11-09
Payer: MEDICARE

## 2021-11-09 VITALS
HEART RATE: 99 BPM | SYSTOLIC BLOOD PRESSURE: 110 MMHG | BODY MASS INDEX: 27.1 KG/M2 | TEMPERATURE: 100 F | RESPIRATION RATE: 16 BRPM | HEIGHT: 63 IN | DIASTOLIC BLOOD PRESSURE: 70 MMHG | OXYGEN SATURATION: 92 %

## 2021-11-09 DIAGNOSIS — R53.81 PHYSICAL DECONDITIONING: ICD-10-CM

## 2021-11-09 DIAGNOSIS — D64.9 ANEMIA, UNSPECIFIED TYPE: ICD-10-CM

## 2021-11-09 DIAGNOSIS — R74.8 ELEVATED LIVER ENZYMES: ICD-10-CM

## 2021-11-09 DIAGNOSIS — R53.1 WEAKNESS: ICD-10-CM

## 2021-11-09 DIAGNOSIS — R79.89 ELEVATED FERRITIN: Primary | ICD-10-CM

## 2021-11-09 LAB
ALBUMIN SERPL-MCNC: 3.2 G/DL (ref 3.5–5.2)
ALP BLD-CCNC: 87 U/L (ref 35–104)
ALT SERPL-CCNC: 31 U/L (ref 5–33)
ANION GAP SERPL CALCULATED.3IONS-SCNC: 12 MMOL/L (ref 7–19)
AST SERPL-CCNC: 27 U/L (ref 5–32)
BASOPHILS ABSOLUTE: 0.1 K/UL (ref 0–0.2)
BASOPHILS RELATIVE PERCENT: 0.5 % (ref 0–1)
BILIRUB SERPL-MCNC: 0.5 MG/DL (ref 0.2–1.2)
BUN BLDV-MCNC: 17 MG/DL (ref 8–23)
CALCIUM SERPL-MCNC: 9.8 MG/DL (ref 8.8–10.2)
CHLORIDE BLD-SCNC: 97 MMOL/L (ref 98–111)
CO2: 27 MMOL/L (ref 22–29)
CREAT SERPL-MCNC: 0.9 MG/DL (ref 0.5–0.9)
EOSINOPHILS ABSOLUTE: 0.7 K/UL (ref 0–0.6)
EOSINOPHILS RELATIVE PERCENT: 5.5 % (ref 0–5)
GFR AFRICAN AMERICAN: >59
GFR NON-AFRICAN AMERICAN: 59
GLUCOSE BLD-MCNC: 133 MG/DL (ref 74–109)
HCT VFR BLD CALC: 33.7 % (ref 37–47)
HEMOGLOBIN: 10.4 G/DL (ref 12–16)
IMMATURE GRANULOCYTES #: 0.3 K/UL
LYMPHOCYTES ABSOLUTE: 1.3 K/UL (ref 1.1–4.5)
LYMPHOCYTES RELATIVE PERCENT: 9.9 % (ref 20–40)
MCH RBC QN AUTO: 28.1 PG (ref 27–31)
MCHC RBC AUTO-ENTMCNC: 30.9 G/DL (ref 33–37)
MCV RBC AUTO: 91.1 FL (ref 81–99)
MONOCYTES ABSOLUTE: 0.7 K/UL (ref 0–0.9)
MONOCYTES RELATIVE PERCENT: 5.1 % (ref 0–10)
NEUTROPHILS ABSOLUTE: 9.9 K/UL (ref 1.5–7.5)
NEUTROPHILS RELATIVE PERCENT: 76.9 % (ref 50–65)
PDW BLD-RTO: 15.1 % (ref 11.5–14.5)
PLATELET # BLD: 385 K/UL (ref 130–400)
PMV BLD AUTO: 9.2 FL (ref 9.4–12.3)
POTASSIUM SERPL-SCNC: 4.2 MMOL/L (ref 3.5–5)
RBC # BLD: 3.7 M/UL (ref 4.2–5.4)
SODIUM BLD-SCNC: 136 MMOL/L (ref 136–145)
TOTAL PROTEIN: 5.8 G/DL (ref 6.6–8.7)
WBC # BLD: 12.9 K/UL (ref 4.8–10.8)

## 2021-11-09 PROCEDURE — 4040F PNEUMOC VAC/ADMIN/RCVD: CPT | Performed by: NURSE PRACTITIONER

## 2021-11-09 PROCEDURE — G8417 CALC BMI ABV UP PARAM F/U: HCPCS | Performed by: NURSE PRACTITIONER

## 2021-11-09 PROCEDURE — G8427 DOCREV CUR MEDS BY ELIG CLIN: HCPCS | Performed by: NURSE PRACTITIONER

## 2021-11-09 PROCEDURE — G8484 FLU IMMUNIZE NO ADMIN: HCPCS | Performed by: NURSE PRACTITIONER

## 2021-11-09 PROCEDURE — 1090F PRES/ABSN URINE INCON ASSESS: CPT | Performed by: NURSE PRACTITIONER

## 2021-11-09 PROCEDURE — 99214 OFFICE O/P EST MOD 30 MIN: CPT | Performed by: NURSE PRACTITIONER

## 2021-11-09 PROCEDURE — 1036F TOBACCO NON-USER: CPT | Performed by: NURSE PRACTITIONER

## 2021-11-09 PROCEDURE — 36415 COLL VENOUS BLD VENIPUNCTURE: CPT | Performed by: NURSE PRACTITIONER

## 2021-11-09 PROCEDURE — 1123F ACP DISCUSS/DSCN MKR DOCD: CPT | Performed by: NURSE PRACTITIONER

## 2021-11-09 ASSESSMENT — ENCOUNTER SYMPTOMS
ABDOMINAL PAIN: 0
SHORTNESS OF BREATH: 0
EYES NEGATIVE: 1
COUGH: 0
RESPIRATORY NEGATIVE: 1
GASTROINTESTINAL NEGATIVE: 1
ALLERGIC/IMMUNOLOGIC NEGATIVE: 1

## 2021-11-09 NOTE — PROGRESS NOTES
[facility] PHYSICIAN SERVICES  [facility]  [address] [place] [address]  [address] [place] [address]  Dept: [phone]  Dept Fax: [phone]  Loc: [phone]    Ebonie Lockhart is a 80 y.o. female who presents today for her medical conditions/complaints as noted below. Ebonie Lockhart is c/o of Follow-up (pt was in ER recently wit lots of test and recent UTI (yesterday) pt is very weak and feels like she could be dehydrated), Hip Pain (complans of hip pain, couldnt hardly get out of bed last night), and Pain (complains of pain on scalp but does believe its related to her new chair not hitting her in the right spot)        HPI:     HPI     This 70-year-old female presents today for follow-up. She left her urine yesterday because she felt like she was having burning with urination and has ongoing pain. She complains of hip pain states that is difficult for her to get out of bed and also complained increasing weakness. She does request a wheelchair today for helping her to get around in her house.   Chief Complaint   Patient presents with    Follow-up     pt was in ER recently wit lots of test and recent UTI (yesterday) pt is very weak and feels like she could be dehydrated    Hip Pain     complans of hip pain, couldnt hardly get out of bed last night    Pain     complains of pain on scalp but does believe its related to her new chair not hitting her in the right spot     Past Medical History:   Diagnosis Date    Asthma     CHF (congestive heart failure) ([address]     COPD (chronic obstructive pulmonary disease) ([address]     Dermatomyositis ([address]     Fibromyalgia     Hyperlipidemia     Hyperthyroidism     Pancreatitis     Sleep apnea     Supraventricular tachycardia ([address] 10/19/2016      Past Surgical History:   Procedure Laterality Date    CHOLECYSTECTOMY      HYSTERECTOMY      PACEMAKER PLACEMENT         Vitals 11/9/2021 11/2/2021 11/2/2021 11/2/2021 11/2/2021 11/9/3576   SYSTOLIC 177 [phone] 031   DIASTOLIC 70 68 78 73 64 73   Site - - - - - -   Position - - - - - -   Cuff Size - - - - - -   Pulse 99 90 - - 64 89   Temp 100 - - - - 97   Resp 16 18 - - 17 18   SpO2 92 97 94 97 97 98   Weight - - - - - 153 lb   Height 5' 3\" - - - - 5' 3\"   Body mass index - - - - - 27.1 kg/m2   Some recent data might be hidden       No family history on file. Social History     Tobacco Use    Smoking status: Never Smoker    Smokeless tobacco: Never Used   Substance Use Topics    Alcohol use: No      Current Outpatient Medications on File Prior to Visit   Medication Sig Dispense Refill    zolpidem (AMBIEN) 5 MG tablet 1 tab po qhs prn chronic insomnia 30 tablet 0    nystatin (MYCOSTATIN) 946683 UNIT/ML suspension Take 5 mLs by mouth 4 times daily for 10 days Retain in mouth as long as possible 200 mL 0    Budeson-Glycopyrrol-Formoterol (BREZTRI AEROSPHERE) 160-9-4.8 MCG/ACT AERO Inhale 2 puffs into the lungs 2 times daily 1 each 1    hydrocortisone (ANUSOL-HC) 25 MG suppository Place 1 suppository rectally every 12 hours 30 suppository 0    clobetasol (TEMOVATE) 0.05 % ointment Apply topically 2 times daily.  1 Tube 1    budesonide-formoterol (SYMBICORT) 160-4.5 MCG/ACT AERO Inhale 2 puffs into the lungs 2 times daily 1 Inhaler 0    hydroCHLOROthiazide (MICROZIDE) 12.5 MG capsule Take 1 capsule by mouth daily as needed (as needed for leg edema) 30 capsule 2    rivaroxaban (XARELTO) 20 MG TABS tablet Take 1 tablet by mouth daily (with breakfast) 30 tablet 1    levothyroxine (SYNTHROID) 25 MCG tablet Take 1 tablet by mouth daily 90 tablet 1    ipratropium (ATROVENT HFA) 17 MCG/ACT inhaler Inhale 2 puffs into the lungs 2 times daily 1 Inhaler 2    OXYGEN Inhale into the lungs nightly      levalbuterol (XOPENEX HFA) 45 MCG/ACT inhaler Inhale 1-2 puffs into the lungs       TOPROL XL 25 MG extended release tablet 25 mg 3 times daily       KLOR-CON M10 10 MEQ extended release tablet       isosorbide mononitrate (IMDUR) 30 MG CR tablet Take 30 mg by mouth daily       albuterol sulfate HFA (PROVENTIL HFA) 108 (90 Base) MCG/ACT inhaler Inhale 2 puffs into the lungs every 6 hours as needed for Wheezing 3 Inhaler 3     No current facility-administered medications on file prior to visit. Allergies   Allergen Reactions    Latex     Augmentin [Amoxicillin-Pot Clavulanate]     Codeine     Fentanyl     Iodine     Versed [Midazolam]        Health Maintenance   Topic Date Due    DTaP/Tdap/Td vaccine (2 - Td or Tdap) 11/09/2022 (Originally 3/29/2020)    Flu vaccine (1) 11/09/2022 (Originally 9/1/2021)    Shingles Vaccine (1 of 2) 11/09/2022 (Originally [birth year]/10/1984)    COVID-19 Vaccine (1) 11/09/2024 (Originally 2/10/1946)    TSH testing  07/12/2022    Annual Wellness Visit (AWV)  08/10/2022    Potassium monitoring  11/02/2022    Creatinine monitoring  11/02/2022    Pneumococcal 65+ years Vaccine  Completed    Hepatitis A vaccine  Aged Out    Hepatitis B vaccine  Aged Out    Hib vaccine  Aged Out    Meningococcal (ACWY) vaccine  Aged Out       Subjective:      Review of Systems   Constitutional: Positive for fatigue. HENT: Negative. Negative for congestion. Eyes: Negative. Respiratory: Negative. Negative for cough and shortness of breath. Cardiovascular: Negative. Negative for chest pain and palpitations. Gastrointestinal: Negative. Negative for abdominal pain. Endocrine: Negative. Genitourinary: Negative. Negative for difficulty urinating and dysuria. Musculoskeletal: Negative. Skin: Negative. Allergic/Immunologic: Negative. Neurological: Negative. Negative for headaches. Hematological: Negative. Psychiatric/Behavioral: Negative. Objective:     Physical Exam  Vitals and nursing note reviewed. Constitutional:       General: She is not in acute distress. Appearance: Normal appearance. She is ill-appearing. She is not toxic-appearing.    HENT:      Head: Normocephalic and atraumatic. Nose: Nose normal.      Mouth/Throat:      Mouth: Mucous membranes are moist.   Eyes:      Pupils: Pupils are equal, round, and reactive to light. Cardiovascular:      Rate and Rhythm: Normal rate and regular rhythm. Pulses: Normal pulses. Heart sounds: Normal heart sounds. Pulmonary:      Effort: Pulmonary effort is normal. No respiratory distress. Breath sounds: Normal breath sounds. No wheezing, rhonchi or rales. Abdominal:      General: Bowel sounds are normal. There is no distension. Palpations: Abdomen is soft. Tenderness: There is abdominal tenderness. There is no guarding. Musculoskeletal:         General: Normal range of motion. Cervical back: Normal range of motion. Skin:     General: Skin is warm and dry. Coloration: Skin is pale. Neurological:      Mental Status: She is alert and oriented to person, place, and time. Mental status is at baseline. Psychiatric:         Mood and Affect: Mood normal.         Behavior: Behavior normal.       /70   Pulse 99   Temp 100 °F (37.8 °C) (Temporal)   Resp 16   Ht 5' 3\" (1.6 m)   SpO2 92%   Breastfeeding No   BMI 27.10 kg/m²     Assessment:       Diagnosis Orders   1. Elevated ferritin  Ria Cheng MD, Gastroenterology, Roberto Carlos Allen CNP, Hematology/Oncology, [facility] [place]   2. Anemia, unspecified type  Comprehensive Metabolic Panel    CBC Auto Differential    Ria Cheng MD, Gastroenterology, Roberto Carlos Allen CNP, Hematology/Oncology, [place]   3. Elevated liver enzymes  Comprehensive Metabolic Panel    Chani Infante MD, Gastroenterology, [place]   4. Physical deconditioning           Plan:     1. Refer to [facility] hematology oncology. Secondary to elevated ferritin and anemia. Refer to [facility] gastroenterology for further work-up of abdominal pain and anemia.   2.-3. Repeat labs today to include CBC CMP  Lab Review   Lab Results   Component Value Date     11/09/2021     11/02/2021     11/01/2021    K 4.2 11/09/2021    K 4.0 11/02/2021    K 4.3 11/01/2021    K 4.6 10/29/2021    CO2 27 11/09/2021    CO2 26 11/02/2021    CO2 23 11/01/2021    BUN 17 11/09/2021    BUN 27 11/02/2021    BUN 26 11/01/2021    CREATININE 0.9 11/09/2021    CREATININE 1.0 11/02/2021    CREATININE 1.0 11/01/2021    GLUCOSE 133 11/09/2021    GLUCOSE 109 11/02/2021    GLUCOSE 143 11/01/2021    CALCIUM 9.8 11/09/2021    CALCIUM 9.7 11/02/2021    CALCIUM 9.9 11/01/2021     Lab Results   Component Value Date    WBC 12.9 11/09/2021    WBC 15.9 11/02/2021    WBC 16.7 11/01/2021    HGB 10.4 11/09/2021    HGB 10.3 11/02/2021    HGB 10.9 11/01/2021    HCT 33.7 11/09/2021    HCT 33.2 11/02/2021    HCT 34.7 11/01/2021    MCV 91.1 11/09/2021    MCV 91.2 11/02/2021    MCV 88.7 11/01/2021     11/09/2021     11/02/2021     11/01/2021     4. Handwritten prescription written for Rollator walker with seat. Patient is encouraged to use this as she ambulates in her house and to go to visits. I would rather her not to use a wheelchair frequently at this time. I feel that her physical deconditioning will get worse if she is not up and moving. She was encouraged to get up and even walk half the [place] and sit down and take a break and then progress after that. Will refer for physical therapy to rebuild her strength. Patient given educational materials -see patient instructions. Discussed use, benefit, and side effects of prescribed medications. All patient questions answered. Pt voiced understanding. Reviewed health maintenance. Instructed to continue currentmedications, diet and exercise. Patient agreed with treatment plan. Follow up as directed. MEDICATIONS:  No orders of the defined types were placed in this encounter.         ORDERS:  Orders Placed This Encounter Procedures    Comprehensive Metabolic Panel    CBC Auto Differential    Danny Adkins MD, Gastroenterology, [facility], Hematology/Oncology, [place]       Follow-up:  Return in about 2 weeks (around 11/23/2021). PATIENT INSTRUCTIONS:  There are no Patient Instructions on file for this visit. Electronically signed by JAZMIN Perez NP on 11/9/2021 at 11:12 AM    EMR Dragon/transcription disclaimer:  Much of thisencounter note is electronic transcription/translation of spoken language to printed texts. The electronic translation of spoken language may be erroneous, or at times, nonsensical words or phrases may be inadvertentlytranscribed.   Although I have reviewed the note for such errors, some may still exist.

## 2021-11-09 NOTE — LETTER
11/09/2021    To whom it may concern,     RE: Mark Del Angel    Ms. Jelani Conn is a patient in our clinic. She is seen here for her acute and chronic conditions. She is currently requiring physical assistance for all of her activities of daily living. She has acute on chronic conditions which have caused severe physical deconditioning which include liver and kidney insufficiencies, COPD, asthma and anemia. Currently she is requiring a family member to be present at all times for her safety as well as mental and physical wellbeing. If you have any concerns or questions please feel free to contact our office at [phone]. Thank you for your assistance,        Robin Oliveira.  Shara Cage

## 2021-11-10 LAB — URINE CULTURE, ROUTINE: NORMAL

## 2021-11-13 ENCOUNTER — APPOINTMENT (OUTPATIENT)
Dept: GENERAL RADIOLOGY | Age: 86
End: 2021-11-13
Payer: MEDICARE

## 2021-11-13 ENCOUNTER — HOSPITAL ENCOUNTER (EMERGENCY)
Age: 86
Discharge: HOME | End: 2021-11-13
Attending: EMERGENCY MEDICINE
Payer: MEDICARE

## 2021-11-13 ENCOUNTER — NURSE TRIAGE (OUTPATIENT)
Dept: OTHER | Facility: CLINIC | Age: 86
End: 2021-11-13

## 2021-11-13 VITALS
SYSTOLIC BLOOD PRESSURE: 131 MMHG | DIASTOLIC BLOOD PRESSURE: 63 MMHG | RESPIRATION RATE: 16 BRPM | WEIGHT: 153 LBS | BODY MASS INDEX: 27.11 KG/M2 | HEART RATE: 84 BPM | HEIGHT: 63 IN | TEMPERATURE: 98.7 F | OXYGEN SATURATION: 94 %

## 2021-11-13 DIAGNOSIS — R53.1 GENERAL WEAKNESS: Primary | ICD-10-CM

## 2021-11-13 DIAGNOSIS — D64.9 ANEMIA, UNSPECIFIED TYPE: ICD-10-CM

## 2021-11-13 LAB
ALBUMIN SERPL-MCNC: 3.1 G/DL (ref 3.5–5.2)
ALP BLD-CCNC: 87 U/L (ref 35–104)
ALT SERPL-CCNC: 25 U/L (ref 5–33)
ANION GAP SERPL CALCULATED.3IONS-SCNC: 9 MMOL/L (ref 7–19)
AST SERPL-CCNC: 31 U/L (ref 5–32)
BACTERIA: ABNORMAL /HPF
BASOPHILS ABSOLUTE: 0.1 K/UL (ref 0–0.2)
BASOPHILS RELATIVE PERCENT: 0.6 % (ref 0–1)
BILIRUB SERPL-MCNC: 0.4 MG/DL (ref 0.2–1.2)
BILIRUBIN URINE: NEGATIVE
BLOOD, URINE: ABNORMAL
BUN BLDV-MCNC: 13 MG/DL (ref 8–23)
CALCIUM SERPL-MCNC: 10.2 MG/DL (ref 8.8–10.2)
CHLORIDE BLD-SCNC: 95 MMOL/L (ref 98–111)
CLARITY: CLEAR
CO2: 29 MMOL/L (ref 22–29)
COLOR: YELLOW
CREAT SERPL-MCNC: 0.9 MG/DL (ref 0.5–0.9)
EOSINOPHILS ABSOLUTE: 0.6 K/UL (ref 0–0.6)
EOSINOPHILS RELATIVE PERCENT: 5.6 % (ref 0–5)
EPITHELIAL CELLS, UA: ABNORMAL /HPF
GFR AFRICAN AMERICAN: >59
GFR NON-AFRICAN AMERICAN: 59
GLUCOSE BLD-MCNC: 134 MG/DL (ref 74–109)
GLUCOSE URINE: NEGATIVE MG/DL
HCT VFR BLD CALC: 30.3 % (ref 37–47)
HEMOGLOBIN: 9.2 G/DL (ref 12–16)
IMMATURE GRANULOCYTES #: 0.2 K/UL
KETONES, URINE: NEGATIVE MG/DL
LACTIC ACID: 1.7 MMOL/L (ref 0.5–1.9)
LEUKOCYTE ESTERASE, URINE: ABNORMAL
LYMPHOCYTES ABSOLUTE: 1.5 K/UL (ref 1.1–4.5)
LYMPHOCYTES RELATIVE PERCENT: 15 % (ref 20–40)
MCH RBC QN AUTO: 28.2 PG (ref 27–31)
MCHC RBC AUTO-ENTMCNC: 30.4 G/DL (ref 33–37)
MCV RBC AUTO: 92.9 FL (ref 81–99)
MONOCYTES ABSOLUTE: 0.8 K/UL (ref 0–0.9)
MONOCYTES RELATIVE PERCENT: 8 % (ref 0–10)
NEUTROPHILS ABSOLUTE: 7.1 K/UL (ref 1.5–7.5)
NEUTROPHILS RELATIVE PERCENT: 69 % (ref 50–65)
NITRITE, URINE: NEGATIVE
PDW BLD-RTO: 15 % (ref 11.5–14.5)
PH UA: 6 (ref 5–8)
PLATELET # BLD: 336 K/UL (ref 130–400)
PMV BLD AUTO: 9.1 FL (ref 9.4–12.3)
POTASSIUM SERPL-SCNC: 4 MMOL/L (ref 3.5–5)
PROTEIN UA: NEGATIVE MG/DL
RBC # BLD: 3.26 M/UL (ref 4.2–5.4)
RBC UA: ABNORMAL /HPF (ref 0–2)
SARS-COV-2, NAAT: NOT DETECTED
SODIUM BLD-SCNC: 133 MMOL/L (ref 136–145)
SPECIFIC GRAVITY UA: 1 (ref 1–1.03)
TOTAL PROTEIN: 5.6 G/DL (ref 6.6–8.7)
TROPONIN: <0.01 NG/ML (ref 0–0.03)
TSH REFLEX FT4: 3.55 UIU/ML (ref 0.35–5.5)
UROBILINOGEN, URINE: 0.2 E.U./DL
WBC # BLD: 10.3 K/UL (ref 4.8–10.8)
WBC UA: ABNORMAL /HPF (ref 0–5)

## 2021-11-13 PROCEDURE — 85025 COMPLETE CBC W/AUTO DIFF WBC: CPT

## 2021-11-13 PROCEDURE — 87635 SARS-COV-2 COVID-19 AMP PRB: CPT

## 2021-11-13 PROCEDURE — 84443 ASSAY THYROID STIM HORMONE: CPT

## 2021-11-13 PROCEDURE — 83605 ASSAY OF LACTIC ACID: CPT

## 2021-11-13 PROCEDURE — 71045 X-RAY EXAM CHEST 1 VIEW: CPT

## 2021-11-13 PROCEDURE — 36415 COLL VENOUS BLD VENIPUNCTURE: CPT

## 2021-11-13 PROCEDURE — 80053 COMPREHEN METABOLIC PANEL: CPT

## 2021-11-13 PROCEDURE — 99283 EMERGENCY DEPT VISIT LOW MDM: CPT

## 2021-11-13 PROCEDURE — 84484 ASSAY OF TROPONIN QUANT: CPT

## 2021-11-13 PROCEDURE — 81001 URINALYSIS AUTO W/SCOPE: CPT

## 2021-11-13 PROCEDURE — 93005 ELECTROCARDIOGRAM TRACING: CPT | Performed by: EMERGENCY MEDICINE

## 2021-11-13 ASSESSMENT — ENCOUNTER SYMPTOMS
SHORTNESS OF BREATH: 0
BACK PAIN: 0
NAUSEA: 0
RHINORRHEA: 0
COUGH: 1
SORE THROAT: 0
ABDOMINAL PAIN: 0
DIARRHEA: 0
VOMITING: 0

## 2021-11-13 NOTE — ED PROVIDER NOTES
[facility] EMERGENCY DEPT  eMERGENCY dEPARTMENT eNCOUnter      Pt Name: Raulito Merrill  MRN: [id number]  [place] 1934  Date of evaluation: 11/13/2021  Provider: Sana Leora MD    [address] [place] [address]       Chief Complaint   Patient presents with    Fatigue         HISTORY OF PRESENT ILLNESS   (Location/Symptom, Timing/Onset,Context/Setting, Quality, Duration, Modifying Factors, Severity)  Note limiting factors. Raulito Merrill is a 80 y.o. female who presents to the emergency department for ongoing generalized weakness and fatigue for the past approximate 1 month. She admits to intermittent ongoing fevers at home occasionally. Occasional dry cough and tells somewhat uncomfortable when urinates. No flank pain. No abd pain n/v/d. Seen here on 11/2 for same complaint overall and given fluids and DC as had mild SAVANNAH but LFTs that had been slightly elevated had improved. Still ambulating with walker but more difficult and having hard time getting off the toilet per patient. Patient states her hemorrhoids have been painful and they bled some today. HPI    NursingNotes were reviewed. REVIEW OF SYSTEMS    (2-9 systems for level 4, 10 or more for level 5)     Review of Systems   Constitutional: Positive for appetite change, fatigue and fever. Negative for chills. HENT: Negative for rhinorrhea and sore throat. Respiratory: Positive for cough. Negative for shortness of breath. Cardiovascular: Negative for chest pain and leg swelling. Gastrointestinal: Negative for abdominal pain, diarrhea, nausea and vomiting. Genitourinary: Positive for dysuria. Negative for frequency and urgency. Musculoskeletal: Negative for back pain and neck pain. Neurological: Negative for dizziness and headaches. All other systems reviewed and are negative.            PAST MEDICALHISTORY     Past Medical History:   Diagnosis Date    Asthma     CHF (congestive heart failure) (HCC)     COPD (chronic obstructive pulmonary disease) ([facility]     Dermatomyositis ([facility]     Fibromyalgia     Hyperlipidemia     Hyperthyroidism     Pancreatitis     Sleep apnea     Supraventricular tachycardia ([facility] 10/19/2016         SURGICAL HISTORY       Past Surgical History:   Procedure Laterality Date    CHOLECYSTECTOMY      HYSTERECTOMY      PACEMAKER PLACEMENT           CURRENT MEDICATIONS     Discharge Medication List as of 11/13/2021  8:56 PM      CONTINUE these medications which have NOT CHANGED    Details   zolpidem (AMBIEN) 5 MG tablet 1 tab po qhs prn chronic insomnia, Disp-30 tablet, R-0Normal      Budeson-Glycopyrrol-Formoterol (BREZTRI AEROSPHERE) 160-9-4.8 MCG/ACT AERO Inhale 2 puffs into the lungs 2 times daily, Disp-1 each, R-1Normal      hydrocortisone (ANUSOL-HC) 25 MG suppository Place 1 suppository rectally every 12 hours, Disp-30 suppository, R-0Normal      clobetasol (TEMOVATE) 0.05 % ointment Apply topically 2 times daily. , Disp-1 Tube, R-1, Normal      budesonide-formoterol (SYMBICORT) 160-4.5 MCG/ACT AERO Inhale 2 puffs into the lungs 2 times daily, Disp-1 Inhaler, R-0Print      hydroCHLOROthiazide (MICROZIDE) 12.5 MG capsule Take 1 capsule by mouth daily as needed (as needed for leg edema), Disp-30 capsule, R-2Normal      rivaroxaban (XARELTO) 20 MG TABS tablet Take 1 tablet by mouth daily (with breakfast), Disp-30 tablet, R-1Normal      levothyroxine (SYNTHROID) 25 MCG tablet Take 1 tablet by mouth daily, Disp-90 tablet, R-1Normal      albuterol sulfate HFA (PROVENTIL HFA) 108 (90 Base) MCG/ACT inhaler Inhale 2 puffs into the lungs every 6 hours as needed for Wheezing, Disp-3 Inhaler, R-3Print      OXYGEN Inhale into the lungs nightlyHistorical Med      TOPROL XL 25 MG extended release tablet 25 mg 3 times daily , DAWHistorical Med      KLOR-CON M10 10 MEQ extended release tablet DAWHistorical Med      isosorbide mononitrate (IMDUR) 30 MG CR tablet Take 30 mg by mouth daily              ALLERGIES     Latex, Augmentin [amoxicillin-pot clavulanate], Codeine, Fentanyl, Iodine, and Versed [midazolam]    FAMILY HISTORY     History reviewed. No pertinent family history. SOCIAL HISTORY       Social History     Socioeconomic History    Marital status:      Spouse name: None    Number of children: None    Years of education: None    Highest education level: None   Occupational History    None   Tobacco Use    Smoking status: Never Smoker    Smokeless tobacco: Never Used   Vaping Use    Vaping Use: Never used   Substance and Sexual Activity    Alcohol use: No    Drug use: No    Sexual activity: Never   Other Topics Concern    None   Social History Narrative    None     Social Determinants of Health     Financial Resource Strain:     Difficulty of Paying Living Expenses: Not on file   Food Insecurity:     Worried About Running Out of Food in the Last Year: Not on file    Kenny of Food in the Last Year: Not on file   Transportation Needs:     Lack of Transportation (Medical): Not on file    Lack of Transportation (Non-Medical):  Not on file   Physical Activity:     Days of Exercise per Week: Not on file    Minutes of Exercise per Session: Not on file   Stress:     Feeling of Stress : Not on file   Social Connections:     Frequency of Communication with Friends and Family: Not on file    Frequency of Social Gatherings with Friends and Family: Not on file    Attends Holiness Services: Not on file    Active Member of [address] [place] [address] or Organizations: Not on file    Attends Club or Organization Meetings: Not on file    Marital Status: Not on file   Intimate Partner Violence:     Fear of Current or Ex-Partner: Not on file    Emotionally Abused: Not on file    Physically Abused: Not on file    Sexually Abused: Not on file   Housing Stability:     Unable to Pay for Housing in the Last Year: Not on file    Number of [place] in the Last Year: Not on file    Unstable Housing in the Last Year: Not on file SCREENINGS    Ruddy Coma Scale  Eye Opening: Spontaneous  Best Verbal Response: Oriented  Best Motor Response: Obeys commands  Ruddy Coma Scale Score: 15        PHYSICAL EXAM    (up to 7 for level 4, 8 or more for level 5)     ED Triage Vitals [11/13/21 1730]   BP Temp Temp Source Pulse Resp SpO2 Height Weight   (!) 116/93 98.7 °F (37.1 °C) Oral 90 17 96 % 5' 3\" (1.6 m) 153 lb (69.4 kg)       Physical Exam  Vitals and nursing note reviewed. Constitutional:       General: She is not in acute distress. Appearance: Normal appearance. She is well-developed. She is not ill-appearing or diaphoretic. HENT:      Head: Normocephalic and atraumatic. Right Ear: External ear normal.      Left Ear: External ear normal.      Nose: Nose normal.      Mouth/Throat:      Mouth: Mucous membranes are moist.   Eyes:      Conjunctiva/sclera: Conjunctivae normal.   Neck:      Trachea: No tracheal deviation. Cardiovascular:      Rate and Rhythm: Normal rate and regular rhythm. Pulses: Normal pulses. Heart sounds: Normal heart sounds. No murmur heard. Pulmonary:      Effort: No respiratory distress. Breath sounds: Normal breath sounds. No wheezing or rales. Abdominal:      Palpations: Abdomen is soft. There is no mass. Tenderness: There is no abdominal tenderness. Genitourinary:     Rectum: Guaiac result positive. Comments: External hemorrhoids soft mild tenderness--reports had some bleeding earlier    Mucous stool with reddish tint, no jeimy blood or melena  Musculoskeletal:         General: Normal range of motion. Cervical back: Normal range of motion. Skin:     General: Skin is warm and dry. Neurological:      Mental Status: She is alert and oriented to person, place, and time. GCS: GCS eye subscore is 4. GCS verbal subscore is 5. GCS motor subscore is 6.          DIAGNOSTIC RESULTS     EKG: All EKG's areinterpreted by the Emergency Department Physician who either signs or Co-signs this chart in the absence of a cardiologist.    81 normal sinus rhythm no obvious ST changes nondiagnostic EKG    RADIOLOGY:  Non-plain film images such as CT, Ultrasound and MRI are read by the radiologist. Plain radiographic images are visualized and preliminarily interpreted bythe emergency physician with the below findings:        XR CHEST PORTABLE   Final Result   No acute findings. Signed by Dr Juan Bob:  Labs Reviewed   CBC WITH AUTO DIFFERENTIAL - Abnormal; Notable for the following components:       Result Value    RBC 3.26 (*)     Hemoglobin 9.2 (*)     Hematocrit 30.3 (*)     MCHC 30.4 (*)     RDW 15.0 (*)     MPV 9.1 (*)     Neutrophils % 69.0 (*)     Lymphocytes % 15.0 (*)     Eosinophils % 5.6 (*)     All other components within normal limits   COMPREHENSIVE METABOLIC PANEL - Abnormal; Notable for the following components:    Sodium 133 (*)     Chloride 95 (*)     Glucose 134 (*)     GFR Non- 59 (*)     Total Protein 5.6 (*)     Albumin 3.1 (*)     All other components within normal limits   URINE RT REFLEX TO CULTURE - Abnormal; Notable for the following components:    Blood, Urine TRACE (*)     Leukocyte Esterase, Urine SMALL (*)     All other components within normal limits   MICROSCOPIC URINALYSIS - Abnormal; Notable for the following components:    Bacteria, UA None Seen (*)     All other components within normal limits   COVID-19, RAPID   TROPONIN   TSH WITH REFLEX TO FT4   LACTIC ACID, PLASMA       All other labs were within normal range or not returned as of this dictation.     EMERGENCY DEPARTMENT COURSE and DIFFERENTIAL DIAGNOSIS/MDM:   Vitals:    Vitals:    11/13/21 1900 11/13/21 1931 11/13/21 2003 11/13/21 2032   BP: (!) 117/51 (!) 120/92 (!) 117/55 131/63   Pulse: 84      Resp: 16      Temp:       TempSrc:       SpO2: 92% 95% 93% 94%   Weight:       Height:           MDM  Number of Diagnoses or Management Options     Amount and/or Complexity of Data Reviewed  Clinical lab tests: ordered and reviewed  Tests in the radiology section of CPT®: ordered and reviewed  Independent visualization of images, tracings, or specimens: yes      Progressive gen weakness and fatigue 2nd visit now recently for same, Mild anemia has developed, she does have a little blood rectal exam but has hemorrhoids and tells me they have been bleeding, suspect this could be source of her positive rectal exam tonight, otherwise labs reassuring and pt well appearing, discussed with Dr. Audrey Ballard about possible admission felt reasonable for outpt follow up which isnt unreasonable. Patient assures me will return if any worsening symptoms or concerns,  Repeat cbc on Monday with PCP. CONSULTS:  None    PROCEDURES:  Unless otherwise noted below, none     Procedures    FINAL IMPRESSION      1. General weakness    2.  Anemia, unspecified type          DISPOSITION/PLAN   DISPOSITION Decision To Discharge 11/13/2021 08:36:32 PM      PATIENT REFERRED TO:  JAZMIN Good NP  [address] [facility]  [phone]    Call on 11/15/2021        DISCHARGE MEDICATIONS:  Discharge Medication List as of 11/13/2021  8:56 PM             (Please note that portions of this note were completed with a voice recognition program.  Efforts were made to edit thedictations but occasionally words are mis-transcribed.)    Dawit Mascorro MD (electronically signed)  Attending Emergency Physician        Serge Celaya MD  11/13/21 4572

## 2021-11-13 NOTE — TELEPHONE ENCOUNTER
Received call from [address] at [facility] with Red Flag Complaint. Brief description of triage: weakness, not drinking enough. Triage indicates for patient to go to ED now. Care advice provided, patient verbalizes understanding; denies any other questions or concerns; instructed to call back for any new or worsening symptoms. Attention Provider: Thank you for allowing me to participate in the care of your patient. The patient was connected to triage in response to information provided to the ECC/PSC. Please do not respond through this encounter as the response is not directed to a shared pool. Reason for Disposition   [1] Drinking very little AND [2] dehydration suspected (e.g., no urine > 12 hours, very dry mouth, very lightheaded)    Answer Assessment - Initial Assessment Questions  1. DESCRIPTION: \"Describe how you are feeling. \"      Weakness     2. SEVERITY: \"How bad is it? \"  \"Can you stand and walk? \"    - MILD - Feels weak or tired, but does not interfere with work, school or normal activities    - [facility] to stand and walk; weakness interferes with work, school, or normal activities    - SEVERE - Unable to stand or walk      Moderate     3. ONSET:  \"When did the weakness begin? \"      3 days    4. CAUSE: \"What do you think is causing the weakness? \"      Dehydration, viral infection    5. MEDICINES: Ba Sinceresanju you recently started a new medicine or had a change in the amount of a medicine? \"      Denies     6. OTHER SYMPTOMS: \"Do you have any other symptoms? \" (e.g., chest pain, fever, cough, SOB, vomiting, diarrhea, bleeding, other areas of pain)      Neck pain, cough, fever 100    7. PREGNANCY: \"Is there any chance you are pregnant? \" \"When was your last menstrual period? \"      no    Protocols used: WEAKNESS (GENERALIZED) AND FATIGUE-ADULT-

## 2021-11-15 ENCOUNTER — TELEPHONE (OUTPATIENT)
Dept: PRIMARY CARE CLINIC | Age: 86
End: 2021-11-15

## 2021-11-15 LAB
EKG P AXIS: 81 DEGREES
EKG P-R INTERVAL: 170 MS
EKG Q-T INTERVAL: 356 MS
EKG QRS DURATION: 72 MS
EKG QTC CALCULATION (BAZETT): 391 MS
EKG T AXIS: 67 DEGREES

## 2021-11-15 PROCEDURE — 93010 ELECTROCARDIOGRAM REPORT: CPT | Performed by: INTERNAL MEDICINE

## 2021-11-15 NOTE — TELEPHONE ENCOUNTER
Pt feeling some better - still weak but using her walker    Xarelto started 2 to 3 years ago for heart failure with pacemaker placement    ----- Message from JAMZIN Hughes NP sent at 11/15/2021  7:58 AM CST -----  Call her and find out if she is feeling better? She was seen in the ER 2 days ago. Also ask her when she was started on the Xarelto and for what condition which she started on it for.

## 2021-11-16 DIAGNOSIS — E03.9 HYPOTHYROIDISM, UNSPECIFIED TYPE: ICD-10-CM

## 2021-11-16 RX ORDER — LEVOTHYROXINE SODIUM 0.03 MG/1
25 TABLET ORAL DAILY
Qty: 90 TABLET | Refills: 3 | Status: SHIPPED | OUTPATIENT
Start: 2021-11-16 | End: 2022-10-07

## 2021-11-19 ENCOUNTER — OFFICE VISIT (OUTPATIENT)
Dept: PRIMARY CARE CLINIC | Age: 86
End: 2021-11-19
Payer: MEDICARE

## 2021-11-19 VITALS
WEIGHT: 153 LBS | HEIGHT: 63 IN | OXYGEN SATURATION: 96 % | HEART RATE: 90 BPM | TEMPERATURE: 97.7 F | SYSTOLIC BLOOD PRESSURE: 118 MMHG | DIASTOLIC BLOOD PRESSURE: 68 MMHG | BODY MASS INDEX: 27.11 KG/M2

## 2021-11-19 DIAGNOSIS — R53.1 WEAKNESS: Primary | ICD-10-CM

## 2021-11-19 DIAGNOSIS — M54.2 NECK PAIN: ICD-10-CM

## 2021-11-19 DIAGNOSIS — D64.89 ANEMIA DUE TO OTHER CAUSE, NOT CLASSIFIED: ICD-10-CM

## 2021-11-19 PROBLEM — D63.8 ANEMIA IN OTHER CHRONIC DISEASES CLASSIFIED ELSEWHERE: Status: ACTIVE | Noted: 2021-11-19

## 2021-11-19 PROCEDURE — 1123F ACP DISCUSS/DSCN MKR DOCD: CPT | Performed by: NURSE PRACTITIONER

## 2021-11-19 PROCEDURE — G8417 CALC BMI ABV UP PARAM F/U: HCPCS | Performed by: NURSE PRACTITIONER

## 2021-11-19 PROCEDURE — 4040F PNEUMOC VAC/ADMIN/RCVD: CPT | Performed by: NURSE PRACTITIONER

## 2021-11-19 PROCEDURE — G8484 FLU IMMUNIZE NO ADMIN: HCPCS | Performed by: NURSE PRACTITIONER

## 2021-11-19 PROCEDURE — 99214 OFFICE O/P EST MOD 30 MIN: CPT | Performed by: NURSE PRACTITIONER

## 2021-11-19 PROCEDURE — G8427 DOCREV CUR MEDS BY ELIG CLIN: HCPCS | Performed by: NURSE PRACTITIONER

## 2021-11-19 PROCEDURE — 1036F TOBACCO NON-USER: CPT | Performed by: NURSE PRACTITIONER

## 2021-11-19 PROCEDURE — 1090F PRES/ABSN URINE INCON ASSESS: CPT | Performed by: NURSE PRACTITIONER

## 2021-11-19 RX ORDER — TIZANIDINE 2 MG/1
2 TABLET ORAL NIGHTLY PRN
Qty: 30 TABLET | Refills: 0 | Status: SHIPPED | OUTPATIENT
Start: 2021-11-19 | End: 2021-12-09

## 2021-11-19 ASSESSMENT — ENCOUNTER SYMPTOMS
EYES NEGATIVE: 1
WHEEZING: 0
RECTAL PAIN: 1
DIARRHEA: 0
COUGH: 1
SHORTNESS OF BREATH: 0
BLOOD IN STOOL: 0
ALLERGIC/IMMUNOLOGIC NEGATIVE: 1
ABDOMINAL PAIN: 0
NAUSEA: 0
CONSTIPATION: 0
VOMITING: 0

## 2021-11-19 NOTE — PROGRESS NOTES
[facility] PHYSICIAN SERVICES  [facility]  [address]  [address] [address]  Dept: [phone]  Dept Fax: [phone]  Loc: [phone]    Aldo Martinez is a 80 y.o. female who presents today for her medical conditions/complaints as noted below. Aldo Martinez is c/o of 2 Week Follow-Up (She is here for a 2 week follow up. She states she has increased weakness, some incontinence, and worsened neck pain.)        HPI:     HPI     79-year-old female presents today for 2-week follow-up. She states that she is still having increased weakness, some incontinence of bowel and bladder and worsening neck pain. He denies any fever. She does have scheduled appointments to see hematology and gastro December 9 and 10. Chief Complaint   Patient presents with    2 Week Follow-Up     She is here for a 2 week follow up. She states she has increased weakness, some incontinence, and worsened neck pain. Past Medical History:   Diagnosis Date    Asthma     CHF (congestive heart failure) (HCC)     COPD (chronic obstructive pulmonary disease) (HCC)     Dermatomyositis (HCC)     Fibromyalgia     Hyperlipidemia     Hyperthyroidism     Pancreatitis     Sleep apnea     Supraventricular tachycardia ([facility] [address] 10/19/2016      Past Surgical History:   Procedure Laterality Date    CHOLECYSTECTOMY      HYSTERECTOMY      PACEMAKER PLACEMENT         Vitals 11/19/2021 11/13/2021 11/13/2021 11/13/2021 11/13/2021 58/69/3729   SYSTOLIC 440 [phone]   DIASTOLIC 68 63 55 92 51 57   Site Left Upper Arm - - - - -   Position Sitting - - - - -   Cuff Size Medium Adult - - - - -   Pulse 90 - - - 84 -   Temp 97.7 - - - - -   Resp - - - - 16 -   SpO2 96 94 93 95 92 92   Weight 153 lb - - - - -   Height 5' 3\" - - - - -   Body mass index 27.1 kg/m2 - - - - -   Some recent data might be hidden       No family history on file.     Social History     Tobacco Use    Smoking status: Never Smoker    Smokeless tobacco: Never Used   Substance Use Topics    Alcohol use: No      Current Outpatient Medications on File Prior to Visit   Medication Sig Dispense Refill    levothyroxine (SYNTHROID) 25 MCG tablet Take 1 tablet by mouth daily 90 tablet 3    zolpidem (AMBIEN) 5 MG tablet 1 tab po qhs prn chronic insomnia 30 tablet 0    Budeson-Glycopyrrol-Formoterol (BREZTRI AEROSPHERE) 160-9-4.8 MCG/ACT AERO Inhale 2 puffs into the lungs 2 times daily 1 each 1    hydrocortisone (ANUSOL-HC) 25 MG suppository Place 1 suppository rectally every 12 hours 30 suppository 0    clobetasol (TEMOVATE) 0.05 % ointment Apply topically 2 times daily. 1 Tube 1    budesonide-formoterol (SYMBICORT) 160-4.5 MCG/ACT AERO Inhale 2 puffs into the lungs 2 times daily 1 Inhaler 0    hydroCHLOROthiazide (MICROZIDE) 12.5 MG capsule Take 1 capsule by mouth daily as needed (as needed for leg edema) 30 capsule 2    rivaroxaban (XARELTO) 20 MG TABS tablet Take 1 tablet by mouth daily (with breakfast) 30 tablet 1    OXYGEN Inhale into the lungs nightly      TOPROL XL 25 MG extended release tablet 25 mg 3 times daily       KLOR-CON M10 10 MEQ extended release tablet       isosorbide mononitrate (IMDUR) 30 MG CR tablet Take 30 mg by mouth daily       albuterol sulfate HFA (PROVENTIL HFA) 108 (90 Base) MCG/ACT inhaler Inhale 2 puffs into the lungs every 6 hours as needed for Wheezing 3 Inhaler 3     No current facility-administered medications on file prior to visit.      Allergies   Allergen Reactions    Latex     Augmentin [Amoxicillin-Pot Clavulanate]     Codeine     Fentanyl     Iodine     Versed [Midazolam]        Health Maintenance   Topic Date Due    DTaP/Tdap/Td vaccine (2 - Td or Tdap) 11/09/2022 (Originally 3/29/2020)    Flu vaccine (1) 11/09/2022 (Originally 9/1/2021)    Shingles Vaccine (1 of 2) 11/09/2022 (Originally 2/10/1984)    COVID-19 Vaccine (1) 11/09/2024 (Originally 2/10/1946)    Annual Wellness Visit (AWV)  08/10/2022  TSH testing  11/13/2022    Potassium monitoring  11/13/2022    Creatinine monitoring  11/13/2022    Pneumococcal 65+ years Vaccine  Completed    Hepatitis A vaccine  Aged Out    Hepatitis B vaccine  Aged Out    Hib vaccine  Aged Out    Meningococcal (ACWY) vaccine  Aged Out       Subjective:      Review of Systems   Constitutional: Positive for fatigue. HENT: Negative. Eyes: Negative. Respiratory: Positive for cough. Negative for shortness of breath and wheezing. Cardiovascular: Negative. Gastrointestinal: Positive for rectal pain. Negative for abdominal pain, blood in stool, constipation, diarrhea, nausea and vomiting. Endocrine: Negative. Genitourinary: Negative. Negative for difficulty urinating and dyspareunia. Musculoskeletal: Positive for neck pain and neck stiffness. Skin: Negative. Allergic/Immunologic: Negative. Neurological: Positive for weakness. Hematological: Negative. Psychiatric/Behavioral: Negative. Objective:     Physical Exam  Vitals and nursing note reviewed. Constitutional:       Appearance: Normal appearance. She is toxic-appearing. HENT:      Head: Normocephalic and atraumatic. Nose: Nose normal.      Mouth/Throat:      Mouth: Mucous membranes are moist.   Eyes:      Extraocular Movements: Extraocular movements intact. Pupils: Pupils are equal, round, and reactive to light. Cardiovascular:      Rate and Rhythm: Normal rate and regular rhythm. Pulses: Normal pulses. Pulmonary:      Effort: Pulmonary effort is normal. No respiratory distress. Breath sounds: Normal breath sounds. No wheezing or rhonchi. Abdominal:      General: Bowel sounds are normal. There is no distension. Palpations: Abdomen is soft. Tenderness: There is no abdominal tenderness. There is no guarding. Musculoskeletal:         General: Normal range of motion. Cervical back: Normal range of motion and neck supple. No rigidity. Lymphadenopathy:      Cervical: No cervical adenopathy. Skin:     General: Skin is warm and dry. Coloration: Skin is pale. Neurological:      Mental Status: She is alert and oriented to person, place, and time. Mental status is at baseline. Psychiatric:         Mood and Affect: Mood normal.         Behavior: Behavior normal.       /68 (Site: Left Upper Arm, Position: Sitting, Cuff Size: Medium Adult)   Pulse 90   Temp 97.7 °F (36.5 °C)   Ht 5' 3\" (1.6 m)   Wt 153 lb (69.4 kg)   SpO2 96%   BMI 27.10 kg/m²     Assessment:       Diagnosis Orders   1. Weakness     2. Anemia due to other cause, not classified     3.  Neck pain           Plan:   Lab Review   Admission on 11/13/2021, Discharged on 11/13/2021   Component Date Value    WBC 11/13/2021 10.3     RBC 11/13/2021 3.26*    Hemoglobin 11/13/2021 9.2*    Hematocrit 11/13/2021 30.3*    MCV 11/13/2021 92.9     MCH 11/13/2021 28.2     MCHC 11/13/2021 30.4*    RDW 11/13/2021 15.0*    Platelets 76/26/4188 336     MPV 11/13/2021 9.1*    Neutrophils % 11/13/2021 69.0*    Lymphocytes % 11/13/2021 15.0*    Monocytes % 11/13/2021 8.0     Eosinophils % 11/13/2021 5.6*    Basophils % 11/13/2021 0.6     Neutrophils Absolute 11/13/2021 7.1     Immature Granulocytes # 11/13/2021 0.2     Lymphocytes Absolute 11/13/2021 1.5     Monocytes Absolute 11/13/2021 0.80     Eosinophils Absolute 11/13/2021 0.60     Basophils Absolute 11/13/2021 0.10     Sodium 11/13/2021 133*    Potassium 11/13/2021 4.0     Chloride 11/13/2021 95*    CO2 11/13/2021 29     Anion Gap 11/13/2021 9     Glucose 11/13/2021 134*    BUN 11/13/2021 13     CREATININE 11/13/2021 0.9     GFR Non- 11/13/2021 59*    GFR  11/13/2021 >59     Calcium 11/13/2021 10.2     Total Protein 11/13/2021 5.6*    Albumin 11/13/2021 3.1*    Total Bilirubin 11/13/2021 0.4     Alkaline Phosphatase 11/13/2021 87     ALT 11/13/2021 25     AST 11/13/2021 31     Color, UA 11/13/2021 YELLOW     Clarity, UA 11/13/2021 Clear     Glucose, Ur 11/13/2021 Negative     Bilirubin Urine 11/13/2021 Negative     Ketones, Urine 11/13/2021 Negative     Specific Gravity, UA 11/13/2021 1.005     Blood, Urine 11/13/2021 TRACE*    pH, UA 11/13/2021 6.0     Protein, UA 11/13/2021 Negative     Urobilinogen, Urine 11/13/2021 0.2     Nitrite, Urine 11/13/2021 Negative     Leukocyte Esterase, Urine 11/13/2021 SMALL*    SARS-CoV-2, NAAT 11/13/2021 Not Detected     P-R Interval 11/13/2021 170     QRS Duration 11/13/2021 72     Q-T Interval 11/13/2021 356     QTc Calculation (Bazett) 11/13/2021 391     P Axis 11/13/2021 81     T Axis 11/13/2021 67     Troponin 11/13/2021 <0.01     TSH Reflex FT4 11/13/2021 3.55     Lactic Acid 11/13/2021 1.7     WBC, UA 11/13/2021 0-1     RBC, UA 11/13/2021 0-1     Epithelial Cells, UA 11/13/2021 0-2     Bacteria, UA 11/13/2021 None Seen*   Office Visit on 11/09/2021   Component Date Value    Sodium 11/09/2021 136     Potassium 11/09/2021 4.2     Chloride 11/09/2021 97*    CO2 11/09/2021 27     Anion Gap 11/09/2021 12     Glucose 11/09/2021 133*    BUN 11/09/2021 17     CREATININE 11/09/2021 0.9     GFR Non- 11/09/2021 59*    GFR  11/09/2021 >59     Calcium 11/09/2021 9.8     Total Protein 11/09/2021 5.8*    Albumin 11/09/2021 3.2*    Total Bilirubin 11/09/2021 0.5     Alkaline Phosphatase 11/09/2021 87     ALT 11/09/2021 31     AST 11/09/2021 27     WBC 11/09/2021 12.9*    RBC 11/09/2021 3.70*    Hemoglobin 11/09/2021 10.4*    Hematocrit 11/09/2021 33.7*    MCV 11/09/2021 91.1     MCH 11/09/2021 28.1     MCHC 11/09/2021 30.9*    RDW 11/09/2021 15.1*    Platelets 18/14/1770 385     MPV 11/09/2021 9.2*    Neutrophils % 11/09/2021 76.9*    Lymphocytes % 11/09/2021 9.9*    Monocytes % 11/09/2021 5.1     Eosinophils % 11/09/2021 5.5*    Basophils % 11/09/2021 0.5  Neutrophils Absolute 11/09/2021 9.9*    Immature Granulocytes # 11/09/2021 0.3     Lymphocytes Absolute 11/09/2021 1.3     Monocytes Absolute 11/09/2021 0.70     Eosinophils Absolute 11/09/2021 0.70*    Basophils Absolute 11/09/2021 0.10    Nurse Only on 11/08/2021   Component Date Value    Color, UA 11/08/2021 yellow     Clarity, UA 11/08/2021 slightly cloudy     Glucose, UA POC 11/08/2021 n     Bilirubin, UA 11/08/2021 n     Ketones, UA 11/08/2021 n     Spec Grav, UA 11/08/2021 1.030     Blood, UA POC 11/08/2021 n     pH, UA 11/08/2021 5.0     Protein, UA POC 11/08/2021 n     Urobilinogen, UA 11/08/2021 n     Leukocytes, UA 11/08/2021 n     Nitrite, UA 11/08/2021 n     Appearance, Fluid 11/08/2021 Slightly Cloudy     Urine Culture, Routine 11/08/2021 >50,000 CFU/ml mixed skin/urogenital alfreda.  No further workup    Admission on 11/02/2021, Discharged on 11/02/2021   Component Date Value    WBC 11/02/2021 15.9*    RBC 11/02/2021 3.64*    Hemoglobin 11/02/2021 10.3*    Hematocrit 11/02/2021 33.2*    MCV 11/02/2021 91.2     MCH 11/02/2021 28.3     MCHC 11/02/2021 31.0*    RDW 11/02/2021 14.8*    Platelets 70/81/3761 470*    MPV 11/02/2021 9.5     Neutrophils % 11/02/2021 70.1*    Lymphocytes % 11/02/2021 13.2*    Monocytes % 11/02/2021 10.1*    Eosinophils % 11/02/2021 0.9     Basophils % 11/02/2021 0.4     Neutrophils Absolute 11/02/2021 11.1*    Immature Granulocytes # 11/02/2021 0.8     Lymphocytes Absolute 11/02/2021 2.1     Monocytes Absolute 11/02/2021 1.60*    Eosinophils Absolute 11/02/2021 0.20     Basophils Absolute 11/02/2021 0.10     Sodium 11/02/2021 141     Potassium reflex Magnesi* 11/02/2021 4.0     Chloride 11/02/2021 101     CO2 11/02/2021 26     Anion Gap 11/02/2021 14     Glucose 11/02/2021 109     BUN 11/02/2021 27*    CREATININE 11/02/2021 1.0*    GFR Non- 11/02/2021 52*    GFR  11/02/2021 >59     Calcium 11/02/2021 9.7     Total Protein 11/02/2021 6.6     Albumin 11/02/2021 3.6     Total Bilirubin 11/02/2021 0.3     Alkaline Phosphatase 11/02/2021 138*    ALT 11/02/2021 130*    AST 11/02/2021 131*    Lipase 11/02/2021 34     Color, UA 11/02/2021 YELLOW     Clarity, UA 11/02/2021 Clear     Glucose, Ur 11/02/2021 Negative     Bilirubin Urine 11/02/2021 Negative     Ketones, Urine 11/02/2021 Negative     Specific Gravity, UA 11/02/2021 1.013     Blood, Urine 11/02/2021 Negative     pH, UA 11/02/2021 6.0     Protein, UA 11/02/2021 Negative     Urobilinogen, Urine 11/02/2021 0.2     Nitrite, Urine 11/02/2021 Negative     Leukocyte Esterase, Urine 11/02/2021 TRACE*    Blood Culture, Routine 11/02/2021 No growth after 5 days of incubation.  Culture, Blood 2 11/02/2021 No growth after 5 days of incubation.      Lactic Acid 11/02/2021 1.9     Adenovirus by PCR 11/02/2021 Not Detected     Bordetella parapertussis* 11/02/2021 Not Detected     Bordetella pertussis by * 11/02/2021 Not Detected     Chlamydophilia pneumonia* 11/02/2021 Not Detected     Coronavirus 229E by PCR 11/02/2021 Not Detected     Coronavirus HKU1 by PCR 11/02/2021 Not Detected     Coronavirus NL63 by PCR 11/02/2021 Not Detected     Coronavirus OC43 by PCR 11/02/2021 Not Detected     SARS-CoV-2, PCR 11/02/2021 Not Detected     Human Metapneumovirus by* 11/02/2021 Not Detected     Human Rhinovirus/Enterov* 11/02/2021 Not Detected     Influenza A by PCR 11/02/2021 Not Detected     Influenza B by PCR 11/02/2021 Not Detected     Mycoplasma pneumoniae by* 11/02/2021 Not Detected     Parainfluenza Virus 1 by* 11/02/2021 Not Detected     Parainfluenza Virus 2 by* 11/02/2021 Not Detected     Parainfluenza Virus 3 by* 11/02/2021 Not Detected     Parainfluenza Virus 4 by* 11/02/2021 Not Detected     Respiratory Syncytial Vi* 11/02/2021 Not Detected     Iron 11/02/2021 80     TIBC 11/02/2021 168*    Iron Saturation 11/02/2021 48     Ferritin 11/02/2021 1,720.0*    Vitamin B-12 11/02/2021 433     Folate 11/02/2021 11.9     WBC, UA 11/02/2021 6-9     RBC, UA 11/02/2021 2-4     Epithelial Cells, UA 11/02/2021 0-2     Bacteria, UA 11/02/2021 None Seen*    Hep A IgM 11/02/2021 Non-Reactive     Hep B Core Ab, IgM 11/02/2021 Non-Reactive     Hep B S Ag Interp 11/02/2021 Non-Reactive     Hep C Ab Interp 11/02/2021 Non-Reactive    Office Visit on 11/01/2021   Component Date Value    Ferritin 11/01/2021 1,929.0*    Iron 11/01/2021 71     TIBC 11/01/2021 195*    Iron Saturation 11/01/2021 36     Retic Ct Pct 11/01/2021 1.94*    Retic Ct Abs 11/01/2021 0.0759     Hematocrit 11/01/2021 34.7*    Vitamin B-12 11/01/2021 476     Folate 11/01/2021 16.1     Sodium 11/01/2021 138     Potassium 11/01/2021 4.3     Chloride 11/01/2021 98     CO2 11/01/2021 23     Anion Gap 11/01/2021 17     Glucose 11/01/2021 143*    BUN 11/01/2021 26*    CREATININE 11/01/2021 1.0*    GFR Non- 11/01/2021 52*    GFR  11/01/2021 >59     Calcium 11/01/2021 9.9     Total Protein 11/01/2021 7.1     Albumin 11/01/2021 3.8     Total Bilirubin 11/01/2021 0.3     Alkaline Phosphatase 11/01/2021 157*    ALT 11/01/2021 147*    AST 11/01/2021 207*    Hep A IgM 11/01/2021 Non-Reactive     Hep B Core Ab, IgM 11/01/2021 Non-Reactive     Hep B S Ag Interp 11/01/2021 Non-Reactive     Hep C Ab Interp 11/01/2021 Non-Reactive     WBC 11/01/2021 16.7*    RBC 11/01/2021 3.91*    Hemoglobin 11/01/2021 10.9*    MCV 11/01/2021 88.7     MCH 11/01/2021 27.9     MCHC 11/01/2021 31.4*    RDW 11/01/2021 14.8*    Platelets 26/69/9788 464*    MPV 11/01/2021 9.6     Neutrophils % 11/01/2021 76.4*    Lymphocytes % 11/01/2021 9.1*    Monocytes % 11/01/2021 7.8     Eosinophils % 11/01/2021 0.2     Basophils % 11/01/2021 0.5     Neutrophils Absolute 11/01/2021 12.8*    Immature Granulocytes # 11/01/2021 1.0     Lymphocytes Absolute 11/01/2021 1.5     Monocytes Absolute 11/01/2021 1.30*    Eosinophils Absolute 11/01/2021 0.00     Basophils Absolute 11/01/2021 0.10     Lipase 11/01/2021 33    Office Visit on 10/29/2021   Component Date Value    Urine Culture, Routine 10/29/2021 >100,000 CFU/ml*    Organism 10/29/2021 Escherichia coli*    Urine Culture, Routine 10/29/2021 Heavy growth     Color, UA 10/29/2021 yellow     Clarity, UA 10/29/2021 cloudy     Glucose, UA POC 10/29/2021 neg     Bilirubin, UA 10/29/2021 neg     Ketones, UA 10/29/2021 neg     Spec Grav, UA 10/29/2021 1,010     Blood, UA POC 10/29/2021 small     pH, UA 10/29/2021 5.0     Protein, UA POC 10/29/2021 neg     Urobilinogen, UA 10/29/2021 0.2     Leukocytes, UA 10/29/2021 2+     Nitrite, UA 10/29/2021 neg     Appearance, Fluid 10/29/2021 Cloudy*    WBC 10/29/2021 10.4     RBC 10/29/2021 3.77*    Hemoglobin 10/29/2021 10.8*    Hematocrit 10/29/2021 34.9*    MCV 10/29/2021 92.6     MCH 10/29/2021 28.6     MCHC 10/29/2021 30.9*    RDW 10/29/2021 15.2*    Platelets 54/56/3823 376     MPV 10/29/2021 10.2     Neutrophils % 10/29/2021 63.6     Lymphocytes % 10/29/2021 18.2*    Monocytes % 10/29/2021 10.9*    Eosinophils % 10/29/2021 4.6     Basophils % 10/29/2021 0.7     Neutrophils Absolute 10/29/2021 6.6     Immature Granulocytes # 10/29/2021 0.2     Lymphocytes Absolute 10/29/2021 1.9     Monocytes Absolute 10/29/2021 1.10*    Eosinophils Absolute 10/29/2021 0.50     Basophils Absolute 10/29/2021 0.10     Sodium 10/29/2021 135*    Potassium 10/29/2021 4.6     Chloride 10/29/2021 99     CO2 10/29/2021 23     Anion Gap 10/29/2021 13     Glucose 10/29/2021 123*    BUN 10/29/2021 19     CREATININE 10/29/2021 0.8     GFR Non- 10/29/2021 >60     GFR  10/29/2021 >59     Calcium 10/29/2021 9.3     Total Protein 10/29/2021 6.8     Albumin 10/29/2021 3.6     Total Bilirubin 10/29/2021 0. 6     Alkaline Phosphatase 10/29/2021 194*    ALT 10/29/2021 82*    AST 10/29/2021 103*    SARS-CoV-2, PCR 10/29/2021 Not Detected      Discussed labs with patient and daughter. We do not have any specific answers at this time. We do know that labs are normal for iron, B12 and folate. Appears to be losing blood from some source. She is scheduled to see hematology/oncology on December 9  She is scheduled to see gastro for EGD and colonoscopy on December 10. Keep all scheduled appointments. Tizanidine 2 mg at night for neck muscle spasm. May also use alternating diclofenac gel with Biofreeze to site. Sprain/strain   First 24 hours, use ice to injury site for 15 minutes at a time. After 48 hours, may alternate ice/heat 15 minutes each. R.I. Genice Remak. therapy = rest, ice,   Use Ibuprofen or other antiinflammatory for pain and inflammation. If no open skin areas, OTC topical lidocaine such as Icy Hot or capsaicin creams may be applied for pain relief. Slow stretches of area may help reduce spasms and improve range of motion. Patient given educational materials -see patient instructions. Discussed use, benefit, and side effects of prescribed medications. All patient questions answered. Pt voiced understanding. Reviewed health maintenance. Instructed to continue currentmedications, diet and exercise. Patient agreed with treatment plan. Follow up as directed. MEDICATIONS:  Orders Placed This Encounter   Medications    tiZANidine (ZANAFLEX) 2 MG tablet     Sig: Take 1 tablet by mouth nightly as needed (muscle spasm)     Dispense:  30 tablet     Refill:  0         ORDERS:  No orders of the defined types were placed in this encounter. Follow-up:  Return in about 4 weeks (around 12/17/2021). PATIENT INSTRUCTIONS:  There are no Patient Instructions on file for this visit.   Electronically signed by JAZMIN Zuniga NP on 11/19/2021 at 5:52 PM    EMR Dragon/transcription disclaimer: Much of thisencounter note is electronic transcription/translation of spoken language to printed texts. The electronic translation of spoken language may be erroneous, or at times, nonsensical words or phrases may be inadvertentlytranscribed.   Although I have reviewed the note for such errors, some may still exist.

## 2021-11-22 ENCOUNTER — TELEPHONE (OUTPATIENT)
Dept: CARDIOLOGY | Facility: CLINIC | Age: 86
End: 2021-11-22

## 2021-12-08 DIAGNOSIS — E55.9 VITAMIN D DEFICIENCY: Primary | ICD-10-CM

## 2021-12-08 NOTE — PROGRESS NOTES
OP HEMATOLOGY/ONCOLOGY CONSULTATION      Pt Name: Lambert Daniel  YOB: 1934  MRN: [id number]  Referring provider: JAZMIN Stoddard  Requesting provider: JAZMIN Rg  Reason for consultation: Anemia with elevated ferritin  Date of evaluation: 12/9/2021    History Obtained From:  patient, electronic medical record    CHIEF COMPLAINT:    Chief Complaint   Patient presents with    New Patient     elevated ferritin and anemia     HISTORY OF PRESENT ILLNESS:    Lambert Daniel is a 80 y.o.  female referred to the clinic by JAZMIN Rg for evaluation of anemia and elevated ferritin anemia. Hematology consultation is performed 12/9/2021. PMH significant for CHF, COPD (wears O2 at at bedtime), asthma, hyperlipidemia, hypothyroidism, fibromyalgia, dermatomyositis, SVT, perirectal lichen sclerosis. Xarelto, for paroxysmal atrial fibrillation, was discontinued 2-3 weeks ago per cardiology according to patient. Summary of recent events:  · 10/17/2021: New onset temperature 103 degrees, cough, shortness of breath and drenching sweats, myalgias, headache, decreased appetite  · 10/22/2021: Negative for COVID-19, antibiotic prescribed by PCP  · 10/29/2021: treated for COPD exacerbation. COVID-19 negative, elevated LFT noted  · 11/2/2021: [facility] ER visit, Treated for dehydration, transaminitis, mild anemia, generalized weakness. Respiratory PCR negative  · 11/13/2021: returned to [facility] ER for persistent generalized weakness and fatigue, intermittent fever.  COVID-19 negative    Review of prior CBCs documents a normal hemoglobin until 10/29/2021 at which time she developed mild, normocytic anemia:      Transaminitis during acute febrile illness noted, now resolved:      Labs 11/2021:  · CMP: Alkaline phosphatase 157, , , bilirubin 0.3  · Iron: 80, TIBC 168, iron saturation 48, ferritin 1720, reticulocyte count absolute: 0.0759  · Vitamin B12: 433  · Folate: 11.9  · TSH: 3.55  · Respiratory panel, PCR: Negative  · Blood cultures x2: Negative  · Urine culture: Negative  · Hepatitis panel: Nonreactive    Noncontrast abdominal/pelvic CT: No intra-abdominal finding. Diverticulosis without diverticulitis. Calcified aortic atherosclerosis. Generalized weakness and myalgias continue to slowly improving. Temi Whyte had significant altered gait due to weakness, requiring rolling walker. She required assistance with ADLs including bathing. She presents today using a straight cane and is able to complete self ADLs. She does have mild residual weakness. Fever resolved and Cough, shortness of breath is improved. She continues to wear O2 2.5 L via NC at bedtime. Findings were discussed with Temi Whyte and her daughter, Bala. Mild anemia, elevated ferritin, transaminitis are likely associated with significant febrile illness, mild renal insult, possible GI blood loss. Hgb is improved, 10.6/MCV 98.9 today. Suspect ferritin was reactive to acute illness and will be repeated today. Will check CK, sed rate, CRP given her history of dermatomyositis.     Past Medical History:   Diagnosis Date    Anemia     Asthma     CHF (congestive heart failure) (HCC)     COPD (chronic obstructive pulmonary disease) (HCC)     Dermatomyositis (HCC)     Elevated ferritin     Fibromyalgia     Hyperlipidemia     Hyperthyroidism     Pancreatitis     Sleep apnea     Supraventricular tachycardia ([facility] [address] 10/19/2016     Past Surgical History:   Procedure Laterality Date    CHOLECYSTECTOMY      COLONOSCOPY  10/27/2009    Dr Lashonda Valenzuela:  10 yr recall    HYSTERECTOMY      PACEMAKER PLACEMENT      UPPER GASTROINTESTINAL ENDOSCOPY  07/05/2005    Dr Lashonda Valenzuela       Current Outpatient Medications:     zolpidem (AMBIEN) 10 MG tablet, Take by mouth nightly as needed for Sleep., Disp: , Rfl:     levothyroxine (SYNTHROID) 25 MCG tablet, Take 1 tablet by mouth daily, Disp: 90 tablet, Rfl: 3    Budeson-Glycopyrrol-Formoterol (BREZTRI AEROSPHERE) 160-9-4.8 MCG/ACT AERO, Inhale 2 puffs into the lungs 2 times daily, Disp: 1 each, Rfl: 1    hydrocortisone (ANUSOL-HC) 25 MG suppository, Place 1 suppository rectally every 12 hours (Patient taking differently: Place 25 mg rectally 2 times daily as needed ), Disp: 30 suppository, Rfl: 0    clobetasol (TEMOVATE) 0.05 % ointment, Apply topically 2 times daily. , Disp: 1 Tube, Rfl: 1    budesonide-formoterol (SYMBICORT) 160-4.5 MCG/ACT AERO, Inhale 2 puffs into the lungs 2 times daily, Disp: 1 Inhaler, Rfl: 0    OXYGEN, Inhale into the lungs nightly, Disp: , Rfl:     TOPROL XL 25 MG extended release tablet, 25 mg 3 times daily , Disp: , Rfl:     KLOR-CON M10 10 MEQ extended release tablet, 10 mEq daily , Disp: , Rfl:     isosorbide mononitrate (IMDUR) 30 MG CR tablet, Take 30 mg by mouth daily , Disp: , Rfl:     albuterol sulfate HFA (PROVENTIL HFA) 108 (90 Base) MCG/ACT inhaler, Inhale 2 puffs into the lungs every 6 hours as needed for Wheezing, Disp: 3 Inhaler, Rfl: 3   Allergies: Allergies   Allergen Reactions    Latex     Augmentin [Amoxicillin-Pot Clavulanate]     Codeine     Fentanyl     Iodine     Versed [Midazolam]      Social History     Tobacco Use    Smoking status: Never Smoker    Smokeless tobacco: Never Used   Vaping Use    Vaping Use: Never used   Substance Use Topics    Alcohol use: No    Drug use: No     History reviewed. No pertinent family history. No family history of blood disorders or cancer.   Health Maintenance   Topic Date Due    DTaP/Tdap/Td vaccine (2 - Td or Tdap) 11/09/2022 (Originally 3/29/2020)    Flu vaccine (1) 11/09/2022 (Originally 9/1/2021)    Shingles Vaccine (1 of 2) 11/09/2022 (Originally 2/10/1984)    COVID-19 Vaccine (1) 11/09/2024 (Originally 2/10/1946)    Annual Wellness Visit (AWV)  08/10/2022    TSH testing  11/13/2022    Pneumococcal 65+ years Vaccine  Completed    Hepatitis A vaccine  Aged Out    Hepatitis B vaccine  Aged Out    Hib vaccine  Aged Out    Meningococcal (ACWY) vaccine  Aged Out     Subjective   Review of Systems   Constitutional: Positive for fatigue. Negative for fever. HENT: Negative for dental problem, hearing loss, mouth sores, nosebleeds, sore throat and trouble swallowing. Eyes: Negative for discharge and itching. Respiratory: Negative for cough, shortness of breath and wheezing. Cardiovascular: Negative for chest pain, palpitations and leg swelling. Gastrointestinal: Negative for abdominal pain, constipation, diarrhea, nausea and vomiting. Endocrine: Negative for cold intolerance and heat intolerance. Genitourinary: Negative for dysuria, frequency, hematuria and urgency. Musculoskeletal: Negative for arthralgias, joint swelling and myalgias. Skin: Negative for pallor and rash. Allergic/Immunologic: Negative for environmental allergies and immunocompromised state. Neurological: Positive for weakness (generalized ). Negative for seizures, syncope and numbness. Hematological: Negative for adenopathy. Does not bruise/bleed easily. Psychiatric/Behavioral: Negative for agitation, behavioral problems and confusion. The patient is not nervous/anxious. Objective   Physical Exam  Vitals reviewed. Constitutional:       General: She is not in acute distress. Appearance: She is well-developed. She is not toxic-appearing or diaphoretic. Comments: Wearing a facial mask. HENT:      Head: Normocephalic and atraumatic. Right Ear: External ear normal.      Left Ear: External ear normal.      Nose: Nose normal.      Mouth/Throat:      Mouth: Mucous membranes are moist.   Eyes:      General: No scleral icterus. Right eye: No discharge. Left eye: No discharge. Conjunctiva/sclera: Conjunctivae normal.   Neck:      Trachea: No tracheal deviation. Cardiovascular:      Rate and Rhythm: Normal rate and regular rhythm. Pulmonary:      Effort: Pulmonary effort is normal. No respiratory distress. Breath sounds: Normal breath sounds. No wheezing or rales. Chest:   Breasts:      Right: No supraclavicular adenopathy. Left: No supraclavicular adenopathy. Abdominal:      General: Bowel sounds are normal. There is no distension. Palpations: Abdomen is soft. Tenderness: There is no abdominal tenderness. There is no guarding. Genitourinary:     Comments: Exam deferred  Musculoskeletal:         General: No tenderness or deformity. Cervical back: Neck supple. No muscular tenderness. Comments: Normal ROM all four extremities   Lymphadenopathy:      Cervical:      Right cervical: No superficial or deep cervical adenopathy. Left cervical: No superficial or deep cervical adenopathy. Upper Body:      Right upper body: No supraclavicular adenopathy. Left upper body: No supraclavicular adenopathy. Comments:      Skin:     General: Skin is warm and dry. Findings: No rash. Neurological:      Mental Status: She is alert and oriented to person, place, and time. Motor: Weakness present. Comments: follows commands, non-focal. Using a straight cane   Psychiatric:         Behavior: Behavior normal. Behavior is cooperative. Thought Content: Thought content normal.         Judgment: Judgment normal.      Comments: Alert and oriented to person, place and time.        BP (!) 158/74   Pulse 91   Ht 5' 3\" (1.6 m)   Wt 146 lb (66.2 kg)   SpO2 95%   BMI 25.86 kg/m²   Wt Readings from Last 3 Encounters:   12/09/21 146 lb (66.2 kg)   11/19/21 153 lb (69.4 kg)   11/13/21 153 lb (69.4 kg)     Labs reviewed by me:  CBC 12/09/21:   Lab Results   Component Value Date    WBC 10.30 (H) 12/09/2021    HGB 10.6 (L) 12/09/2021    HCT 35.0 12/09/2021    MCV 98.9 (H) 12/09/2021     12/09/2021    LYMPHOPCT 31.0 12/09/2021    RBC 3.54 (L) 12/09/2021    MCH 29.9 12/09/2021    MCHC 30.3 (L) 12/09/2021    RDW 17.5 (H) 12/09/2021     ASSESSMENT/PLAN:    1. Anemia, unspecified type    2. Elevated ferritin    3. Occult blood positive stool    4. Generalized weakness       Normocytic anemia, likely multifactorial including hydration, acute illness, mild kidney insult (now resolved), possible GI blood loss  Hgb improved, 10.6, MCV 98.9  B12, folate normal  Iron studies were normal with elevated ferritin        Hyperferritinemia likely reactive from acute illness and will be repeated at this time  Additional serology requested:  Peripheral blood smear, sed rate, CRP, CK, ferritin  Currently under treatment for perirectal like Lichen sclerosis as well    Stool for occult blood positive  Rectal discomfort with known hemorrhoids  Schedule to see [facility]) Gastroenterology 12/10/2021    Transaminitis (associated with acute febrile illness), resolved      Orders Placed This Encounter   Procedures    Lactate Dehydrogenase    Haptoglobin    Miscellaneous Sendout 1    Sedimentation Rate    C-Reactive Protein    Ferritin    CK    Reticulocytes    Electrophoresis Protein, Serum with Reflex to Immunofixation    Stephan/Lambda Free Lt Chains, Serum Quant     Return in about 4 weeks (around 1/6/2022) for follow up with JAZMIN Smith. I have seen, examined and reviewed this patient medication list, appropriate labs and imaging studies. I reviewed relevant medical records and others physicians notes. I discussed the plan of care with the patient. I answered all questions to the patients satisfaction. I have also reviewed the chief complaint (CC) and part of the history (History of Present Illness (HPI), Past Family Social History [facility]), or Review of Systems (ROS) and made changes when appropriated. Dictated utilizing Dragon transcription software.         JAZMIN Jin  5:12 PM  12/9/2021

## 2021-12-09 ENCOUNTER — HOSPITAL ENCOUNTER (OUTPATIENT)
Dept: INFUSION THERAPY | Age: 86
Discharge: HOME | End: 2021-12-09
Payer: MEDICARE

## 2021-12-09 ENCOUNTER — OFFICE VISIT (OUTPATIENT)
Dept: HEMATOLOGY | Age: 86
End: 2021-12-09
Payer: MEDICARE

## 2021-12-09 VITALS
BODY MASS INDEX: 25.87 KG/M2 | WEIGHT: 146 LBS | HEIGHT: 63 IN | SYSTOLIC BLOOD PRESSURE: 158 MMHG | HEART RATE: 91 BPM | OXYGEN SATURATION: 95 % | DIASTOLIC BLOOD PRESSURE: 74 MMHG

## 2021-12-09 DIAGNOSIS — R53.1 GENERALIZED WEAKNESS: ICD-10-CM

## 2021-12-09 DIAGNOSIS — D64.9 ANEMIA, UNSPECIFIED TYPE: Primary | ICD-10-CM

## 2021-12-09 DIAGNOSIS — D64.9 ANEMIA, UNSPECIFIED TYPE: ICD-10-CM

## 2021-12-09 DIAGNOSIS — R79.89 ELEVATED FERRITIN: ICD-10-CM

## 2021-12-09 DIAGNOSIS — R19.5 OCCULT BLOOD POSITIVE STOOL: ICD-10-CM

## 2021-12-09 DIAGNOSIS — E55.9 VITAMIN D DEFICIENCY: ICD-10-CM

## 2021-12-09 LAB
BASOPHILS ABSOLUTE: 0.05 K/UL (ref 0.01–0.08)
BASOPHILS RELATIVE PERCENT: 0.5 % (ref 0.1–1.2)
EOSINOPHILS ABSOLUTE: 0.47 K/UL (ref 0.04–0.54)
EOSINOPHILS RELATIVE PERCENT: 4.6 % (ref 0.7–7)
FERRITIN: 388 NG/ML (ref 11.1–264)
HCT VFR BLD CALC: 35 % (ref 34.1–44.9)
HEMOGLOBIN: 10.6 G/DL (ref 11.2–15.7)
LACTATE DEHYDROGENASE: 535 U/L (ref 313–618)
LYMPHOCYTES ABSOLUTE: 3.19 K/UL (ref 1.18–3.74)
LYMPHOCYTES RELATIVE PERCENT: 31 % (ref 19.3–53.1)
MCH RBC QN AUTO: 29.9 PG (ref 25.6–32.2)
MCHC RBC AUTO-ENTMCNC: 30.3 G/DL (ref 32.3–35.5)
MCV RBC AUTO: 98.9 FL (ref 79.4–94.8)
MONOCYTES ABSOLUTE: 1.24 K/UL (ref 0.24–0.82)
MONOCYTES RELATIVE PERCENT: 12 % (ref 4.7–12.5)
NEUTROPHILS ABSOLUTE: 5.35 K/UL (ref 1.56–6.13)
NEUTROPHILS RELATIVE PERCENT: 51.9 % (ref 34–71.1)
PDW BLD-RTO: 17.5 % (ref 11.7–14.4)
PLATELET # BLD: 211 K/UL (ref 182–369)
PMV BLD AUTO: 10.9 FL (ref 7.4–10.4)
RBC # BLD: 3.54 M/UL (ref 3.93–5.22)
WBC # BLD: 10.3 K/UL (ref 3.98–10.04)

## 2021-12-09 PROCEDURE — 1090F PRES/ABSN URINE INCON ASSESS: CPT | Performed by: NURSE PRACTITIONER

## 2021-12-09 PROCEDURE — 4040F PNEUMOC VAC/ADMIN/RCVD: CPT | Performed by: NURSE PRACTITIONER

## 2021-12-09 PROCEDURE — G8484 FLU IMMUNIZE NO ADMIN: HCPCS | Performed by: NURSE PRACTITIONER

## 2021-12-09 PROCEDURE — G8427 DOCREV CUR MEDS BY ELIG CLIN: HCPCS | Performed by: NURSE PRACTITIONER

## 2021-12-09 PROCEDURE — 1036F TOBACCO NON-USER: CPT | Performed by: NURSE PRACTITIONER

## 2021-12-09 PROCEDURE — 85025 COMPLETE CBC W/AUTO DIFF WBC: CPT

## 2021-12-09 PROCEDURE — 99214 OFFICE O/P EST MOD 30 MIN: CPT | Performed by: NURSE PRACTITIONER

## 2021-12-09 PROCEDURE — 1123F ACP DISCUSS/DSCN MKR DOCD: CPT | Performed by: NURSE PRACTITIONER

## 2021-12-09 PROCEDURE — 83615 LACTATE (LD) (LDH) ENZYME: CPT

## 2021-12-09 PROCEDURE — 82728 ASSAY OF FERRITIN: CPT

## 2021-12-09 PROCEDURE — 36415 COLL VENOUS BLD VENIPUNCTURE: CPT

## 2021-12-09 PROCEDURE — G8417 CALC BMI ABV UP PARAM F/U: HCPCS | Performed by: NURSE PRACTITIONER

## 2021-12-09 PROCEDURE — 99212 OFFICE O/P EST SF 10 MIN: CPT

## 2021-12-09 RX ORDER — ZOLPIDEM TARTRATE 10 MG/1
TABLET ORAL NIGHTLY PRN
COMMUNITY
End: 2021-12-15

## 2021-12-09 ASSESSMENT — ENCOUNTER SYMPTOMS
SHORTNESS OF BREATH: 0
NAUSEA: 0
EYE DISCHARGE: 0
EYE ITCHING: 0
WHEEZING: 0
DIARRHEA: 0
TROUBLE SWALLOWING: 0
ABDOMINAL PAIN: 0
COUGH: 0
VOMITING: 0
CONSTIPATION: 0
SORE THROAT: 0

## 2021-12-10 ENCOUNTER — OFFICE VISIT (OUTPATIENT)
Dept: PRIMARY CARE CLINIC | Age: 86
End: 2021-12-10
Payer: MEDICARE

## 2021-12-10 VITALS
SYSTOLIC BLOOD PRESSURE: 138 MMHG | WEIGHT: 148 LBS | HEIGHT: 63 IN | DIASTOLIC BLOOD PRESSURE: 80 MMHG | TEMPERATURE: 97.7 F | HEART RATE: 73 BPM | OXYGEN SATURATION: 96 % | BODY MASS INDEX: 26.22 KG/M2

## 2021-12-10 DIAGNOSIS — R74.8 ELEVATED LIVER ENZYMES: ICD-10-CM

## 2021-12-10 DIAGNOSIS — D64.89 ANEMIA DUE TO OTHER CAUSE, NOT CLASSIFIED: ICD-10-CM

## 2021-12-10 DIAGNOSIS — R53.83 FATIGUE, UNSPECIFIED TYPE: ICD-10-CM

## 2021-12-10 DIAGNOSIS — R53.1 WEAKNESS: ICD-10-CM

## 2021-12-10 DIAGNOSIS — R53.81 PHYSICAL DECONDITIONING: Primary | ICD-10-CM

## 2021-12-10 DIAGNOSIS — R35.0 URINARY FREQUENCY: ICD-10-CM

## 2021-12-10 LAB
ALBUMIN SERPL-MCNC: 4.1 G/DL (ref 3.5–5.2)
ALP BLD-CCNC: 94 U/L (ref 35–104)
ALT SERPL-CCNC: 19 U/L (ref 5–33)
ANION GAP SERPL CALCULATED.3IONS-SCNC: 12 MMOL/L (ref 7–19)
AST SERPL-CCNC: 29 U/L (ref 5–32)
BILIRUB SERPL-MCNC: <0.2 MG/DL (ref 0.2–1.2)
BUN BLDV-MCNC: 15 MG/DL (ref 8–23)
CALCIUM SERPL-MCNC: 9.6 MG/DL (ref 8.8–10.2)
CHLORIDE BLD-SCNC: 101 MMOL/L (ref 98–111)
CO2: 27 MMOL/L (ref 22–29)
CREAT SERPL-MCNC: 1.1 MG/DL (ref 0.5–0.9)
GFR AFRICAN AMERICAN: 57
GFR NON-AFRICAN AMERICAN: 47
GLUCOSE BLD-MCNC: 131 MG/DL (ref 74–109)
POTASSIUM SERPL-SCNC: 4.6 MMOL/L (ref 3.5–5)
SODIUM BLD-SCNC: 140 MMOL/L (ref 136–145)
TOTAL PROTEIN: 6.8 G/DL (ref 6.6–8.7)

## 2021-12-10 PROCEDURE — G8427 DOCREV CUR MEDS BY ELIG CLIN: HCPCS | Performed by: NURSE PRACTITIONER

## 2021-12-10 PROCEDURE — 81002 URINALYSIS NONAUTO W/O SCOPE: CPT | Performed by: NURSE PRACTITIONER

## 2021-12-10 PROCEDURE — 1123F ACP DISCUSS/DSCN MKR DOCD: CPT | Performed by: NURSE PRACTITIONER

## 2021-12-10 PROCEDURE — G8484 FLU IMMUNIZE NO ADMIN: HCPCS | Performed by: NURSE PRACTITIONER

## 2021-12-10 PROCEDURE — 1036F TOBACCO NON-USER: CPT | Performed by: NURSE PRACTITIONER

## 2021-12-10 PROCEDURE — 4040F PNEUMOC VAC/ADMIN/RCVD: CPT | Performed by: NURSE PRACTITIONER

## 2021-12-10 PROCEDURE — G8417 CALC BMI ABV UP PARAM F/U: HCPCS | Performed by: NURSE PRACTITIONER

## 2021-12-10 PROCEDURE — 1090F PRES/ABSN URINE INCON ASSESS: CPT | Performed by: NURSE PRACTITIONER

## 2021-12-10 PROCEDURE — 99214 OFFICE O/P EST MOD 30 MIN: CPT | Performed by: NURSE PRACTITIONER

## 2021-12-10 NOTE — PROGRESS NOTES
[facility] PHYSICIAN SERVICES  [facility]  [address]  [address] [place] [address]  Dept: [phone]  Dept Fax: [phone]  Loc: [phone]    Kayla Martino is a 80 y.o. female who presents today for her medical conditions/complaints as noted below. Kayla Martino is c/o of Follow-up (3-month-f/u. Discuss recent lab results.  )        HPI:     HPI     This 55-year-old female presents today for 3-month follow-up. She states she is feeling much better. Today she is able to ambulate in on her own willpower without the use of her Rollator. Chief Complaint   Patient presents with    Follow-up     3-month-f/u. Discuss recent lab results. Past Medical History:   Diagnosis Date    Anemia     Asthma     CHF (congestive heart failure) (HCC)     COPD (chronic obstructive pulmonary disease) (HCC)     Dermatomyositis (HCC)     Elevated ferritin     Fibromyalgia     Hyperlipidemia     Hyperthyroidism     Pancreatitis     Sleep apnea     Supraventricular tachycardia ([facility] [address] 10/19/2016      Past Surgical History:   Procedure Laterality Date    CHOLECYSTECTOMY      COLONOSCOPY  10/27/2009    Dr Braydon Lynne:  10 yr recall    HYSTERECTOMY      PACEMAKER PLACEMENT      UPPER GASTROINTESTINAL ENDOSCOPY  07/05/2005    Dr Laurie Lemus 12/10/2021 12/9/2021 11/19/2021 11/13/2021 11/13/2021 87/66/4835   SYSTOLIC 589 315 [phone]   DIASTOLIC 80 74 68 63 55 92   Site - - Left Upper Arm - - -   Position - - Sitting - - -   Cuff Size - - Medium Adult - - -   Pulse 73 91 90 - - -   Temp 97.7 - 97.7 - - -   Resp - - - - - -   SpO2 96 95 96 94 93 95   Weight 148 lb 146 lb 153 lb - - -   Height 5' 3\" 5' 3\" 5' 3\" - - -   Body mass index 26.21 kg/m2 25.86 kg/m2 27.1 kg/m2 - - -   Some recent data might be hidden       No family history on file.     Social History     Tobacco Use    Smoking status: Never Smoker    Smokeless tobacco: Never Used   Substance Use Topics    Alcohol use: No      Current Outpatient Medications on File Prior to Visit   Medication Sig Dispense Refill    levothyroxine (SYNTHROID) 25 MCG tablet Take 1 tablet by mouth daily 90 tablet 3    hydrocortisone (ANUSOL-HC) 25 MG suppository Place 1 suppository rectally every 12 hours (Patient taking differently: Place 25 mg rectally 2 times daily as needed ) 30 suppository 0    clobetasol (TEMOVATE) 0.05 % ointment Apply topically 2 times daily. 1 Tube 1    budesonide-formoterol (SYMBICORT) 160-4.5 MCG/ACT AERO Inhale 2 puffs into the lungs 2 times daily 1 Inhaler 0    albuterol sulfate HFA (PROVENTIL HFA) 108 (90 Base) MCG/ACT inhaler Inhale 2 puffs into the lungs every 6 hours as needed for Wheezing 3 Inhaler 3    OXYGEN Inhale into the lungs nightly      TOPROL XL 25 MG extended release tablet 25 mg 3 times daily       KLOR-CON M10 10 MEQ extended release tablet 10 mEq daily       isosorbide mononitrate (IMDUR) 30 MG CR tablet Take 30 mg by mouth daily       zolpidem (AMBIEN) 10 MG tablet Take by mouth nightly as needed for Sleep. (Patient not taking: Reported on 12/10/2021)      Budeson-Glycopyrrol-Formoterol (BREZTRI AEROSPHERE) 160-9-4.8 MCG/ACT AERO Inhale 2 puffs into the lungs 2 times daily 1 each 1     No current facility-administered medications on file prior to visit.      Allergies   Allergen Reactions    Latex     Augmentin [Amoxicillin-Pot Clavulanate]     Codeine     Fentanyl     Iodine     Versed [Midazolam]        Health Maintenance   Topic Date Due    DTaP/Tdap/Td vaccine (2 - Td or Tdap) 11/09/2022 (Originally 3/29/2020)    Flu vaccine (1) 11/09/2022 (Originally 9/1/2021)    Shingles Vaccine (1 of 2) 11/09/2022 (Originally 2/10/1984)    COVID-19 Vaccine (1) 11/09/2024 (Originally 2/10/1946)    Annual Wellness Visit (AWV)  08/10/2022    TSH testing  11/13/2022    Pneumococcal 65+ years Vaccine  Completed    Hepatitis A vaccine  Aged Out    Hepatitis B vaccine  Aged Out    Hib vaccine Aged Out    Meningococcal (ACWY) vaccine  Aged Out       Subjective:      Review of Systems   Constitutional: Positive for fatigue. HENT: Negative. Eyes: Negative. Respiratory: Negative. Cardiovascular: Negative. Gastrointestinal: Negative. Endocrine: Negative. Genitourinary: Positive for dysuria and frequency. Musculoskeletal: Negative. Skin: Negative. Allergic/Immunologic: Negative. Neurological: Positive for dizziness and headaches. Hematological: Negative. Psychiatric/Behavioral: Negative. Objective:     Physical Exam  Vitals and nursing note reviewed. Constitutional:       General: She is not in acute distress. Appearance: Normal appearance. She is not ill-appearing or toxic-appearing. HENT:      Head: Normocephalic and atraumatic. Nose: Nose normal.      Mouth/Throat:      Mouth: Mucous membranes are moist.   Eyes:      Pupils: Pupils are equal, round, and reactive to light. Cardiovascular:      Rate and Rhythm: Normal rate and regular rhythm. Pulses: Normal pulses. Heart sounds: Normal heart sounds. Pulmonary:      Effort: Pulmonary effort is normal.      Breath sounds: Normal breath sounds. Abdominal:      General: Bowel sounds are normal. There is no distension. Palpations: Abdomen is soft. Tenderness: There is no abdominal tenderness. There is no right CVA tenderness, left CVA tenderness or guarding. Musculoskeletal:         General: Normal range of motion. Cervical back: Normal range of motion. Skin:     General: Skin is warm and dry. Coloration: Skin is pale. Skin is not jaundiced. Findings: No erythema or rash. Neurological:      Mental Status: She is alert and oriented to person, place, and time. Mental status is at baseline.    Psychiatric:         Mood and Affect: Mood normal.         Behavior: Behavior normal.       /80   Pulse 73   Temp 97.7 °F (36.5 °C)   Ht 5' 3\" (1.6 m)   Wt 148 lb (67.1 kg)   SpO2 96%   BMI 26.22 kg/m²     Assessment:       Diagnosis Orders   1. Physical deconditioning  [address] [place] [address]   2. Anemia due to other cause, not classified  [address]   4. Fatigue, unspecified type  [address] [place] [address]   5. Elevated liver enzymes  Comprehensive Metabolic Panel   6. Urinary frequency  POCT Urinalysis no Micro         Plan:   1.-4. Referral to home health for nursing, OT and PT. 5.  Repeat CMP today in office today. Lab Review   Lab Results   Component Value Date     12/10/2021     11/13/2021     11/09/2021    K 4.6 12/10/2021    K 4.0 11/13/2021    K 4.2 11/09/2021    K 4.0 11/02/2021    CO2 27 12/10/2021    CO2 29 11/13/2021    CO2 27 11/09/2021    BUN 15 12/10/2021    BUN 13 11/13/2021    BUN 17 11/09/2021    CREATININE 1.1 12/10/2021    CREATININE 0.9 11/13/2021    CREATININE 0.9 11/09/2021    GLUCOSE 131 12/10/2021    GLUCOSE 134 11/13/2021    GLUCOSE 133 11/09/2021    CALCIUM 9.6 12/10/2021    CALCIUM 10.2 11/13/2021    CALCIUM 9.8 11/09/2021     6. POCT urinalysis in office     Patient given educational materials -see patient instructions. Discussed use, benefit, and side effects of prescribed medications. All patient questions answered. Pt voiced understanding. Reviewed health maintenance. Instructed to continue currentmedications, diet and exercise. Patient agreed with treatment plan. Follow up as directed. MEDICATIONS:  No orders of the defined types were placed in this encounter. ORDERS:  Orders Placed This Encounter   Procedures    Comprehensive Metabolic Panel   [address] [place]    POCT Urinalysis no Micro       Follow-up:  Return if symptoms worsen or fail to improve. PATIENT INSTRUCTIONS:  There are no Patient Instructions on file for this visit.   Electronically signed by JAZMIN Medeiros NP on 12/13/2021 at 5:28 PM    EMR Dragon/transcription disclaimer:  Much of thisencounter note is electronic transcription/translation of spoken language to printed texts. The electronic translation of spoken language may be erroneous, or at times, nonsensical words or phrases may be inadvertentlytranscribed.   Although I have reviewed the note for such errors, some may still exist.

## 2021-12-13 DIAGNOSIS — F51.01 PRIMARY INSOMNIA: Primary | ICD-10-CM

## 2021-12-13 ASSESSMENT — ENCOUNTER SYMPTOMS
GASTROINTESTINAL NEGATIVE: 1
ALLERGIC/IMMUNOLOGIC NEGATIVE: 1
RESPIRATORY NEGATIVE: 1
EYES NEGATIVE: 1

## 2021-12-14 ENCOUNTER — TELEPHONE (OUTPATIENT)
Dept: PRIMARY CARE CLINIC | Age: 86
End: 2021-12-14

## 2021-12-14 NOTE — TELEPHONE ENCOUNTER
Tati from [facility] states that pt has declined HH. She states yesterday for the evaluationpt seemed apprehensive and today pt called and stated there was a gas leak in her home and was going to need to be evacuated then called a bit later and stated that the gas was shut ff and was going to use space heater, but she refused HH.

## 2021-12-14 NOTE — TELEPHONE ENCOUNTER
Provider needs to review PDMP    PDMP Monitoring:    Last PDMP Avis Wood as Reviewed [facility]):  Review User Review Instant Review Result          Urine Drug Screenings (1 yr)    No resulted procedures found.        Medication Contract and Consent for Opioid Use Documents Filed     Patient Documents     Type of Document Status Date Received Received By Description    Medication Contract Signed 1/8/2016  1:57 PM Kamaljit Harman

## 2021-12-15 RX ORDER — ZOLPIDEM TARTRATE 10 MG/1
5 TABLET ORAL NIGHTLY PRN
Qty: 30 TABLET | Refills: 0 | Status: SHIPPED | OUTPATIENT
Start: 2021-12-15 | End: 2022-01-14

## 2021-12-30 RX ORDER — ZOLPIDEM TARTRATE 10 MG/1
10 TABLET ORAL NIGHTLY PRN
Qty: 90 TABLET | Refills: 0 | Status: CANCELLED | OUTPATIENT
Start: 2021-12-30 | End: 2022-03-30

## 2022-01-13 ENCOUNTER — OFFICE VISIT (OUTPATIENT)
Dept: GASTROENTEROLOGY | Age: 87
End: 2022-01-13
Payer: MEDICARE

## 2022-01-13 ENCOUNTER — TELEPHONE (OUTPATIENT)
Dept: GASTROENTEROLOGY | Age: 87
End: 2022-01-13

## 2022-01-13 VITALS
DIASTOLIC BLOOD PRESSURE: 70 MMHG | HEIGHT: 63 IN | SYSTOLIC BLOOD PRESSURE: 139 MMHG | HEART RATE: 64 BPM | WEIGHT: 144 LBS | OXYGEN SATURATION: 99 % | BODY MASS INDEX: 25.52 KG/M2

## 2022-01-13 DIAGNOSIS — R79.89 ELEVATED FERRITIN LEVEL: ICD-10-CM

## 2022-01-13 DIAGNOSIS — D64.9 ANEMIA, UNSPECIFIED TYPE: Primary | ICD-10-CM

## 2022-01-13 DIAGNOSIS — D64.9 ANEMIA, UNSPECIFIED TYPE: ICD-10-CM

## 2022-01-13 DIAGNOSIS — R74.01 TRANSAMINITIS: ICD-10-CM

## 2022-01-13 LAB
ALBUMIN SERPL-MCNC: 4.4 G/DL (ref 3.5–5.2)
ALP BLD-CCNC: 116 U/L (ref 35–104)
ALT SERPL-CCNC: 15 U/L (ref 5–33)
AST SERPL-CCNC: 22 U/L (ref 5–32)
BILIRUB SERPL-MCNC: <0.2 MG/DL (ref 0.2–1.2)
BILIRUBIN DIRECT: 0.1 MG/DL (ref 0–0.3)
BILIRUBIN, INDIRECT: 0.1 MG/DL (ref 0.1–1)
FERRITIN: 357.6 NG/ML (ref 13–150)
HCT VFR BLD CALC: 38.8 % (ref 37–47)
HEMOGLOBIN: 11.6 G/DL (ref 12–16)
MCH RBC QN AUTO: 28.3 PG (ref 27–31)
MCHC RBC AUTO-ENTMCNC: 29.9 G/DL (ref 33–37)
MCV RBC AUTO: 94.6 FL (ref 81–99)
PDW BLD-RTO: 15.7 % (ref 11.5–14.5)
PLATELET # BLD: 264 K/UL (ref 130–400)
PMV BLD AUTO: 10.2 FL (ref 9.4–12.3)
RBC # BLD: 4.1 M/UL (ref 4.2–5.4)
TOTAL PROTEIN: 7 G/DL (ref 6.6–8.7)
WBC # BLD: 8.3 K/UL (ref 4.8–10.8)

## 2022-01-13 PROCEDURE — G8484 FLU IMMUNIZE NO ADMIN: HCPCS | Performed by: NURSE PRACTITIONER

## 2022-01-13 PROCEDURE — 1123F ACP DISCUSS/DSCN MKR DOCD: CPT | Performed by: NURSE PRACTITIONER

## 2022-01-13 PROCEDURE — 1090F PRES/ABSN URINE INCON ASSESS: CPT | Performed by: NURSE PRACTITIONER

## 2022-01-13 PROCEDURE — 1036F TOBACCO NON-USER: CPT | Performed by: NURSE PRACTITIONER

## 2022-01-13 PROCEDURE — 4040F PNEUMOC VAC/ADMIN/RCVD: CPT | Performed by: NURSE PRACTITIONER

## 2022-01-13 PROCEDURE — G8427 DOCREV CUR MEDS BY ELIG CLIN: HCPCS | Performed by: NURSE PRACTITIONER

## 2022-01-13 PROCEDURE — 99203 OFFICE O/P NEW LOW 30 MIN: CPT | Performed by: NURSE PRACTITIONER

## 2022-01-13 PROCEDURE — G8417 CALC BMI ABV UP PARAM F/U: HCPCS | Performed by: NURSE PRACTITIONER

## 2022-01-13 RX ORDER — HYDROCHLOROTHIAZIDE 12.5 MG/1
12.5 CAPSULE, GELATIN COATED ORAL PRN
COMMUNITY

## 2022-01-13 ASSESSMENT — ENCOUNTER SYMPTOMS
RECTAL PAIN: 0
DIARRHEA: 0
BACK PAIN: 0
CONSTIPATION: 1
VOICE CHANGE: 0
ABDOMINAL DISTENTION: 0
TROUBLE SWALLOWING: 0
VOMITING: 0
ABDOMINAL PAIN: 0
SHORTNESS OF BREATH: 0
SORE THROAT: 0
COUGH: 0
ANAL BLEEDING: 0
BLOOD IN STOOL: 0
NAUSEA: 0

## 2022-01-13 NOTE — TELEPHONE ENCOUNTER
Please let Jimmy Escobedo know I have reviewed her labs. Her hemoglobin is still improving. It was 10.2 about a month ago, it is now 11.6. Its still little low, normal is >12, however it moving in the right direction. So this is good. Does she want to proceed with EGD, if so put her in the schedule for this please. Her ferrritin has decreased slighly since last lab. It is now 357. Rosa l is <150. I ordered a Hemochromatosis mutation test for evaluation of this. Will call her with results when this is available    Her liver enzymes (ALT/ALT ) are still normal.  Great news.

## 2022-01-13 NOTE — PROGRESS NOTES
Subjective:      Milton Grier is a80 y.o. female  Chief Complaint   Patient [facility]   [facility]       HPI  PCP: JAZMIN Guajardo - NP  Referring Provider: JAZMIN Houston - *  New pt referral  For anemia, transaminatitis, and elevated ferritin    Anemia  Labs 12/2021 note H/H of 10.6/35  She has been mildly anemic with labs dating back to 10/2021. Pt denies symptoms suggestive of GI blood loss (melena, hematemesis, brbrp)    Transaminitis. Labs 12/2021 note normal ALT, AST, alk phos and total bili  Labs 11/2021 notes ALT of 130, AST of 131, alk phos 138, total bili normal.  Pt reports she was on antibiotics and thinks she had covid when her liver enzymes were elevated. Had a CT abd/pelvis 11/2021 that revealed a normal appearing liver. Elevated ferritin  Labs 12/2021 notes ferritin level of 388 (<150)  Ferritin level 11/2021 was 1720. Reports this is a new finding  And being worked up by [facility] hematology    Reports esophageal burning at rare times  With trigger foods  Nothing regular or persistent. Has constipation  Well controlled with miralax daily. Reports weight loss. With decreased appetite  28 pound over 6 months  Reports she was sick with possible covid in Oct, Nov, Dec 2021. Had some decreased appetite prior to this but it was exaserbated when she was sick. We discussed EGD and colonoscopy. She defers a colonoscopy. And is aware of the potential risk of undetected colonic malignancy. Wants to get labs and decide if she wants to persue egd. Agrees to have labs. Had a CT abd /pelvis 11/2021 that was unrevealing from GI standpoint. Family HX:    Pt denies family hx of colon polyps, colon CA, inflammatory bowel dx, gastric CA and esophageal CA.     Past Medical History:   Diagnosis Date    Anemia     Asthma     CHF (congestive heart failure) (HCC)     COPD (chronic obstructive pulmonary disease) (HCC)     Dermatomyositis (HCC)     Elevated ferritin     Fibromyalgia     Hyperlipidemia     Hyperthyroidism     Pancreatitis     Sleep apnea     Supraventricular tachycardia ([facility] [address] 10/19/2016          Past Surgical History:   Procedure Laterality Date    CHOLECYSTECTOMY      COLONOSCOPY  10/27/2009    Dr Banda Zaki:  10 yr recall    HYSTERECTOMY      PACEMAKER PLACEMENT      UPPER GASTROINTESTINAL ENDOSCOPY  07/05/2005    Dr Negra Smith History     Socioeconomic History    Marital status:      Spouse name: None    Number of children: None    Years of education: None    Highest education level: None   Occupational History    None   Tobacco Use    Smoking status: Never Smoker    Smokeless tobacco: Never Used   Vaping Use    Vaping Use: Never used   Substance and Sexual Activity    Alcohol use: No    Drug use: No    Sexual activity: None   Other Topics Concern    None   Social History Narrative    None     Social Determinants of Health     Financial Resource Strain:     Difficulty of Paying Living Expenses: Not on file   Food Insecurity:     Worried About Running Out of Food in the Last Year: Not on file    Kenny of Food in the Last Year: Not on file   Transportation Needs:     Lack of Transportation (Medical): Not on file    Lack of Transportation (Non-Medical):  Not on file   Physical Activity:     Days of Exercise per Week: Not on file    Minutes of Exercise per Session: Not on file   Stress:     Feeling of Stress : Not on file   Social Connections:     Frequency of Communication with Friends and Family: Not on file    Frequency of Social Gatherings with Friends and Family: Not on file    Attends Latter-day Services: Not on file    Active Member of Clubs or Organizations: Not on file    Attends Club or Organization Meetings: Not on file    Marital Status: Not on file   Intimate Partner Violence:     Fear of Current or Ex-Partner: Not on file    Emotionally Abused: Not on file    Physically Abused: Not on file   Alexandre Dela Cruz Sexually Abused: Not on file   Housing Stability:     Unable to Pay for Housing in the Last Year: Not on file    Number of Places Lived in the Last Year: Not on file    Unstable Housing in the Last Year: Not on file       Allergies   Allergen Reactions    Latex     Augmentin [Amoxicillin-Pot Clavulanate]     Codeine     Fentanyl     Iodine     Versed [Midazolam]        Current Outpatient Medications   Medication Sig Dispense Refill    hydroCHLOROthiazide (MICROZIDE) 12.5 MG capsule Take 12.5 mg by mouth daily      zolpidem (AMBIEN) 10 MG tablet Take 0.5 tablets by mouth nightly as needed for Sleep for up to 30 days. 30 tablet 0    levothyroxine (SYNTHROID) 25 MCG tablet Take 1 tablet by mouth daily 90 tablet 3    hydrocortisone (ANUSOL-HC) 25 MG suppository Place 1 suppository rectally every 12 hours (Patient taking differently: Place 25 mg rectally 2 times daily as needed ) 30 suppository 0    clobetasol (TEMOVATE) 0.05 % ointment Apply topically 2 times daily. 1 Tube 1    budesonide-formoterol (SYMBICORT) 160-4.5 MCG/ACT AERO Inhale 2 puffs into the lungs 2 times daily 1 Inhaler 0    albuterol sulfate HFA (PROVENTIL HFA) 108 (90 Base) MCG/ACT inhaler Inhale 2 puffs into the lungs every 6 hours as needed for Wheezing 3 Inhaler 3    OXYGEN Inhale into the lungs nightly      TOPROL XL 25 MG extended release tablet 25 mg 3 times daily       KLOR-CON M10 10 MEQ extended release tablet 10 mEq daily       isosorbide mononitrate (IMDUR) 30 MG CR tablet Take 30 mg by mouth daily        No current facility-administered medications for this visit. Review of Systems   Constitutional: Positive for appetite change and unexpected weight change. Negative for fatigue and fever. HENT: Negative for sore throat, trouble swallowing and voice change. Respiratory: Negative for cough and shortness of breath. Cardiovascular: Negative for chest pain, palpitations and leg swelling.    Gastrointestinal: Positive for constipation. Negative for abdominal distention, abdominal pain, anal bleeding, blood in stool, diarrhea, nausea, rectal pain and vomiting. Genitourinary: Negative for hematuria. Musculoskeletal: Negative for arthralgias, back pain and neck pain. Neurological: Negative for dizziness, weakness, light-headedness and headaches. Psychiatric/Behavioral: Negative for dysphoric mood and sleep disturbance. The patient is not nervous/anxious. All other systems reviewed and are negative. Objective:     Physical Exam  Vitals and nursing note reviewed. Constitutional:       Appearance: She is well-developed. Comments: /70   Pulse 64   Ht 5' 3\" (1.6 m)   Wt 144 lb (65.3 kg)   SpO2 99%   BMI 25.51 kg/m²    Eyes:      General: No scleral icterus. Conjunctiva/sclera: Conjunctivae normal.      Pupils: Pupils are equal, round, and reactive to light. Neck:      Thyroid: No thyromegaly. Cardiovascular:      Rate and Rhythm: Normal rate and regular rhythm. Heart sounds: Normal heart sounds. No murmur heard. No friction rub. No gallop. Pulmonary:      Effort: Pulmonary effort is normal. No respiratory distress. Breath sounds: Normal breath sounds. Abdominal:      General: Bowel sounds are normal. There is no distension. Palpations: Abdomen is soft. Tenderness: There is no abdominal tenderness. There is no rebound. Musculoskeletal:         General: No deformity. Normal range of motion. Cervical back: Normal range of motion and neck supple. Neurological:      Mental Status: She is alert and oriented to person, place, and time. Cranial Nerves: No cranial nerve deficit. Psychiatric:         Judgment: Judgment normal.           Assessment:       Diagnosis Orders   1. Anemia, unspecified type  CBC   2. Elevated ferritin level  Ferritin    Hemochromatosis mutation   3. Transaminitis  Hepatic Function Panel         Plan:      1. Labs today.  Will call her with results and she will let us know if she wishes to proceed with EGD. She has deferred colonoscopy. 2. Advised pt to use pepcid 20mg daily or prn esophageal burning.

## 2022-01-22 LAB
C282Y HEMOCHROMATOSIS MUT: NEGATIVE
H63D HEMOCHROMATOSIS MUT: NORMAL
HEMOCHROMATOSIS GENE ANALYSIS: NORMAL
HFE PCR SPECIMEN: NORMAL
S65C HEMOCHROMATOSIS MUT: NEGATIVE

## 2022-01-25 NOTE — TELEPHONE ENCOUNTER
Please let Shahrzad Medeiros know she does not have hereditary hemochromatosis.  So she should keep f/u with hematology for further evaluation of this elevated ferritin, thanks

## 2022-02-01 ENCOUNTER — OFFICE VISIT (OUTPATIENT)
Dept: PRIMARY CARE CLINIC | Age: 87
End: 2022-02-01
Payer: MEDICARE

## 2022-02-01 VITALS
TEMPERATURE: 98.2 F | SYSTOLIC BLOOD PRESSURE: 126 MMHG | OXYGEN SATURATION: 99 % | DIASTOLIC BLOOD PRESSURE: 72 MMHG | BODY MASS INDEX: 25.52 KG/M2 | HEIGHT: 63 IN | WEIGHT: 144 LBS | HEART RATE: 75 BPM

## 2022-02-01 DIAGNOSIS — J44.1 COPD WITH ACUTE EXACERBATION (HCC): ICD-10-CM

## 2022-02-01 DIAGNOSIS — M33.90 DERMATOMYOSITIS (HCC): ICD-10-CM

## 2022-02-01 DIAGNOSIS — R43.2 ALTERED TASTE: ICD-10-CM

## 2022-02-01 DIAGNOSIS — R53.83 FATIGUE, UNSPECIFIED TYPE: ICD-10-CM

## 2022-02-01 DIAGNOSIS — R05.9 COUGH: Primary | ICD-10-CM

## 2022-02-01 DIAGNOSIS — I50.9 HEART FAILURE, UNSPECIFIED HF CHRONICITY, UNSPECIFIED HEART FAILURE TYPE (HCC): ICD-10-CM

## 2022-02-01 DIAGNOSIS — I48.0 PAROXYSMAL ATRIAL FIBRILLATION (HCC): ICD-10-CM

## 2022-02-01 DIAGNOSIS — R52 BODY ACHES: ICD-10-CM

## 2022-02-01 LAB
INFLUENZA A ANTIBODY: NEGATIVE
INFLUENZA B ANTIBODY: NEGATIVE
SARS-COV-2, PCR: DETECTED

## 2022-02-01 PROCEDURE — G8417 CALC BMI ABV UP PARAM F/U: HCPCS | Performed by: NURSE PRACTITIONER

## 2022-02-01 PROCEDURE — 1036F TOBACCO NON-USER: CPT | Performed by: NURSE PRACTITIONER

## 2022-02-01 PROCEDURE — G8427 DOCREV CUR MEDS BY ELIG CLIN: HCPCS | Performed by: NURSE PRACTITIONER

## 2022-02-01 PROCEDURE — 4040F PNEUMOC VAC/ADMIN/RCVD: CPT | Performed by: NURSE PRACTITIONER

## 2022-02-01 PROCEDURE — 3023F SPIROM DOC REV: CPT | Performed by: NURSE PRACTITIONER

## 2022-02-01 PROCEDURE — 1123F ACP DISCUSS/DSCN MKR DOCD: CPT | Performed by: NURSE PRACTITIONER

## 2022-02-01 PROCEDURE — 1090F PRES/ABSN URINE INCON ASSESS: CPT | Performed by: NURSE PRACTITIONER

## 2022-02-01 PROCEDURE — 99214 OFFICE O/P EST MOD 30 MIN: CPT | Performed by: NURSE PRACTITIONER

## 2022-02-01 PROCEDURE — 87804 INFLUENZA ASSAY W/OPTIC: CPT | Performed by: NURSE PRACTITIONER

## 2022-02-01 PROCEDURE — G8484 FLU IMMUNIZE NO ADMIN: HCPCS | Performed by: NURSE PRACTITIONER

## 2022-02-01 RX ORDER — SUCRALFATE ORAL 1 G/10ML
1 SUSPENSION ORAL 4 TIMES DAILY
Qty: 1200 ML | Refills: 3 | Status: SHIPPED | OUTPATIENT
Start: 2022-02-01

## 2022-02-01 ASSESSMENT — ENCOUNTER SYMPTOMS
ALLERGIC/IMMUNOLOGIC NEGATIVE: 1
EYES NEGATIVE: 1
COUGH: 1
GASTROINTESTINAL NEGATIVE: 1

## 2022-02-01 NOTE — PATIENT INSTRUCTIONS
Covid Supportive Treatments    Zinc 250 mg daily for 5 days and then decrease to 50 mg a day. Vitamin C 1000 mg a day. Vitamin D 2869-5977 mcg a day. Aspirin 325 mg a day. Daily antihistamine of choice ( Claritin, Allegra, or Zyrtec)  Pepcid daily. Tylenol for aches, pain and fever. Your provider may also send in prescriptions for symptom specific treatment  our provider may also send in prescriptions for symptom specific treatment. Covid is a viral pneumonia and some antibiotics will not help this. Mucinex or delsym for chest congestion. Check oxygen saturation regularly and if below 90% go to the ER. You may qualify for home oxygen and breathing treatments if your oxygen is staying around 90%. According to current [place] guidelines if you have tested positive for COVID and have symptoms you are to isolate for 10 days from the day the symptoms began. If you tested positive for COVID and have never had symptoms you must isolate for 5 days from the day of your test.  If you are not fully vaccinated or booster eligible but not yet boostered and have been in close contact with someone diagnosed with COVID quarantine from 10 days from the exposure. This may be shortened to 5 days if you have no symptoms and tested negative for COVID on day 5    Viral syndrome   Many illnesses are caused by viruses. These conditions usually run their course in 7-14 days. Antibiotics do not help fight viral infections and are not needed at this time. Viral syndromes are treated with symptomatic support. You may take tylenol or ibuprofen for fever or aches and pains. Stay hydrated by taking sips of water or non caffeinated, noncarbonated, and nonalcoholic beverages throughout the day. For sore throat, you may gargle with warm salt water, use lozenges or sprays. Using a daily antihistamine such as Claritin, Zyrtec or Allegra can help with upper respiratory symptoms.  Benadryl can be sedating but is helpful at drying secretions and may be taken at night. Call if you have a fever greater than 102 F or if symptoms do not improve. Your provider may also send you in prescription medications depending on your symptoms and their severity. Take all medications as directed on package unless specifically told otherwise. Patient Education        Coronavirus ([id number]): Care Instructions  Overview  The coronavirus disease (COVID-19) is caused by a virus. Symptoms may include a fever, a cough, and shortness of breath. It can spread through droplets from coughing and sneezing, breathing, and singing. The virus also can spread when people are in close contact with someone who is infected. Most people have mild symptoms and can take care of themselves at home. If their symptoms get worse, they may need care in a hospital. Treatment may include medicines to reduce symptoms, plus breathing support such as oxygen therapy or a ventilator. It's important to not spread the virus to others. If you have COVID-19, wear a mask anytime you are around other people. It can help stop the spread of the virus. You need to isolate yourself while you are sick. Leave your home only if you need to get medical care or testing. Follow-up care is a key part of your treatment and safety. Be sure to make and go to all appointments, and call your doctor if you are having problems. It's also a good idea to know your test results and keep a list of the medicines you take. How can you care for yourself at home? · Get extra rest. It can help you feel better. · Drink plenty of fluids. This helps replace fluids lost from fever. Fluids may also help ease a scratchy throat. · You can take acetaminophen (Tylenol) or ibuprofen (Advil, Motrin) to reduce a fever. It may also help with muscle and body aches. Read and follow all instructions on the label. · Use petroleum jelly on sore skin.  This can help if the skin around your nose and lips becomes sore from rubbing a lot with tissues. If you use oxygen, use a water-based product instead of petroleum jelly. · Keep track of symptoms such as fever and shortness of breath. This can help you know if you need to call your doctor. It can also help you know when it's safe to be around other people. · In some cases, your doctor might suggest that you get a pulse oximeter. How can you self-isolate when you have COVID-19? If you have COVID-19, there are things you can do to help avoid spreading the virus to others. · Limit contact with people in your home. If possible, stay in a separate bedroom and use a separate bathroom. · Wear a mask when you are around other people. · If you have to leave home, avoid crowds and try to stay at least 6 feet away from other people. · Avoid contact with pets and other animals. · Cover your mouth and nose with a tissue when you cough or sneeze. Then throw it in the trash right away. · Wash your hands often, especially after you cough or sneeze. Use soap and water, and scrub for at least 20 seconds. If soap and water aren't available, use an alcohol-based hand . · Don't share personal household items. These include bedding, towels, cups and glasses, and eating utensils. · [address] in the warmest water allowed for the fabric type, and dry it completely. It's okay to wash other people's laundry with yours. · Clean and disinfect your home. Use household  and disinfectant wipes or sprays. When can you end self-isolation for COVID-19? If you know or think that you have the virus, you will need to self-isolate. You can be around others after:  · It's been at least 10 days since your symptoms started and  · You haven't had a fever for 24 hours without taking medicines to lower the fever and  · Your symptoms are improving. If you tested positive but have no symptoms, you can end isolation after 10 days. But if you start to have symptoms, follow the steps above.   Ask your doctor if you need to be tested before you end isolation. This is especially important if you have a weakened immune system. When should you call for help? Call 911 anytime you think you may need emergency care. For example, call if you have life-threatening symptoms, such as:    · You have severe trouble breathing. (You can't talk at all.)     · You have constant chest pain or pressure.     · You are severely dizzy or lightheaded.     · You are confused or can't think clearly.     · You have pale, gray, or blue-colored skin or lips.     · You pass out (lose consciousness) or are very hard to wake up. Call your doctor now or seek immediate medical care if:    · You have moderate trouble breathing. (You can't speak a full sentence.)     · You are coughing up blood (more than about 1 teaspoon).     · You have signs of low blood pressure. These include feeling lightheaded; being too weak to stand; and having cold, pale, clammy skin. Watch closely for changes in your health, and be sure to contact your doctor if:    · Your symptoms get worse.     · You are not getting better as expected.     · You have new or worse symptoms of anxiety, depression, nightmares, or flashbacks. Call before you go to the doctor's office. Follow their instructions. And wear a mask. Current as of: July 1, 2021               Content Version: 13.1  © 9169-5818 [employer]. Care instructions adapted under license by [facility] ([facility]). If you have questions about a medical condition or this instruction, always ask your healthcare professional. [place] [address] any warranty or liability for your use of this information. Patient Education        10 Things to Do When You Have COVID-19      Stay home. Don't go to school, work, or public areas. And don't use public transportation, ride-shares, or taxis unless you have no choice. Leave your home only if you need to get medical care.  But call the doctor's office first so they know you're coming. And wear a mask. Ask before leaving isolation. Follow your doctor's advice about when it is safe for you to leave isolation. Wear a mask when you are around other people. It can help stop the spread of the virus. Limit contact with people in your home. If possible, stay in a separate bedroom and use a separate bathroom. Avoid contact with pets and other animals. If possible, have a friend or family member care for them while you're sick. Cover your mouth and nose with a tissue when you cough or sneeze. Then throw the tissue in the trash right away. Wash your hands often, especially after you cough or sneeze. Use soap and water, and scrub for at least 20 seconds. If soap and water aren't available, use an alcohol-based hand . Don't share personal household items. These include bedding, towels, cups and glasses, and eating utensils. Clean and disinfect your home every day. Use household  or disinfectant wipes or sprays. If needed, take acetaminophen (Tylenol) or ibuprofen (Advil, Motrin) to relieve fever and body aches. Read and follow all instructions on the label. Current as of: March 26, 2021               Content Version: 13.1  © 2006-2021 [employer]. Care instructions adapted under license by [facility] ([facility]). If you have questions about a medical condition or this instruction, always ask your healthcare professional. [place] [address] any warranty or liability for your use of this information.

## 2022-02-01 NOTE — PROGRESS NOTES
[facility] PHYSICIAN SERVICES  [facility]  [address] [place] [address]  [address] [place] [address]  Dept: [phone]  Dept Fax: [phone]  Loc: [phone]    Urszula Harris is a 80 y.o. female who presents today for her medical conditions/complaints as noted below. Urszula Harris is c/o of Fatigue (Symptoms started in October.), Taste Change, Generalized Body Aches, Cough, Congestion, and Chills        HPI:     HPI     This 80-year-old female presents today for fatigue, general body aches, cough, congestion and chills. She also reports a horrible taste in her mouth. She took at home Covid test which was positive. She has not had a Covid vaccines. She is concerned because she was recently underwent hospitalization with infirmity and continues to be anemic and very weak. Fatigue started back in October due to that illness. She states that these new cold-like symptoms started 2 days ago. Chief Complaint   Patient presents with    Fatigue     Symptoms started in October.     Taste Change    Generalized Body Aches    Cough    Congestion    Chills     Past Medical History:   Diagnosis Date    Anemia     Asthma     CHF (congestive heart failure) (HCC)     COPD (chronic obstructive pulmonary disease) (HCC)     Dermatomyositis (HCC)     Elevated ferritin     Fibromyalgia     Hyperlipidemia     Hyperthyroidism     Pancreatitis     Sleep apnea     Supraventricular tachycardia ([facility] [address] 10/19/2016      Past Surgical History:   Procedure Laterality Date    CHOLECYSTECTOMY      COLONOSCOPY  10/27/2009    Dr Hunter Caesar:  10 yr recall    HYSTERECTOMY      PACEMAKER PLACEMENT      UPPER GASTROINTESTINAL ENDOSCOPY  07/05/2005    Dr Ruelas Conception 2/1/2022 1/13/2022 12/10/2021 12/9/2021 11/19/2021 16/24/4985   SYSTOLIC 952 [phone] 345   DIASTOLIC 72 70 80 74 68 63   Site Left Upper Arm - - - Left Upper Arm -   Position Sitting - - - Sitting -   Cuff Size Medium Adult - - - Medium Adult -   Pulse 75 64 73 91 90 -   Temp 98.2 - 97.7 - 97.7 -   Resp - - - - - -   SpO2 99 99 96 95 96 94   Weight 144 lb 144 lb 148 lb 146 lb 153 lb -   Height 5' 3\" 5' 3\" 5' 3\" 5' 3\" 5' 3\" -   Body mass index 25.51 kg/m2 25.51 kg/m2 26.21 kg/m2 25.86 kg/m2 27.1 kg/m2 -   Some recent data might be hidden       Family History   Problem Relation Age of Onset    Colon Cancer Neg Hx     Colon Polyps Neg Hx        Social History     Tobacco Use    Smoking status: Never Smoker    Smokeless tobacco: Never Used   Substance Use Topics    Alcohol use: No      Current Outpatient Medications on File Prior to Visit   Medication Sig Dispense Refill    hydroCHLOROthiazide (MICROZIDE) 12.5 MG capsule Take 12.5 mg by mouth daily      levothyroxine (SYNTHROID) 25 MCG tablet Take 1 tablet by mouth daily 90 tablet 3    hydrocortisone (ANUSOL-HC) 25 MG suppository Place 1 suppository rectally every 12 hours (Patient taking differently: Place 25 mg rectally 2 times daily as needed ) 30 suppository 0    clobetasol (TEMOVATE) 0.05 % ointment Apply topically 2 times daily. 1 Tube 1    budesonide-formoterol (SYMBICORT) 160-4.5 MCG/ACT AERO Inhale 2 puffs into the lungs 2 times daily 1 Inhaler 0    OXYGEN Inhale into the lungs nightly      TOPROL XL 25 MG extended release tablet 25 mg 3 times daily       KLOR-CON M10 10 MEQ extended release tablet 10 mEq daily       isosorbide mononitrate (IMDUR) 30 MG CR tablet Take 30 mg by mouth daily       albuterol sulfate HFA (PROVENTIL HFA) 108 (90 Base) MCG/ACT inhaler Inhale 2 puffs into the lungs every 6 hours as needed for Wheezing 3 Inhaler 3     No current facility-administered medications on file prior to visit.      Allergies   Allergen Reactions    Latex     Augmentin [Amoxicillin-Pot Clavulanate]     Codeine     Fentanyl     Iodine     Versed [Midazolam]        Health Maintenance   Topic Date Due    DTaP/Tdap/Td vaccine (2 - Td or Tdap) 11/09/2022 (Originally 3/29/2020) distress. Breath sounds: Normal breath sounds. No wheezing, rhonchi or rales. Abdominal:      General: Bowel sounds are normal.      Palpations: Abdomen is soft. Musculoskeletal:         General: Normal range of motion. Cervical back: Normal range of motion and neck supple. No rigidity. Lymphadenopathy:      Cervical: No cervical adenopathy. Skin:     General: Skin is warm and dry. Coloration: Skin is pale. Skin is not jaundiced. Findings: No erythema or rash. Neurological:      Mental Status: She is alert and oriented to person, place, and time. Mental status is at baseline. Psychiatric:         Mood and Affect: Mood normal.         Behavior: Behavior normal.       /72 (Site: Left Upper Arm, Position: Sitting, Cuff Size: Medium Adult)   Pulse 75   Temp 98.2 °F (36.8 °C)   Ht 5' 3\" (1.6 m)   Wt 144 lb (65.3 kg)   SpO2 99%   BMI 25.51 kg/m²     Assessment:       Diagnosis Orders   1. Cough  COVID-19    POCT Influenza A/B   2. Altered taste     3. Body aches  COVID-19    POCT Influenza A/B   4. Fatigue, unspecified type  COVID-19    POCT Influenza A/B   5. COPD with acute exacerbation ([facility] [address]     6. Heart failure, unspecified HF chronicity, unspecified heart failure type (HCC)     7. Paroxysmal atrial fibrillation ([facility] [address]     8. Dermatomyositis ([facility] [address]           Plan:   1.-4. Covid testing in office today. Please continue to self quarantine for 10 days from onset of symptoms if Covid is positive       Covid Supportive Treatments    Zinc 250 mg daily for 5 days and then decrease to 50 mg a day. Vitamin C 1000 mg a day. Vitamin D 5368-3281 mcg a day. Aspirin 325 mg a day. Daily antihistamine of choice ( Claritin, Allegra, or Zyrtec)  Pepcid daily. Tylenol for aches, pain and fever. Your provider may also send in prescriptions for symptom specific treatment  our provider may also send in prescriptions for symptom specific treatment.   Covid is a viral pneumonia and some antibiotics will not help this. Mucinex or delsym for chest congestion. Check oxygen saturation regularly and if below 90% go to the ER. You may qualify for home oxygen and breathing treatments if your oxygen is staying around 90%. According to current [place] guidelines if you have tested positive for COVID and have symptoms you are to isolate for 10 days from the day the symptoms began. If you tested positive for COVID and have never had symptoms you must isolate for 5 days from the day of your test.  If you are not fully vaccinated or booster eligible but not yet boostered and have been in close contact with someone diagnosed with COVID quarantine from 10 days from the exposure. This may be shortened to 5 days if you have no symptoms and tested negative for COVID on day 5    Carafate suspension take first thing when you wake up and then 30 minutes before lunch 30 minutes before supper and at bedtime. This is to treat GI irritation and gastritis prior to any oral supplements or treatments for COVID. 5.  Chronic COPD. Stable   pt has Home O2 as needed. She uses 2.5 L via NC per Dr. Kristen Arredondo  symbicort three times a day  Been seen by Dr. Radha Hoang    6.-7. .-. Chronic cardiovascular disease to include heart failure and chronic A. fib with pacemaker. Stable   followed by Dr. Kristen Arredondo for pacemaker, afib, and heart failure. Scheduled for follow-up 4/6/2020 T    8. Pt reports not having any flares for 5 or 6 years. Prednisone and methotrexate combo resolved issues       Patient given educational materials -see patient instructions. Discussed use, benefit, and side effects of prescribed medications. All patient questions answered. Pt voiced understanding. Reviewed health maintenance. Instructed to continue currentmedications, diet and exercise. Patient agreed with treatment plan. Follow up as directed.    MEDICATIONS:  Orders Placed This Encounter   Medications    sucralfate (CARAFATE) 1 GM/10ML suspension     Sig: Take 10 mLs by mouth 4 times daily     Dispense:  1200 mL     Refill:  3         ORDERS:  Orders Placed This Encounter   Procedures    COVID-19    COVID-19    COVID-19    POCT Influenza A/B       Follow-up:  Return if symptoms worsen or fail to improve. PATIENT INSTRUCTIONS:  Patient Instructions        Covid Supportive Treatments    Zinc 250 mg daily for 5 days and then decrease to 50 mg a day. Vitamin C 1000 mg a day. Vitamin D 6863-1416 mcg a day. Aspirin 325 mg a day. Daily antihistamine of choice ( Claritin, Allegra, or Zyrtec)  Pepcid daily. Tylenol for aches, pain and fever. Your provider may also send in prescriptions for symptom specific treatment  our provider may also send in prescriptions for symptom specific treatment. Covid is a viral pneumonia and some antibiotics will not help this. Mucinex or delsym for chest congestion. Check oxygen saturation regularly and if below 90% go to the ER. You may qualify for home oxygen and breathing treatments if your oxygen is staying around 90%. According to current [place] guidelines if you have tested positive for COVID and have symptoms you are to isolate for 10 days from the day the symptoms began. If you tested positive for COVID and have never had symptoms you must isolate for 5 days from the day of your test.  If you are not fully vaccinated or booster eligible but not yet boostered and have been in close contact with someone diagnosed with COVID quarantine from 10 days from the exposure. This may be shortened to 5 days if you have no symptoms and tested negative for COVID on day 5    Viral syndrome   Many illnesses are caused by viruses. These conditions usually run their course in 7-14 days. Antibiotics do not help fight viral infections and are not needed at this time. Viral syndromes are treated with symptomatic support. You may take tylenol or ibuprofen for fever or aches and pains.  Stay hydrated by taking sips of water or non caffeinated, noncarbonated, and nonalcoholic beverages throughout the day. For sore throat, you may gargle with warm salt water, use lozenges or sprays. Using a daily antihistamine such as Claritin, Zyrtec or Allegra can help with upper respiratory symptoms. Benadryl can be sedating but is helpful at drying secretions and may be taken at night. Call if you have a fever greater than 102 F or if symptoms do not improve. Your provider may also send you in prescription medications depending on your symptoms and their severity. Take all medications as directed on package unless specifically told otherwise. Patient Education        Coronavirus ([id number]-[id number]): Care Instructions  Overview  The coronavirus disease (COVID-19) is caused by a virus. Symptoms may include a fever, a cough, and shortness of breath. It can spread through droplets from coughing and sneezing, breathing, and singing. The virus also can spread when people are in close contact with someone who is infected. Most people have mild symptoms and can take care of themselves at home. If their symptoms get worse, they may need care in a hospital. Treatment may include medicines to reduce symptoms, plus breathing support such as oxygen therapy or a ventilator. It's important to not spread the virus to others. If you have COVID-19, wear a mask anytime you are around other people. It can help stop the spread of the virus. You need to isolate yourself while you are sick. Leave your home only if you need to get medical care or testing. Follow-up care is a key part of your treatment and safety. Be sure to make and go to all appointments, and call your doctor if you are having problems. It's also a good idea to know your test results and keep a list of the medicines you take. How can you care for yourself at home? · Get extra rest. It can help you feel better. · Drink plenty of fluids. This helps replace fluids lost from fever.  Fluids may also help ease a scratchy throat. · You can take acetaminophen (Tylenol) or ibuprofen (Advil, Motrin) to reduce a fever. It may also help with muscle and body aches. Read and follow all instructions on the label. · Use petroleum jelly on sore skin. This can help if the skin around your nose and lips becomes sore from rubbing a lot with tissues. If you use oxygen, use a water-based product instead of petroleum jelly. · Keep track of symptoms such as fever and shortness of breath. This can help you know if you need to call your doctor. It can also help you know when it's safe to be around other people. · In some cases, your doctor might suggest that you get a pulse oximeter. How can you self-isolate when you have COVID-19? If you have COVID-19, there are things you can do to help avoid spreading the virus to others. · Limit contact with people in your home. If possible, stay in a separate bedroom and use a separate bathroom. · Wear a mask when you are around other people. · If you have to leave home, avoid crowds and try to stay at least 6 feet away from other people. · Avoid contact with pets and other animals. · Cover your mouth and nose with a tissue when you cough or sneeze. Then throw it in the trash right away. · Wash your hands often, especially after you cough or sneeze. Use soap and water, and scrub for at least 20 seconds. If soap and water aren't available, use an alcohol-based hand . · Don't share personal household items. These include bedding, towels, cups and glasses, and eating utensils. · [address] in the warmest water allowed for the fabric type, and dry it completely. It's okay to wash other people's laundry with yours. · Clean and disinfect your home. Use household  and disinfectant wipes or sprays. When can you end self-isolation for COVID-19? If you know or think that you have the virus, you will need to self-isolate.  You can be around others after:  · It's been at least 10 days since your symptoms started and  · You haven't had a fever for 24 hours without taking medicines to lower the fever and  · Your symptoms are improving. If you tested positive but have no symptoms, you can end isolation after 10 days. But if you start to have symptoms, follow the steps above. Ask your doctor if you need to be tested before you end isolation. This is especially important if you have a weakened immune system. When should you call for help? Call 911 anytime you think you may need emergency care. For example, call if you have life-threatening symptoms, such as:    · You have severe trouble breathing. (You can't talk at all.)     · You have constant chest pain or pressure.     · You are severely dizzy or lightheaded.     · You are confused or can't think clearly.     · You have pale, gray, or blue-colored skin or lips.     · You pass out (lose consciousness) or are very hard to wake up. Call your doctor now or seek immediate medical care if:    · You have moderate trouble breathing. (You can't speak a full sentence.)     · You are coughing up blood (more than about 1 teaspoon).     · You have signs of low blood pressure. These include feeling lightheaded; being too weak to stand; and having cold, pale, clammy skin. Watch closely for changes in your health, and be sure to contact your doctor if:    · Your symptoms get worse.     · You are not getting better as expected.     · You have new or worse symptoms of anxiety, depression, nightmares, or flashbacks. Call before you go to the doctor's office. Follow their instructions. And wear a mask. Current as of: July 1, 2021               Content Version: 13.1  © 2728-4881 [employer], [employer]. Care instructions adapted under license by [facility] ([facility]).  If you have questions about a medical condition or this instruction, always ask your healthcare professional. [address] any warranty or liability for your use of this information. Patient Education        10 Things to Do When You Have COVID-19      Stay home. Don't go to school, work, or public areas. And don't use public transportation, ride-shares, or taxis unless you have no choice. Leave your home only if you need to get medical care. But call the doctor's office first so they know you're coming. And wear a mask. Ask before leaving isolation. Follow your doctor's advice about when it is safe for you to leave isolation. Wear a mask when you are around other people. It can help stop the spread of the virus. Limit contact with people in your home. If possible, stay in a separate bedroom and use a separate bathroom. Avoid contact with pets and other animals. If possible, have a friend or family member care for them while you're sick. Cover your mouth and nose with a tissue when you cough or sneeze. Then throw the tissue in the trash right away. Wash your hands often, especially after you cough or sneeze. Use soap and water, and scrub for at least 20 seconds. If soap and water aren't available, use an alcohol-based hand . Don't share personal household items. These include bedding, towels, cups and glasses, and eating utensils. Clean and disinfect your home every day. Use household  or disinfectant wipes or sprays. If needed, take acetaminophen (Tylenol) or ibuprofen (Advil, Motrin) to relieve fever and body aches. Read and follow all instructions on the label. Current as of: March 26, 2021               Content Version: 13.1  © 2006-2021 [employer], [employer]. Care instructions adapted under license by [facility] ([facility]). If you have questions about a medical condition or this instruction, always ask your healthcare professional. [place] [address] any warranty or liability for your use of this information.              Electronically signed by JAZMIN Wellington NP on 2/1/2022 at 3:11 PM    EMR Dragon/transcription disclaimer:  Much of thisencounter note is electronic transcription/translation of spoken language to printed texts. The electronic translation of spoken language may be erroneous, or at times, nonsensical words or phrases may be inadvertentlytranscribed.   Although I have reviewed the note for such errors, some may still exist.

## 2022-02-02 ENCOUNTER — HOSPITAL ENCOUNTER (OUTPATIENT)
Dept: INFUSION THERAPY | Age: 87
Setting detail: INFUSION SERIES
Discharge: HOME | End: 2022-02-02
Payer: MEDICARE

## 2022-02-02 VITALS
SYSTOLIC BLOOD PRESSURE: 128 MMHG | OXYGEN SATURATION: 94 % | DIASTOLIC BLOOD PRESSURE: 65 MMHG | TEMPERATURE: 97.8 F | HEART RATE: 60 BPM | RESPIRATION RATE: 18 BRPM

## 2022-02-02 DIAGNOSIS — U07.1 COVID-19: Primary | ICD-10-CM

## 2022-02-02 DIAGNOSIS — U07.1 COVID-19: ICD-10-CM

## 2022-02-02 PROCEDURE — 96365 THER/PROPH/DIAG IV INF INIT: CPT

## 2022-02-02 PROCEDURE — 2580000003 HC RX 258: Performed by: NURSE PRACTITIONER

## 2022-02-02 PROCEDURE — M0247 HC SOTROVIMAB INFUSION: HCPCS

## 2022-02-02 PROCEDURE — 6360000002 HC RX W HCPCS: Performed by: NURSE PRACTITIONER

## 2022-02-02 PROCEDURE — 6370000000 HC RX 637 (ALT 250 FOR IP): Performed by: NURSE PRACTITIONER

## 2022-02-02 RX ORDER — SODIUM CHLORIDE 9 MG/ML
25 INJECTION, SOLUTION INTRAVENOUS PRN
Status: CANCELLED | OUTPATIENT
Start: 2022-02-02

## 2022-02-02 RX ORDER — SODIUM CHLORIDE 9 MG/ML
INJECTION, SOLUTION INTRAVENOUS CONTINUOUS
Status: DISCONTINUED | OUTPATIENT
Start: 2022-02-02 | End: 2022-02-03 | Stop reason: HOSPADM

## 2022-02-02 RX ORDER — ACETAMINOPHEN 325 MG/1
650 TABLET ORAL
Status: CANCELLED | OUTPATIENT
Start: 2022-02-02

## 2022-02-02 RX ORDER — DIPHENHYDRAMINE HYDROCHLORIDE 50 MG/ML
25 INJECTION INTRAMUSCULAR; INTRAVENOUS ONCE
Status: CANCELLED
Start: 2022-02-02 | End: 2022-02-02

## 2022-02-02 RX ORDER — SODIUM CHLORIDE 0.9 % (FLUSH) 0.9 %
5-40 SYRINGE (ML) INJECTION PRN
Status: CANCELLED | OUTPATIENT
Start: 2022-02-02

## 2022-02-02 RX ORDER — ONDANSETRON 2 MG/ML
8 INJECTION INTRAMUSCULAR; INTRAVENOUS
Status: CANCELLED | OUTPATIENT
Start: 2022-02-02

## 2022-02-02 RX ORDER — ACETAMINOPHEN 325 MG/1
650 TABLET ORAL ONCE
Status: COMPLETED | OUTPATIENT
Start: 2022-02-02 | End: 2022-02-02

## 2022-02-02 RX ORDER — DIPHENHYDRAMINE HYDROCHLORIDE 50 MG/ML
50 INJECTION INTRAMUSCULAR; INTRAVENOUS
Status: CANCELLED | OUTPATIENT
Start: 2022-02-02

## 2022-02-02 RX ORDER — SODIUM CHLORIDE 9 MG/ML
INJECTION, SOLUTION INTRAVENOUS CONTINUOUS
Status: CANCELLED | OUTPATIENT
Start: 2022-02-02

## 2022-02-02 RX ORDER — ALBUTEROL SULFATE 90 UG/1
4 AEROSOL, METERED RESPIRATORY (INHALATION) PRN
Status: CANCELLED | OUTPATIENT
Start: 2022-02-02

## 2022-02-02 RX ORDER — DIPHENHYDRAMINE HYDROCHLORIDE 50 MG/ML
25 INJECTION INTRAMUSCULAR; INTRAVENOUS ONCE
Status: COMPLETED | OUTPATIENT
Start: 2022-02-02 | End: 2022-02-02

## 2022-02-02 RX ORDER — HEPARIN SODIUM (PORCINE) LOCK FLUSH IV SOLN 100 UNIT/ML 100 UNIT/ML
500 SOLUTION INTRAVENOUS PRN
Status: CANCELLED | OUTPATIENT
Start: 2022-02-02

## 2022-02-02 RX ORDER — ACETAMINOPHEN 325 MG/1
650 TABLET ORAL ONCE
Status: CANCELLED
Start: 2022-02-02 | End: 2022-02-02

## 2022-02-02 RX ORDER — EPINEPHRINE 1 MG/ML
0.3 INJECTION, SOLUTION, CONCENTRATE INTRAVENOUS PRN
Status: CANCELLED | OUTPATIENT
Start: 2022-02-02

## 2022-02-02 RX ADMIN — DIPHENHYDRAMINE HYDROCHLORIDE 25 MG: 50 INJECTION, SOLUTION INTRAMUSCULAR; INTRAVENOUS at 12:07

## 2022-02-02 RX ADMIN — ACETAMINOPHEN 650 MG: 325 TABLET ORAL at 12:07

## 2022-02-02 RX ADMIN — SODIUM CHLORIDE: 9 INJECTION, SOLUTION INTRAVENOUS at 12:07

## 2022-02-02 RX ADMIN — SODIUM CHLORIDE 500 MG: 9 INJECTION, SOLUTION INTRAVENOUS at 12:37

## 2022-02-02 ASSESSMENT — PAIN SCALES - GENERAL: PAINLEVEL_OUTOF10: 0

## 2022-02-07 RX ORDER — ISOSORBIDE MONONITRATE 30 MG/1
30 TABLET, EXTENDED RELEASE ORAL DAILY
Qty: 90 TABLET | Refills: 3 | Status: SHIPPED | OUTPATIENT
Start: 2022-02-07 | End: 2023-02-27

## 2022-05-02 ENCOUNTER — LAB (OUTPATIENT)
Dept: LAB | Facility: HOSPITAL | Age: 87
End: 2022-05-02

## 2022-05-02 ENCOUNTER — CLINICAL SUPPORT NO REQUIREMENTS (OUTPATIENT)
Dept: CARDIOLOGY | Facility: CLINIC | Age: 87
End: 2022-05-02

## 2022-05-02 ENCOUNTER — OFFICE VISIT (OUTPATIENT)
Dept: CARDIOLOGY | Facility: CLINIC | Age: 87
End: 2022-05-02

## 2022-05-02 VITALS
SYSTOLIC BLOOD PRESSURE: 130 MMHG | DIASTOLIC BLOOD PRESSURE: 80 MMHG | HEIGHT: 63 IN | WEIGHT: 145 LBS | BODY MASS INDEX: 25.69 KG/M2 | HEART RATE: 80 BPM

## 2022-05-02 DIAGNOSIS — I47.1 PSVT (PAROXYSMAL SUPRAVENTRICULAR TACHYCARDIA): ICD-10-CM

## 2022-05-02 DIAGNOSIS — I48.0 PAF (PAROXYSMAL ATRIAL FIBRILLATION): ICD-10-CM

## 2022-05-02 DIAGNOSIS — I10 ESSENTIAL HYPERTENSION: ICD-10-CM

## 2022-05-02 DIAGNOSIS — Z86.73 HISTORY OF STROKE: Primary | ICD-10-CM

## 2022-05-02 DIAGNOSIS — Z86.2 HISTORY OF ANEMIA: ICD-10-CM

## 2022-05-02 DIAGNOSIS — I49.5 SICK SINUS SYNDROME: ICD-10-CM

## 2022-05-02 DIAGNOSIS — Z98.890 HISTORY OF CARDIOVERSION: ICD-10-CM

## 2022-05-02 DIAGNOSIS — R73.03 PREDIABETES: ICD-10-CM

## 2022-05-02 LAB
ALBUMIN SERPL-MCNC: 4.5 G/DL (ref 3.5–5.2)
ALBUMIN/GLOB SERPL: 1.9 G/DL
ALP SERPL-CCNC: 116 U/L (ref 39–117)
ALT SERPL W P-5'-P-CCNC: 16 U/L (ref 1–33)
ANION GAP SERPL CALCULATED.3IONS-SCNC: 11 MMOL/L (ref 5–15)
AST SERPL-CCNC: 23 U/L (ref 1–32)
BASOPHILS # BLD AUTO: 0.04 10*3/MM3 (ref 0–0.2)
BASOPHILS NFR BLD AUTO: 0.5 % (ref 0–1.5)
BILIRUB SERPL-MCNC: 0.4 MG/DL (ref 0–1.2)
BUN SERPL-MCNC: 14 MG/DL (ref 8–23)
BUN/CREAT SERPL: 15.1 (ref 7–25)
CALCIUM SPEC-SCNC: 9.4 MG/DL (ref 8.6–10.5)
CHLORIDE SERPL-SCNC: 96 MMOL/L (ref 98–107)
CO2 SERPL-SCNC: 27 MMOL/L (ref 22–29)
CREAT SERPL-MCNC: 0.93 MG/DL (ref 0.57–1)
DEPRECATED RDW RBC AUTO: 49.9 FL (ref 37–54)
EGFRCR SERPLBLD CKD-EPI 2021: 59.2 ML/MIN/1.73
EOSINOPHIL # BLD AUTO: 0.23 10*3/MM3 (ref 0–0.4)
EOSINOPHIL NFR BLD AUTO: 3.2 % (ref 0.3–6.2)
ERYTHROCYTE [DISTWIDTH] IN BLOOD BY AUTOMATED COUNT: 14.9 % (ref 12.3–15.4)
GLOBULIN UR ELPH-MCNC: 2.4 GM/DL
GLUCOSE SERPL-MCNC: 112 MG/DL (ref 65–99)
HCT VFR BLD AUTO: 36.9 % (ref 34–46.6)
HGB BLD-MCNC: 11.8 G/DL (ref 12–15.9)
IMM GRANULOCYTES # BLD AUTO: 0.04 10*3/MM3 (ref 0–0.05)
IMM GRANULOCYTES NFR BLD AUTO: 0.5 % (ref 0–0.5)
LYMPHOCYTES # BLD AUTO: 2.65 10*3/MM3 (ref 0.7–3.1)
LYMPHOCYTES NFR BLD AUTO: 36.3 % (ref 19.6–45.3)
MCH RBC QN AUTO: 29.4 PG (ref 26.6–33)
MCHC RBC AUTO-ENTMCNC: 32 G/DL (ref 31.5–35.7)
MCV RBC AUTO: 92 FL (ref 79–97)
MONOCYTES # BLD AUTO: 0.74 10*3/MM3 (ref 0.1–0.9)
MONOCYTES NFR BLD AUTO: 10.1 % (ref 5–12)
NEUTROPHILS NFR BLD AUTO: 3.6 10*3/MM3 (ref 1.7–7)
NEUTROPHILS NFR BLD AUTO: 49.4 % (ref 42.7–76)
NRBC BLD AUTO-RTO: 0 /100 WBC (ref 0–0.2)
PLATELET # BLD AUTO: 222 10*3/MM3 (ref 140–450)
PMV BLD AUTO: 10.2 FL (ref 6–12)
POTASSIUM SERPL-SCNC: 4.6 MMOL/L (ref 3.5–5.2)
PROT SERPL-MCNC: 6.9 G/DL (ref 6–8.5)
RBC # BLD AUTO: 4.01 10*6/MM3 (ref 3.77–5.28)
SODIUM SERPL-SCNC: 134 MMOL/L (ref 136–145)
TSH SERPL DL<=0.05 MIU/L-ACNC: 2.72 UIU/ML (ref 0.27–4.2)
WBC NRBC COR # BLD: 7.3 10*3/MM3 (ref 3.4–10.8)

## 2022-05-02 PROCEDURE — 93288 INTERROG EVL PM/LDLS PM IP: CPT | Performed by: INTERNAL MEDICINE

## 2022-05-02 PROCEDURE — 93000 ELECTROCARDIOGRAM COMPLETE: CPT | Performed by: INTERNAL MEDICINE

## 2022-05-02 PROCEDURE — 99214 OFFICE O/P EST MOD 30 MIN: CPT | Performed by: INTERNAL MEDICINE

## 2022-05-02 PROCEDURE — 85025 COMPLETE CBC W/AUTO DIFF WBC: CPT

## 2022-05-02 PROCEDURE — 36415 COLL VENOUS BLD VENIPUNCTURE: CPT

## 2022-05-02 PROCEDURE — 80053 COMPREHEN METABOLIC PANEL: CPT

## 2022-05-02 PROCEDURE — 84443 ASSAY THYROID STIM HORMONE: CPT

## 2022-05-02 RX ORDER — SUCRALFATE ORAL 1 G/10ML
1 SUSPENSION ORAL 4 TIMES DAILY
COMMUNITY
Start: 2022-02-01

## 2022-05-02 NOTE — PROGRESS NOTES
Violet Fajardo  [id number]  1934  88 y.o.  female    Referring Provider: Belem Marshall APRN    Reason for Follow-up Visit:  Here for follow up after cardiac testing.   sick sinus syndrome s/p pacemaker   chronic atrial fibrillation   Cardiac workup test results as below   Had anemia   Happened when she had URI   upper GI endoscopy and colonoscopy was planned and then cancelled due to her age   CBC as below   Xarelto was stopped prior to scopes and never resumed   No definite bleeding episodes identified   CBC as below     Subjective    Moderate chronic exertional shortness of breath on exertion relieved with rest  Worse when leaning over or bending   No significant cough or wheezing    Intermittent palpitations, once every several days to several weeks lasting for less than 1 minute  No associated symptoms of dizziness, weakness, chest pain,  shortness of breath    Feels her heart  jumps around in her chest   No associated chest pain  No significant pedal edema    No fever or chills  No significant expectoration    No hemoptysis  No presyncope or syncope    Tolerating current medications well with no untoward side effects   Compliant with prescribed medication regimen. Tries to adhere to cardiac diet.     Device interrogation: paroxysmal atrial fibrillation   Had Covid in January and got infusion therapy in [facility]     Easy fatiguability and increasing tired  Feels energy levels running low        Component      Latest Ref Rng & Units 11/6/2017 8/17/2018 10/29/2019 6/11/2020           9:51 AM  9:56 AM  1:50 PM  9:59 AM   WBC      4.8 - 10.8 K/uL 6.61 8.39 8.8 9.0   RBC      4.20 - 5.40 M/uL 4.10 (L) 4.85 4.56 4.28   Hemoglobin      12.0 - 16.0 g/dL 11.9 (L) 13.8 13.6 12.8   Hematocrit      37.0 - 47.0 % 36.8 (L) 43.0 43.6 41.0   MCV      81.0 - 99.0 fL 89.8 88.7 95.6 95.8   MCH      27.0 - 31.0 pg 29.0 28.5 29.8 29.9   MCHC      33.0 - 37.0 g/dL 32.3 (L) 32.1 (L) 31.2 (L) 31.2 (L)   RDW      11.5 - 14.5 %  14.1 13.8 14.5 13.7   MPV      9.4 - 12.3 fL 10.6 10.3 10.7 10.6   Platelets      130 - 400 K/uL 223 248 243 237     Component      Latest Ref Rng & Units 7/12/2021 10/29/2021 11/1/2021 11/1/2021          12:28 PM  3:55 PM  5:55 PM  5:55 PM   WBC      4.8 - 10.8 K/uL 7.3 10.4 16.7 (H)    RBC      4.20 - 5.40 M/uL 4.40 3.77 (L) 3.91 (L)    Hemoglobin      12.0 - 16.0 g/dL 13.1 10.8 (L) 10.9 (L)    Hematocrit      37.0 - 47.0 % 42.1 34.9 (L)  34.7 (L)   MCV      81.0 - 99.0 fL 95.7 92.6 88.7    MCH      27.0 - 31.0 pg 29.8 28.6 27.9    MCHC      33.0 - 37.0 g/dL 31.1 (L) 30.9 (L) 31.4 (L)    RDW      11.5 - 14.5 % 14.3 15.2 (H) 14.8 (H)    MPV      9.4 - 12.3 fL 10.7 10.2 9.6    Platelets      130 - 400 K/uL 249 376 464 (H)      Component      Latest Ref Rng & Units 11/2/2021 11/9/2021 11/13/2021 12/9/2021          11:15 AM  2:10 PM  7:10 PM 11:56 AM   WBC      4.8 - 10.8 K/uL 15.9 (H) 12.9 (H) 10.3 10.30 (H)   RBC      4.20 - 5.40 M/uL 3.64 (L) 3.70 (L) 3.26 (L) 3.54 (L)   Hemoglobin      12.0 - 16.0 g/dL 10.3 (L) 10.4 (L) 9.2 (L) 10.6 (L)   Hematocrit      37.0 - 47.0 % 33.2 (L) 33.7 (L) 30.3 (L) 35.0   MCV      81.0 - 99.0 fL 91.2 91.1 92.9 98.9 (H)   MCH      27.0 - 31.0 pg 28.3 28.1 28.2 29.9   MCHC      33.0 - 37.0 g/dL 31.0 (L) 30.9 (L) 30.4 (L) 30.3 (L)   RDW      11.5 - 14.5 % 14.8 (H) 15.1 (H) 15.0 (H) 17.5 (H)   MPV      9.4 - 12.3 fL 9.5 9.2 (L) 9.1 (L) 10.9 (H)   Platelets      130 - 400 K/uL 470 (H) 385 336 211     Component      Latest Ref Rng & Units 1/13/2022           1:41 PM   WBC      4.8 - 10.8 K/uL 8.3   RBC      4.20 - 5.40 M/uL 4.10 (L)   Hemoglobin      12.0 - 16.0 g/dL 11.6 (L)   Hematocrit      37.0 - 47.0 % 38.8   MCV      81.0 - 99.0 fL 94.6   MCH      27.0 - 31.0 pg 28.3   MCHC      33.0 - 37.0 g/dL 29.9 (L)   RDW      11.5 - 14.5 % 15.7 (H)   MPV      9.4 - 12.3 fL 10.2   Platelets      130 - 400 K/uL 264     Ferritin as below      Contains abnormal data Ferritin  Specimen:  Blood -  Blood specimen (specimen)  Component   Ref Range & Units 6 mo ago   Ferritin   13.0 - 150.0 ng/mL 1929.0 High     Resulting Agency [facility]   Specimen Collected: 11/01/21  4:55 PM Last Resulted: 11/01/21  6:42 PM   Received From: [facility]  Result Received: 05/02/22  9:28 AM   View Encounter       [facility]  Related to Ferritin  Component 01/13/22 12/09/21 11/02/21 11/01/21   Ferritin 357.6 High  388.0 High  1720.0 High         History of present illness:  Violet Fajardo is a 88 y.o. yo female with history of sick sinus syndrome s/p pacemaker who presents today for   Chief Complaint   Patient presents with   • Atrial Fibrillation     6 mo f/u   .    History  Past Medical History:   Diagnosis Date   • Asthma    • CHF (congestive heart failure) (HCC)    • COPD (chronic obstructive pulmonary disease) (HCC)    • Dermatomyositis (HCC)    • Dermatomyositis (HCC)    • Dyspnea    • E-coli UTI    • Fibromyalgia    • Hyperlipemia, mixed    • Hyperlipidemia    • Hypothyroidism    • Mitral valve disorder     History of MVP   • Mitral valve disorder    • Pacemaker    • Pacemaker    • PAF (paroxysmal atrial fibrillation) (CMS/HCC) new onset 8/6/2018   • Respiratory infection    • Sick sinus syndrome (HCC)    • Sleep apnea    • Stroke (HCC)    • SVT (supraventricular tachycardia) ([facility])    ,   Past Surgical History:   Procedure Laterality Date   • CARDIAC CATHETERIZATION     • CARDIOVERSION     • CHOLECYSTECTOMY     • FOOT SURGERY     • HYSTERECTOMY     • INSERT / REPLACE / REMOVE PACEMAKER     • PACEMAKER IMPLANTATION  12/15/2011    EnRhythm (4/16/2012)-lead repositioning   ,   Family History   Problem Relation Age of Onset   • Coronary artery disease Mother    • Heart attack Mother    • Coronary artery disease Father    ,   Social History     Tobacco Use   • Smoking status: Never Smoker   • Smokeless tobacco: Never Used   Vaping Use   •  Vaping Use: Never used   Substance Use Topics   • Alcohol use: No   • Drug use: No   ,     Medications  Current Outpatient Medications   Medication Sig Dispense Refill   • Budesonide-Formoterol Fumarate (SYMBICORT IN) Inhale.     • FOLIC ACID-B6-B12-D PO Take  by mouth.     • hydrochlorothiazide (MICROZIDE) 12.5 MG capsule Take 1 capsule by mouth Daily. 90 capsule 3   • ipratropium (ATROVENT HFA) 17 MCG/ACT inhaler Inhale 2 puffs 4 (Four) Times a Day. 1 inhaler 11   • isosorbide mononitrate (IMDUR) 30 MG 24 hr tablet Take 1 tablet by mouth Daily. 90 tablet 3   • levothyroxine (SYNTHROID, LEVOTHROID) 25 MCG tablet Take 25 mcg by mouth Daily.     • metoprolol succinate XL (TOPROL-XL) 50 MG 24 hr tablet Take 1 tablet by mouth Daily. (Patient taking differently: Take 25 mg by mouth Daily.) 90 tablet 4   • O2 (OXYGEN) Inhale Every Night.     • Omega-3 Fatty Acids (FISH OIL) 1000 MG capsule capsule Take  by mouth Daily With Breakfast.     • potassium chloride (K-DUR,KLOR-CON) 10 MEQ CR tablet Take 1 tablet by mouth Daily. 90 tablet 3   • sucralfate (CARAFATE) 1 GM/10ML suspension Take 1 g by mouth 4 (Four) Times a Day.     • Zolpidem Tartrate 10 MG sublingual tablet Place  under the tongue.     • apixaban (ELIQUIS) 5 MG tablet tablet Take 1 tablet by mouth 2 (Two) Times a Day. 60 tablet 11   • ERGOCALCIFEROL PO Take  by mouth Daily.       No current facility-administered medications for this visit.       Allergies:  Latex, Amoxicillin-pot clavulanate, Barley grass, Codeine, Contrast dye, Fentanyl, and Midazolam    Review of Systems  Review of Systems   Constitutional: Positive for malaise/fatigue.   HENT: Negative.    Eyes: Negative.    Cardiovascular: Positive for dyspnea on exertion. Negative for chest pain, claudication, cyanosis, irregular heartbeat, leg swelling, near-syncope, orthopnea, palpitations, paroxysmal nocturnal dyspnea and syncope.   Respiratory: Negative.    Endocrine: Negative.    Hematologic/Lymphatic:  "Negative.    Skin: Negative.    Musculoskeletal: Positive for arthritis and joint pain.   Gastrointestinal: Negative for anorexia.   Genitourinary: Negative.    Neurological: Negative.    Psychiatric/Behavioral: Negative.        Objective     Physical Exam:  /80   Pulse 80   Ht 160 cm (63\")   Wt 65.8 kg (145 lb)   BMI 25.69 kg/m²   Physical Exam   Constitutional: She appears well-developed.   HENT:   Head: Normocephalic.   Eyes: Lids are normal.   Neck: Normal carotid pulses and no JVD present. No tracheal tenderness present. Carotid bruit is not present. No tracheal deviation present.   Cardiovascular: Regular rhythm, S1 normal, S2 normal and normal pulses.   Murmur heard.   Systolic murmur is present with a grade of 2/6.  Pulmonary/Chest: Effort normal. No stridor.   Abdominal: Soft. Normal appearance. She exhibits no distension. There is no abdominal tenderness.   Neurological: She is alert. No cranial nerve deficit or sensory deficit.   Skin: Skin is warm.   Psychiatric: Her speech is normal and behavior is normal. Thought content normal.       Results Review:    myocardial perfusion scan  5/19    · Left ventricular ejection fraction is normal (Calculated EF = 65%).  · Myocardial perfusion imaging indicates a normal myocardial perfusion study with no evidence of ischemia.  · Breast attenuation artifact is present.  · Raw images reviewed with the following abnormalities noted: vertical motion.  · Impressions are consistent with a low risk study.      Results for orders placed during the hospital encounter of 05/08/19    Adult Transthoracic Echo Complete W/ Cont if Necessary Per Protocol    Interpretation Summary  · Estimated EF = 65%.  · Left ventricular systolic function is normal.  · No evidence of pulmonary hypertension is present.             ECG 12 Lead    Date/Time: 5/2/2022 10:46 AM  Performed by: Chalo Torres MD  Authorized by: Chalo Torres MD   Comparison: compared with previous ECG from " 10/5/2021  Similar to previous ECG  Comparison to previous ECG: Atrial pacing   Rhythm: paced  Rate: normal  QRS axis: normal    Clinical impression: abnormal EKG               Diagnoses and all orders for this visit:    1. History of stroke (Primary)    2. History of cardioversion    3. Essential hypertension    4. PAF (paroxysmal atrial fibrillation) (CMS/HCC) new onset  -     Ambulatory Referral to [facility] - [facility]  -     Adult Transthoracic Echo Complete w/ Color, Spectral and Contrast if necessary per protocol; Future  -     ECG 12 Lead  -     CBC & Differential; Future  -     Comprehensive Metabolic Panel; Future  -     TSH; Future    5. PSVT (paroxysmal supraventricular tachycardia) ([facility])    6. Prediabetes    7. Sick sinus syndrome ([facility])    8. History of anemia  -     Ambulatory Referral to [facility]    Other orders  -     apixaban (ELIQUIS) 5 MG tablet tablet; Take 1 tablet by mouth 2 (Two) Times a Day.  Dispense: 60 tablet; Refill: 11         Plan    Resume anticoagulation  Eliquis samples and coupon given      Orders Placed This Encounter   Procedures   • Comprehensive Metabolic Panel     Standing Status:   Future     Standing Expiration Date:   5/2/2023     Order Specific Question:   Release to patient     Answer:   Immediate   • TSH     Standing Status:   Future     Standing Expiration Date:   5/2/2023     Order Specific Question:   Release to patient     Answer:   Immediate   • Ambulatory Referral to [facility]     Referral Priority:   Routine     Referral Type:   Consultation     Referral Reason:   Specialty Services Required     Number of Visits Requested:   1   • ECG 12 Lead     This order was created via procedure documentation     Order Specific Question:   Release to patient     Answer:   Immediate   • Adult Transthoracic Echo Complete w/ Color, Spectral and Contrast if necessary per protocol     Standing Status:   Future     Standing Expiration Date:    5/2/2023     Scheduling Instructions:      Myocardial strain to be performed during echocardiogram as long as technically feasible     Order Specific Question:   Reason for exam?     Answer:   Arrhythmia     Order Specific Question:   Release to patient     Answer:   Immediate   • CBC & Differential     Standing Status:   Future     Standing Expiration Date:   5/2/2023     Order Specific Question:   Manual Differential     Answer:   No     Order Specific Question:   Release to patient     Answer:   Immediate      Requested Prescriptions     Signed Prescriptions Disp Refills   • apixaban (ELIQUIS) 5 MG tablet tablet 60 tablet 11     Sig: Take 1 tablet by mouth 2 (Two) Times a Day.      Watchman device referral as significant stroke risk and bleeding risk  Some information provided including brochure  Further discussion encouraged in the structural heart disease clinic      Monitor for any signs of bleeding including red or dark stools as well as easy bruisabilty. Fall precautions.       Monitor cardiac rhythm device function regularly per established schedule or PRN    Check BP and heart rates twice daily initially till blood pressures and heart rates under good control and then at least 3x / week,   If blood pressures continue to be well-controlled then can check week a month  at home and bring a recording for review next visit  If BP >130/85 or < 100/60 persistently over 3 reading 30 mins apart or if heart rates persistently above 100 bpm or less than 55 bpm call sooner for evaluation and advise     Follow up with Yana WEBB , Ray WEBB  or myself           Return in about 8 weeks (around 6/27/2022).

## 2022-05-06 ENCOUNTER — TELEPHONE (OUTPATIENT)
Dept: CARDIOLOGY | Facility: CLINIC | Age: 87
End: 2022-05-06

## 2022-05-06 NOTE — TELEPHONE ENCOUNTER
Patient has called wanting to know the results of her most recent labs she had completed on Monday 5/2/22.      Please advise

## 2022-05-16 ENCOUNTER — OFFICE VISIT (OUTPATIENT)
Dept: CARDIOLOGY | Facility: CLINIC | Age: 87
End: 2022-05-16

## 2022-05-16 VITALS
WEIGHT: 143 LBS | SYSTOLIC BLOOD PRESSURE: 119 MMHG | HEART RATE: 70 BPM | DIASTOLIC BLOOD PRESSURE: 60 MMHG | OXYGEN SATURATION: 97 % | BODY MASS INDEX: 25.34 KG/M2 | HEIGHT: 63 IN

## 2022-05-16 DIAGNOSIS — I48.0 PAF (PAROXYSMAL ATRIAL FIBRILLATION): Primary | ICD-10-CM

## 2022-05-16 DIAGNOSIS — I10 ESSENTIAL HYPERTENSION: ICD-10-CM

## 2022-05-16 DIAGNOSIS — Z95.0 PACEMAKER: ICD-10-CM

## 2022-05-16 DIAGNOSIS — Z79.01 CURRENT USE OF LONG TERM ANTICOAGULATION: ICD-10-CM

## 2022-05-16 PROCEDURE — 99215 OFFICE O/P EST HI 40 MIN: CPT | Performed by: INTERNAL MEDICINE

## 2022-05-16 PROCEDURE — 93000 ELECTROCARDIOGRAM COMPLETE: CPT | Performed by: INTERNAL MEDICINE

## 2022-05-16 NOTE — PROGRESS NOTES
Subjective:     Encounter Date:05/16/2022      Patient ID: Violet Fajardo is a 88 y.o. female.    Chief Complaint: consider left atrial appendage occlusion    History of Present Illness    Patient is unremarkable 88-year-old female kindly referred to the [facility] by Dr. Chalo Torres for consideration of left atrial appendage occlusion.  She has paroxysmal atrial fibrillation, it was noted as early as November 2021 is being anemic.  She went to the ER at [facility] for weakness and had a Hemoccult stool that was positive.  She was later seen by both heme/oncology, and then gastroenterology.  Apparently plans were in place for EGD and colonoscopy, and therefore she was taken off Xarelto in preparation for these, but these diagnostic procedures ultimately canceled.  She saw Dr. Torres last week, and he advised that she resume anticoagulation for CVA protection in the interim.  Since then, she denies any obvious melena or hematochezia.    The following portions of the patient's history were reviewed and updated as appropriate: allergies, current medications, past family history, past medical history, past social history, past surgical history and problem list.    Review of Systems   Constitutional: Negative for malaise/fatigue.   Cardiovascular: Negative for chest pain, claudication, dyspnea on exertion, leg swelling, near-syncope, orthopnea, palpitations, paroxysmal nocturnal dyspnea and syncope.   Respiratory: Positive for shortness of breath.    Hematologic/Lymphatic: Does not bruise/bleed easily.           Current Outpatient Medications:   •  Budesonide-Formoterol Fumarate (SYMBICORT IN), Inhale., Disp: , Rfl:   •  ERGOCALCIFEROL PO, Take  by mouth Daily., Disp: , Rfl:   •  FOLIC ACID-B6-B12-D PO, Take  by mouth., Disp: , Rfl:   •  hydrochlorothiazide (MICROZIDE) 12.5 MG capsule, Take 1 capsule by mouth Daily., Disp: 90 capsule, Rfl: 3  •  ipratropium (ATROVENT HFA) 17 MCG/ACT inhaler, Inhale 2  puffs 4 (Four) Times a Day., Disp: 1 inhaler, Rfl: 11  •  isosorbide mononitrate (IMDUR) 30 MG 24 hr tablet, Take 1 tablet by mouth Daily., Disp: 90 tablet, Rfl: 3  •  levothyroxine (SYNTHROID, LEVOTHROID) 25 MCG tablet, Take 25 mcg by mouth Daily., Disp: , Rfl:   •  metoprolol succinate XL (TOPROL-XL) 50 MG 24 hr tablet, Take 1 tablet by mouth Daily. (Patient taking differently: Take 25 mg by mouth Daily.), Disp: 90 tablet, Rfl: 4  •  O2 (OXYGEN), Inhale Every Night., Disp: , Rfl:   •  Omega-3 Fatty Acids (FISH OIL) 1000 MG capsule capsule, Take  by mouth Daily With Breakfast., Disp: , Rfl:   •  potassium chloride (K-DUR,KLOR-CON) 10 MEQ CR tablet, Take 1 tablet by mouth Daily., Disp: 90 tablet, Rfl: 3  •  rivaroxaban (XARELTO) 20 MG tablet, Take 20 mg by mouth Daily., Disp: , Rfl:   •  sucralfate (CARAFATE) 1 GM/10ML suspension, Take 1 g by mouth 4 (Four) Times a Day., Disp: , Rfl:   •  Zolpidem Tartrate 10 MG sublingual tablet, Place  under the tongue., Disp: , Rfl:        Objective:      Vitals:    05/16/22 1346   BP: 119/60   Pulse: 70   SpO2: 97%     Vitals and nursing note reviewed.   Constitutional:       General: Not in acute distress.     Appearance: Not in distress.   Neck:      Vascular: No JVD or JVR. JVD normal.   Pulmonary:      Effort: Pulmonary effort is normal.      Breath sounds: Normal breath sounds.   Cardiovascular:      Normal rate. Regular rhythm.      Murmurs: There is no murmur.      No gallop. No rub.   Pulses:     Intact distal pulses.   Edema:     Peripheral edema absent.   Skin:     General: Skin is warm and dry.   Neurological:      Mental Status: Alert, oriented to person, place, and time and oriented to person, place and time.         Lab Review:         ECG 12 Lead    Date/Time: 5/16/2022 6:13 PM  Performed by: Mayank Vargas MD  Authorized by: Mayank Vargas MD   Comparison: compared with previous ECG from 5/2/2022  Similar to previous ECG  Rhythm comments: Sinus rhythm with  "demand electronic atrial pacing and PACs  BPM: 70    Clinical impression: abnormal EKG            Records reveiwed through \"care everywhere\" including notes with multiple specialist, including heme/unk and gastroenterology.  I also reviewed the ED visit note from her trip there November 2021.  Below is the assessment/plan portion of a 12/9/21 visit with JON De La Fuente with heme/onc:    Assessment/Plan:     Problem List Items Addressed This Visit        Cardiac and Vasculature    Essential hypertension    PAF (paroxysmal atrial fibrillation) (CMS/HCC) new onset - Primary    Overview     CHADS-VASc Risk Assessment            7 Total Score    1 CHF    1 Hypertension    2 Age >/= 75    2 PRIOR STROKE/TIA/THROMBO    1 Sex: Female        Criteria that do not apply:    DM    Vascular Disease    Age 65-74                   Pacemaker       Coag and Thromboembolic    Current use of long term anticoagulation        Recommendations/plans:  Patient has history of paroxymal atrial fibrillation with a [id number]=4, which warrants CVA prophylaxis.  This patient is currently being treated with xarelto (which was just resumed after several months of being off it), but seeks an alternative to indefinite anticoagulation because of anemia and prior FOBT+.  As such, I do think this patient meets indications for left atrial appendage occlusion with Watchman device.  We spent the bulk of today's visit discussing the indications, preoperative evaluation, procedural conduct, and postprocedural follow-up care and medication management, emphasizing the need for 6 weeks of anticoagulant therapy (in addition to aspirin 81 mg daily) after a successful implant.  We also discussed need for repeat BALTAZAR 6 weeks after the procedure and, if appendage felt to be successfully excluded at that point in time, that the anticoagulant is discontinued but clopidogrel 75 mg daily will be prescribed to be taken for 4.5 months.    Patient understands all " this and would like to proceed with anatomic assessment, so will have BALTAZAR to be performed by Dr Torres.  I will review these images to assess for anatomic suitability for Watchman, and if it does appear suitable, she will be scheduled for a shared decision-making appointment with Dr. Cas Roman.  If he agrees with indications for implant, she will then be scheduled.    I also advised that she take xarelto with food, which she was not previously doing    Thank you very much for the referral.    43 minutes spent on day of service      Mayank Vargas MD  05/16/2022  15:04 CDT

## 2022-05-20 ENCOUNTER — PREP FOR SURGERY (OUTPATIENT)
Dept: OTHER | Facility: HOSPITAL | Age: 87
End: 2022-05-20

## 2022-05-20 DIAGNOSIS — I48.0 PAF (PAROXYSMAL ATRIAL FIBRILLATION): Primary | ICD-10-CM

## 2022-05-20 RX ORDER — SODIUM CHLORIDE 0.9 % (FLUSH) 0.9 %
10 SYRINGE (ML) INJECTION EVERY 12 HOURS SCHEDULED
Status: CANCELLED | OUTPATIENT
Start: 2022-05-20

## 2022-05-20 RX ORDER — SODIUM CHLORIDE 0.9 % (FLUSH) 0.9 %
10 SYRINGE (ML) INJECTION AS NEEDED
Status: CANCELLED | OUTPATIENT
Start: 2022-05-20

## 2022-05-20 RX ORDER — SODIUM CHLORIDE 9 MG/ML
20 INJECTION, SOLUTION INTRAVENOUS CONTINUOUS
Status: CANCELLED | OUTPATIENT
Start: 2022-05-20

## 2022-06-06 ENCOUNTER — APPOINTMENT (OUTPATIENT)
Dept: LAB | Facility: HOSPITAL | Age: 87
End: 2022-06-06

## 2022-06-07 ENCOUNTER — LAB (OUTPATIENT)
Dept: LAB | Facility: HOSPITAL | Age: 87
End: 2022-06-07

## 2022-06-07 DIAGNOSIS — I48.0 PAF (PAROXYSMAL ATRIAL FIBRILLATION): ICD-10-CM

## 2022-06-07 LAB — SARS-COV-2 RNA PNL SPEC NAA+PROBE: NOT DETECTED

## 2022-06-07 PROCEDURE — C9803 HOPD COVID-19 SPEC COLLECT: HCPCS

## 2022-06-07 PROCEDURE — 87635 SARS-COV-2 COVID-19 AMP PRB: CPT

## 2022-06-08 ENCOUNTER — HOSPITAL ENCOUNTER (OUTPATIENT)
Dept: CARDIOLOGY | Facility: HOSPITAL | Age: 87
Discharge: HOME | End: 2022-06-08

## 2022-06-08 ENCOUNTER — ANESTHESIA EVENT (OUTPATIENT)
Dept: CARDIOLOGY | Facility: HOSPITAL | Age: 87
End: 2022-06-08

## 2022-06-08 ENCOUNTER — ANESTHESIA (OUTPATIENT)
Dept: CARDIOLOGY | Facility: HOSPITAL | Age: 87
End: 2022-06-08

## 2022-06-08 VITALS
HEIGHT: 63 IN | SYSTOLIC BLOOD PRESSURE: 162 MMHG | BODY MASS INDEX: 25.34 KG/M2 | HEART RATE: 60 BPM | RESPIRATION RATE: 17 BRPM | DIASTOLIC BLOOD PRESSURE: 60 MMHG | TEMPERATURE: 97.1 F | OXYGEN SATURATION: 94 % | WEIGHT: 143 LBS

## 2022-06-08 DIAGNOSIS — I48.0 PAF (PAROXYSMAL ATRIAL FIBRILLATION): ICD-10-CM

## 2022-06-08 PROCEDURE — 93312 ECHO TRANSESOPHAGEAL: CPT | Performed by: INTERNAL MEDICINE

## 2022-06-08 PROCEDURE — 93325 DOPPLER ECHO COLOR FLOW MAPG: CPT | Performed by: INTERNAL MEDICINE

## 2022-06-08 PROCEDURE — 93312 ECHO TRANSESOPHAGEAL: CPT

## 2022-06-08 PROCEDURE — 93321 DOPPLER ECHO F-UP/LMTD STD: CPT | Performed by: INTERNAL MEDICINE

## 2022-06-08 PROCEDURE — 93325 DOPPLER ECHO COLOR FLOW MAPG: CPT

## 2022-06-08 PROCEDURE — 25010000002 PROPOFOL 10 MG/ML EMULSION: Performed by: NURSE ANESTHETIST, CERTIFIED REGISTERED

## 2022-06-08 PROCEDURE — 93321 DOPPLER ECHO F-UP/LMTD STD: CPT

## 2022-06-08 RX ORDER — SODIUM CHLORIDE 0.9 % (FLUSH) 0.9 %
10 SYRINGE (ML) INJECTION EVERY 12 HOURS SCHEDULED
Status: DISCONTINUED | OUTPATIENT
Start: 2022-06-08 | End: 2022-06-09 | Stop reason: HOSPADM

## 2022-06-08 RX ORDER — SODIUM CHLORIDE 0.9 % (FLUSH) 0.9 %
10 SYRINGE (ML) INJECTION AS NEEDED
Status: DISCONTINUED | OUTPATIENT
Start: 2022-06-08 | End: 2022-06-09 | Stop reason: HOSPADM

## 2022-06-08 RX ORDER — SODIUM CHLORIDE 9 MG/ML
20 INJECTION, SOLUTION INTRAVENOUS CONTINUOUS
Status: DISCONTINUED | OUTPATIENT
Start: 2022-06-08 | End: 2022-06-09 | Stop reason: HOSPADM

## 2022-06-08 RX ORDER — LIDOCAINE HYDROCHLORIDE 20 MG/ML
INJECTION, SOLUTION EPIDURAL; INFILTRATION; INTRACAUDAL; PERINEURAL AS NEEDED
Status: DISCONTINUED | OUTPATIENT
Start: 2022-06-08 | End: 2022-06-08 | Stop reason: SURG

## 2022-06-08 RX ORDER — PROPOFOL 10 MG/ML
VIAL (ML) INTRAVENOUS AS NEEDED
Status: DISCONTINUED | OUTPATIENT
Start: 2022-06-08 | End: 2022-06-08 | Stop reason: SURG

## 2022-06-08 RX ADMIN — LIDOCAINE HYDROCHLORIDE 100 MG: 20 INJECTION, SOLUTION EPIDURAL; INFILTRATION; INTRACAUDAL; PERINEURAL at 13:53

## 2022-06-08 RX ADMIN — SODIUM CHLORIDE: 9 INJECTION, SOLUTION INTRAVENOUS at 13:51

## 2022-06-08 RX ADMIN — PROPOFOL 160 MG: 10 INJECTION, EMULSION INTRAVENOUS at 13:53

## 2022-06-08 NOTE — PROGRESS NOTES
Agree with assessment and plan of mid level provider as below with any changes if made as noted by Myra Garcia PA-C   of Violet Fajardo.  See below   Signed Prescriptions Disp Refills    apixaban (ELIQUIS) 5 MG tablet tablet 60 tablet 11       Sig: Take 1 tablet by mouth 2 (Two) Times a Day.      Watchman device referral as significant stroke risk and bleeding risk

## 2022-06-08 NOTE — ANESTHESIA POSTPROCEDURE EVALUATION
"Patient: Violet Fajardo    Procedure Summary     Date: 06/08/22 Room / Location: [facility] CATH LAB; [facility] CARDIOLOGY    Anesthesia Start: 1351 Anesthesia Stop: 1407    Procedure: ADULT TRANSESOPHAGEAL ECHO (BALTAZAR) W/ CONT IF NECESSARY PER PROTOCOL Diagnosis:       PAF (paroxysmal atrial fibrillation) (HCC)      (Guidance for Cardiac Intervention)      (Closure Device)    Scheduled Providers: Chalo Torres MD Provider: Su Powell CRNA    Anesthesia Type: MAC ASA Status: 3          Anesthesia Type: MAC    Vitals  No vitals data found for the desired time range.          Post Anesthesia Care and Evaluation    Patient location during evaluation: PACU  Patient participation: complete - patient participated  Level of consciousness: awake and alert  Pain management: adequate    Airway patency: patent  Anesthetic complications: No anesthetic complications    Cardiovascular status: acceptable  Respiratory status: acceptable  Hydration status: acceptable    Comments: Blood pressure 144/68, pulse 97, temperature 97.1 °F (36.2 °C), temperature source Tympanic, resp. rate 18, height 160 cm (63\"), weight 64.9 kg (143 lb), SpO2 98 %.    Pt discharged from PACU based on gricelda score >8      "

## 2022-06-08 NOTE — ANESTHESIA PREPROCEDURE EVALUATION
Anesthesia Evaluation     Patient summary reviewed and Nursing notes reviewed   no history of anesthetic complications:  NPO Solid Status: > 8 hours  NPO Liquid Status: > 8 hours           Airway   Mallampati: II  TM distance: >3 FB  Neck ROM: full  Dental    (+) upper dentures and partials    Comment: Unable to remove lower partial. Pt understands dental damage risk with procedure    Pulmonary    (+) COPD, asthma,sleep apnea on CPAP,   Cardiovascular     (+) pacemaker, hypertension, valvular problems/murmurs MVP, dysrhythmias (SSS), CHF , hyperlipidemia,       Neuro/Psych  (+) CVA,    GI/Hepatic/Renal/Endo    (+)   thyroid problem hypothyroidism    Musculoskeletal (-) negative ROS    Abdominal    Substance History - negative use     OB/GYN negative ob/gyn ROS         Other          Other Comment: Fibromyalgia                Anesthesia Plan    ASA 3     MAC       Anesthetic plan, risks, benefits, and alternatives have been provided, discussed and informed consent has been obtained with: patient.        CODE STATUS:

## 2022-06-11 LAB
LEFT VENTRICULAR EJECTION FRACTION HIGH VALUE: 60 %
LEFT VENTRICULAR EJECTION FRACTION MODE: NORMAL
LV EF: 56 %
MAXIMAL PREDICTED HEART RATE: 132 BPM
STRESS TARGET HR: 112 BPM

## 2022-06-15 ENCOUNTER — TELEPHONE (OUTPATIENT)
Dept: CARDIOLOGY | Facility: CLINIC | Age: 87
End: 2022-06-15

## 2022-06-15 NOTE — TELEPHONE ENCOUNTER
Call to pt to discuss her shared decision appt and she said that she had been talking the Watchman implant over with some of her family.    They are concerned and she is concerned about doing something like the Watchman at her age. I did explain that we often do patients her age, because those are usually the pts that have more complications from blood thinners.    She continues to be hesitant and would like to not pursue this at this time. She has my call back number if she has any questions, wants to be seen again or changes her mind. She verbalized understanding.

## 2022-06-22 ENCOUNTER — APPOINTMENT (OUTPATIENT)
Dept: CARDIOLOGY | Facility: HOSPITAL | Age: 87
End: 2022-06-22

## 2022-06-24 ENCOUNTER — OFFICE VISIT (OUTPATIENT)
Dept: PRIMARY CARE CLINIC | Age: 87
End: 2022-06-24
Payer: MEDICARE

## 2022-06-24 VITALS
OXYGEN SATURATION: 95 % | TEMPERATURE: 99.3 F | HEART RATE: 95 BPM | BODY MASS INDEX: 24.98 KG/M2 | SYSTOLIC BLOOD PRESSURE: 128 MMHG | WEIGHT: 141 LBS | HEIGHT: 63 IN | DIASTOLIC BLOOD PRESSURE: 72 MMHG

## 2022-06-24 DIAGNOSIS — R05.9 COUGH: ICD-10-CM

## 2022-06-24 DIAGNOSIS — R09.89 RALES: Primary | ICD-10-CM

## 2022-06-24 DIAGNOSIS — R52 BODY ACHES: ICD-10-CM

## 2022-06-24 DIAGNOSIS — J44.1 COPD EXACERBATION (HCC): ICD-10-CM

## 2022-06-24 DIAGNOSIS — R50.9 FEVER, UNSPECIFIED FEVER CAUSE: ICD-10-CM

## 2022-06-24 DIAGNOSIS — R53.83 FATIGUE, UNSPECIFIED TYPE: ICD-10-CM

## 2022-06-24 LAB
ALBUMIN SERPL-MCNC: 3.8 G/DL (ref 3.5–5.2)
ALP BLD-CCNC: 187 U/L (ref 35–104)
ALT SERPL-CCNC: 27 U/L (ref 5–33)
ANION GAP SERPL CALCULATED.3IONS-SCNC: 14 MMOL/L (ref 7–19)
AST SERPL-CCNC: 57 U/L (ref 5–32)
BILIRUB SERPL-MCNC: 0.8 MG/DL (ref 0.2–1.2)
BUN BLDV-MCNC: 17 MG/DL (ref 8–23)
CALCIUM SERPL-MCNC: 9 MG/DL (ref 8.8–10.2)
CHLORIDE BLD-SCNC: 98 MMOL/L (ref 98–111)
CO2: 24 MMOL/L (ref 22–29)
CREAT SERPL-MCNC: 1 MG/DL (ref 0.5–0.9)
GFR AFRICAN AMERICAN: >59
GFR NON-AFRICAN AMERICAN: 52
GLUCOSE BLD-MCNC: 132 MG/DL (ref 74–109)
HCT VFR BLD CALC: 36.9 % (ref 37–47)
HEMOGLOBIN: 11.4 G/DL (ref 12–16)
INFLUENZA A ANTIBODY: NEGATIVE
INFLUENZA B ANTIBODY: NEGATIVE
Lab: 1234
MCH RBC QN AUTO: 28.9 PG (ref 27–31)
MCHC RBC AUTO-ENTMCNC: 30.9 G/DL (ref 33–37)
MCV RBC AUTO: 93.7 FL (ref 81–99)
PDW BLD-RTO: 14.8 % (ref 11.5–14.5)
PLATELET # BLD: 314 K/UL (ref 130–400)
PMV BLD AUTO: 10.5 FL (ref 9.4–12.3)
POTASSIUM SERPL-SCNC: 3.9 MMOL/L (ref 3.5–5)
QC PASS/FAIL: NORMAL
RBC # BLD: 3.94 M/UL (ref 4.2–5.4)
SARS-COV-2 RDRP RESP QL NAA+PROBE: NEGATIVE
SODIUM BLD-SCNC: 136 MMOL/L (ref 136–145)
TOTAL PROTEIN: 7.3 G/DL (ref 6.6–8.7)
WBC # BLD: 12.2 K/UL (ref 4.8–10.8)

## 2022-06-24 PROCEDURE — 99214 OFFICE O/P EST MOD 30 MIN: CPT | Performed by: NURSE PRACTITIONER

## 2022-06-24 PROCEDURE — 3023F SPIROM DOC REV: CPT | Performed by: NURSE PRACTITIONER

## 2022-06-24 PROCEDURE — 87635 SARS-COV-2 COVID-19 AMP PRB: CPT | Performed by: NURSE PRACTITIONER

## 2022-06-24 PROCEDURE — G8427 DOCREV CUR MEDS BY ELIG CLIN: HCPCS | Performed by: NURSE PRACTITIONER

## 2022-06-24 PROCEDURE — 96372 THER/PROPH/DIAG INJ SC/IM: CPT | Performed by: NURSE PRACTITIONER

## 2022-06-24 PROCEDURE — 87804 INFLUENZA ASSAY W/OPTIC: CPT | Performed by: NURSE PRACTITIONER

## 2022-06-24 PROCEDURE — G8420 CALC BMI NORM PARAMETERS: HCPCS | Performed by: NURSE PRACTITIONER

## 2022-06-24 PROCEDURE — 1090F PRES/ABSN URINE INCON ASSESS: CPT | Performed by: NURSE PRACTITIONER

## 2022-06-24 PROCEDURE — 1123F ACP DISCUSS/DSCN MKR DOCD: CPT | Performed by: NURSE PRACTITIONER

## 2022-06-24 PROCEDURE — 1036F TOBACCO NON-USER: CPT | Performed by: NURSE PRACTITIONER

## 2022-06-24 RX ORDER — AZITHROMYCIN 250 MG/1
TABLET, FILM COATED ORAL
Qty: 1 PACKET | Refills: 0 | Status: SHIPPED | OUTPATIENT
Start: 2022-06-24 | End: 2022-07-04

## 2022-06-24 RX ORDER — PREDNISONE 20 MG/1
20 TABLET ORAL 2 TIMES DAILY
Qty: 10 TABLET | Refills: 0 | Status: SHIPPED | OUTPATIENT
Start: 2022-06-24 | End: 2022-06-29

## 2022-06-24 RX ORDER — BENZONATATE 200 MG/1
200 CAPSULE ORAL 3 TIMES DAILY PRN
Qty: 30 CAPSULE | Refills: 0 | Status: SHIPPED | OUTPATIENT
Start: 2022-06-24 | End: 2022-07-01

## 2022-06-24 RX ORDER — DEXTROMETHORPHAN HYDROBROMIDE AND PROMETHAZINE HYDROCHLORIDE 15; 6.25 MG/5ML; MG/5ML
SYRUP ORAL
Qty: 120 ML | Refills: 0 | Status: SHIPPED | OUTPATIENT
Start: 2022-06-24 | End: 2022-07-01

## 2022-06-24 RX ORDER — BETAMETHASONE SODIUM PHOSPHATE AND BETAMETHASONE ACETATE 3; 3 MG/ML; MG/ML
12 INJECTION, SUSPENSION INTRA-ARTICULAR; INTRALESIONAL; INTRAMUSCULAR; SOFT TISSUE ONCE
Status: COMPLETED | OUTPATIENT
Start: 2022-06-24 | End: 2022-06-24

## 2022-06-24 RX ADMIN — BETAMETHASONE SODIUM PHOSPHATE AND BETAMETHASONE ACETATE 12 MG: 3; 3 INJECTION, SUSPENSION INTRA-ARTICULAR; INTRALESIONAL; INTRAMUSCULAR; SOFT TISSUE at 13:18

## 2022-06-24 ASSESSMENT — PATIENT HEALTH QUESTIONNAIRE - PHQ9
SUM OF ALL RESPONSES TO PHQ9 QUESTIONS 1 & 2: 0
SUM OF ALL RESPONSES TO PHQ QUESTIONS 1-9: 0
2. FEELING DOWN, DEPRESSED OR HOPELESS: 0
SUM OF ALL RESPONSES TO PHQ QUESTIONS 1-9: 0
1. LITTLE INTEREST OR PLEASURE IN DOING THINGS: 0
SUM OF ALL RESPONSES TO PHQ QUESTIONS 1-9: 0
SUM OF ALL RESPONSES TO PHQ QUESTIONS 1-9: 0

## 2022-06-24 ASSESSMENT — ENCOUNTER SYMPTOMS
COUGH: 1
GASTROINTESTINAL NEGATIVE: 1
SORE THROAT: 1
EYES NEGATIVE: 1
ALLERGIC/IMMUNOLOGIC NEGATIVE: 1
VOICE CHANGE: 1

## 2022-06-24 NOTE — PATIENT INSTRUCTIONS
Take one prednisone tablet twice a day for 5 days. Start tomorrow. Take Zpack as directed . Start today     DM cough syrup is for nightime. It will make you sleepy do not drive on this. tesslon perles may be taken for cough 3 times a day . These do not make you sleepy.      Nystatin swish and swallow

## 2022-06-24 NOTE — PROGRESS NOTES
Formerly [facility] PHYSICIAN SERVICES  [facility]  [address] [place] [address]  [address] [place] [address]  Dept: [phone]  Dept Fax: [phone]  Loc: [phone]    Kike Stapleton is a 80 y.o. female who presents today for her medical conditions/complaints as noted below. Kike Stapleton is c/o of Cough (Symptoms started Saturday.), Congestion, Fever, Chills, Fatigue, Generalized Body Aches, and Pharyngitis        HPI:     HPI     This 59-year-old female presents today for cough, congestion fever up to 100 304, chills fatigue general body aches and sore throat. He states that he started with a scratchy throat on Thursday night. And then over the weekend it is progressively got worse to fevers as high as 104. States that she has been taken Tylenol occasionally for that to keep it under control. States that she just feels very bad. Chief Complaint   Patient presents with    Cough     Symptoms started Saturday.     Congestion    Fever    Chills    Fatigue    Generalized Body Aches    Pharyngitis     Past Medical History:   Diagnosis Date    Anemia     Asthma     CHF (congestive heart failure) (HCC)     COPD (chronic obstructive pulmonary disease) (HCC)     Dermatomyositis (HCC)     Elevated ferritin     Fibromyalgia     Hyperlipidemia     Hyperthyroidism     Pancreatitis     Sleep apnea     Supraventricular tachycardia ([facility] [address] 10/19/2016      Past Surgical History:   Procedure Laterality Date    CHOLECYSTECTOMY      COLONOSCOPY  10/27/2009    Dr Ginny Gil:  10 yr recall    HYSTERECTOMY (CERVIX STATUS UNKNOWN)      PACEMAKER PLACEMENT      UPPER GASTROINTESTINAL ENDOSCOPY  07/05/2005    Dr Gino Storey 6/24/2022 2/2/2022 2/2/2022 2/2/2022 2/1/2022 1/00/1580   SYSTOLIC 567 947 - 409 309 483   DIASTOLIC 72 65 - 65 72 70   Site Left Upper Arm - - - Left Upper Arm -   Position Sitting - - - Sitting -   Cuff Size Large Adult - - - Medium Adult -   Pulse 95 60 - 64 75 64   Temp 99.3 97.8 - 97.6 98.2 -   Resp - 18 - 16 - -   SpO2 95 94 - 94 99 99   Weight 141 lb - - - 144 lb 144 lb   Height 5' 3\" - - - 5' 3\" 5' 3\"   Body mass index 24.97 kg/m2 - - - 25.51 kg/m2 25.51 kg/m2   Pain Level - - 0 - - -   Some recent data might be hidden       Family History   Problem Relation Age of Onset    Colon Cancer Neg Hx     Colon Polyps Neg Hx        Social History     Tobacco Use    Smoking status: Never Smoker    Smokeless tobacco: Never Used   Substance Use Topics    Alcohol use: No      Current Outpatient Medications on File Prior to Visit   Medication Sig Dispense Refill    sucralfate (CARAFATE) 1 GM/10ML suspension Take 10 mLs by mouth 4 times daily 1200 mL 3    hydroCHLOROthiazide (MICROZIDE) 12.5 MG capsule Take 12.5 mg by mouth daily      hydrocortisone (ANUSOL-HC) 25 MG suppository Place 1 suppository rectally every 12 hours (Patient taking differently: Place 25 mg rectally 2 times daily as needed ) 30 suppository 0    clobetasol (TEMOVATE) 0.05 % ointment Apply topically 2 times daily. 1 Tube 1    budesonide-formoterol (SYMBICORT) 160-4.5 MCG/ACT AERO Inhale 2 puffs into the lungs 2 times daily 1 Inhaler 0    OXYGEN Inhale into the lungs nightly      TOPROL XL 25 MG extended release tablet 25 mg 3 times daily       KLOR-CON M10 10 MEQ extended release tablet 10 mEq daily       isosorbide mononitrate (IMDUR) 30 MG CR tablet Take 30 mg by mouth daily       levothyroxine (SYNTHROID) 25 MCG tablet Take 1 tablet by mouth daily 90 tablet 3    albuterol sulfate HFA (PROVENTIL HFA) 108 (90 Base) MCG/ACT inhaler Inhale 2 puffs into the lungs every 6 hours as needed for Wheezing 3 Inhaler 3     No current facility-administered medications on file prior to visit.      Allergies   Allergen Reactions    Latex     Augmentin [Amoxicillin-Pot Clavulanate]     Codeine     Fentanyl     Iodine     Versed [Midazolam]        Health Maintenance   Topic Date Due    DTaP/Tdap/Td vaccine (2 - Td or Tdap) 11/09/2022 (Originally 3/29/2020)    Flu vaccine (Season Ended) 11/09/2022 (Originally 9/1/2022)    Shingles vaccine (1 of 2) 11/09/2022 (Originally [birth year]/1984)    COVID-19 Vaccine (1) 11/09/2024 (Originally 2/10/1939)    Depression Screen  08/09/2022    Annual Wellness Visit (AWV)  08/10/2022    Pneumococcal 65+ years Vaccine  Completed    Hepatitis A vaccine  Aged Out    Hepatitis B vaccine  Aged Out    Hib vaccine  Aged Out    Meningococcal (ACWY) vaccine  Aged Out       Subjective:      Review of Systems   Constitutional: Positive for fever. HENT: Positive for congestion, sore throat and voice change. Eyes: Negative. Respiratory: Positive for cough. Cardiovascular: Negative. Gastrointestinal: Negative. Endocrine: Negative. Genitourinary: Negative. Musculoskeletal: Positive for myalgias. Skin: Negative. Allergic/Immunologic: Negative. Neurological: Negative. Hematological: Negative. Psychiatric/Behavioral: Negative. Objective:     Physical Exam  Vitals and nursing note reviewed. Constitutional:       General: She is not in acute distress. Appearance: Normal appearance. HENT:      Head: Normocephalic and atraumatic. Nose: Rhinorrhea present. Mouth/Throat:      Mouth: Mucous membranes are moist.      Pharynx: Posterior oropharyngeal erythema present. Eyes:      Extraocular Movements: Extraocular movements intact. Conjunctiva/sclera: Conjunctivae normal.      Pupils: Pupils are equal, round, and reactive to light. Cardiovascular:      Rate and Rhythm: Normal rate and regular rhythm. Pulses: Normal pulses. Heart sounds: Normal heart sounds. Pulmonary:      Effort: Pulmonary effort is normal.      Breath sounds: Rales present. Abdominal:      General: Bowel sounds are normal.      Palpations: Abdomen is soft. Musculoskeletal:         General: Normal range of motion.       Cervical back: Normal range of motion and neck supple. Skin:     General: Skin is warm and dry. Coloration: Skin is pale. Skin is not jaundiced. Findings: No erythema or rash. Neurological:      Mental Status: She is alert and oriented to person, place, and time. Mental status is at baseline. Psychiatric:         Mood and Affect: Mood normal.         Behavior: Behavior normal.       /72 (Site: Left Upper Arm, Position: Sitting, Cuff Size: Large Adult)   Pulse 95   Temp 99.3 °F (37.4 °C)   Ht 5' 3\" (1.6 m)   Wt 141 lb (64 kg)   SpO2 95%   BMI 24.98 kg/m²     Assessment:       Diagnosis Orders   1. Rales  POCT Influenza A/B    POCT COVID-19 Rapid, NAAT    XR CHEST STANDARD (2 VW)    Comprehensive Metabolic Panel    CBC    azithromycin (ZITHROMAX Z-REYNA) 250 MG tablet   2. Cough  POCT Influenza A/B    POCT COVID-19 Rapid, NAAT    Comprehensive Metabolic Panel    CBC    azithromycin (ZITHROMAX Z-REYNA) 250 MG tablet   3. Fever, unspecified fever cause  POCT Influenza A/B    POCT COVID-19 Rapid, NAAT    Comprehensive Metabolic Panel    CBC    azithromycin (ZITHROMAX Z-REYNA) 250 MG tablet   4. Body aches  POCT Influenza A/B    POCT COVID-19 Rapid, NAAT    Comprehensive Metabolic Panel    CBC    azithromycin (ZITHROMAX Z-REYNA) 250 MG tablet   5. Fatigue, unspecified type  POCT Influenza A/B    POCT COVID-19 Rapid, NAAT    Comprehensive Metabolic Panel    CBC    azithromycin (ZITHROMAX Z-REYNA) 250 MG tablet   6. COPD exacerbation (HCC)  azithromycin (ZITHROMAX Z-REYNA) 250 MG tablet         Plan:   POCT rapid COVID in office today results reviewed negative  POCT rapid influenza in office today negative for both a and B.    Labs today to include CBC CMP. Chest x-ray in office today. Covid Supportive Treatments    Zinc 250 mg daily for 5 days and then decrease to 50 mg a day. Vitamin C 1000 mg a day. Vitamin D 7643-7577 mcg a day. Aspirin 325 mg a day. Daily antihistamine of choice ( Claritin, Allegra, or Zyrtec)  Pepcid daily.    Tylenol for aches, pain and fever. Your provider may also send in prescriptions for symptom specific treatment  our provider may also send in prescriptions for symptom specific treatment. Covid is a viral pneumonia and some antibiotics will not help this. Mucinex or delsym for chest congestion. Check oxygen saturation regularly and if below 90% go to the ER. You may qualify for home oxygen and breathing treatments if your oxygen is staying around 90%. According to current [facility] guidelines if you have tested positive for COVID and have symptoms you are to isolate for 10 days from the day the symptoms began. If you tested positive for COVID and have never had symptoms you must isolate for 5 days from the day of your test.  If you are not fully vaccinated or booster eligible but not yet boostered and have been in close contact with someone diagnosed with COVID quarantine from 10 days from the exposure. This may be shortened to 5 days if you have no symptoms and tested negative for COVID on day 5    Viral syndrome   Many illnesses are caused by viruses. These conditions usually run their course in 7-14 days. Antibiotics do not help fight viral infections and are not needed at this time. Viral syndromes are treated with symptomatic support. You may take tylenol or ibuprofen for fever or aches and pains. Stay hydrated by taking sips of water or non caffeinated, noncarbonated, and nonalcoholic beverages throughout the day. For sore throat, you may gargle with warm salt water, use lozenges or sprays. Using a daily antihistamine such as Claritin, Zyrtec or Allegra can help with upper respiratory symptoms. Benadryl can be sedating but is helpful at drying secretions and may be taken at night. Call if you have a fever greater than 102 F or if symptoms do not improve. Your provider may also send you in prescription medications depending on your symptoms and their severity.  Take all medications as directed on package unless specifically told otherwise. 6.  Celestone injection in office today. Follow this by prednisone Dosepak to start tomorrow. Rocephin injection in office today. Azithromycin take as directed to complete. Antibiotic Therapy  Take your antibiotic as prescribed. Take all of your antibiotics. You should not have any left over. Many antibiotics may cause diarrhea. . you can start an OTC probiotic to support normal gut bacteria. If you are on birth control, you need to use an alternative method of contraceptive for one month as antibiotics can reduce the effectiveness of oral BC. Always monitor for signs of allergic reaction such as itching, hives or any difficulty swallowing or breathing STOP THE MEDICATION IMMEDIATELY. If difficulty breathing, call 911 and go to the ER. If hives and itching, contact provider through [address] or phone for alternative antibiotics or be seen if necessary. Tessalon Perles may be taken 3 times a day as needed for cough  DM cough syrup may be taken every 4-6 hours at night for cough. This will make you sleepy. Do not drive on this medication. Patient given educational materials -see patient instructions. Discussed use, benefit, and side effects of prescribed medications. All patient questions answered. Pt voiced understanding. Reviewed health maintenance. Instructed to continue currentmedications, diet and exercise. Patient agreed with treatment plan. Follow up as directed. MEDICATIONS:  Orders Placed This Encounter   Medications    cefTRIAXone (ROCEPHIN) 1,000 mg in lidocaine 1 % 2.86 mL IM Injection     Order Specific Question:   Antimicrobial Indications     Answer:   Upper Respiratory Infection     Order Specific Question:   URI duration of therapy     Answer:    Other     Order Specific Question:   Other Upper Respiratory Infection Duration     Answer:   other    nystatin (MYCOSTATIN) 048793 UNIT/ML suspension     Sig: Take 5 mLs by mouth 4 times daily     Dispense:  1 each     Refill:  0    betamethasone acetate-betamethasone sodium phosphate (CELESTONE) injection 12 mg    predniSONE (DELTASONE) 20 MG tablet     Sig: Take 1 tablet by mouth 2 times daily for 5 days     Dispense:  10 tablet     Refill:  0    azithromycin (ZITHROMAX Z-REYNA) 250 MG tablet     Sig: Take as directed     Dispense:  1 packet     Refill:  0    promethazine-dextromethorphan (PROMETHAZINE-DM) 6.25-15 MG/5ML syrup     Si to 2 teaspoons every 4 to 6 hours at night for cough. Dispense:  120 mL     Refill:  0    benzonatate (TESSALON) 200 MG capsule     Sig: Take 1 capsule by mouth 3 times daily as needed for Cough     Dispense:  30 capsule     Refill:  0         ORDERS:  Orders Placed This Encounter   Procedures    XR CHEST STANDARD (2 VW)    Comprehensive Metabolic Panel    CBC    POCT Influenza A/B    POCT COVID-19 Rapid, NAAT       Follow-up:  Return in about 1 week (around 2022). PATIENT INSTRUCTIONS:  Patient Instructions   Take one prednisone tablet twice a day for 5 days. Start tomorrow. Take Zpack as directed . Start today     DM cough syrup is for nightime. It will make you sleepy do not drive on this. tesslon perles may be taken for cough 3 times a day . These do not make you sleepy. Nystatin swish and swallow     Electronically signed by JAZMIN Fortune NP on 2022 at 1:09 PM    EMR Dragon/transcription disclaimer:  Much of thisencounter note is electronic transcription/translation of spoken language to printed texts. The electronic translation of spoken language may be erroneous, or at times, nonsensical words or phrases may be inadvertentlytranscribed.   Although I have reviewed the note for such errors, some may still exist.

## 2022-08-11 ENCOUNTER — OFFICE VISIT (OUTPATIENT)
Dept: PRIMARY CARE CLINIC | Age: 87
End: 2022-08-11
Payer: MEDICARE

## 2022-08-11 VITALS
WEIGHT: 140 LBS | SYSTOLIC BLOOD PRESSURE: 138 MMHG | BODY MASS INDEX: 24.8 KG/M2 | HEIGHT: 63 IN | RESPIRATION RATE: 18 BRPM | HEART RATE: 65 BPM | OXYGEN SATURATION: 97 % | DIASTOLIC BLOOD PRESSURE: 60 MMHG | TEMPERATURE: 97.6 F

## 2022-08-11 DIAGNOSIS — Z00.00 MEDICARE ANNUAL WELLNESS VISIT, SUBSEQUENT: Primary | ICD-10-CM

## 2022-08-11 DIAGNOSIS — N18.31 STAGE 3A CHRONIC KIDNEY DISEASE (HCC): ICD-10-CM

## 2022-08-11 DIAGNOSIS — I48.0 PAROXYSMAL ATRIAL FIBRILLATION (HCC): ICD-10-CM

## 2022-08-11 DIAGNOSIS — M79.18 MUSCULOSKELETAL PAIN: ICD-10-CM

## 2022-08-11 DIAGNOSIS — E03.9 HYPOTHYROIDISM, UNSPECIFIED TYPE: ICD-10-CM

## 2022-08-11 PROBLEM — N18.30 CHRONIC RENAL DISEASE, STAGE III (HCC): Status: ACTIVE | Noted: 2022-08-11

## 2022-08-11 PROCEDURE — 1123F ACP DISCUSS/DSCN MKR DOCD: CPT | Performed by: NURSE PRACTITIONER

## 2022-08-11 PROCEDURE — G0439 PPPS, SUBSEQ VISIT: HCPCS | Performed by: NURSE PRACTITIONER

## 2022-08-11 RX ORDER — BACLOFEN 10 MG/1
10 TABLET ORAL NIGHTLY PRN
Qty: 30 TABLET | Refills: 1 | Status: SHIPPED | OUTPATIENT
Start: 2022-08-11

## 2022-08-11 SDOH — ECONOMIC STABILITY: FOOD INSECURITY: WITHIN THE PAST 12 MONTHS, YOU WORRIED THAT YOUR FOOD WOULD RUN OUT BEFORE YOU GOT MONEY TO BUY MORE.: NEVER TRUE

## 2022-08-11 SDOH — ECONOMIC STABILITY: FOOD INSECURITY: WITHIN THE PAST 12 MONTHS, THE FOOD YOU BOUGHT JUST DIDN'T LAST AND YOU DIDN'T HAVE MONEY TO GET MORE.: NEVER TRUE

## 2022-08-11 ASSESSMENT — PATIENT HEALTH QUESTIONNAIRE - PHQ9
SUM OF ALL RESPONSES TO PHQ QUESTIONS 1-9: 0
1. LITTLE INTEREST OR PLEASURE IN DOING THINGS: 0
SUM OF ALL RESPONSES TO PHQ QUESTIONS 1-9: 0
SUM OF ALL RESPONSES TO PHQ9 QUESTIONS 1 & 2: 0
2. FEELING DOWN, DEPRESSED OR HOPELESS: 0
SUM OF ALL RESPONSES TO PHQ QUESTIONS 1-9: 0
SUM OF ALL RESPONSES TO PHQ QUESTIONS 1-9: 0

## 2022-08-11 ASSESSMENT — LIFESTYLE VARIABLES
HOW MANY STANDARD DRINKS CONTAINING ALCOHOL DO YOU HAVE ON A TYPICAL DAY: PATIENT DOES NOT DRINK
HOW OFTEN DO YOU HAVE A DRINK CONTAINING ALCOHOL: NEVER

## 2022-08-11 ASSESSMENT — SOCIAL DETERMINANTS OF HEALTH (SDOH): HOW HARD IS IT FOR YOU TO PAY FOR THE VERY BASICS LIKE FOOD, HOUSING, MEDICAL CARE, AND HEATING?: NOT HARD AT ALL

## 2022-08-11 NOTE — PATIENT INSTRUCTIONS
Personalized Preventive Plan for Romina Galeana - 8/11/2022  Medicare offers a range of preventive health benefits. Some of the tests and screenings are paid in full while other may be subject to a deductible, co-insurance, and/or copay. Some of these benefits include a comprehensive review of your medical history including lifestyle, illnesses that may run in your family, and various assessments and screenings as appropriate. After reviewing your medical record and screening and assessments performed today your provider may have ordered immunizations, labs, imaging, and/or referrals for you. A list of these orders (if applicable) as well as your Preventive Care list are included within your After Visit Summary for your review. Other Preventive Recommendations:    A preventive eye exam performed by an eye specialist is recommended every 1-2 years to screen for glaucoma; cataracts, macular degeneration, and other eye disorders. A preventive dental visit is recommended every 6 months. Try to get at least 150 minutes of exercise per week or 10,000 steps per day on a pedometer . Order or download the FREE \"Exercise & Physical Activity: Your Everyday Guide\" from [employer]. Call 8-710.125.6590 or search [employer] online. You need 9145-7337 mg of calcium and 6142-3185 IU of vitamin D per day. It is possible to meet your calcium requirement with diet alone, but a vitamin D supplement is usually necessary to meet this goal.  When exposed to the sun, use a sunscreen that protects against both UVA and UVB radiation with an SPF of 30 or greater. Reapply every 2 to 3 hours or after sweating, drying off with a towel, or swimming. Always wear a seat belt when traveling in a car. Always wear a helmet when riding a bicycle or motorcycle.

## 2022-08-11 NOTE — PROGRESS NOTES
that she is going out and they are applying canasta at [email or web address]. She is out and moving and feels so much better than she did 6 months ago. Patient's complete Health Risk Assessment and screening values have been reviewed and are found in Flowsheets. The following problems were reviewed today and where indicated follow up appointments were made and/or referrals ordered. Positive Risk Factor Screenings with Interventions:             General Health and ACP:  General  In general, how would you say your health is?: Excellent  In the past 7 days, have you experienced any of the following: New or Increased Pain, New or Increased Fatigue, Loneliness, Social Isolation, Stress or Anger?: No  Do you get the social and emotional support that you need?: Yes  Do you have a Living Will?: Yes    Advance Directives       Power of  Living Will ACP-Advance Directive ACP-Power of     Not on File Not on File Not on File Not on File        General Health Risk Interventions:  Pain issues: mild discomfort to shoulder blade and right foot     Health Habits/Nutrition:  Physical Activity: Insufficiently Active    Days of Exercise per Week: 1 day    Minutes of Exercise per Session: 10 min     Have you lost any weight without trying in the past 3 months?: No  Body mass index: 24.8  Have you seen the dentist within the past year?: (!) No  Health Habits/Nutrition Interventions:  pt encouraged to use voltaren cream with biofreeze gel . Hearing/Vision:  Do you or your family notice any trouble with your hearing that hasn't been managed with hearing aids?: No  Do you have difficulty driving, watching TV, or doing any of your daily activities because of your eyesight?: No  Have you had an eye exam within the past year?: (!) No  No results found.   Hearing/Vision Interventions:  None, pt reports needing to schedule eye check up             Objective   Vitals:    08/11/22 1401 08/11/22 1439   BP: (!) 160/68 138/60

## 2022-08-12 ENCOUNTER — HOSPITAL ENCOUNTER (OUTPATIENT)
Dept: ULTRASOUND IMAGING | Facility: HOSPITAL | Age: 87
Discharge: HOME | End: 2022-08-12
Admitting: INTERNAL MEDICINE

## 2022-08-12 ENCOUNTER — OFFICE VISIT (OUTPATIENT)
Dept: CARDIOLOGY | Facility: CLINIC | Age: 87
End: 2022-08-12

## 2022-08-12 VITALS
WEIGHT: 143 LBS | DIASTOLIC BLOOD PRESSURE: 64 MMHG | HEART RATE: 85 BPM | OXYGEN SATURATION: 96 % | HEIGHT: 63 IN | BODY MASS INDEX: 25.34 KG/M2 | SYSTOLIC BLOOD PRESSURE: 120 MMHG

## 2022-08-12 DIAGNOSIS — E78.2 MIXED HYPERLIPIDEMIA: ICD-10-CM

## 2022-08-12 DIAGNOSIS — I47.1 PSVT (PAROXYSMAL SUPRAVENTRICULAR TACHYCARDIA): Primary | ICD-10-CM

## 2022-08-12 DIAGNOSIS — I49.5 SICK SINUS SYNDROME: ICD-10-CM

## 2022-08-12 DIAGNOSIS — Z79.01 CURRENT USE OF LONG TERM ANTICOAGULATION: ICD-10-CM

## 2022-08-12 DIAGNOSIS — I48.0 PAF (PAROXYSMAL ATRIAL FIBRILLATION): ICD-10-CM

## 2022-08-12 DIAGNOSIS — Z98.890 HISTORY OF CARDIOVERSION: ICD-10-CM

## 2022-08-12 DIAGNOSIS — R06.09 DYSPNEA ON EXERTION: ICD-10-CM

## 2022-08-12 DIAGNOSIS — J44.9 CHRONIC OBSTRUCTIVE PULMONARY DISEASE, UNSPECIFIED COPD TYPE: ICD-10-CM

## 2022-08-12 DIAGNOSIS — I65.23 ATHEROSCLEROSIS OF BOTH CAROTID ARTERIES: ICD-10-CM

## 2022-08-12 DIAGNOSIS — Z86.73 HISTORY OF STROKE: ICD-10-CM

## 2022-08-12 PROCEDURE — 93880 EXTRACRANIAL BILAT STUDY: CPT

## 2022-08-12 PROCEDURE — 99214 OFFICE O/P EST MOD 30 MIN: CPT | Performed by: INTERNAL MEDICINE

## 2022-08-12 NOTE — PROGRESS NOTES
Violet Fajardo  [id number]  1934  88 y.o.  female    Referring Provider: Josette Solis MD    Reason for Follow-up Visit:  Here for follow up after cardiac testing.   sick sinus syndrome s/p pacemaker   chronic atrial fibrillation   Cardiac workup test results as below   Had anemia   Happened when she had URI   upper GI endoscopy and colonoscopy was planned and then cancelled due to her age   CBC as below   On Xarelto   No definite bleeding episodes identified   CBC as below   transesophageal echo as below     Subjective    She has decided against Watcham[facility]         Moderate chronic exertional shortness of breath on exertion relieved with rest  Worse when leaning over or bending   No significant cough or wheezing    Intermittent palpitations, once every several days to several weeks lasting for less than 1 minute  No associated symptoms of dizziness, weakness, chest pain,  shortness of breath    Feels her heart  jumps around in her chest   Usually only with exertion or lifting heavy object  No associated chest pain  No significant pedal edema    No fever or chills  No significant expectoration    No hemoptysis  No presyncope or syncope    Tolerating current medications well with no untoward side effects   Compliant with prescribed medication regimen. Tries to adhere to cardiac diet.   Had Covid in January and got infusion therapy in [facility]     Easy fatiguability and increasing tired  Feels energy levels running low      Device check as below             History of present illness:  Violet Fajardo is a 88 y.o. yo female with history of sick sinus syndrome s/p pacemaker who presents today for   Chief Complaint   Patient presents with   • paf   • Results     Recent BALTAZAR    .    History  Past Medical History:   Diagnosis Date   • Asthma    • CHF (congestive heart failure) (HCC)    • COPD (chronic obstructive pulmonary disease) (HCC)    • Dermatomyositis (HCC)    • Dermatomyositis (HCC)    •  Dyspnea    • E-coli UTI    • Fibromyalgia    • Hyperlipemia, mixed    • Hyperlipidemia    • Hypothyroidism    • Mitral valve disorder     History of MVP   • Mitral valve disorder    • Pacemaker    • Pacemaker    • PAF (paroxysmal atrial fibrillation) (CMS/HCC) new onset 8/6/2018   • Respiratory infection    • Sick sinus syndrome (HCC)    • Sleep apnea    • Stroke ([facility])    • SVT (supraventricular tachycardia) ([facility])    ,   Past Surgical History:   Procedure Laterality Date   • CARDIAC CATHETERIZATION     • CARDIOVERSION     • CHOLECYSTECTOMY     • FOOT SURGERY     • HYSTERECTOMY     • INSERT / REPLACE / REMOVE PACEMAKER     • PACEMAKER IMPLANTATION  12/15/2011    EnRhythm (4/16/2012)-lead repositioning   ,   Family History   Problem Relation Age of Onset   • Coronary artery disease Mother    • Heart attack Mother    • Coronary artery disease Father    ,   Social History     Tobacco Use   • Smoking status: Never Smoker   • Smokeless tobacco: Never Used   Vaping Use   • Vaping Use: Never used   Substance Use Topics   • Alcohol use: No   • Drug use: No   ,     Medications  Current Outpatient Medications   Medication Sig Dispense Refill   • Budesonide-Formoterol Fumarate (SYMBICORT IN) Inhale.     • ERGOCALCIFEROL PO Take  by mouth Daily.     • FOLIC ACID-B6-B12-D PO Take  by mouth.     • hydrochlorothiazide (MICROZIDE) 12.5 MG capsule Take 1 capsule by mouth Daily. 90 capsule 3   • ipratropium (ATROVENT HFA) 17 MCG/ACT inhaler Inhale 2 puffs 4 (Four) Times a Day. 1 inhaler 11   • isosorbide mononitrate (IMDUR) 30 MG 24 hr tablet Take 1 tablet by mouth Daily. 90 tablet 3   • levothyroxine (SYNTHROID, LEVOTHROID) 25 MCG tablet Take 25 mcg by mouth Daily.     • metoprolol succinate XL (TOPROL-XL) 50 MG 24 hr tablet Take 1 tablet by mouth Daily. (Patient taking differently: Take 25 mg by mouth Daily.) 90 tablet 4   • O2 (OXYGEN) Inhale Every Night.     • Omega-3 Fatty Acids (FISH OIL) 1000 MG capsule capsule Take  by mouth  "Daily With Breakfast.     • potassium chloride (K-DUR,KLOR-CON) 10 MEQ CR tablet Take 1 tablet by mouth Daily. 90 tablet 3   • rivaroxaban (XARELTO) 20 MG tablet Take 20 mg by mouth Daily.     • sucralfate (CARAFATE) 1 GM/10ML suspension Take 1 g by mouth 4 (Four) Times a Day.     • Zolpidem Tartrate 10 MG sublingual tablet Place  under the tongue.       No current facility-administered medications for this visit.       Allergies:  Latex, Amoxicillin-pot clavulanate, Barley grass, Codeine, Contrast dye, Fentanyl, and Midazolam    Review of Systems  Review of Systems   Constitutional: Positive for malaise/fatigue.   HENT: Negative.    Eyes: Negative.    Cardiovascular: Positive for dyspnea on exertion. Negative for chest pain, claudication, cyanosis, irregular heartbeat, leg swelling, near-syncope, orthopnea, palpitations, paroxysmal nocturnal dyspnea and syncope.   Respiratory: Negative.    Endocrine: Negative.    Hematologic/Lymphatic: Negative.    Skin: Negative.    Musculoskeletal: Positive for arthritis and joint pain.   Gastrointestinal: Negative for anorexia.   Genitourinary: Negative.    Neurological: Negative.    Psychiatric/Behavioral: Negative.        Objective     Physical Exam:  /64   Pulse 85   Ht 160 cm (63\")   Wt 64.9 kg (143 lb)   SpO2 96%   BMI 25.33 kg/m²   Physical Exam   Constitutional: She appears well-developed.   HENT:   Head: Normocephalic.   Eyes: Lids are normal.   Neck: Normal carotid pulses and no JVD present. No tracheal tenderness present. Carotid bruit is not present. No tracheal deviation present.   Cardiovascular: Regular rhythm, S1 normal, S2 normal and normal pulses.   Murmur heard.   Systolic murmur is present with a grade of 2/6.  Pulmonary/Chest: Effort normal. No stridor.   Abdominal: Soft. Normal appearance. She exhibits no distension. There is no abdominal tenderness.   Neurological: She is alert. No cranial nerve deficit or sensory deficit.   Skin: Skin is warm. "   Psychiatric: Her speech is normal and behavior is normal. Thought content normal.       Results Review:    myocardial perfusion scan  5/19    · Left ventricular ejection fraction is normal (Calculated EF = 65%).  · Myocardial perfusion imaging indicates a normal myocardial perfusion study with no evidence of ischemia.  · Breast attenuation artifact is present.  · Raw images reviewed with the following abnormalities noted: vertical motion.  · Impressions are consistent with a low risk study.      Results for orders placed during the hospital encounter of 06/08/22    Adult Transesophageal Echo (BALTAZAR) W/ Cont if Necessary Per Protocol    Interpretation Summary  · Left ventricular ejection fraction appears to be 56 - 60%. Left ventricular systolic function is normal.  · No evidence of a left atrial appendage thrombus was present.           Procedures       Diagnoses and all orders for this visit:    1. PSVT (paroxysmal supraventricular tachycardia) (HCC) (Primary)    2. Sick sinus syndrome (HCC)    3. PAF (paroxysmal atrial fibrillation) (CMS/HCC) new onset    4. Dyspnea on exertion    5. Current use of long term anticoagulation    6. Chronic obstructive pulmonary disease, unspecified COPD type ([facility])    7. History of cardioversion    8. History of stroke    9. Mixed hyperlipidemia         Plan      Orders Placed This Encounter   Procedures   • US Carotid Bilateral     Standing Status:   Future     Standing Expiration Date:   8/12/2023     Order Specific Question:   Reason for Exam:     Answer:   carotid atherosclerosis      Will get her samples of Xarelto as $500 a month for this   Will try to get patient assistance   Offered to switch to Warfarin, she will let us know     Patient expressed understanding  Encouraged and answered all questions   Discussed with the patient and all questioned fully answered. She will call me if any problems arise.   Discussed results of prior testing with patient : transesophageal echo        Watchman device referral was made as significant stroke risk and bleeding risk  she declined this now as she and family feel she is too old for this     Monitor for any signs of bleeding including red or dark stools as well as easy bruisabilty. Fall precautions.       Monitor cardiac rhythm device function regularly per established schedule or PRN    Check BP and heart rates twice daily initially till blood pressures and heart rates under good control and then at least 3x / week,   If blood pressures continue to be well-controlled then can check week a month  at home and bring a recording for review next visit  If BP >130/85 or < 100/60 persistently over 3 reading 30 mins apart or if heart rates persistently above 100 bpm or less than 55 bpm call sooner for evaluation and advise     Follow up with Yana WEBB , Ray WEBB or Emperatriz Wang PA-C            Return in about 4 months (around 12/12/2022).

## 2022-08-16 ENCOUNTER — TELEPHONE (OUTPATIENT)
Dept: CARDIOLOGY | Facility: CLINIC | Age: 87
End: 2022-08-16

## 2022-08-17 ENCOUNTER — NURSE ONLY (OUTPATIENT)
Dept: PRIMARY CARE CLINIC | Age: 87
End: 2022-08-17
Payer: MEDICARE

## 2022-08-17 DIAGNOSIS — R30.0 DYSURIA: Primary | ICD-10-CM

## 2022-08-17 LAB
APPEARANCE FLUID: CLEAR
BILIRUBIN, POC: NORMAL
BLOOD URINE, POC: NORMAL
CLARITY, POC: CLEAR
COLOR, POC: NORMAL
GLUCOSE URINE, POC: NORMAL
KETONES, POC: NORMAL
LEUKOCYTE EST, POC: NORMAL
NITRITE, POC: NORMAL
PH, POC: NORMAL
PROTEIN, POC: NORMAL
SPECIFIC GRAVITY, POC: NORMAL
UROBILINOGEN, POC: NORMAL

## 2022-08-17 PROCEDURE — 81002 URINALYSIS NONAUTO W/O SCOPE: CPT | Performed by: NURSE PRACTITIONER

## 2022-08-19 ENCOUNTER — TELEPHONE (OUTPATIENT)
Dept: PRIMARY CARE CLINIC | Age: 87
End: 2022-08-19

## 2022-08-19 ENCOUNTER — TELEPHONE (OUTPATIENT)
Dept: CARDIOLOGY | Facility: CLINIC | Age: 87
End: 2022-08-19

## 2022-08-19 LAB — URINE CULTURE, ROUTINE: NORMAL

## 2022-08-19 NOTE — TELEPHONE ENCOUNTER
DELETE AFTER REVIEWING: Telephone encounter to be sent to the clinical pool.    Caller: Violet Fajardo    Relationship: Self    Best call back number: [phone]    Caller requesting test results: PATIENT    What test was performed: CAROTID ARTERY TEST      Additional notes: RETURNED A CALL FROM THE OFFICE TO GET HER TEST RESULTS BUT UNABLE TO WT. STILL WOULD LIKE TEST RESULTS.

## 2022-08-19 NOTE — TELEPHONE ENCOUNTER
Pt was seen on 8/11 and she states it was discussed at her visit about having and upper and lower GI procedure to see what os causing her blood counts to be low She states she has no preference but would like this done soon.

## 2022-08-22 ENCOUNTER — OFFICE VISIT (OUTPATIENT)
Dept: PRIMARY CARE CLINIC | Age: 87
End: 2022-08-22
Payer: MEDICARE

## 2022-08-22 ENCOUNTER — ANCILLARY PROCEDURE (OUTPATIENT)
Dept: PRIMARY CARE CLINIC | Age: 87
End: 2022-08-22
Payer: MEDICARE

## 2022-08-22 VITALS
HEART RATE: 92 BPM | TEMPERATURE: 98 F | SYSTOLIC BLOOD PRESSURE: 124 MMHG | OXYGEN SATURATION: 98 % | BODY MASS INDEX: 25.16 KG/M2 | HEIGHT: 63 IN | DIASTOLIC BLOOD PRESSURE: 80 MMHG | WEIGHT: 142 LBS

## 2022-08-22 DIAGNOSIS — R10.30 LOWER ABDOMINAL PAIN: Primary | ICD-10-CM

## 2022-08-22 LAB
ALBUMIN SERPL-MCNC: 4.6 G/DL (ref 3.5–5.2)
ALP BLD-CCNC: 126 U/L (ref 35–104)
ALT SERPL-CCNC: 15 U/L (ref 5–33)
ANION GAP SERPL CALCULATED.3IONS-SCNC: 11 MMOL/L (ref 7–19)
AST SERPL-CCNC: 24 U/L (ref 5–32)
BILIRUB SERPL-MCNC: 0.3 MG/DL (ref 0.2–1.2)
BUN BLDV-MCNC: 22 MG/DL (ref 8–23)
CALCIUM SERPL-MCNC: 9.6 MG/DL (ref 8.8–10.2)
CHLORIDE BLD-SCNC: 100 MMOL/L (ref 98–111)
CO2: 29 MMOL/L (ref 22–29)
CREAT SERPL-MCNC: 0.9 MG/DL (ref 0.5–0.9)
GFR AFRICAN AMERICAN: >59
GFR NON-AFRICAN AMERICAN: 59
GLUCOSE BLD-MCNC: 105 MG/DL (ref 74–109)
HCT VFR BLD CALC: 38.3 % (ref 37–47)
HEMOGLOBIN: 12 G/DL (ref 12–16)
LIPASE: 39 U/L (ref 13–60)
MCH RBC QN AUTO: 29.7 PG (ref 27–31)
MCHC RBC AUTO-ENTMCNC: 31.3 G/DL (ref 33–37)
MCV RBC AUTO: 94.8 FL (ref 81–99)
PDW BLD-RTO: 15.3 % (ref 11.5–14.5)
PLATELET # BLD: 269 K/UL (ref 130–400)
PMV BLD AUTO: 10.5 FL (ref 9.4–12.3)
POTASSIUM SERPL-SCNC: 4.2 MMOL/L (ref 3.5–5)
RBC # BLD: 4.04 M/UL (ref 4.2–5.4)
SODIUM BLD-SCNC: 140 MMOL/L (ref 136–145)
TOTAL PROTEIN: 7 G/DL (ref 6.6–8.7)
WBC # BLD: 7.2 K/UL (ref 4.8–10.8)

## 2022-08-22 PROCEDURE — 1123F ACP DISCUSS/DSCN MKR DOCD: CPT | Performed by: NURSE PRACTITIONER

## 2022-08-22 PROCEDURE — G8417 CALC BMI ABV UP PARAM F/U: HCPCS | Performed by: NURSE PRACTITIONER

## 2022-08-22 PROCEDURE — 82270 OCCULT BLOOD FECES: CPT | Performed by: NURSE PRACTITIONER

## 2022-08-22 PROCEDURE — 1090F PRES/ABSN URINE INCON ASSESS: CPT | Performed by: NURSE PRACTITIONER

## 2022-08-22 PROCEDURE — 99214 OFFICE O/P EST MOD 30 MIN: CPT | Performed by: NURSE PRACTITIONER

## 2022-08-22 PROCEDURE — 74018 RADEX ABDOMEN 1 VIEW: CPT | Performed by: NURSE PRACTITIONER

## 2022-08-22 PROCEDURE — G8427 DOCREV CUR MEDS BY ELIG CLIN: HCPCS | Performed by: NURSE PRACTITIONER

## 2022-08-22 PROCEDURE — 1036F TOBACCO NON-USER: CPT | Performed by: NURSE PRACTITIONER

## 2022-08-22 RX ORDER — METOPROLOL SUCCINATE 50 MG/1
TABLET, EXTENDED RELEASE ORAL
COMMUNITY
Start: 2022-07-12

## 2022-08-22 ASSESSMENT — ENCOUNTER SYMPTOMS
ABDOMINAL PAIN: 1
CONSTIPATION: 0
VOMITING: 1
DIARRHEA: 0
BLOOD IN STOOL: 0

## 2022-08-22 NOTE — PROGRESS NOTES
[facility] PHYSICIAN SERVICES  [facility]  [address] [place] [address]  [address] [place] [address]  Dept: [phone]  Dept Fax: [phone]  Loc: [phone]    Sandeep Carpenter is a 80 y.o. female who presents today for her medical conditions/complaints as noted below.   Sandeep Carpenter is c/o of Referral - General (Pt would like a GI Referral)        HPI:     HPI   Chief Complaint   Patient presents with    Referral - General     Pt would like a GI Referral     Past Medical History:   Diagnosis Date    Anemia     Asthma     CHF (congestive heart failure) (HCC)     COPD (chronic obstructive pulmonary disease) (HCC)     Dermatomyositis (HCC)     Elevated ferritin     Fibromyalgia     Hyperlipidemia     Hyperthyroidism     Pancreatitis     Sleep apnea     Supraventricular tachycardia ([facility] [address] 10/19/2016      Past Surgical History:   Procedure Laterality Date    CHOLECYSTECTOMY      COLONOSCOPY  10/27/2009    Dr Nicholson Ramona:  10 yr recall    HYSTERECTOMY (CERVIX STATUS UNKNOWN)      PACEMAKER PLACEMENT      UPPER GASTROINTESTINAL ENDOSCOPY  07/05/2005    Dr Andria Hahn 8/22/2022 8/11/2022 8/11/2022 6/24/2022 2/2/2022 5/0/6578   SYSTOLIC 015 003 117 278 232 -   DIASTOLIC 80 60 68 72 65 -   Site - - Left Upper Arm Left Upper Arm - -   Position - - Sitting Sitting - -   Cuff Size - - Medium Adult Large Adult - -   Pulse 92 - 65 95 60 -   Temp 98 - 97.6 99.3 97.8 -   Resp - - 18 - 18 -   SpO2 98 - 97 95 94 -   Weight 142 lb - 140 lb 141 lb - -   Height 5' 3\" - 5' 3\" 5' 3\" - -   Body mass index 25.15 kg/m2 - 24.8 kg/m2 24.97 kg/m2 - -   Pain Level - - - - - 0   Some recent data might be hidden       Family History   Problem Relation Age of Onset    Colon Cancer Neg Hx     Colon Polyps Neg Hx        Social History     Tobacco Use    Smoking status: Never    Smokeless tobacco: Never   Substance Use Topics    Alcohol use: No      Current Outpatient Medications on File Prior to Visit   Medication Sig Dispense Refill Vaccine  Completed    Hepatitis A vaccine  Aged Out    Hepatitis B vaccine  Aged Out    Hib vaccine  Aged Out    Meningococcal (ACWY) vaccine  Aged Out       Subjective:      Review of Systems   Constitutional: Negative. Negative for fatigue and fever. HENT: Negative. Eyes: Negative. Respiratory: Negative. Negative for cough, shortness of breath and wheezing. Cardiovascular: Negative. Negative for chest pain and palpitations. Gastrointestinal:  Positive for abdominal pain and vomiting. Negative for blood in stool, constipation and diarrhea. Endocrine: Negative. Genitourinary: Negative. Negative for difficulty urinating and dysuria. Musculoskeletal: Negative. Skin: Negative. Allergic/Immunologic: Negative. Neurological: Negative. Negative for headaches. Hematological: Negative. Psychiatric/Behavioral: Negative. Objective:     Physical Exam  Vitals and nursing note reviewed. Constitutional:       General: She is not in acute distress. Appearance: Normal appearance. She is not ill-appearing or toxic-appearing. HENT:      Head: Normocephalic and atraumatic. Nose: Nose normal.      Mouth/Throat:      Mouth: Mucous membranes are moist.   Eyes:      Pupils: Pupils are equal, round, and reactive to light. Cardiovascular:      Rate and Rhythm: Normal rate and regular rhythm. Pulses: Normal pulses. Heart sounds: Normal heart sounds. Pulmonary:      Effort: Pulmonary effort is normal. No respiratory distress. Breath sounds: Normal breath sounds. No wheezing, rhonchi or rales. Abdominal:      General: Bowel sounds are normal. There is no distension. Palpations: Abdomen is soft. Tenderness: There is abdominal tenderness. There is no right CVA tenderness, left CVA tenderness or guarding. Musculoskeletal:         General: Normal range of motion. Cervical back: Normal range of motion. Skin:     General: Skin is warm and dry. Coloration: Skin is pale. Skin is not jaundiced. Findings: No erythema or rash. Neurological:      Mental Status: She is alert and oriented to person, place, and time. Mental status is at baseline. Psychiatric:         Mood and Affect: Mood normal.         Behavior: Behavior normal.         Thought Content: Thought content normal.         Judgment: Judgment normal.     /80   Pulse 92   Temp 98 °F (36.7 °C)   Ht 5' 3\" (1.6 m)   Wt 142 lb (64.4 kg)   SpO2 98%   BMI 25.15 kg/m²     Assessment:       Diagnosis Orders   1. Lower abdominal pain  XR ABDOMEN (KUB) (SINGLE AP VIEW)    Dale Garner MD, Gastroenterology, [place]    CBC    Comprehensive Metabolic Panel    POCT occult blood stool    Lipase            Plan:   XR abd   Recent Results: XR ABDOMEN (KUB) (SINGLE AP VIEW)    Result Date: 8/24/2022  NO PRIOR REPORT AVAILABLE Exam:X-RAY OF THE ABDOMEN, KUB Clinical data:Lower abdominal pain. Technique: Single view of the abdomen is submitted. Prior studies: No prior studies submitted. Findings:Surgical clips right upper quadrant. Lumbar levoscoliosis. Degenerative changes of the SI joints and hips. Bowel gas pattern is nonobstructive. No evidence of free air. No suspicious calcification. No acute intra-abdominal finding or obstruction. Recommendation: Follow up as clinically indicated.  Electronically Signed by Juarez Keene MD at 24-Aug-2022 05:14:12 AM              Referring se [facility]    Labs to get CBC CMP lipase and POCT occult blood  Lab Results   Component Value Date/Time    WBC 7.2 08/22/2022 05:57 PM    WBC 12.2 06/24/2022 12:35 PM    WBC 8.3 01/13/2022 12:41 PM    HGB 12.0 08/22/2022 05:57 PM    HGB 11.4 06/24/2022 12:35 PM    HGB 11.6 01/13/2022 12:41 PM    HCT 38.3 08/22/2022 05:57 PM    HCT 36.9 06/24/2022 12:35 PM    HCT 38.8 01/13/2022 12:41 PM    MCV 94.8 08/22/2022 05:57 PM    MCV 93.7 06/24/2022 12:35 PM    MCV 94.6 01/13/2022 12:41 PM     08/22/2022 05:57 PM     06/24/2022 12:35 PM     01/13/2022 12:41 PM       Lab Results   Component Value Date/Time     08/22/2022 05:59 PM     06/24/2022 12:35 PM     12/10/2021 03:05 PM    K 4.2 08/22/2022 05:59 PM    K 3.9 06/24/2022 12:35 PM    K 4.6 12/10/2021 03:05 PM    K 4.0 11/02/2021 10:15 AM     08/22/2022 05:59 PM    CL 98 06/24/2022 12:35 PM     12/10/2021 03:05 PM    CO2 29 08/22/2022 05:59 PM    CO2 24 06/24/2022 12:35 PM    CO2 27 12/10/2021 03:05 PM    BUN 22 08/22/2022 05:59 PM    BUN 17 06/24/2022 12:35 PM    BUN 15 12/10/2021 03:05 PM    CREATININE 0.9 08/22/2022 05:59 PM    CREATININE 1.0 06/24/2022 12:35 PM    CREATININE 1.1 12/10/2021 03:05 PM    GLUCOSE 105 08/22/2022 05:59 PM    GLUCOSE 132 06/24/2022 12:35 PM    GLUCOSE 131 12/10/2021 03:05 PM    CALCIUM 9.6 08/22/2022 05:59 PM    CALCIUM 9.0 06/24/2022 12:35 PM    CALCIUM 9.6 12/10/2021 03:05 PM    ALKPHOS 126 08/22/2022 05:59 PM    ALKPHOS 187 06/24/2022 12:35 PM    ALKPHOS 116 01/13/2022 12:41 PM    AST 24 08/22/2022 05:59 PM    AST 57 06/24/2022 12:35 PM    AST 22 01/13/2022 12:41 PM    ALT 15 08/22/2022 05:59 PM    ALT 27 06/24/2022 12:35 PM    ALT 15 01/13/2022 12:41 PM    LABGLOM 59 08/22/2022 05:59 PM    LABGLOM 52 06/24/2022 12:35 PM    LABGLOM 47 12/10/2021 03:05 PM    GFRAA >59 08/22/2022 05:59 PM    GFRAA >59 06/24/2022 12:35 PM    GFRAA 57 12/10/2021 03:05 PM    AGRATIO 1.2 12/09/2021 02:34 PM    GLOB 3.0 12/09/2021 02:34 PM    GLOB 2.9 03/09/2016 02:45 PM       Lab Results   Component Value Date/Time    TSH 2.030 07/12/2021 11:28 AM    TSH 1.920 04/23/2019 09:00 AM    TSH 2.270 03/14/2018 10:42 AM       Lab Results   Component Value Date/Time    T4FREE 1.32 07/12/2021 11:28 AM    T4FREE 1.4 06/29/2017 12:27 PM       Lab Results   Component Value Date/Time    CHOL 221 04/23/2019 09:00 AM    CHOL 268 03/14/2018 10:42 AM       Lab Results   Component Value Date/Time    TRIG 93 04/23/2019 09:00 AM    TRIG 118 03/14/2018 10:42 AM       Lab Results   Component Value Date/Time    HDL 68 04/23/2019 09:00 AM    HDL 78 03/14/2018 10:42 AM       Lab Results   Component Value Date/Time    LDLCALC 134 04/23/2019 09:00 AM    LDLCALC 166 03/14/2018 10:42 AM     No results found for: LABVLDL, VLDL  No results found for: CHOLHDLRATIO    Continue Carafate 4 times a day on empty stomach. Patient given educational materials -see patient instructions. Discussed use, benefit, and side effects of prescribed medications. All patient questions answered. Pt voiced understanding. Reviewed health maintenance. Instructed to continue currentmedications, diet and exercise. Patient agreed with treatment plan. Follow up as directed. MEDICATIONS:  No orders of the defined types were placed in this encounter. ORDERS:  Orders Placed This Encounter   Procedures    XR ABDOMEN (KUB) (SINGLE AP VIEW)    CBC    Comprehensive Metabolic Panel    Joao Davey MD, Gastroenterology, [facility] doc    POCT occult blood stool       Follow-up:  Return in about 3 months (around 11/22/2022). PATIENT INSTRUCTIONS:  There are no Patient Instructions on file for this visit. Electronically signed by JAZMIN Evans NP on 8/29/2022 at 1:11 PM    EMR Dragon/transcription disclaimer:  Much of thisencounter note is electronic transcription/translation of spoken language to printed texts. The electronic translation of spoken language may be erroneous, or at times, nonsensical words or phrases may be inadvertentlytranscribed.   Although I have reviewed the note for such errors, some may still exist.

## 2022-08-23 ENCOUNTER — NURSE ONLY (OUTPATIENT)
Dept: PRIMARY CARE CLINIC | Age: 87
End: 2022-08-23

## 2022-08-23 DIAGNOSIS — R10.30 LOWER ABDOMINAL PAIN: Primary | ICD-10-CM

## 2022-08-23 LAB
HEMOCCULT STL QL: NEGATIVE

## 2022-08-29 ENCOUNTER — TELEPHONE (OUTPATIENT)
Dept: GASTROENTEROLOGY | Age: 87
End: 2022-08-29

## 2022-08-29 ASSESSMENT — ENCOUNTER SYMPTOMS
RESPIRATORY NEGATIVE: 1
EYES NEGATIVE: 1
ALLERGIC/IMMUNOLOGIC NEGATIVE: 1
WHEEZING: 0
SHORTNESS OF BREATH: 0
COUGH: 0

## 2022-08-29 NOTE — TELEPHONE ENCOUNTER
Assessment:        Diagnosis Orders   1. Anemia, unspecified type  CBC   2. Elevated ferritin level  Ferritin     Hemochromatosis mutation   3. Transaminitis  Hepatic Function Panel                    Plan:      1. Labs today. Will call her with results and she will let us know if she wishes to proceed with EGD. She has deferred colonoscopy. 2. Advised pt to use pepcid 20mg daily or prn esophageal burning. Thank you, I will call this patient tomorrow and discuss, last visit [address] as NP 1-13-22. FU scheduled 9-22-22.  Jose Antonio corbett

## 2022-08-29 NOTE — TELEPHONE ENCOUNTER
Patient is requesting for the office to return her call in regards to scheduling a upper and lower GI test. Please return her call. Thank you!

## 2022-09-06 NOTE — TELEPHONE ENCOUNTER
Caller: Violet Fajardo    Relationship: Self    Best call back number: [phone]    Requested Prescriptions:   Requested Prescriptions     Pending Prescriptions Disp Refills   • rivaroxaban (XARELTO) 20 MG tablet 30 tablet      Sig: Take 1 tablet by mouth Daily.        Pharmacy where request should be sent: [facility] [address] [place] [address] [address] [facility] [phone] [facility] [phone] FX     Additional details provided by patient: PATIENT HAS A SAMPLES TO LAST THE NEXT FEW WEEKS    Does the patient have less than a 3 day supply:  [] Yes  [x] No    Shahbaz Steve Rep   09/06/22 14:18 CDT

## 2022-09-22 ENCOUNTER — OFFICE VISIT (OUTPATIENT)
Dept: GASTROENTEROLOGY | Age: 87
End: 2022-09-22
Payer: MEDICARE

## 2022-09-22 VITALS
WEIGHT: 143.8 LBS | DIASTOLIC BLOOD PRESSURE: 72 MMHG | BODY MASS INDEX: 24.55 KG/M2 | HEIGHT: 64 IN | SYSTOLIC BLOOD PRESSURE: 136 MMHG

## 2022-09-22 DIAGNOSIS — R11.0 NAUSEA: ICD-10-CM

## 2022-09-22 DIAGNOSIS — R10.84 GENERALIZED ABDOMINAL PAIN: ICD-10-CM

## 2022-09-22 DIAGNOSIS — R13.10 DYSPHAGIA, UNSPECIFIED TYPE: ICD-10-CM

## 2022-09-22 DIAGNOSIS — R19.4 CHANGE IN BOWEL HABITS: ICD-10-CM

## 2022-09-22 DIAGNOSIS — K59.00 CONSTIPATION, UNSPECIFIED CONSTIPATION TYPE: ICD-10-CM

## 2022-09-22 DIAGNOSIS — R19.7 DIARRHEA, UNSPECIFIED TYPE: Primary | ICD-10-CM

## 2022-09-22 DIAGNOSIS — R14.0 ABDOMINAL BLOATING: ICD-10-CM

## 2022-09-22 PROCEDURE — G8427 DOCREV CUR MEDS BY ELIG CLIN: HCPCS | Performed by: NURSE PRACTITIONER

## 2022-09-22 PROCEDURE — 1036F TOBACCO NON-USER: CPT | Performed by: NURSE PRACTITIONER

## 2022-09-22 PROCEDURE — G8417 CALC BMI ABV UP PARAM F/U: HCPCS | Performed by: NURSE PRACTITIONER

## 2022-09-22 PROCEDURE — 1090F PRES/ABSN URINE INCON ASSESS: CPT | Performed by: NURSE PRACTITIONER

## 2022-09-22 PROCEDURE — 1123F ACP DISCUSS/DSCN MKR DOCD: CPT | Performed by: NURSE PRACTITIONER

## 2022-09-22 PROCEDURE — 99214 OFFICE O/P EST MOD 30 MIN: CPT | Performed by: NURSE PRACTITIONER

## 2022-09-22 ASSESSMENT — ENCOUNTER SYMPTOMS
ANAL BLEEDING: 0
ABDOMINAL DISTENTION: 0
CONSTIPATION: 1
ABDOMINAL PAIN: 1
VOMITING: 1
VOICE CHANGE: 0
DIARRHEA: 1
RECTAL PAIN: 0
BACK PAIN: 0
TROUBLE SWALLOWING: 1
COUGH: 0
BLOOD IN STOOL: 0
NAUSEA: 1
SHORTNESS OF BREATH: 0
SORE THROAT: 0

## 2022-09-22 NOTE — PATIENT INSTRUCTIONS

## 2022-09-22 NOTE — PROGRESS NOTES
Subjective:      Raulito Merrill is a80 y.o. female  Chief Complaint   Patient presents with    Abdominal Pain    Constipation    Diarrhea       HPI  PCP: JAZMIN Hedrick NP  Pt made an appt due to c/o abdominal pain  Generalized abdomen  Started in Dec 2021. Occurs after eating  Nauseated feeling  And vomits sometimes (last episode was 2 weeks ago)  And bloating  Reports grandson has celiac and wants tested for this    Also reports alternating constipation with diarrhea  And lower abd cramping  She is not sure when this started  Used to have chronic constipation which was controlled with miralax daily  Now having watery diarrhea stool a day  And sometimes hard pebble stools  No blood in stools  Recent KUB unrevealing    Reports dysphagia  With pills only  This started about a year ago      Family HX:    Pt denies family hx of colon polyps, colon CA, inflammatory bowel dx, gastric CA and esophageal CA. Past Medical History:   Diagnosis Date    Anemia     Asthma     CHF (congestive heart failure) (HCC)     COPD (chronic obstructive pulmonary disease) (HCC)     Dermatomyositis (HCC)     Elevated ferritin     Fibromyalgia     Hyperlipidemia     Hyperthyroidism     Lichen sclerosus of anus 2021    Pancreatitis     Sleep apnea     Supraventricular tachycardia ([address] 10/19/2016          Past Surgical History:   Procedure Laterality Date    CHOLECYSTECTOMY      COLONOSCOPY  10/27/2009    Dr Abelardo Sandoval:  10 yr recall    HYSTERECTOMY (CERVIX STATUS UNKNOWN)      PACEMAKER PLACEMENT      UPPER GASTROINTESTINAL ENDOSCOPY  07/05/2005    Dr Jah Yepez History     Socioeconomic History    Marital status:       Spouse name: None    Number of children: None    Years of education: None    Highest education level: None   Tobacco Use    Smoking status: Never    Smokeless tobacco: Never   Vaping Use    Vaping Use: Never used   Substance and Sexual Activity    Alcohol use: No    Drug use: Yes     Comment: CBD oil     Social Determinants of Health     Financial Resource Strain: Low Risk     Difficulty of Paying Living Expenses: Not hard at all   Food Insecurity: No Food Insecurity    Worried About Running Out of Food in the Last Year: Never true    Ran Out of Food in the Last Year: Never true   Physical Activity: Insufficiently Active    Days of Exercise per Week: 1 day    Minutes of Exercise per Session: 10 min       Allergies   Allergen Reactions    Latex     Augmentin [Amoxicillin-Pot Clavulanate]     Codeine     Fentanyl     Iodine     Versed [Midazolam]        Current Outpatient Medications   Medication Sig Dispense Refill    NONFORMULARY in the morning and at bedtime CBD Oil      metoprolol succinate (TOPROL XL) 50 MG extended release tablet TAKE 1 TABLET BY MOUTH ONCE DAILY      rivaroxaban (XARELTO) 20 MG TABS tablet Take 20 mg by mouth daily (with breakfast)      baclofen (LIORESAL) 10 MG tablet Take 1 tablet by mouth nightly as needed (mascle pain) 30 tablet 1    nystatin (MYCOSTATIN) 869879 UNIT/ML suspension Take 5 mLs by mouth 4 times daily (Patient taking differently: Take 500,000 Units by mouth as needed) 1 each 0    sucralfate (CARAFATE) 1 GM/10ML suspension Take 10 mLs by mouth 4 times daily (Patient taking differently: Take 1 g by mouth as needed) 1200 mL 3    hydroCHLOROthiazide (MICROZIDE) 12.5 MG capsule Take 12.5 mg by mouth as needed      levothyroxine (SYNTHROID) 25 MCG tablet Take 1 tablet by mouth daily 90 tablet 3    hydrocortisone (ANUSOL-HC) 25 MG suppository Place 1 suppository rectally every 12 hours (Patient taking differently: Place 25 mg rectally 2 times daily as needed) 30 suppository 0    clobetasol (TEMOVATE) 0.05 % ointment Apply topically 2 times daily. (Patient taking differently: as needed Apply topically 2 times daily. ) 1 Tube 1    budesonide-formoterol (SYMBICORT) 160-4.5 MCG/ACT AERO Inhale 2 puffs into the lungs 2 times daily 1 Inhaler 0    albuterol sulfate HFA (PROVENTIL HFA) 108 (90 Base) MCG/ACT inhaler Inhale 2 puffs into the lungs every 6 hours as needed for Wheezing 3 Inhaler 3    OXYGEN Inhale into the lungs nightly      KLOR-CON M10 10 MEQ extended release tablet 10 mEq daily       isosorbide mononitrate (IMDUR) 30 MG CR tablet Take 30 mg by mouth daily        No current facility-administered medications for this visit. Review of Systems   Constitutional:  Positive for fatigue. Negative for fever and unexpected weight change. HENT:  Positive for trouble swallowing. Negative for sore throat and voice change. Respiratory:  Negative for cough and shortness of breath. Cardiovascular:  Negative for chest pain, palpitations and leg swelling. Gastrointestinal:  Positive for abdominal pain, constipation, diarrhea, nausea and vomiting. Negative for abdominal distention, anal bleeding, blood in stool and rectal pain. Genitourinary:  Negative for hematuria. Musculoskeletal:  Negative for arthralgias, back pain and neck pain. Neurological:  Negative for dizziness, weakness, light-headedness and headaches. Psychiatric/Behavioral:  Negative for dysphoric mood and sleep disturbance. The patient is not nervous/anxious. All other systems reviewed and are negative. Objective:     Physical Exam  Vitals and nursing note reviewed. Constitutional:       Appearance: She is well-developed. Comments: /72   Ht 5' 3.5\" (1.613 m)   Wt 143 lb 12.8 oz (65.2 kg)   BMI 25.07 kg/m²    Eyes:      General: No scleral icterus. Conjunctiva/sclera: Conjunctivae normal.      Pupils: Pupils are equal, round, and reactive to light. Neck:      Thyroid: No thyromegaly. Cardiovascular:      Rate and Rhythm: Normal rate and regular rhythm. Heart sounds: Normal heart sounds. No murmur heard. No friction rub. No gallop. Pulmonary:      Effort: Pulmonary effort is normal. No respiratory distress. Breath sounds: Normal breath sounds.    Abdominal: General: Bowel sounds are normal. There is no distension. Palpations: Abdomen is soft. Tenderness: There is abdominal tenderness. There is no rebound. Comments: TTP here on exam   Musculoskeletal:         General: No deformity. Normal range of motion. Cervical back: Normal range of motion and neck supple. Neurological:      Mental Status: She is alert and oriented to person, place, and time. Cranial Nerves: No cranial nerve deficit. Psychiatric:         Judgment: Judgment normal.         Assessment:       Diagnosis Orders   1. Diarrhea, unspecified type  Gastrointestinal Panel, Molecular    COLONOSCOPY W/ OR W/O BIOPSY    ESOPHAGOSCOPY / EGD      2. Generalized abdominal pain  COLONOSCOPY W/ OR W/O BIOPSY    ESOPHAGOSCOPY / EGD      3. Nausea  ESOPHAGOSCOPY / EGD      4. Abdominal bloating  COLONOSCOPY W/ OR W/O BIOPSY    ESOPHAGOSCOPY / EGD      5. Change in bowel habits  COLONOSCOPY W/ OR W/O BIOPSY      6. Constipation, unspecified constipation type  COLONOSCOPY W/ OR W/O BIOPSY      7. Dysphagia, unspecified type  ESOPHAGOSCOPY / EGD            Plan:      Stool testing  Schedule outpatient endoscopy r/o h pylori and celiac. Patient advised no Aspirin, Fish Oil, Vit E or NSAIDs 5 (five) days before procedure. Follow-up Visit: per Dr. Juan Conrad clearance from Dr Lizbeth King to stop xarelto and for cardiac clearance for anesthesia  Clearance for discontinuing anticoagulants is needed before scheduling procedure. Increased r isks may be associated with discontinuation of this medication including stroke, TIA, and cardiac event. I have discussed this with patient. Patient voices understanding and agrees to proceed with scheduling. Pt Education:   Risks, benefits, and alternatives to endoscopy were discussed. Patient voices understanding of risks of, but not limited to, perforation, bleeding, and infection. The risk of perforation is increased with esophageal dilatation.  All questions answered to patient's satisfaction. Patient is agreable to proceed. Schedule outpatient colonoscopy with random colon bx. Patient advised no Aspirin, Fish Oil, Vit E or NSAIDs 5 (five) days before procedure. Follow-up Visit: per Dr Analilia Pulliam [facility] and for cardiac clearance for anesthesia  Clearance for discontinuing anticoagulants is needed before scheduling procedure. Increased r isks may be associated with discontinuation of this medication including stroke, TIA, and cardiac event. I have discussed this with patient. Patient voices understanding and agrees to proceed with scheduling. Pt education:  Risks, benefits, and alternatives to colonoscopy were discussed. Risks of colonoscopy include, but are not limited to, perforation, bleeding, and infection. We discussed that the risk for perforation is 1-3 in 5,000  at the time of colonoscopy;   and 1-2% risk of bleeding post-polypectomy. All questions answered to the satisfaction of the patient. Pt is agreeable to proceed.

## 2022-09-27 ENCOUNTER — TELEPHONE (OUTPATIENT)
Dept: GASTROENTEROLOGY | Age: 87
End: 2022-09-27

## 2022-09-27 DIAGNOSIS — R19.7 DIARRHEA, UNSPECIFIED TYPE: ICD-10-CM

## 2022-09-27 LAB
ADENOVIRUS F 40 41 PCR: NOT DETECTED
ASTROVIRUS PCR: NOT DETECTED
CAMPYLOBACTER PCR: NOT DETECTED
CLOSTRIDIUM DIFFICILE, PCR: NOT DETECTED
CRYPTOSPORIDIUM PCR: NOT DETECTED
CYCLOSPORA CAYETANENSIS PCR: NOT DETECTED
E COLI ENTEROAGGREGATIVE PCR: NOT DETECTED
E COLI ENTEROPATHOGENIC PCR: NOT DETECTED
E COLI ENTEROTOXIGENIC PCR: NOT DETECTED
E COLI SHIGELLA/ENTEROINVASIVE PCR: NOT DETECTED
ENTAMOEBA HISTOLYTICA PCR: NOT DETECTED
GIARDIA LAMBLIA PCR: NOT DETECTED
NOROVIRUS GI GII PCR: NOT DETECTED
PLESIOMONAS SHIGELLOIDES PCR: NOT DETECTED
ROTAVIRUS A PCR: NOT DETECTED
SALMONELLA PCR: NOT DETECTED
SAPOVIRUS PCR: NOT DETECTED
SHIGA-LIKE TOXIN-PRODUCING E. COLI (STEC) STX1/STX2: NOT DETECTED
VIBRIO CHOLERAE PCR: NOT DETECTED
VIBRIO PCR: NOT DETECTED
YERSINIA ENTEROCOLITICA PCR: NOT DETECTED

## 2022-09-27 NOTE — TELEPHONE ENCOUNTER
Please let [facility] know the GI stool panel is negative for anything infectious  Thank you.

## 2022-09-28 ENCOUNTER — TELEPHONE (OUTPATIENT)
Dept: CARDIOLOGY | Facility: CLINIC | Age: 87
End: 2022-09-28

## 2022-10-03 ENCOUNTER — TELEPHONE (OUTPATIENT)
Dept: GASTROENTEROLOGY | Age: 87
End: 2022-10-03

## 2022-10-03 NOTE — TELEPHONE ENCOUNTER
Patient: Ness Velarde    YOB: 1934      Clearance was received on October 3, 2022. for Endoscopy / Colonoscopy scheduled for: 10/7/22    Patient may discontinue the use of XARELTO for 5  days prior to the procedure.     IS Lovenox required:  NO    PATIENT NOTIFIED  VIA  ON:  10/3/22      Clearance scanned into media

## 2022-10-07 ENCOUNTER — TELEPHONE (OUTPATIENT)
Dept: GASTROENTEROLOGY | Age: 87
End: 2022-10-07

## 2022-10-07 ENCOUNTER — HOSPITAL ENCOUNTER (OUTPATIENT)
Age: 87
Setting detail: OUTPATIENT SURGERY
Discharge: HOME | End: 2022-10-07
Attending: INTERNAL MEDICINE | Admitting: INTERNAL MEDICINE
Payer: MEDICARE

## 2022-10-07 ENCOUNTER — ANESTHESIA EVENT (OUTPATIENT)
Dept: ENDOSCOPY | Age: 87
End: 2022-10-07
Payer: MEDICARE

## 2022-10-07 ENCOUNTER — ANESTHESIA (OUTPATIENT)
Dept: ENDOSCOPY | Age: 87
End: 2022-10-07
Payer: MEDICARE

## 2022-10-07 VITALS
DIASTOLIC BLOOD PRESSURE: 69 MMHG | TEMPERATURE: 96.9 F | SYSTOLIC BLOOD PRESSURE: 146 MMHG | HEART RATE: 61 BPM | RESPIRATION RATE: 18 BRPM | HEIGHT: 64 IN | BODY MASS INDEX: 23.9 KG/M2 | WEIGHT: 140 LBS | OXYGEN SATURATION: 99 %

## 2022-10-07 DIAGNOSIS — R14.0 BLOATING: ICD-10-CM

## 2022-10-07 DIAGNOSIS — R11.2 NAUSEA AND VOMITING: ICD-10-CM

## 2022-10-07 DIAGNOSIS — R11.2 NAUSEA AND VOMITING, INTRACTABILITY OF VOMITING NOT SPECIFIED, UNSPECIFIED VOMITING TYPE: ICD-10-CM

## 2022-10-07 DIAGNOSIS — R19.7 DIARRHEA, UNSPECIFIED TYPE: ICD-10-CM

## 2022-10-07 DIAGNOSIS — R10.9 ABDOMINAL PAIN, UNSPECIFIED ABDOMINAL LOCATION: ICD-10-CM

## 2022-10-07 DIAGNOSIS — K59.00 CONSTIPATION, UNSPECIFIED CONSTIPATION TYPE: ICD-10-CM

## 2022-10-07 LAB
ALBUMIN SERPL-MCNC: 3.8 G/DL (ref 3.5–5.2)
ALP BLD-CCNC: 79 U/L (ref 35–104)
ALT SERPL-CCNC: 12 U/L (ref 5–33)
ANION GAP SERPL CALCULATED.3IONS-SCNC: 10 MMOL/L (ref 7–19)
AST SERPL-CCNC: 24 U/L (ref 5–32)
BILIRUB SERPL-MCNC: 0.4 MG/DL (ref 0.2–1.2)
BUN BLDV-MCNC: 10 MG/DL (ref 8–23)
CALCIUM SERPL-MCNC: 9 MG/DL (ref 8.8–10.2)
CHLORIDE BLD-SCNC: 107 MMOL/L (ref 98–111)
CO2: 22 MMOL/L (ref 22–29)
CREAT SERPL-MCNC: 0.7 MG/DL (ref 0.5–0.9)
GFR AFRICAN AMERICAN: >59
GFR NON-AFRICAN AMERICAN: >60
GLUCOSE BLD-MCNC: 86 MG/DL (ref 74–109)
POTASSIUM SERPL-SCNC: 4.5 MMOL/L (ref 3.5–5)
SODIUM BLD-SCNC: 139 MMOL/L (ref 136–145)
TOTAL PROTEIN: 6 G/DL (ref 6.6–8.7)

## 2022-10-07 PROCEDURE — 6360000002 HC RX W HCPCS: Performed by: NURSE ANESTHETIST, CERTIFIED REGISTERED

## 2022-10-07 PROCEDURE — 2709999900 HC NON-CHARGEABLE SUPPLY: Performed by: INTERNAL MEDICINE

## 2022-10-07 PROCEDURE — 88305 TISSUE EXAM BY PATHOLOGIST: CPT

## 2022-10-07 PROCEDURE — 3609012400 HC EGD TRANSORAL BIOPSY SINGLE/MULTIPLE: Performed by: INTERNAL MEDICINE

## 2022-10-07 PROCEDURE — 3609010600 HC COLONOSCOPY POLYPECTOMY SNARE/COLD BIOPSY: Performed by: INTERNAL MEDICINE

## 2022-10-07 PROCEDURE — 45385 COLONOSCOPY W/LESION REMOVAL: CPT | Performed by: INTERNAL MEDICINE

## 2022-10-07 PROCEDURE — 36415 COLL VENOUS BLD VENIPUNCTURE: CPT

## 2022-10-07 PROCEDURE — 2580000003 HC RX 258: Performed by: NURSE ANESTHETIST, CERTIFIED REGISTERED

## 2022-10-07 PROCEDURE — 3700000001 HC ADD 15 MINUTES (ANESTHESIA): Performed by: INTERNAL MEDICINE

## 2022-10-07 PROCEDURE — 45380 COLONOSCOPY AND BIOPSY: CPT | Performed by: INTERNAL MEDICINE

## 2022-10-07 PROCEDURE — 3700000000 HC ANESTHESIA ATTENDED CARE: Performed by: INTERNAL MEDICINE

## 2022-10-07 PROCEDURE — 80053 COMPREHEN METABOLIC PANEL: CPT

## 2022-10-07 PROCEDURE — 2500000003 HC RX 250 WO HCPCS: Performed by: NURSE ANESTHETIST, CERTIFIED REGISTERED

## 2022-10-07 PROCEDURE — 7100000010 HC PHASE II RECOVERY - FIRST 15 MIN: Performed by: INTERNAL MEDICINE

## 2022-10-07 PROCEDURE — 43450 DILATE ESOPHAGUS 1/MULT PASS: CPT | Performed by: INTERNAL MEDICINE

## 2022-10-07 PROCEDURE — 2580000003 HC RX 258: Performed by: INTERNAL MEDICINE

## 2022-10-07 PROCEDURE — 43239 EGD BIOPSY SINGLE/MULTIPLE: CPT | Performed by: INTERNAL MEDICINE

## 2022-10-07 PROCEDURE — 7100000011 HC PHASE II RECOVERY - ADDTL 15 MIN: Performed by: INTERNAL MEDICINE

## 2022-10-07 PROCEDURE — 3609012700 HC EGD DILATION SAVORY: Performed by: INTERNAL MEDICINE

## 2022-10-07 RX ORDER — PROPOFOL 10 MG/ML
INJECTION, EMULSION INTRAVENOUS PRN
Status: DISCONTINUED | OUTPATIENT
Start: 2022-10-07 | End: 2022-10-07 | Stop reason: SDUPTHER

## 2022-10-07 RX ORDER — SODIUM CHLORIDE, SODIUM LACTATE, POTASSIUM CHLORIDE, CALCIUM CHLORIDE 600; 310; 30; 20 MG/100ML; MG/100ML; MG/100ML; MG/100ML
INJECTION, SOLUTION INTRAVENOUS CONTINUOUS
Status: DISCONTINUED | OUTPATIENT
Start: 2022-10-07 | End: 2022-10-07 | Stop reason: HOSPADM

## 2022-10-07 RX ORDER — LIDOCAINE HYDROCHLORIDE 10 MG/ML
INJECTION, SOLUTION INFILTRATION; PERINEURAL PRN
Status: DISCONTINUED | OUTPATIENT
Start: 2022-10-07 | End: 2022-10-07 | Stop reason: SDUPTHER

## 2022-10-07 RX ORDER — POLYETHYLENE GLYCOL 3350 17 G/17G
17 POWDER, FOR SOLUTION ORAL DAILY PRN
COMMUNITY

## 2022-10-07 RX ORDER — SODIUM CHLORIDE, SODIUM LACTATE, POTASSIUM CHLORIDE, CALCIUM CHLORIDE 600; 310; 30; 20 MG/100ML; MG/100ML; MG/100ML; MG/100ML
INJECTION, SOLUTION INTRAVENOUS CONTINUOUS PRN
Status: DISCONTINUED | OUTPATIENT
Start: 2022-10-07 | End: 2022-10-07 | Stop reason: SDUPTHER

## 2022-10-07 RX ADMIN — PROPOFOL 300 MG: 10 INJECTION, EMULSION INTRAVENOUS at 11:02

## 2022-10-07 RX ADMIN — SODIUM CHLORIDE, SODIUM LACTATE, POTASSIUM CHLORIDE, AND CALCIUM CHLORIDE: 600; 310; 30; 20 INJECTION, SOLUTION INTRAVENOUS at 10:55

## 2022-10-07 RX ADMIN — SODIUM CHLORIDE, POTASSIUM CHLORIDE, SODIUM LACTATE AND CALCIUM CHLORIDE: 600; 310; 30; 20 INJECTION, SOLUTION INTRAVENOUS at 09:40

## 2022-10-07 RX ADMIN — LIDOCAINE HYDROCHLORIDE 50 MG: 10 INJECTION, SOLUTION INFILTRATION; PERINEURAL at 11:02

## 2022-10-07 ASSESSMENT — PAIN - FUNCTIONAL ASSESSMENT: PAIN_FUNCTIONAL_ASSESSMENT: 0-10

## 2022-10-07 NOTE — OP NOTE
Endoscopic Procedure Note    Patient: William Merritt : 1934  Med Rec#: [id number] Acc#: [id number]     Primary Care Provider JAZMIN Vivas NP    Endoscopist: Mallory Coates MD, MD    Date of Procedure:  10/7/2022    Procedure:   1. EGD with a Puga bougie dilation of esophagus and cold biopsies    Indications: For both EGD and colonoscopy exams today:  1. Diarrhea, unspecified type                  2. Generalized abdominal pain             3. Nausea        4. Abdominal bloating             5. Change in bowel habits        6. Constipation, unspecified constipation type        7. Dysphagia, unspecified type     8. History of chronic asthma    Anesthesia:  Sedation was administered by anesthesia who monitored the patient during the procedure. Estimated Blood Loss: minimal    Procedure:   After reviewing the patient's chart and obtaining informed consent, the patient was placed in the left lateral decubitus position. A forward-viewing Olympus endoscope was lubricated and inserted through the mouth into the oropharynx. Under direct visualization, the upper esophagus was intubated. The scope was advanced to the level of the third portion of duodenum. Scope was slowly withdrawn with careful inspection of the mucosal surfaces. The scope was retroflexed for inspection of the gastric fundus and incisura. Findings and maneuvers are listed in impression below. Next, a lubricated Puga weighted Bougie dilator-54 Fr was gently introduced into the patient's mouth and passed into the Esophagus and into the proximal stomach without much resistance and then withdrawn. Repeat EGD was performed to verify dilation and scope tip was passed into the stomach. NO evidence of perforation or excessive bleeding was noted subsequent to the dilation. The patient tolerated the procedure well. The scope was removed. There were no immediate complications.     Findings/IMPRESSION:  Esophagus: abnormal: Normal upper two thirds; a partially obstructing distal esophageal Schatzki ring was noted near the EG junction at 35 cm and was subjected to successful Puga bougie dilation as described above. NO erosions or ulcers or nodules or strictures or webs or mass lesions or extrinsic compression or diverticula noted. A Puga 54 fr bougie dilation was performed and random cold biopsies were taken to check for EoE, especially given her history of chronic asthma and NERD. There is a 5 cm in length hiatal hernia present. Stomach:  normal.    NO ulcers or masses or gastric outlet obstruction or retained food or fluid. Rugae were normal and lumen distended well with insufflation. Retroflexed views otherwise revealed a normal GE junction, fundus and cardia as well. Duodenum: Normal. Random biopsies were taken to check for Celiac disease and other causes of villous atrophy. RECOMMENDATIONS:    1. Await path results, the patient will be contacted in 7-10 days with biopsy results. 2.  Magic mouthwash 5 ml PO Swish and swallow q3h PRN ONLY IF patient has post-procedural sorethroat or chest pain. 3. Full liquids to soft diet today tab discharge from the surgicenter; may advance  diet starting in AM tomorrow. 4. May resume other meds except any ASA/NSAIDs; may use cough drops or lozenges PRN; also OTC/prescription PPI or H2RA PO qday or BID with anti-GERD measures. 5. NO ASA/NSAIDs x 2 weeks  6. OP f/u in 4-6 weeks with Ms. Nelda Duran; will consider an Esophageal manometry later if the patient's dysphagia persists. The results were discussed with the patient and family. A copy of the images obtained were given to the patient.      (Please note that portions of this note were completed with a voice recognition program. Efforts were made to edit the dictations but occasionally words may be mis-transcribed.)     Edilson Kennedy MD, MD  10/7/2022  10:25 AM

## 2022-10-07 NOTE — OP NOTE
Patient: Suzi Hardwick : 1934  Med Rec#: [id number] Acc#: [id number]   Primary Care Provider JAZMIN Mac NP    Date of Procedure:  10/7/2022    Endoscopist: Kailash Ye MD, MD    Referring Provider: JAZMIN Mac NP,     Operation Performed: Colonoscopy up to the cecum with hot snare resection of a small ascending colon polyp and with random cold biopsies    Indications: For both EGD and colonoscopy exams today:  1. Diarrhea, unspecified type                  2. Generalized abdominal pain             3. Nausea        4. Abdominal bloating             5. Change in bowel habits        6. Constipation, unspecified constipation type        7. Dysphagia, unspecified type         Anesthesia:  Sedation was administered by anesthesia who monitored the patient during the procedure. I met with Suzi Hardwick prior to procedure. We discussed the procedure itself, and I have discussed the risks of endoscopy (including-- but not limited to-- pain, discomfort, bleeding potentially requiring second endoscopic procedure and/or blood transfusion, organ perforation requiring operative repair, damage to organs near the colon, infection, aspiration, cardiopulmonary/allergic reaction), benefits, indications to endoscopy. Additionally, we discussed options other than colonoscopy. The patient expressed understanding. All questions answered. The patient decided to proceed with the procedure. Signed informed consent was placed on the chart. Blood Loss: minimal    Withdrawal time: More than 6 minutes  Bowel Prep: adequate     Complications: no immediate complications    DESCRIPTION OF PROCEDURE:     A time out was performed. After written informed consent was obtained, the patient was placed in the left lateral position. The perianal area was inspected, and a digital rectal exam was performed. A rectal exam was performed: normal tone, no palpable lesions.  At this point, a forward viewing Olympus colonoscope was inserted into the anus and carefully advanced to the cecum with some difficulty requiring external abdominal pressure during parts of this procedure due to a redundant colon and severe diverticulosis. The cecum was identified by the ileocecal valve and the appendiceal orifice. The colonoscope was then slowly withdrawn with careful inspection of the mucosa in a linear and circumferential fashion. The scope was retroflexed in the rectum. Suction was utilized during the procedure to remove as much air as possible from the bowel. The colonoscope was removed from the patient, and the procedure was terminated. Findings are listed below. Findings:   A 6 mm in diameter sessile flat ascending colon polyp was removed by hot snare polypectomy. Otherwise, the mucosa appeared normal throughout the entire examined colon; random cold biopsies were taken to check for microscopic colitis. NO larger polyps or masses or strictures or colitis. Severe diverticulosis in the left colon  Internal hemorrhoids-Grade 2-3 and external hemorrhoids without any stigmata of active bleeding at this time. Retroflexion in the rectum was otherwise normal and revealed no further abnormalities      Recommendations:  1. Repeat colonoscopy: pending pathology -if no evidence of microscopic colitis, she will not require any routine colorectal cancer surveillance exams due to her advanced age. 2. Await biopsy results-you will receive a letter with your results within 7-10 days    - Resume previous meds   -- Keep scheduled f/u appts with other MDs     - NO ASA/NSAIDs x 2 weeks     Findings and recommendations were discussed w/ the patient. A copy of the images was provided.     (Please note that portions of this note were completed with a voice recognition program. Efforts were made to edit the dictations but occasionally words may be mis-transcribed.)     Conchita Russell MD, MD  10/7/2022  10:25 AM

## 2022-10-07 NOTE — TELEPHONE ENCOUNTER
RECOMMENDATIONS:    1. Await path results, the patient will be contacted in 7-10 days with biopsy results. 2.  Magic mouthwash 5 ml PO Swish and swallow q3h PRN ONLY IF patient has post-procedural sorethroat or chest pain. 3. Full liquids to soft diet today tab discharge from the surgicenter; may advance  diet starting in AM tomorrow. 4. May resume other meds except any ASA/NSAIDs; may use cough drops or lozenges PRN; also OTC/prescription PPI or H2RA PO qday or BID with anti-GERD measures. 5. NO ASA/NSAIDs x 2 weeks  6. OP f/u in 4-6 weeks with Ms. Byron Delgado; will consider an Esophageal manometry later if the patient's dysphagia persists. The results were discussed with the patient and family. A copy of the images obtained were given to the patient. (Please note that portions of this note were completed with a voice recognition program. Efforts were made to edit the dictations but occasionally words may be mis-transcribed.)      Boom Loera MD, MD  10/7/2022  10:25 AM      Paula from [address] [facility] called me  today and said this above patient needs Rx for MMW which I called to Andreas (per her son) Cheri Lo rae the Rx ingredients per  to Marylu Pharmacist, patient to use 5 ml S & S every 3 hours prn only # 4 ounces x 0 refills.  [facility]

## 2022-10-07 NOTE — DISCHARGE INSTRUCTIONS
1. Await path results, the patient will be contacted in 7-10 days with biopsy results. 2.  Magic mouthwash 5 ml PO Swish and swallow q3h PRN ONLY IF patient has post-procedural sorethroat or chest pain. 3. Full liquids to soft diet today tab discharge from the surgicenter; may advance  diet starting in AM tomorrow. 4. May resume other meds except any ASA/NSAIDs; may use cough drops or lozenges PRN; also OTC/prescription PPI or H2RA PO qday or BID with anti-GERD measures. 5. NO ASA/NSAIDs x 2 weeks  6. OP f/u in 4-6 weeks with Ms. Jose G Crisostomo; will consider an Esophageal manometry later if the patient's dysphagia persists. 7.Repeat colonoscopy: pending pathology -if no evidence of microscopic colitis, she will not require any routine colorectal cancer surveillance exams due to her advanced age. NSAIDS Non-steroidal Anti-Inflammatory  You have been directed by your physician to avoid any NSAID's; the following medications are a list of those to avoid. If you think that you are taking any NSAID's notify your physician. Over The Counter  Advil                      Motrin  Nuprin                   Ibuprofen  Midol                     Aleve  Naproxen              Orudis  Aspirin                   [place]    Prescriptions and Generics    Cataflam              Relafen  Voltaren               Clinoril  Indocin                 Naproxen  Arthrotec              Lodine  Daypro                 Nalfon  Toradol                Ansaid  Feldene               Meclofenamate  Fenoprofen          Ponstel  Mobic                   Celebrex  Vioxx     POST-OP ORDERS: ENDOSCOPY & COLONOSCOPY:  1. Rest today. 2. DO NOT eat or drink until wide awake; eat your usual diet today in moderate amount only. 3. DO NOT drive today. 4. Call physician if you have severe pain, vomiting, fever, rectal bleeding or black bowel movements. 5.  If a biopsy was taken or a polyp removed, you should expect to hear results in about 21 days.   If you have heard nothing from your physician by then, call the office for results. 6.  Discharge home when patient awake, vitals signs stable and tolerating liquids.

## 2022-10-07 NOTE — ANESTHESIA POSTPROCEDURE EVALUATION
Department of Anesthesiology  Postprocedure Note    Patient: Coy Cartwright  MRN: [id number]  [place]: 1934  Date of evaluation: 10/7/2022      Procedure Summary     Date: 10/07/22 Room / Location: [address]    Anesthesia Start: 1055 Anesthesia Stop: 0359    Procedures:       EGD BIOPSY (Abdomen)      COLONOSCOPY POLYPECTOMY SNARE/COLD BIOPSY      EGD DILATION ANGELA Diagnosis:       Abdominal pain, unspecified abdominal location      Diarrhea, unspecified type      Bloating      Nausea and vomiting, intractability of vomiting not specified, unspecified vomiting type      Constipation, unspecified constipation type      (Abdominal pain, unspecified abdominal location [R10.9])      (Diarrhea, unspecified type [R19.7])      (Bloating [R14.0])      (Nausea and vomiting, intractability of vomiting not specified, unspecified vomiting type [R11.2])      (Constipation, unspecified constipation type [K59.00])    Surgeons: Lilia Gonzalez MD Responsible Provider: JAZMIN Blankenship CRNA    Anesthesia Type: general, TIVA ASA Status: 3          Anesthesia Type: No value filed.     Lesly Phase I: Lesly Score: 10    Lesly Phase II:        Anesthesia Post Evaluation    Patient location during evaluation: PACU  Patient participation: complete - patient participated  Level of consciousness: awake and alert  Pain score: 0  Airway patency: patent  Nausea & Vomiting: no nausea and no vomiting  Complications: no  Cardiovascular status: blood pressure returned to baseline and hemodynamically stable  Respiratory status: acceptable and room air  Hydration status: euvolemic

## 2022-10-07 NOTE — ANESTHESIA PRE PROCEDURE
Department of Anesthesiology  Preprocedure Note       Name:  Lisy Rene   Age:  80 y.o.  :  1934                                          MRN:  [id number]         Date:  10/7/2022      Surgeon: Jeffrey Maurice):  Yoko Dye MD    Procedure: Procedure(s):  EGD BIOPSY  COLONOSCOPY DIAGNOSTIC    Medications prior to admission:   Prior to Admission medications    Medication Sig Start Date End Date Taking? Authorizing Provider   polyethylene glycol (GLYCOLAX) 17 g packet Take 17 g by mouth daily as needed for Constipation   Yes Historical Provider, MD   NONFORMULARY in the morning and at bedtime CBD Oil    Historical Provider, MD   metoprolol succinate (TOPROL XL) 50 MG extended release tablet TAKE 1 TABLET BY MOUTH ONCE DAILY 22   Historical Provider, MD   rivaroxaban (XARELTO) 20 MG TABS tablet Take 20 mg by mouth daily (with breakfast)    Historical Provider, MD   baclofen (LIORESAL) 10 MG tablet Take 1 tablet by mouth nightly as needed (mascle pain) 22   JAZMIN Drake NP   nystatin (MYCOSTATIN) 760300 UNIT/ML suspension Take 5 mLs by mouth 4 times daily  Patient not taking: Reported on 10/7/2022 6/24/22   JAZMIN Drake NP   sucralfate (CARAFATE) 1 GM/10ML suspension Take 10 mLs by mouth 4 times daily  Patient not taking: Reported on 10/7/2022 2/1/22   JAZMIN Drake NP   hydroCHLOROthiazide (MICROZIDE) 12.5 MG capsule Take 12.5 mg by mouth as needed    Historical Provider, MD   levothyroxine (SYNTHROID) 25 MCG tablet Take 1 tablet by mouth daily 11/16/21 10/7/22  JAZMIN Drake NP   hydrocortisone (ANUSOL-HC) 25 MG suppository Place 1 suppository rectally every 12 hours  Patient taking differently: Place 25 mg rectally 2 times daily as needed 21   JAZMIN Drake NP   clobetasol (TEMOVATE) 0.05 % ointment Apply topically 2 times daily. Patient taking differently: as needed Apply topically 2 times daily.  21   JAZMIN Mina NP budesonide-formoterol (SYMBICORT) 160-4.5 MCG/ACT AERO Inhale 2 puffs into the lungs 2 times daily 8/9/21   JAZMIN Nath NP   albuterol sulfate HFA (PROVENTIL HFA) 108 (90 Base) MCG/ACT inhaler Inhale 2 puffs into the lungs every 6 hours as needed for Wheezing 3/19/20 10/7/22  JAZMIN Pinto   OXYGEN Inhale into the lungs nightly    Historical Provider, MD   KLOR-CON M10 10 MEQ extended release tablet 10 mEq daily  2/1/17   Historical Provider, MD   isosorbide mononitrate (IMDUR) 30 MG CR tablet Take 30 mg by mouth daily  2/10/16   Historical Provider, MD       Current medications:    Current Facility-Administered Medications   Medication Dose Route Frequency Provider Last Rate Last Admin    lactated ringers infusion   IntraVENous Continuous Ace Schneider  mL/hr at 10/07/22 0940 New Bag at 10/07/22 0940       Allergies:     Allergies   Allergen Reactions    Latex     Adhesive Tape     Augmentin [Amoxicillin-Pot Clavulanate]     Codeine     Fentanyl     Iodine     Versed [Midazolam]        Problem List:    Patient Active Problem List   Diagnosis Code    [facility]oxysmal atrial fibrillation ([facility]) I48.0    Vitamin D deficiency E55.9    Hypothyroidism A98.8    Lichen sclerosus of anus L90.0    Dermatomyositis ([facility]) M33.90    Anemia in other chronic diseases classified elsewhere D63.8    Other specified anemias D64.89    COPD with acute exacerbation ([facility]) J44.1    Heart failure, unspecified HF chronicity, unspecified heart failure type ([facility]) I50.9    COVID-19 U07.1    Chronic renal disease, stage III ([facility]) [787030] N18.30    Diarrhea R19.7    Generalized abdominal pain R10.84    Nausea R11.0    Abdominal bloating R14.0    Change in bowel habits R19.4    Constipation K59.00    Dysphagia R13.10       Past Medical History:        Diagnosis Date    Anemia     Arthritis     Asthma     CHF (congestive heart failure) ([facility])     COPD (chronic obstructive pulmonary disease) ([address]     Dermatomyositis ([address]     Diarrhea     Elevated ferritin     Fibromyalgia     Hemorrhoids     Hypertension     Hyperthyroidism     Lichen sclerosus     Lichen sclerosus of anus 2021    Pancreatitis     Sleep apnea     no machine, o2 at night    Supraventricular tachycardia ([address] 10/19/2016    Weight loss        Past Surgical History:        Procedure Laterality Date    CHOLECYSTECTOMY      COLONOSCOPY  10/27/2009    Dr Lance Benjamin:  8 yr recall    HYSTERECTOMY (CERVIX STATUS UNKNOWN)      PACEMAKER PLACEMENT      UPPER GASTROINTESTINAL ENDOSCOPY  07/05/2005    Dr Lance Benjamin       Social History:    Social History     Tobacco Use    Smoking status: Never    Smokeless tobacco: Never   Substance Use Topics    Alcohol use: No                                Counseling given: Not Answered      Vital Signs (Current):   Vitals:    10/07/22 0927   BP: 132/65   Pulse: 63   Resp: 18   Temp: 97.8 °F (36.6 °C)   TempSrc: Temporal   SpO2: 100%   Weight: 140 lb (63.5 kg)   Height: 5' 3.5\" (1.613 m)                                              BP Readings from Last 3 Encounters:   10/07/22 132/65   09/22/22 136/72   08/22/22 124/80       NPO Status: Time of last liquid consumption: 0600                        Time of last solid consumption: 1700                        Date of last liquid consumption: 10/07/22                        Date of last solid food consumption: 10/05/22    BMI:   Wt Readings from Last 3 Encounters:   10/07/22 140 lb (63.5 kg)   09/22/22 143 lb 12.8 oz (65.2 kg)   08/22/22 142 lb (64.4 kg)     Body mass index is 24.41 kg/m².     CBC:   Lab Results   Component Value Date/Time    WBC 7.2 08/22/2022 05:57 PM    RBC 4.04 08/22/2022 05:57 PM    HGB 12.0 08/22/2022 05:57 PM    HCT 38.3 08/22/2022 05:57 PM    MCV 94.8 08/22/2022 05:57 PM    RDW 15.3 08/22/2022 05:57 PM     08/22/2022 05:57 PM       CMP:   Lab Results   Component Value Date/Time     08/22/2022 05:59 PM    K 4.2 08/22/2022 05:59 PM    K 4.0 11/02/2021 10:15 AM     08/22/2022 05:59 PM    CO2 29 08/22/2022 05:59 PM    BUN 22 08/22/2022 05:59 PM    CREATININE 0.9 08/22/2022 05:59 PM    GFRAA >59 08/22/2022 05:59 PM    AGRATIO 1.2 12/09/2021 02:34 PM    LABGLOM 59 08/22/2022 05:59 PM    GLUCOSE 105 08/22/2022 05:59 PM    PROT 7.0 08/22/2022 05:59 PM    CALCIUM 9.6 08/22/2022 05:59 PM    BILITOT 0.3 08/22/2022 05:59 PM    ALKPHOS 126 08/22/2022 05:59 PM    AST 24 08/22/2022 05:59 PM    ALT 15 08/22/2022 05:59 PM       POC Tests: No results for input(s): POCGLU, POCNA, POCK, POCCL, POCBUN, POCHEMO, POCHCT in the last 72 hours. Coags: No results found for: PROTIME, INR, APTT    HCG (If Applicable): No results found for: PREGTESTUR, PREGSERUM, HCG, HCGQUANT     ABGs: No results found for: PHART, PO2ART, OFC5UOM, XQN3FMJ, BEART, M8WQPYKN     Type & Screen (If Applicable):  No results found for: LABABO, LABRH    Drug/Infectious Status (If Applicable):  No results found for: HIV, HEPCAB    COVID-19 Screening (If Applicable):   Lab Results   Component Value Date/Time    COVID19 Negative 06/24/2022 12:00 AM    COVID19 DETECTED 02/01/2022 01:27 PM           Anesthesia Evaluation    Airway: Mallampati: I  TM distance: >3 FB   Neck ROM: limited  Mouth opening: > = 3 FB   Dental:    (+) upper dentures      Pulmonary:normal exam    (+) COPD:  asthma:     (-) sleep apnea                           Cardiovascular:  Exercise tolerance: poor (<4 METS),   (+) hypertension:, pacemaker: pacemaker, CHF:, KING:,          Beta Blocker:  Dose within 24 Hrs         Neuro/Psych:   (+) neuromuscular disease:,             GI/Hepatic/Renal:   (+) GERD: well controlled, bowel prep,           Endo/Other:    (+) hypothyroidism: arthritis:., .    (-) hyperthyroidism               Abdominal:             Vascular: negative vascular ROS.          Other Findings:           Anesthesia Plan      general and TIVA     ASA 3       Induction: intravenous. Anesthetic plan and risks discussed with patient.                         JAZMIN Casarez - ROSEANN   10/7/2022

## 2022-10-07 NOTE — H&P
Patient Name: Henna Aguirre  : 1934  MRN: [id number]  DATE: 10/07/22    Allergies: Allergies   Allergen Reactions    Latex     Adhesive Tape     Augmentin [Amoxicillin-Pot Clavulanate]     Codeine     Fentanyl     Iodine     Versed [Midazolam]         ENDOSCOPY  History and Physical    Procedure:    [x] Diagnostic Colonoscopy       [] Screening Colonoscopy  [x] EGD      [] ERCP      [] EUS       [] Other    [x] Previous office notes/History and Physical reviewed from the patients chart. Please see EMR for further details of HPI. I have examined the patient's status immediately prior to the procedure and:      Indications/HPI: For both EGD and colonoscopy exams today:  1. Diarrhea, unspecified type                  2. Generalized abdominal pain             3. Nausea        4. Abdominal bloating             5. Change in bowel habits        6. Constipation, unspecified constipation type        7. Dysphagia, unspecified type       []Abdominal Pain   []Cancer- GI/Lung     []Fhx of colon CA/polyps  []History of Polyps  []Barretts            []Melena  []Abnormal Imaging              []Dysphagia              []Persistent Pneumonia   []Anemia                            []Food Impaction        []History of Polyps  [] GI Bleed             []Pulmonary nodule/Mass   []Change in bowel habits []Heartburn/Reflux  []Rectal Bleed (BRBPR)  []Chest Pain - Non Cardiac []Heme (+) Stool []Ulcers  []Constipation  []Hemoptysis  []Varices  []Diarrhea  []Hypoxemia    []Nausea/Vomiting   []Screening   []Crohns/Colitis  []Other:     Anesthesia:   [x] MAC [] Moderate Sedation   [] General   [] None     ROS: 12 pt Review of Symptoms was negative unless mentioned above    Medications:   Prior to Admission medications    Medication Sig Start Date End Date Taking?  Authorizing Provider   polyethylene glycol (GLYCOLAX) 17 g packet Take 17 g by mouth daily as needed for Constipation   Yes Historical Provider, MD   NONFORMULARY in the morning and at bedtime CBD Oil    Historical Provider, MD   metoprolol succinate (TOPROL XL) 50 MG extended release tablet TAKE 1 TABLET BY MOUTH ONCE DAILY 7/12/22   Historical Provider, MD   rivaroxaban (XARELTO) 20 MG TABS tablet Take 20 mg by mouth daily (with breakfast)    Historical Provider, MD   baclofen (LIORESAL) 10 MG tablet Take 1 tablet by mouth nightly as needed (mascle pain) 8/11/22   JAZMIN George NP   nystatin (MYCOSTATIN) 286262 UNIT/ML suspension Take 5 mLs by mouth 4 times daily  Patient not taking: Reported on 10/7/2022 6/24/22   JAZMIN George NP   sucralfate (CARAFATE) 1 GM/10ML suspension Take 10 mLs by mouth 4 times daily  Patient not taking: Reported on 10/7/2022 2/1/22   JAZMIN George NP   hydroCHLOROthiazide (MICROZIDE) 12.5 MG capsule Take 12.5 mg by mouth as needed    Historical Provider, MD   levothyroxine (SYNTHROID) 25 MCG tablet Take 1 tablet by mouth daily 11/16/21 10/7/22  JAZMIN George NP   hydrocortisone (ANUSOL-HC) 25 MG suppository Place 1 suppository rectally every 12 hours  Patient taking differently: Place 25 mg rectally 2 times daily as needed 8/9/21   JAZMIN George NP   clobetasol (TEMOVATE) 0.05 % ointment Apply topically 2 times daily. Patient taking differently: as needed Apply topically 2 times daily.  8/9/21   JAZMIN Rocha NP   budesonide-formoterol [facility]) 160-4.5 MCG/ACT AERO Inhale 2 puffs into the lungs 2 times daily 8/9/21   JAZMIN George NP   albuterol sulfate HFA (PROVENTIL HFA) 108 (90 Base) MCG/ACT inhaler Inhale 2 puffs into the lungs every 6 hours as needed for Wheezing 3/19/20 10/7/22  JAZMIN White   OXYGEN Inhale into the lungs nightly    Historical Provider, MD   KLOR-CON M10 10 MEQ extended release tablet 10 mEq daily  2/1/17   Historical Provider, MD   isosorbide mononitrate (IMDUR) 30 MG CR tablet Take 30 mg by mouth daily  2/10/16   Historical Provider, MD       Past Medical History:  Past Medical History:   Diagnosis Date    Anemia     Arthritis     Asthma     CHF (congestive heart failure) (HCC)     COPD (chronic obstructive pulmonary disease) (HCC)     Dermatomyositis (HCC)     Diarrhea     Elevated ferritin     Fibromyalgia     Hemorrhoids     Hypertension     Hyperthyroidism     Lichen sclerosus     Lichen sclerosus of anus 2021    Pancreatitis     Sleep apnea     no machine, o2 at night    Supraventricular tachycardia ([address] 10/19/2016    Weight loss        Past Surgical History:  Past Surgical History:   Procedure Laterality Date    CHOLECYSTECTOMY      COLONOSCOPY  10/27/2009    Dr Morrow Me:  8 yr recall    HYSTERECTOMY (CERVIX STATUS UNKNOWN)      PACEMAKER PLACEMENT      UPPER GASTROINTESTINAL ENDOSCOPY  07/05/2005    Dr Cristhian Gilmore       Social History:  Social History     Tobacco Use    Smoking status: Never    Smokeless tobacco: Never   Vaping Use    Vaping Use: Never used   Substance Use Topics    Alcohol use: No    Drug use: Yes     Comment: CBD oil       Vital Signs:   Vitals:    10/07/22 0927   BP: 132/65   Pulse: 63   Resp: 18   Temp: 97.8 °F (36.6 °C)   SpO2: 100%        Physical Exam:  Cardiac:  [x]WNL  []Comments:  Pulmonary:  [x]WNL   []Comments:  Neuro/Mental Status:  [x]WNL  []Comments:  Abdominal:  [x]WNL    []Comments:  Other:   []WNL  []Comments:    Informed Consent:  The risks and benefits of the procedure have been discussed with either the patient or if they cannot consent, their representative. Assessment:  Patient examined and appropriate for planned sedation and procedure. Plan:  Proceed with planned sedation and procedure as above.          Crissy Toribio MD

## 2022-10-10 RX ORDER — METOPROLOL SUCCINATE 50 MG/1
50 TABLET, EXTENDED RELEASE ORAL DAILY
Qty: 90 TABLET | Refills: 3 | Status: SHIPPED | OUTPATIENT
Start: 2022-10-10

## 2022-10-27 ENCOUNTER — TELEPHONE (OUTPATIENT)
Dept: CARDIOLOGY | Facility: CLINIC | Age: 87
End: 2022-10-27

## 2022-10-27 NOTE — TELEPHONE ENCOUNTER
Caller: [employer]    Relationship:     Best call back number: [phone]    What form or medical record are you requesting: LAST OFFICE NOTE WITH      Who is requesting this form or medical record from you: MEDICARE     How would you like to receive the form or medical records (pick-up, mail, fax):   If fax, what is the fax number: [phone] ATTMelody DIAZ

## 2022-11-16 ENCOUNTER — OFFICE VISIT (OUTPATIENT)
Dept: GASTROENTEROLOGY | Age: 87
End: 2022-11-16
Payer: MEDICARE

## 2022-11-16 VITALS
SYSTOLIC BLOOD PRESSURE: 139 MMHG | HEIGHT: 64 IN | OXYGEN SATURATION: 98 % | HEART RATE: 89 BPM | BODY MASS INDEX: 24.92 KG/M2 | DIASTOLIC BLOOD PRESSURE: 80 MMHG | WEIGHT: 146 LBS

## 2022-11-16 DIAGNOSIS — K21.00 GASTROESOPHAGEAL REFLUX DISEASE WITH ESOPHAGITIS WITHOUT HEMORRHAGE: Primary | ICD-10-CM

## 2022-11-16 PROCEDURE — G8417 CALC BMI ABV UP PARAM F/U: HCPCS | Performed by: NURSE PRACTITIONER

## 2022-11-16 PROCEDURE — G8484 FLU IMMUNIZE NO ADMIN: HCPCS | Performed by: NURSE PRACTITIONER

## 2022-11-16 PROCEDURE — 99213 OFFICE O/P EST LOW 20 MIN: CPT | Performed by: NURSE PRACTITIONER

## 2022-11-16 PROCEDURE — 1036F TOBACCO NON-USER: CPT | Performed by: NURSE PRACTITIONER

## 2022-11-16 PROCEDURE — G8427 DOCREV CUR MEDS BY ELIG CLIN: HCPCS | Performed by: NURSE PRACTITIONER

## 2022-11-16 PROCEDURE — 1090F PRES/ABSN URINE INCON ASSESS: CPT | Performed by: NURSE PRACTITIONER

## 2022-11-16 PROCEDURE — 1123F ACP DISCUSS/DSCN MKR DOCD: CPT | Performed by: NURSE PRACTITIONER

## 2022-11-16 ASSESSMENT — ENCOUNTER SYMPTOMS
ABDOMINAL PAIN: 0
COUGH: 0
VOMITING: 0
CONSTIPATION: 0
DIARRHEA: 0
NAUSEA: 0
CHOKING: 0
TROUBLE SWALLOWING: 0
ANAL BLEEDING: 0
SHORTNESS OF BREATH: 0
ABDOMINAL DISTENTION: 0
RECTAL PAIN: 0
BLOOD IN STOOL: 0

## 2022-11-16 NOTE — PROGRESS NOTES
Subjective:     Patient ID: Gurpreet Paulino is a 80 y.o. female  PCP: JAZMIN Gerardo NP  Referring Provider: No ref. provider found    HPI  Patient presents to the office today with the following complaints: Follow-up      Pt seen today for follow up after EGD and Colonoscopy on 10/7/22 with Dr. Stefania Griggs. Previous pt of ANTONIO Walker. Scopes were performed for c/o nausea, abdominal pain with alternating bowel habits. Today, pt states symptoms have improved. She is not taking anything for reflux currently. She denies any further nausea, reflux, or issues swallowing. Pt denies any further issues or concerns. EGD Findings/IMPRESSION 10/7/22:  Esophagus: abnormal: Normal upper two thirds; a partially obstructing distal esophageal Schatzki ring was noted near the EG junction at 35 cm and was subjected to successful Puga bougie dilation as described above. NO erosions or ulcers or nodules or strictures or webs or mass lesions or extrinsic compression or diverticula noted. A Puga 54 fr bougie dilation was performed and random cold biopsies were taken to check for EoE, especially given her history of chronic asthma and NERD. There is a 5 cm in length hiatal hernia present. Stomach:  normal.  NO ulcers or masses or gastric outlet obstruction or retained food or fluid. Rugae were normal and lumen distended well with insufflation. Retroflexed views otherwise revealed a normal GE junction, fundus and cardia as well. Duodenum: Normal. Random biopsies were taken to check for Celiac disease and other causes of villous atrophy. RECOMMENDATIONS:    1. Await path results, the patient will be contacted in 7-10 days with biopsy results. 2.  Magic mouthwash 5 ml PO Swish and swallow q3h PRN ONLY IF patient has post-procedural sorethroat or chest pain. 3. Full liquids to soft diet today tab discharge from the surgicenter; may advance  diet starting in AM tomorrow.   4. May resume other meds except any ASA/NSAIDs; may use cough drops or lozenges PRN; also OTC/prescription PPI or H2RA PO qday or BID with anti-GERD measures. 5. NO ASA/NSAIDs x 2 weeks  6. OP f/u in 4-6 weeks with Ms. Sherry Rowan; will consider an Esophageal manometry later if the patient's dysphagia persists. Colonoscopy Findings 10/7/22: A 6 mm in diameter sessile flat ascending colon polyp was removed by hot snare polypectomy. Otherwise, the mucosa appeared normal throughout the entire examined colon; random cold biopsies were taken to check for microscopic colitis. NO larger polyps or masses or strictures or colitis. Severe diverticulosis in the left colon  Internal hemorrhoids-Grade 2-3 and external hemorrhoids without any stigmata of active bleeding at this time. Retroflexion in the rectum was otherwise normal and revealed no further abnormalities    Recommendations:  1. Repeat colonoscopy: pending pathology -if no evidence of microscopic colitis, she will not require any routine colorectal cancer surveillance exams due to her advanced age. 2. Await biopsy results-you will receive a letter with your results within 7-10 days  - Resume previous meds   -- Keep scheduled f/u appts with other MDs   - NO ASA/NSAIDs x 2 weeks     FINAL DIAGNOSIS:   A.  Small intestine, duodenal biopsy:   Benign duodenal mucosa with patchy mild chronic nonspecific inflammation, negative for evidence of sprue. B.  Esophagus, biopsy:   Benign esophageal mucosa with changes consistent with reflux esophagitis, negative for evidence of eosinophilic esophagitis. C.  Colon, ascending colonic polypectomy:   Tubular adenoma, negative for evidence of high-grade dysplasia. D.  Colon, random colonic biopsies:   Benign colonic mucosa with no significant histopathologic changes. Assessment:     1.  Gastroesophageal reflux disease with esophagitis without hemorrhage            Plan:   - Anti-reflux precautions  - Follow up prn or sooner if needed  - Call with any questions or concerns       Orders  No orders of the defined types were placed in this encounter. Medications  No orders of the defined types were placed in this encounter. Patient History:     Past Medical History:   Diagnosis Date    Anemia     Arthritis     Asthma     CHF (congestive heart failure) (HCC)     COPD (chronic obstructive pulmonary disease) (HCC)     Dermatomyositis (HCC)     Diarrhea     Elevated ferritin     Fibromyalgia     Hemorrhoids     Hypertension     Hyperthyroidism     Lichen sclerosus     Lichen sclerosus of anus 2021    Pancreatitis     Sleep apnea     no machine, o2 at night    Supraventricular tachycardia ([address] 10/19/2016    Weight loss        Past Surgical History:   Procedure Laterality Date    CHOLECYSTECTOMY      COLONOSCOPY  10/27/2009    Dr Maco Ballesteros:  10 yr recall    COLONOSCOPY N/A 10/07/2022    Dr George Ballard (-)Mirco Colitis, TA (-)dysplasia, Diverticulosis, internal hemorrhoids-Grade 2-3 and external hemorrhoids, no recall needed ,    HYSTERECTOMY (CERVIX STATUS UNKNOWN)      PACEMAKER PLACEMENT      UPPER GASTROINTESTINAL ENDOSCOPY  07/05/2005    Dr Taylor Edmondson ENDOSCOPY N/A 10/07/2022    Dr Harshal Zepeda obstructing distal esophageal Schatzki ring was noted near the EG junction at 35 cm, HH    UPPER GASTROINTESTINAL ENDOSCOPY  10/07/2022    Dr Harshal Zepeda obstructing distal esophageal Schatzki ring was noted near the EG junction at 35 cm,        Family History   Problem Relation Age of Onset    Colon Cancer Neg Hx     Esophageal Cancer Neg Hx     Liver Cancer Neg Hx     Rectal Cancer Neg Hx     Stomach Cancer Neg Hx        Social History     Socioeconomic History    Marital status:     Tobacco Use    Smoking status: Never    Smokeless tobacco: Never   Vaping Use    Vaping Use: Never used   Substance and Sexual Activity    Alcohol use: No    Drug use: Yes     Comment: CBD oil     Social Determinants of Health     Financial Resource Strain: Low Risk     Difficulty of Paying Living Expenses: Not hard at all   Food Insecurity: No Food Insecurity    Worried About Running Out of Food in the Last Year: Never true    Ran Out of Food in the Last Year: Never true   Physical Activity: Insufficiently Active    Days of Exercise per Week: 1 day    Minutes of Exercise per Session: 10 min       Current Outpatient Medications   Medication Sig Dispense Refill    polyethylene glycol (GLYCOLAX) 17 g packet Take 17 g by mouth daily as needed for Constipation      NONFORMULARY in the morning and at bedtime CBD Oil      metoprolol succinate (TOPROL XL) 50 MG extended release tablet TAKE 1 TABLET BY MOUTH ONCE DAILY      rivaroxaban (XARELTO) 20 MG TABS tablet Take 20 mg by mouth daily (with breakfast)      baclofen (LIORESAL) 10 MG tablet Take 1 tablet by mouth nightly as needed (mascle pain) 30 tablet 1    hydroCHLOROthiazide (MICROZIDE) 12.5 MG capsule Take 12.5 mg by mouth as needed      levothyroxine (SYNTHROID) 25 MCG tablet Take 1 tablet by mouth daily 90 tablet 3    hydrocortisone (ANUSOL-HC) 25 MG suppository Place 1 suppository rectally every 12 hours (Patient taking differently: Place 25 mg rectally 2 times daily as needed) 30 suppository 0    clobetasol (TEMOVATE) 0.05 % ointment Apply topically 2 times daily. (Patient taking differently: as needed Apply topically 2 times daily. ) 1 Tube 1    budesonide-formoterol (SYMBICORT) 160-4.5 MCG/ACT AERO Inhale 2 puffs into the lungs 2 times daily 1 Inhaler 0    albuterol sulfate HFA (PROVENTIL HFA) 108 (90 Base) MCG/ACT inhaler Inhale 2 puffs into the lungs every 6 hours as needed for Wheezing 3 Inhaler 3    OXYGEN Inhale into the lungs nightly      KLOR-CON M10 10 MEQ extended release tablet 10 mEq daily       isosorbide mononitrate (IMDUR) 30 MG CR tablet Take 30 mg by mouth daily        No current facility-administered medications for this visit.        Allergies Allergen Reactions    Latex     Adhesive Tape     Augmentin [Amoxicillin-Pot Clavulanate]     Codeine     Fentanyl     Iodine     Versed [Midazolam]        Review of Systems   Constitutional:  Negative for activity change, appetite change, fatigue, fever and unexpected weight change. HENT:  Negative for trouble swallowing. Respiratory:  Negative for cough, choking and shortness of breath. Cardiovascular:  Negative for chest pain. Gastrointestinal:  Negative for abdominal distention, abdominal pain, anal bleeding, blood in stool, constipation, diarrhea, nausea, rectal pain and vomiting. Allergic/Immunologic: Negative for food allergies. All other systems reviewed and are negative. Objective:     /80 (Site: Left Upper Arm)   Pulse 89   Ht 5' 3.5\" (1.613 m)   Wt 146 lb (66.2 kg)   SpO2 98%   BMI 25.46 kg/m²     Physical Exam  Vitals reviewed. Constitutional:       General: She is not in acute distress. Appearance: She is well-developed. HENT:      Head: Normocephalic and atraumatic. Right Ear: External ear normal.      Left Ear: External ear normal.      Nose: Nose normal.   Eyes:      General: No scleral icterus. Right eye: No discharge. Left eye: No discharge. Conjunctiva/sclera: Conjunctivae normal.      Pupils: Pupils are equal, round, and reactive to light. Cardiovascular:      Rate and Rhythm: Normal rate and regular rhythm. Heart sounds: Normal heart sounds. No murmur heard. Pulmonary:      Effort: Pulmonary effort is normal. No respiratory distress. Breath sounds: Normal breath sounds. No wheezing or rales. Abdominal:      General: Bowel sounds are normal. There is no distension. Palpations: Abdomen is soft. There is no mass. Tenderness: There is no abdominal tenderness. There is no guarding or rebound. Musculoskeletal:         General: Normal range of motion. Cervical back: Normal range of motion and neck supple. Skin:     General: Skin is warm and dry. Coloration: Skin is not pale. Neurological:      Mental Status: She is alert and oriented to person, place, and time.    Psychiatric:         Behavior: Behavior normal.

## 2022-11-16 NOTE — PATIENT INSTRUCTIONS
Increasing Your Fiber Intake   What is fiber? Fiber is the portion of plant foods that cannot be digested. There are two kinds of fiber, both of which are keys to a healthy diet and a healthy digestive system:   ? Soluble fiber aids in bulking and moving food through the gut. It forms a gel when mixed with liquid. ? Insoluble fiber does not mix with liquids and passes through the GI tract mostly intact. It is sometimes called \"roughage. \"     Why do I need to eat it? Fiber has many important roles:   ? Helps maintain regular bowel movements. More fiber can improve both diarrhea and constipation. ? Reduces the risk of developing hemorrhoids. ? Lowers LDL or \"bad\" cholesterol levels, which lowers risk of heart disease. ? Regulates blood sugar levels in people with diabetes. ? Provides a feeling of fullness and may help with weight loss. How much do I need? The Academy of Nutrition and Dietetics recommends:   ? For women, 25 grams per day under age 48 and 21 grams per day over age 48.   ? For men, 38 grams per day under age 48 and 30 grams per day over age 48. What foods are the best sources? Plant foods contain fiber, but some more than others. Best choices are:   ? Whole grains and high fiber cereals. ? Dried beans and legumes. ? Fruits and vegetables, especially raw. What about fiber supplements? If you need to add more fiber than you can get in your diet, consider:   ? Type of Fiber: The major brands of fiber supplements (Metamucil®, Konsyl®, Citrucel®, Benefiber®, Fibercon®) all use soluble fiber and work in the same way. ? Flavorings and Mixing: Many of the powdered brands have added flavoring and are mixed with just water. Other varieties are \"clear\" and can be added to numerous beverages and food items. Some brands also offer a \"wafer\" form. It's up to you! Tip: Increasing the fiber in your diet gradually may help minimize bloating and discomfort.  Be sure to drink plenty of fluids as you increase your fiber intake.         Fruits  Serving size  Total fiber (grams)    Pear  1 medium  5.1    Figs, dried  2 medium  3.7    Blueberries  1 cup  3.5    Apple, with skin  1 medium  4.4    Strawberries  1 cup  3.3    Peaches, dried  3 halves  3.2    Orange  1 medium  3.1    Apricots, dried  10 halves  2.6    Raisins  1.5-ounce box  1.6    Grains, cereal & pasta  Serving size  Total fiber (grams)    Spaghetti, whole-wheat  1 cup  6.3    Bran flakes  3/4 cup  5.1    Oatmeal  1 cup  4.0    Bread, rye  1 slice  1.9    Bread, whole-wheat  1 slice  1.9    Bread, mixed-grain  1 slice  1.7    Bread, cracked-wheat  1 slice  1.4

## 2022-12-12 ENCOUNTER — OFFICE VISIT (OUTPATIENT)
Dept: PRIMARY CARE CLINIC | Age: 87
End: 2022-12-12

## 2022-12-12 VITALS
HEART RATE: 71 BPM | HEIGHT: 64 IN | TEMPERATURE: 97.7 F | OXYGEN SATURATION: 97 % | DIASTOLIC BLOOD PRESSURE: 68 MMHG | SYSTOLIC BLOOD PRESSURE: 118 MMHG | BODY MASS INDEX: 25.27 KG/M2 | WEIGHT: 148 LBS

## 2022-12-12 DIAGNOSIS — M21.611 BILATERAL BUNIONS: Primary | ICD-10-CM

## 2022-12-12 DIAGNOSIS — B37.2 CANDIDAL INTERTRIGO: ICD-10-CM

## 2022-12-12 DIAGNOSIS — R10.2 PELVIC PAIN: ICD-10-CM

## 2022-12-12 DIAGNOSIS — L84 CORNS AND CALLOSITIES: ICD-10-CM

## 2022-12-12 DIAGNOSIS — M21.612 BILATERAL BUNIONS: Primary | ICD-10-CM

## 2022-12-12 DIAGNOSIS — R52 PAIN AGGRAVATED BY WALKING: ICD-10-CM

## 2022-12-12 DIAGNOSIS — L90.0 LICHEN SCLEROSUS OF ANUS: ICD-10-CM

## 2022-12-12 DIAGNOSIS — R30.0 DYSURIA: ICD-10-CM

## 2022-12-12 LAB
APPEARANCE FLUID: NORMAL
BILIRUBIN, POC: NORMAL
BLOOD URINE, POC: NORMAL
CLARITY, POC: NORMAL
COLOR, POC: NORMAL
GLUCOSE URINE, POC: NORMAL
KETONES, POC: NORMAL
LEUKOCYTE EST, POC: NORMAL
NITRITE, POC: NORMAL
PH, POC: 6
PROTEIN, POC: NORMAL
SPECIFIC GRAVITY, POC: 1.02
UROBILINOGEN, POC: NORMAL

## 2022-12-12 RX ORDER — CLOTRIMAZOLE 1 %
CREAM (GRAM) TOPICAL
Qty: 45 G | Refills: 0 | Status: SHIPPED | OUTPATIENT
Start: 2022-12-12 | End: 2022-12-19

## 2022-12-12 NOTE — PATIENT INSTRUCTIONS
Apply estrogen cream vaginally daily for 5 days then twice weekly.      Replens or rePHresh vaginal moisturizers

## 2022-12-12 NOTE — PROGRESS NOTES
[facility] PHYSICIAN SERVICES  [facility]  [address] [place] [address]  [address] [place] [address]  Dept: [phone]  Dept Fax: [phone]  Loc: [phone]    iRnku Rain is a 80 y.o. female who presents today for her medical conditions/complaints as noted below. Rinku Rain is c/o of Toe Pain (She states she has a corn between her 1st and 2nd toe on both feet. 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 She states that she has a aggravated and worsening corn between her first and second toe on both feet. She also states that she is now having issues with walking because of the pain. She also states that her lichen of the anus is back in burning and itching again. Chief Complaint   Patient presents with    Toe Pain     She states she has a corn between her 1st and 2nd toe on both feet. 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      Past Medical History:   Diagnosis Date    Anemia     Arthritis     Asthma     CHF (congestive heart failure) (HCC)     COPD (chronic obstructive pulmonary disease) (HCC)     Dermatomyositis (HCC)     Diarrhea     Elevated ferritin     Fibromyalgia     Hemorrhoids     Hypertension     Hyperthyroidism     Lichen sclerosus     Lichen sclerosus of anus 2021    Pancreatitis     Sleep apnea     no machine, o2 at night    Supraventricular tachycardia ([address] 10/19/2016    Weight loss       Past Surgical History:   Procedure Laterality Date    CHOLECYSTECTOMY      COLONOSCOPY  10/27/2009    Dr Sunday Edmond:  10 yr recall    COLONOSCOPY N/A 10/07/2022    Dr Ebonie Scott (-)Mirco Colitis, TA (-)dysplasia, Diverticulosis, internal hemorrhoids-Grade 2-3 and external hemorrhoids, no recall needed ,    HYSTERECTOMY (CERVIX STATUS UNKNOWN)      PACEMAKER PLACEMENT      UPPER GASTROINTESTINAL ENDOSCOPY  07/05/2005    Dr Yina Lauren ENDOSCOPY N/A 10/07/2022 Dr Albino Irwin obstructing distal esophageal Schatzki ring was noted near the EG junction at 35 cm,     UPPER GASTROINTESTINAL ENDOSCOPY  10/07/2022    Dr Albino Irwin obstructing distal esophageal Schatzki ring was noted near the EG junction at 35 cm, [facility]       Vitals 12/12/2022 11/16/2022 10/7/2022 10/7/2022 10/7/2022 23/4/5724   SYSTOLIC 908 408 519 135 471 83   DIASTOLIC 68 80 69 56 62 59   Site Left Upper Arm Left Upper Arm - - - -   Position Sitting - - - - -   Cuff Size Large Adult - - - - -   Pulse 71 89 61 60 92 63   Temp 97.7 - - - - 96.9   Resp - - 18 18 18 16   SpO2 97 98 99 93 95 100   Weight 148 lb 146 lb - - - -   Height 5' 3.5\" 5' 3.5\" - - - -   Body mass index 25.8 kg/m2 25.45 kg/m2 - - - -   Pain Level - - - - - -   Some recent data might be hidden       Family History   Problem Relation Age of Onset    Colon Cancer Neg Hx     Esophageal Cancer Neg Hx     Liver Cancer Neg Hx     Rectal Cancer Neg Hx     Stomach Cancer Neg Hx        Social History     Tobacco Use    Smoking status: Never    Smokeless tobacco: Never   Substance Use Topics    Alcohol use: No      Current Outpatient Medications on File Prior to Visit   Medication Sig Dispense Refill    polyethylene glycol (GLYCOLAX) 17 g packet Take 17 g by mouth daily as needed for Constipation      NONFORMULARY in the morning and at bedtime CBD Oil      metoprolol succinate (TOPROL XL) 50 MG extended release tablet TAKE 1 TABLET BY MOUTH ONCE DAILY      rivaroxaban (XARELTO) 20 MG TABS tablet Take 20 mg by mouth daily (with breakfast)      baclofen (LIORESAL) 10 MG tablet Take 1 tablet by mouth nightly as needed (mascle pain) 30 tablet 1    hydroCHLOROthiazide (MICROZIDE) 12.5 MG capsule Take 12.5 mg by mouth as needed      hydrocortisone (ANUSOL-HC) 25 MG suppository Place 1 suppository rectally every 12 hours (Patient taking differently: Place 25 mg rectally 2 times daily as needed) 30 suppository 0 clobetasol (TEMOVATE) 0.05 % ointment Apply topically 2 times daily. (Patient taking differently: as needed Apply topically 2 times daily. ) 1 Tube 1    budesonide-formoterol (SYMBICORT) 160-4.5 MCG/ACT AERO Inhale 2 puffs into the lungs 2 times daily 1 Inhaler 0    albuterol sulfate HFA (PROVENTIL HFA) 108 (90 Base) MCG/ACT inhaler Inhale 2 puffs into the lungs every 6 hours as needed for Wheezing 3 Inhaler 3    OXYGEN Inhale into the lungs nightly      KLOR-CON M10 10 MEQ extended release tablet 10 mEq daily       isosorbide mononitrate (IMDUR) 30 MG CR tablet Take 30 mg by mouth daily       levothyroxine (SYNTHROID) 25 MCG tablet Take 1 tablet by mouth daily 90 tablet 3     No current facility-administered medications on file prior to visit. Allergies   Allergen Reactions    Latex     Adhesive Tape     Augmentin [Amoxicillin-Pot Clavulanate]     Codeine     Fentanyl     Iodine     Versed [Midazolam]        Health Maintenance   Topic Date Due    Shingles vaccine (1 of 2) Never done    DTaP/Tdap/Td vaccine (2 - Td or Tdap) 03/29/2020    Flu vaccine (1) 08/01/2022    COVID-19 Vaccine (1) 11/09/2024 (Originally 1934)    Depression Screen  08/11/2023    Annual Wellness Visit (AWV)  08/12/2023    Pneumococcal 65+ years Vaccine  Completed    Hepatitis A vaccine  Aged Out    Hib vaccine  Aged Out    Meningococcal (ACWY) vaccine  Aged Out       Subjective:      Review of Systems   Constitutional: Negative. Negative for diaphoresis, fatigue and fever. HENT: Negative. Eyes: Negative. Respiratory: Negative. Cardiovascular: Negative. Gastrointestinal: Negative. Endocrine: Negative. Genitourinary: Negative. Negative for vaginal bleeding and vaginal discharge. Musculoskeletal:  Positive for arthralgias, gait problem and myalgias. Corns, callous, discoloration to skin    Skin: Negative. Lichen to anus and perianal region worsening   Allergic/Immunologic: Negative. Hematological: Negative. Psychiatric/Behavioral: Negative. Objective:     Physical Exam  Vitals and nursing note reviewed. Constitutional:       General: She is not in acute distress. Appearance: Normal appearance. She is not ill-appearing or toxic-appearing. HENT:      Head: Normocephalic and atraumatic. Nose: Nose normal.      Mouth/Throat:      Mouth: Mucous membranes are moist.   Eyes:      Pupils: Pupils are equal, round, and reactive to light. Cardiovascular:      Rate and Rhythm: Normal rate and regular rhythm. Pulses: Normal pulses. Dorsalis pedis pulses are 2+ on the right side and 2+ on the left side. Posterior tibial pulses are 2+ on the right side and 2+ on the left side. Heart sounds: Normal heart sounds. Pulmonary:      Effort: Pulmonary effort is normal. No respiratory distress. Breath sounds: Normal breath sounds. No wheezing, rhonchi or rales. Abdominal:      General: Bowel sounds are normal.      Palpations: Abdomen is soft. Genitourinary:      Musculoskeletal:         General: Normal range of motion. Cervical back: Normal range of motion. Feet:    Feet:      Right foot:      Skin integrity: Skin breakdown, erythema and callus present. Toenail Condition: Right toenails are ingrown. Left foot:      Skin integrity: Skin breakdown, erythema and callus present. Toenail Condition: Left toenails are ingrown. Skin:     General: Skin is warm and dry. Coloration: Skin is not jaundiced or pale. Findings: No erythema or rash. Neurological:      Mental Status: She is alert and oriented to person, place, and time. Mental status is at baseline.    Psychiatric:         Mood and Affect: Mood normal.         Behavior: Behavior normal.     /68 (Site: Left Upper Arm, Position: Sitting, Cuff Size: Large Adult)   Pulse 71   Temp 97.7 °F (36.5 °C)   Ht 5' 3.5\" (1.613 m)   Wt 148 lb (67.1 kg)   SpO2 97% BMI 25.81 kg/m²     Assessment:       Diagnosis Orders   1. Bilateral bunions  External Referral To Podiatry      2. Corns and callosities  External Referral To Podiatry      3. Pain aggravated by walking  External Referral To Podiatry      4. Pelvic pain  POCT Urinalysis no Micro      5. Dysuria  Culture, Urine      6. Lichen sclerosus of anus        7. Candidal intertrigo  clotrimazole (LOTRIMIN) 1 % cream            Plan:   1.-3. Chronic condition and worsening  Refer to podiatry    4.-5. POCT urinalysis in office today. Results reviewed slightly cloudy with small amount of leukocytes no nitrites or blood noted. We will send sample for culture and sensitivity. 5.-7. Chronic condition uncontrolled  Premarin cream insert 1 dose nightly for 5 days and then decrease to twice weekly    Lotrimin cream combined with clobetasol and apply a small bead to external skin around the anus and perianal region twice a day. Patient given educational materials -see patient instructions. Discussed use, benefit, and side effects of prescribed medications. All patient questions answered. Pt voiced understanding. Reviewed health maintenance. Instructed to continue currentmedications, diet and exercise. Patient agreed with treatment plan. Follow up as directed. MEDICATIONS:  Orders Placed This Encounter   Medications    clotrimazole (LOTRIMIN) 1 % cream     Sig: Apply topically 2 times daily. Dispense:  45 g     Refill:  0         ORDERS:  Orders Placed This Encounter   Procedures    Culture, Urine    External Referral To Podiatry    POCT Urinalysis no Micro       Follow-up:  Return in about 4 weeks (around 1/9/2023). PATIENT INSTRUCTIONS:  Patient Instructions   Apply estrogen cream vaginally daily for 5 days then twice weekly.      Replens or rePHresh vaginal moisturizers   Electronically signed by JAZMIN Carrasco NP on 12/15/2022 at 7:28 AM    EMR Dragon/transcription disclaimer:  Much of thisencounter note is electronic transcription/translation of spoken language to printed texts. The electronic translation of spoken language may be erroneous, or at times, nonsensical words or phrases may be inadvertentlytranscribed.   Although I have reviewed the note for such errors, some may still exist.

## 2022-12-13 RX ORDER — CONJUGATED ESTROGENS 0.62 MG/G
CREAM VAGINAL
Qty: 30 G | Refills: 1 | Status: SHIPPED | OUTPATIENT
Start: 2022-12-13

## 2022-12-14 LAB — URINE CULTURE, ROUTINE: NORMAL

## 2023-01-04 ENCOUNTER — OFFICE VISIT (OUTPATIENT)
Dept: CARDIOLOGY | Facility: CLINIC | Age: 88
End: 2023-01-04
Payer: MEDICARE

## 2023-01-04 VITALS
DIASTOLIC BLOOD PRESSURE: 88 MMHG | BODY MASS INDEX: 26.75 KG/M2 | WEIGHT: 151 LBS | HEIGHT: 63 IN | SYSTOLIC BLOOD PRESSURE: 147 MMHG | HEART RATE: 90 BPM

## 2023-01-04 DIAGNOSIS — I48.0 PAF (PAROXYSMAL ATRIAL FIBRILLATION): ICD-10-CM

## 2023-01-04 DIAGNOSIS — I49.5 SICK SINUS SYNDROME: ICD-10-CM

## 2023-01-04 DIAGNOSIS — J44.9 CHRONIC OBSTRUCTIVE PULMONARY DISEASE, UNSPECIFIED COPD TYPE: ICD-10-CM

## 2023-01-04 DIAGNOSIS — I47.1 PSVT (PAROXYSMAL SUPRAVENTRICULAR TACHYCARDIA): Primary | ICD-10-CM

## 2023-01-04 DIAGNOSIS — Z98.890 HISTORY OF CARDIOVERSION: ICD-10-CM

## 2023-01-04 DIAGNOSIS — I10 ESSENTIAL HYPERTENSION: ICD-10-CM

## 2023-01-04 DIAGNOSIS — Z79.01 CURRENT USE OF LONG TERM ANTICOAGULATION: ICD-10-CM

## 2023-01-04 DIAGNOSIS — E78.2 MIXED HYPERLIPIDEMIA: ICD-10-CM

## 2023-01-04 PROCEDURE — 1160F RVW MEDS BY RX/DR IN RCRD: CPT | Performed by: INTERNAL MEDICINE

## 2023-01-04 PROCEDURE — 1159F MED LIST DOCD IN RCRD: CPT | Performed by: INTERNAL MEDICINE

## 2023-01-04 PROCEDURE — 99214 OFFICE O/P EST MOD 30 MIN: CPT | Performed by: INTERNAL MEDICINE

## 2023-01-04 RX ORDER — CONJUGATED ESTROGENS 0.62 MG/G
CREAM VAGINAL
COMMUNITY
Start: 2022-12-13

## 2023-01-04 NOTE — PROGRESS NOTES
Violet Fajardo  [id number]  1934  88 y.o.  female    Referring Provider: Josette Solis MD    Reason for Follow-up Visit:  Here for follow up after cardiac testing.   sick sinus syndrome s/p pacemaker   chronic atrial fibrillation   Cardiac workup test results as below   Had anemia   Happened when she had URI   upper GI endoscopy and colonoscopy was performed and had esophageal dilatation in [place]   On Xarelto now and tolerating well  transesophageal echo as below     Subjective      Mild chronic exertional shortness of breath on exertion relieved with rest  No significant cough or wheezing    No palpitations  No associated chest pain  No significant pedal edema    No fever or chills  No significant expectoration    No hemoptysis  No presyncope or syncope    Tolerating current medications well with no untoward side effects   Compliant with prescribed medication regimen. Tries to adhere to cardiac diet.     No new events or complaints since last visit   Now has patient assistance for Xarelto     No bleeding, excessive bruising, gait instability or fall risks      Device check as below               History of present illness:  Violet Fajardo is a 88 y.o. yo female with history of sick sinus syndrome s/p pacemaker who presents today for   Chief Complaint   Patient presents with   • PSVT     4 mo f/u   .    History  Past Medical History:   Diagnosis Date   • Asthma    • CHF (congestive heart failure) (HCC)    • COPD (chronic obstructive pulmonary disease) ([facility])    • Dermatomyositis (HCC)    • Dermatomyositis (HCC)    • Dyspnea    • E-coli UTI    • Fibromyalgia    • Hyperlipemia, mixed    • Hyperlipidemia    • Hypothyroidism    • Mitral valve disorder     History of MVP   • Mitral valve disorder    • Pacemaker    • Pacemaker    • PAF (paroxysmal atrial fibrillation) (CMS/HCC) new onset 8/6/2018   • Respiratory infection    • Sick sinus syndrome (HCC)    • Sleep apnea    • Stroke ([facility])    •  SVT (supraventricular tachycardia) (HCC)    ,   Past Surgical History:   Procedure Laterality Date   • CARDIAC CATHETERIZATION     • CARDIOVERSION     • CHOLECYSTECTOMY     • ESOPHAGUS SURGERY     • FOOT SURGERY     • HYSTERECTOMY     • INSERT / REPLACE / REMOVE PACEMAKER     • PACEMAKER IMPLANTATION  12/15/2011    [employer] (4/16/2012)-lead repositioning   ,   Family History   Problem Relation Age of Onset   • Coronary artery disease Mother    • Heart attack Mother    • Coronary artery disease Father    ,   Social History     Tobacco Use   • Smoking status: Never   • Smokeless tobacco: Never   Vaping Use   • Vaping Use: Never used   Substance Use Topics   • Alcohol use: No   • Drug use: No   ,     Medications  Current Outpatient Medications   Medication Sig Dispense Refill   • Budesonide-Formoterol Fumarate (SYMBICORT IN) Inhale.     • ERGOCALCIFEROL PO Take  by mouth Daily.     • Estrogens Conjugated (Premarin) 0.625 MG/GM vaginal cream Insert 0.625 mg daily for 5 days then decrease to 3 times a day     • FOLIC ACID-B6-B12-D PO Take  by mouth.     • hydrochlorothiazide (MICROZIDE) 12.5 MG capsule Take 1 capsule by mouth Daily. 90 capsule 3   • ipratropium (ATROVENT HFA) 17 MCG/ACT inhaler Inhale 2 puffs 4 (Four) Times a Day. 1 inhaler 11   • isosorbide mononitrate (IMDUR) 30 MG 24 hr tablet Take 1 tablet by mouth Daily. 90 tablet 3   • levothyroxine (SYNTHROID, LEVOTHROID) 25 MCG tablet Take 25 mcg by mouth Daily.     • metoprolol succinate XL (TOPROL-XL) 50 MG 24 hr tablet Take 1 tablet by mouth Daily. 90 tablet 3   • O2 (OXYGEN) Inhale Every Night.     • Omega-3 Fatty Acids (FISH OIL) 1000 MG capsule capsule Take  by mouth Daily With Breakfast.     • potassium chloride (K-DUR,KLOR-CON) 10 MEQ CR tablet Take 1 tablet by mouth Daily. 90 tablet 3   • rivaroxaban (XARELTO) 20 MG tablet Take 1 tablet by mouth Daily. 30 tablet 11   • sucralfate (CARAFATE) 1 GM/10ML suspension Take 1 g by mouth 4 (Four) Times a Day.      • Zolpidem Tartrate 10 MG sublingual tablet Place  under the tongue.       No current facility-administered medications for this visit.       Allergies:  Latex, Adhesive tape, Amoxicillin-pot clavulanate, Barley grass, Codeine, Contrast dye, Fentanyl, and Midazolam    Review of Systems  Review of Systems   Constitutional: Positive for malaise/fatigue.   HENT: Negative.    Eyes: Negative.    Cardiovascular: Positive for dyspnea on exertion. Negative for chest pain, claudication, cyanosis, irregular heartbeat, leg swelling, near-syncope, orthopnea, palpitations, paroxysmal nocturnal dyspnea and syncope.   Respiratory: Negative.    Endocrine: Negative.    Hematologic/Lymphatic: Negative.    Skin: Negative.    Musculoskeletal: Positive for arthritis and joint pain.   Gastrointestinal: Negative for anorexia.   Genitourinary: Negative.    Neurological: Negative.    Psychiatric/Behavioral: Negative.        Objective     Physical Exam:  /88   Pulse 90   Ht 160 cm (63\")   Wt 68.5 kg (151 lb)   BMI 26.75 kg/m²   Physical Exam   Constitutional: She appears well-developed.   HENT:   Head: Normocephalic.   Eyes: Lids are normal.   Neck: Normal carotid pulses and no JVD present. No tracheal tenderness present. Carotid bruit is not present. No tracheal deviation present.   Cardiovascular: Regular rhythm, S1 normal, S2 normal and normal pulses.   Murmur heard.   Systolic murmur is present with a grade of 2/6.  Pulmonary/Chest: Effort normal. No stridor.   Abdominal: Soft. Normal appearance. She exhibits no distension. There is no abdominal tenderness.   Neurological: She is alert. No cranial nerve deficit or sensory deficit.   Skin: Skin is warm.   Psychiatric: Her speech is normal and behavior is normal. Thought content normal.       Results Review:    myocardial perfusion scan  5/19    · Left ventricular ejection fraction is normal (Calculated EF = 65%).  · Myocardial perfusion imaging indicates a normal myocardial  perfusion study with no evidence of ischemia.  · Breast attenuation artifact is present.  · Raw images reviewed with the following abnormalities noted: vertical motion.  · Impressions are consistent with a low risk study.      Results for orders placed during the hospital encounter of 06/08/22    Adult Transesophageal Echo (BALTAZAR) W/ Cont if Necessary Per Protocol    Interpretation Summary  · Left ventricular ejection fraction appears to be 56 - 60%. Left ventricular systolic function is normal.  · No evidence of a left atrial appendage thrombus was present.           Procedures       Diagnoses and all orders for this visit:    1. PSVT (paroxysmal supraventricular tachycardia) (HCC) (Primary)    2. Sick sinus syndrome (HCC)    3. PAF (paroxysmal atrial fibrillation) (CMS/HCC) new onset    4. Mixed hyperlipidemia    5. History of cardioversion    6. Essential hypertension    7. Current use of long term anticoagulation    8. Chronic obstructive pulmonary disease, unspecified COPD type ([facility])         Plan      Overall doing well no new cardiovascular symptoms and therefore no additional cardiac testing is required prior to next visit  If any interim issues arise will call me for further evaluation.     Check BP and heart rates twice daily initially till blood pressures and heart rates under good control and then at least 3x / week,   If blood pressures continue to be well-controlled then can check week a month  at home and bring a recording for review next visit  If BP >130/85 or < 100/60 persistently over 3 reading 30 mins apart or if heart rates persistently above 100 bpm or less than 55 bpm call sooner for evaluation and advise     Patient expressed understanding  Encouraged and answered all questions   Discussed with the patient and all questioned fully answered. She will call me if any problems arise.   Discussed results of prior testing with patient : transesophageal echo       Watchman device referral was made as  significant stroke risk and bleeding risk  she declined this now as she and family feel she is too old for this and tolerating on anticoagulation   well     Monitor for any signs of bleeding including red or dark stools as well as easy bruisabilty. Fall precautions.   Monitor cardiac rhythm device function regularly per established schedule or PRN     Follow up with Yana WEBB           Return in about 6 months (around 7/4/2023).

## 2023-02-27 RX ORDER — ISOSORBIDE MONONITRATE 30 MG/1
TABLET, EXTENDED RELEASE ORAL
Qty: 90 TABLET | Refills: 1 | Status: SHIPPED | OUTPATIENT
Start: 2023-02-27

## 2023-03-02 NOTE — PROGRESS NOTES
[facility] - PODIATRY    Today's Date: 03/17/2023     Patient Name: Violet Fajardo  MRN: [id number]  [facility]: [id number]  PCP: Josette Solis MD  Referring Provider: Bailey Dyson APRN    SUBJECTIVE     Chief Complaint   Patient presents with   • Establish Care     Josette Solis MD -12/12/2022-BILATERAL BUNIONS, CORNS AND CALLOSITIES AND PAIN AGGRAVATED BY WALKING-pt states she is here today to have between her toes looked at. They are sore.-pt reports pain between the toes great toe and 2nd toe on MANDI feet     HPI: iVolet Fajardo, a 89 y.o.female, comes to clinic as a(n) new patient complaining of painful corns. Patient has h/o asthma, CHF, COPD, Fibromyalgia, HLD, Hypothyroid, MVP, Pacemaker, PAF, SSS, Sleep Apnea, Stoke, SVT. Notes prediabetes. Relates occasional numbness in feet. Denies open wounds. States that for the past several years she has been dealing with a painful area on the inside of her right great toe.  Denies injury or trauma.  Notes that she does have large bunions and hammertoes that have worsened over time.  She has attempted conservative treatments but the painful lesion remains.  Denies redness or drainage. Admits pain at 5/10 level and described as shooting, aching and sharp. Relates previous treatment(s) including spacers, ointments. Denies any constitutional symptoms. No other pedal complaints at this time.    Past Medical History:   Diagnosis Date   • Asthma    • CHF (congestive heart failure) ([facility])    • COPD (chronic obstructive pulmonary disease) ([facility])    • Dermatomyositis ([facility])    • Dermatomyositis ([facility])    • Dyspnea    • E-coli UTI    • Fibromyalgia    • Hyperlipemia, mixed    • Hyperlipidemia    • Hypothyroidism    • Mitral valve disorder     History of MVP   • Mitral valve disorder    • Pacemaker    • Pacemaker    • PAF (paroxysmal atrial fibrillation) ([facility]) new onset 8/6/2018   • Respiratory infection    • Sick sinus  syndrome ([facility])    • Sleep apnea    • Stroke ([facility])    • SVT (supraventricular tachycardia) ([facility])      Past Surgical History:   Procedure Laterality Date   • CARDIAC CATHETERIZATION     • CARDIOVERSION     • CHOLECYSTECTOMY     • ESOPHAGUS SURGERY     • FOOT SURGERY Right     has ankle hardware   • HYSTERECTOMY     • INSERT / REPLACE / REMOVE PACEMAKER     • PACEMAKER IMPLANTATION  12/15/2011    [employer] (4/16/2012)-lead repositioning     Family History   Problem Relation Age of Onset   • Coronary artery disease Mother    • Heart attack Mother    • Coronary artery disease Father      Social History     Socioeconomic History   • Marital status:    Tobacco Use   • Smoking status: Never   • Smokeless tobacco: Never   Vaping Use   • Vaping Use: Never used   Substance and Sexual Activity   • Alcohol use: No   • Drug use: No   • Sexual activity: Defer     Allergies   Allergen Reactions   • Latex    • Adhesive Tape Other (See Comments)   • Amoxicillin-Pot Clavulanate    • Barley Grass    • Codeine    • Contrast Dye (Echo Or Unknown Ct/Mr)    • Fentanyl    • Midazolam      Current Outpatient Medications   Medication Sig Dispense Refill   • Budesonide-Formoterol Fumarate (SYMBICORT IN) Inhale.     • ERGOCALCIFEROL PO Take  by mouth Daily.     • Estrogens Conjugated (Premarin) 0.625 MG/GM vaginal cream Insert 0.625 mg daily for 5 days then decrease to 3 times a day     • FOLIC ACID-B6-B12-D PO Take  by mouth.     • hydrochlorothiazide (MICROZIDE) 12.5 MG capsule Take 1 capsule by mouth Daily. 90 capsule 3   • ipratropium (ATROVENT HFA) 17 MCG/ACT inhaler Inhale 2 puffs 4 (Four) Times a Day. 1 inhaler 11   • isosorbide mononitrate (IMDUR) 30 MG 24 hr tablet Take 1 tablet by mouth once daily 90 tablet 1   • levothyroxine (SYNTHROID, LEVOTHROID) 25 MCG tablet Take 1 tablet by mouth Daily.     • metoprolol succinate XL (TOPROL-XL) 50 MG 24 hr tablet Take 1 tablet by mouth Daily. 90 tablet 3   • O2 (OXYGEN) Inhale Every  Night.     • Omega-3 Fatty Acids (FISH OIL) 1000 MG capsule capsule Take  by mouth Daily With Breakfast.     • potassium chloride (K-DUR,KLOR-CON) 10 MEQ CR tablet Take 1 tablet by mouth Daily. 90 tablet 3   • rivaroxaban (XARELTO) 20 MG tablet Take 1 tablet by mouth Daily. 30 tablet 11   • sucralfate (CARAFATE) 1 GM/10ML suspension Take 10 mL by mouth 4 (Four) Times a Day.     • Zolpidem Tartrate 10 MG sublingual tablet Place  under the tongue.       No current facility-administered medications for this visit.     Review of Systems   Constitutional: Negative for chills and fever.   HENT: Negative for congestion.    Respiratory: Negative for shortness of breath.    Cardiovascular: Negative for chest pain and leg swelling.   Gastrointestinal: Negative for constipation, diarrhea, nausea and vomiting.   Musculoskeletal: Positive for arthralgias.        Foot pain   Skin: Negative for wound.   Neurological: Negative for numbness.       OBJECTIVE     Vitals:    03/17/23 1506   BP: 112/68   Pulse: 74   SpO2: 96%       PHYSICAL EXAM  GEN:   Accompanied by none.     Foot/Ankle Exam:       General:   Appearance: appears stated age and healthy and elderly    Orientation: AAOx3    Affect: appropriate    Gait: unimpaired    Assistance: independent    Shoe Gear:  Casual shoes    VASCULAR      Right Foot Vascularity   Dorsalis pedis:  1+  Posterior tibial:  1+  Skin Temperature: warm    Edema Grading:  None  CFT:  3  Pedal Hair Growth:  Present  Varicosities: moderate varicosities       Left Foot Vascularity   Dorsalis pedis:  1+  Posterior tibial:  1+  Skin Temperature: warm    Edema Grading:  None  CFT:  3  Pedal Hair Growth:  Present  Varicosities: moderate varicosities        NEUROLOGIC     Right Foot Neurologic   Light touch sensation:  Diminished  Vibratory sensation:  Normal  Hot/Cold sensation: normal    Protective Sensation using [place] Monofilament:  10     Left Foot Neurologic   Light touch sensation:   Diminished  Vibratory sensation:  Normal  Hot/cold sensation: normal    Protective Sensation using [place] Monofilament:  10     MUSCULOSKELETAL      Right Foot Musculoskeletal   Ecchymosis:  None  Tenderness: great toe metatarsophalangeal joint, anterior tibiotalar joint line, toe 1 and toe 2    Arch:  Normal  Hammertoe:  Second toe, third toe, fourth toe and fifth toe  Hallux valgus: Yes       Left Foot Musculoskeletal   Ecchymosis:  None  Tenderness: none    Arch:  Normal  Hammertoe:  Second toe, third toe, fourth toe and fifth toe  Hallux valgus: Yes       MUSCLE STRENGTH     Right Foot Muscle Strength   Foot dorsiflexion:  5  Foot plantar flexion:  5  Foot inversion:  5  Foot eversion:  5     Left Foot Muscle Strength   Foot dorsiflexion:  5  Foot plantar flexion:  5  Foot inversion:  5  Foot eversion:  5     RANGE OF MOTION      Right Foot Range of Motion   Foot and ankle ROM within normal limits       Left Foot Range of Motion   Foot and ankle ROM within normal limits       DERMATOLOGIC     Right Foot Dermatologic   Skin: corn and atrophic       Left Foot Dermatologic   Skin: skin intact and atrophic       Image:       RADIOLOGY/NUCLEAR:  US ABDOMEN COMPLETE    Result Date: 3/17/2023  Narrative: NO PRIOR REPORT AVAILABLE Exam: ULTRASOUND OF THE ABDOMEN Clinical data:Lower abdominal pain. History of cholecystectomy. History of hysterectomy and oophorectomy. Technique: Real-time grayscale imaging of the abdomen was performed. Images supplemented with color Doppler technique. Prior studies: No prior studies submitted. Findings: The liver demonstrates normal echogenicity, without evidence of discrete hepatic mass. No intrahepatic biliary distention.Gallbladder is surgically removed status post cholecystectomy. The common bile duct measures 0.7cm. The right kidney bfbkoqfu41.0 x 4.5 x 4.3 cm. The left kidney measures9.8 x 4.7 x 3.9 cm with prominent pyramids. Normal echogenicity and cortical thickness are  preservedbilaterally. No evidence of renal masses. No evidence of renal calculi. There is no evidence of hydronephrosis. The pancreas is unremarkable to the extent visualized.Spleen measuring9.0 x 4.1 x 8.9 cm, echogenic foci seen throughout.Imaged portion of the aorta demonstrates no acute pathology measuring 1.8  x 1.7 cm at the proximal portion, 1.9 x 1.9 cm at the mid portion, 1.4 x 1.6 cm at the distal portion, 1.1 cm at the right bifurcation and 0.8 cm at the left bifurcation.Visualized portion of the inferior vena cava is unremarkable. No ascites is evident.  Both ovaries are surgically absent. Scanned adnexa and midline region of pain, no abnormality seen.    Impression: Status post cholecystectomy.  No biliary ductal dilatation. Otherwise, unremarkable exam as seen.  No abnormality at site of pain. Recommendation: Follow up as clinically indicated. Dictated and Electronically Signed by SOLOMON GRIMES MD at 17-Mar-2023 11:03:52 AM EST               LABORATORY/CULTURE RESULTS:      PATHOLOGY RESULTS:       ASSESSMENT/PLAN     Diagnoses and all orders for this visit:    1. Foot pain, bilateral (Primary)    2. Foot callus    3. Hallux valgus, right    4. Hallux valgus, left    5. Hammer toes of both feet      Comprehensive lower extremity examination and evaluation was performed.  Discussed findings and treatment plan including risks, benefits, and treatment options with patient in detail. Patient agreed with treatment plan.  Patient may maintain nails and calluses at home utilizing emery board or pumice stone between visits as needed   Findings consistent with corn to lateral right hallux due to rubbing between hallux and 2nd toe. Paring of lesion x1.   Advised to utilize toe spacers between toes at all times.   Defer any surgical treatment due to age.   An After Visit Summary was printed and given to the patient at discharge, including (if requested) any available informative/educational handouts regarding  diagnosis, treatment, or medications. All questions were answered to patient/family satisfaction. Should symptoms fail to improve or worsen they agree to call or return to clinic or to go to the Emergency Department. Discussed the importance of following up with any needed screening tests/labs/specialist appointments and any requested follow-up recommended by me today. Importance of maintaining follow-up discussed and patient accepts that missed appointments can delay diagnosis and potentially lead to worsening of conditions.  Return if symptoms worsen or fail to improve., or sooner if acute issues arise.    Lab Frequency Next Occurrence   Adult Transthoracic Echo Complete w/ Color, Spectral and Contrast if necessary per protocol Once 05/09/2022       This document has been electronically signed by Israel Pittman DPM on March 17, 2023 18:46 CDT

## 2023-03-13 ENCOUNTER — OFFICE VISIT (OUTPATIENT)
Dept: PRIMARY CARE CLINIC | Age: 88
End: 2023-03-13
Payer: MEDICARE

## 2023-03-13 VITALS
BODY MASS INDEX: 25.92 KG/M2 | HEART RATE: 67 BPM | WEIGHT: 151.8 LBS | HEIGHT: 64 IN | TEMPERATURE: 97.5 F | DIASTOLIC BLOOD PRESSURE: 70 MMHG | SYSTOLIC BLOOD PRESSURE: 136 MMHG | OXYGEN SATURATION: 99 %

## 2023-03-13 DIAGNOSIS — N18.31 STAGE 3A CHRONIC KIDNEY DISEASE (HCC): ICD-10-CM

## 2023-03-13 DIAGNOSIS — E03.9 HYPOTHYROIDISM, UNSPECIFIED TYPE: ICD-10-CM

## 2023-03-13 DIAGNOSIS — I50.9 HEART FAILURE, UNSPECIFIED HF CHRONICITY, UNSPECIFIED HEART FAILURE TYPE (HCC): ICD-10-CM

## 2023-03-13 DIAGNOSIS — R10.30 LOWER ABDOMINAL PAIN: Primary | ICD-10-CM

## 2023-03-13 DIAGNOSIS — I48.0 PAROXYSMAL ATRIAL FIBRILLATION (HCC): ICD-10-CM

## 2023-03-13 DIAGNOSIS — L90.0 LICHEN SCLEROSUS: ICD-10-CM

## 2023-03-13 DIAGNOSIS — J44.1 COPD EXACERBATION (HCC): ICD-10-CM

## 2023-03-13 DIAGNOSIS — N95.2 VAGINAL ATROPHY: ICD-10-CM

## 2023-03-13 DIAGNOSIS — M33.90 DERMATOMYOSITIS (HCC): ICD-10-CM

## 2023-03-13 LAB
APPEARANCE FLUID: CLEAR
BILIRUBIN, POC: NORMAL
BLOOD URINE, POC: NORMAL
CLARITY, POC: CLEAR
COLOR, POC: NORMAL
GLUCOSE URINE, POC: NORMAL
KETONES, POC: NORMAL
LEUKOCYTE EST, POC: NORMAL
NITRITE, POC: NORMAL
PH, POC: 7
PROTEIN, POC: NORMAL
SPECIFIC GRAVITY, POC: 1.01
T4 FREE: 1.38 NG/DL (ref 0.93–1.7)
TSH SERPL DL<=0.05 MIU/L-ACNC: 1.73 UIU/ML (ref 0.27–4.2)
UROBILINOGEN, POC: NORMAL

## 2023-03-13 PROCEDURE — 81002 URINALYSIS NONAUTO W/O SCOPE: CPT | Performed by: NURSE PRACTITIONER

## 2023-03-13 PROCEDURE — G8417 CALC BMI ABV UP PARAM F/U: HCPCS | Performed by: NURSE PRACTITIONER

## 2023-03-13 PROCEDURE — 1090F PRES/ABSN URINE INCON ASSESS: CPT | Performed by: NURSE PRACTITIONER

## 2023-03-13 PROCEDURE — G8484 FLU IMMUNIZE NO ADMIN: HCPCS | Performed by: NURSE PRACTITIONER

## 2023-03-13 PROCEDURE — 99214 OFFICE O/P EST MOD 30 MIN: CPT | Performed by: NURSE PRACTITIONER

## 2023-03-13 PROCEDURE — 3023F SPIROM DOC REV: CPT | Performed by: NURSE PRACTITIONER

## 2023-03-13 PROCEDURE — G8427 DOCREV CUR MEDS BY ELIG CLIN: HCPCS | Performed by: NURSE PRACTITIONER

## 2023-03-13 PROCEDURE — 1036F TOBACCO NON-USER: CPT | Performed by: NURSE PRACTITIONER

## 2023-03-13 PROCEDURE — 1123F ACP DISCUSS/DSCN MKR DOCD: CPT | Performed by: NURSE PRACTITIONER

## 2023-03-13 SDOH — ECONOMIC STABILITY: INCOME INSECURITY: HOW HARD IS IT FOR YOU TO PAY FOR THE VERY BASICS LIKE FOOD, HOUSING, MEDICAL CARE, AND HEATING?: NOT HARD AT ALL

## 2023-03-13 SDOH — ECONOMIC STABILITY: FOOD INSECURITY: WITHIN THE PAST 12 MONTHS, YOU WORRIED THAT YOUR FOOD WOULD RUN OUT BEFORE YOU GOT MONEY TO BUY MORE.: NEVER TRUE

## 2023-03-13 SDOH — ECONOMIC STABILITY: FOOD INSECURITY: WITHIN THE PAST 12 MONTHS, THE FOOD YOU BOUGHT JUST DIDN'T LAST AND YOU DIDN'T HAVE MONEY TO GET MORE.: NEVER TRUE

## 2023-03-13 SDOH — ECONOMIC STABILITY: HOUSING INSECURITY
IN THE LAST 12 MONTHS, WAS THERE A TIME WHEN YOU DID NOT HAVE A STEADY PLACE TO SLEEP OR SLEPT IN A SHELTER (INCLUDING NOW)?: NO

## 2023-03-13 ASSESSMENT — PATIENT HEALTH QUESTIONNAIRE - PHQ9
1. LITTLE INTEREST OR PLEASURE IN DOING THINGS: 0
SUM OF ALL RESPONSES TO PHQ9 QUESTIONS 1 & 2: 0
SUM OF ALL RESPONSES TO PHQ QUESTIONS 1-9: 0
SUM OF ALL RESPONSES TO PHQ QUESTIONS 1-9: 0
2. FEELING DOWN, DEPRESSED OR HOPELESS: 0
SUM OF ALL RESPONSES TO PHQ QUESTIONS 1-9: 0
SUM OF ALL RESPONSES TO PHQ QUESTIONS 1-9: 0

## 2023-03-13 NOTE — PROGRESS NOTES
[facility] PHYSICIAN SERVICES  [facility]  [address]  [address] [address]  Dept: [phone]  Dept Fax: [phone]  Loc: [phone]    Tarun Choi is a 80 y.o. female who presents today for her medical conditions/complaints as noted below. Tarun Choi is c/o of Follow-up (3 month) and Medication Problem (The Premarin is $300 month and she has been using otc Monostat instead)        HPI:     HPI   This 70-year-old female presents today for 3-month follow-up and to discuss medication for her lichen sclerosis and vaginal [place] dryness and atrophy. States that the Premarin cream that she had tried was working well but when she tried to fill her prescription it was $300 a month. States she had tried over-the-counter Monistat instead. She does state that she is  Seeing podiatry Friday because she is still having pain in her feet. She states that she is currently and has not had any flares or issues with her dermatophytosis, COPD or heart failure in some time. States that she is seeing cardiology for these things states that she has a follow-up scheduled 5/11/2023.   Chief Complaint   Patient presents with    Follow-up     3 month    Medication Problem     The Premarin is $300 month and she has been using otc Monostat instead     Past Medical History:   Diagnosis Date    Anemia     Arthritis     Asthma     CHF (congestive heart failure) (HCC)     COPD (chronic obstructive pulmonary disease) (HCC)     Dermatomyositis (HCC)     Diarrhea     Elevated ferritin     Fibromyalgia     Hemorrhoids     Hypertension     Hyperthyroidism     Lichen sclerosus     Lichen sclerosus of anus 2021    Pancreatitis     Sleep apnea     no machine, o2 at night    Supraventricular tachycardia ([address] 10/19/2016    Weight loss       Past Surgical History:   Procedure Laterality Date    CHOLECYSTECTOMY      COLONOSCOPY  10/27/2009    Dr Alicia Crump:  10 yr recall    COLONOSCOPY N/A 10/07/2022     Joseph (-)Mirco Colitis, TA (-)dysplasia, Diverticulosis, internal hemorrhoids-Grade 2-3 and external hemorrhoids, no recall needed ,    HYSTERECTOMY (CERVIX STATUS UNKNOWN)      PACEMAKER PLACEMENT      UPPER GASTROINTESTINAL ENDOSCOPY  07/05/2005    Dr Manan Lima ENDOSCOPY N/A 10/07/2022    Dr Albino Irwin obstructing distal esophageal Schatzki ring was noted near the EG junction at 35 cm,     UPPER GASTROINTESTINAL ENDOSCOPY  10/07/2022    Dr Albino Irwin obstructing distal esophageal Schatzki ring was noted near the EG junction at 35 cm, [facility]       Vitals 3/13/2023 12/12/2022 11/16/2022 10/7/2022 10/7/2022 29/1/9424   SYSTOLIC 909 405 966 297 060 534   DIASTOLIC 70 68 80 69 56 62   Site - Left Upper Arm Left Upper Arm - - -   Position - Sitting - - - -   Cuff Size - Large Adult - - - -   Pulse 67 71 89 61 60 92   Temp 97.5 97.7 - - - -   Resp - - - 18 18 18   SpO2 99 97 98 99 93 95   Weight 151 lb 12.8 oz 148 lb 146 lb - - -   Height 5' 3.5\" 5' 3.5\" 5' 3.5\" - - -   Body mass index 26.47 kg/m2 25.8 kg/m2 25.45 kg/m2 - - -   Pain Level - - - - - -   Some recent data might be hidden       Family History   Problem Relation Age of Onset    Colon Cancer Neg Hx     Esophageal Cancer Neg Hx     Liver Cancer Neg Hx     Rectal Cancer Neg Hx     Stomach Cancer Neg Hx        Social History     Tobacco Use    Smoking status: Never    Smokeless tobacco: Never   Substance Use Topics    Alcohol use: No      Current Outpatient Medications on File Prior to Visit   Medication Sig Dispense Refill    estrogens conjugated (PREMARIN) 0.625 MG/GM CREA vaginal cream Insert 0.625 mg daily for 5 days then decrease to 3 times a day 30 g 1    NONFORMULARY in the morning and at bedtime CBD Oil      metoprolol succinate (TOPROL XL) 50 MG extended release tablet TAKE 1 TABLET BY MOUTH ONCE DAILY      rivaroxaban (XARELTO) 20 MG TABS tablet Take 20 mg by mouth daily (with breakfast)      baclofen (LIORESAL) 10 MG tablet Take 1 tablet by mouth nightly as needed (mascle pain) 30 tablet 1    hydroCHLOROthiazide (MICROZIDE) 12.5 MG capsule Take 12.5 mg by mouth as needed      budesonide-formoterol (SYMBICORT) 160-4.5 MCG/ACT AERO Inhale 2 puffs into the lungs 2 times daily 1 Inhaler 0    OXYGEN Inhale into the lungs nightly      KLOR-CON M10 10 MEQ extended release tablet 10 mEq daily       isosorbide mononitrate (IMDUR) 30 MG CR tablet Take 30 mg by mouth daily       polyethylene glycol (GLYCOLAX) 17 g packet Take 17 g by mouth daily as needed for Constipation (Patient not taking: Reported on 3/13/2023)      levothyroxine (SYNTHROID) 25 MCG tablet Take 1 tablet by mouth daily 90 tablet 3    hydrocortisone (ANUSOL-HC) 25 MG suppository Place 1 suppository rectally every 12 hours (Patient not taking: Reported on 3/13/2023) 30 suppository 0    clobetasol (TEMOVATE) 0.05 % ointment Apply topically 2 times daily. (Patient not taking: Reported on 3/13/2023) 1 Tube 1    albuterol sulfate HFA (PROVENTIL HFA) 108 (90 Base) MCG/ACT inhaler Inhale 2 puffs into the lungs every 6 hours as needed for Wheezing 3 Inhaler 3     No current facility-administered medications on file prior to visit. Allergies   Allergen Reactions    Latex     Adhesive Tape     Augmentin [Amoxicillin-Pot Clavulanate]     Barium-Containing Compounds     Codeine     Fentanyl     Iodine     Versed [Midazolam]        Health Maintenance   Topic Date Due    Shingles vaccine (1 of 2) Never done    DTaP/Tdap/Td vaccine (2 - Td or Tdap) 03/29/2020    Flu vaccine (1) 08/01/2022    COVID-19 Vaccine (1) 11/09/2024 (Originally 8/10/1934)    Annual Wellness Visit (AWV)  08/12/2023    Depression Screen  03/13/2024    Pneumococcal 65+ years Vaccine  Completed    Hepatitis A vaccine  Aged Out    Hib vaccine  Aged Out    Meningococcal (ACWY) vaccine  Aged Out       Subjective:      Review of Systems   Constitutional: Negative. Negative for chills, fatigue and fever. HENT: Negative. Eyes: Negative. Respiratory: Negative. Negative for cough, shortness of breath and wheezing. Cardiovascular: Negative. Negative for chest pain, palpitations and leg swelling. Gastrointestinal:  Positive for abdominal pain. Negative for constipation, diarrhea, nausea and vomiting. Endocrine: Negative. Genitourinary:  Positive for pelvic pain and vaginal pain. Negative for difficulty urinating, dysuria, frequency, vaginal bleeding and vaginal discharge. Musculoskeletal:  Positive for gait problem and myalgias. Skin: Negative. Allergic/Immunologic: Negative. Neurological:  Negative for headaches. Hematological: Negative. Psychiatric/Behavioral: Negative. Negative for dysphoric mood, self-injury and suicidal ideas. The patient is not nervous/anxious. Objective:     Physical Exam  Vitals and nursing note reviewed. Constitutional:       General: She is not in acute distress. Appearance: Normal appearance. She is not ill-appearing or toxic-appearing. HENT:      Head: Normocephalic and atraumatic. Nose: Nose normal.      Mouth/Throat:      Mouth: Mucous membranes are moist.   Eyes:      Pupils: Pupils are equal, round, and reactive to light. Neck:      Vascular: No carotid bruit. Cardiovascular:      Rate and Rhythm: Normal rate and regular rhythm. Pulses:           Carotid pulses are 2+ on the right side and 2+ on the left side. Radial pulses are 2+ on the right side and 2+ on the left side. Heart sounds: Murmur heard. Systolic murmur is present with a grade of 2/6. Pulmonary:      Effort: Pulmonary effort is normal. No respiratory distress. Breath sounds: Normal breath sounds. No wheezing, rhonchi or rales. Abdominal:      General: Bowel sounds are normal. There is no distension. Palpations: Abdomen is soft. There is no mass. Tenderness: There is abdominal tenderness. There is no right CVA tenderness, left CVA tenderness, guarding or rebound. Hernia: No hernia is present. Musculoskeletal:         General: Normal range of motion. Cervical back: Normal range of motion and neck supple. No rigidity or tenderness. Right lower leg: No edema. Left lower leg: No edema. Lymphadenopathy:      Cervical: No cervical adenopathy. Skin:     General: Skin is warm and dry. Coloration: Skin is not jaundiced or pale. Findings: No erythema or rash. Neurological:      Mental Status: She is alert and oriented to person, place, and time. Mental status is at baseline. Psychiatric:         Attention and Perception: Attention and perception normal.         Mood and Affect: Mood and affect normal.         Speech: Speech normal.         Behavior: Behavior normal. Behavior is cooperative. Thought Content: Thought content normal. Thought content does not include homicidal or suicidal ideation. Cognition and Memory: Cognition normal.         Judgment: Judgment normal.   I cannot get rid of it good eye  /70   Pulse 67   Temp 97.5 °F (36.4 °C)   Ht 5' 3.5\" (1.613 m)   Wt 151 lb 12.8 oz (68.9 kg)   SpO2 99%   BMI 26.47 kg/m²     Assessment:       Diagnosis Orders   1. Lower abdominal pain  US ABDOMEN COMPLETE    POCT Urinalysis no Micro      2. Lichen sclerosus        3. Vaginal atrophy        4. Dermatomyositis ([address]        5. COPD exacerbation ([address]        6. Heart failure, unspecified HF chronicity, unspecified heart failure type (HCC)        7. Paroxysmal atrial fibrillation ([address]        8. Stage 3a chronic kidney disease ([address]        9. Hypothyroidism, unspecified type  TSH    T4, Free            Plan:     Undiagnosed condition has been going on about 3 months. Patient does not have any female organs if she does not know where this pain or issues coming from.   POCT urinalysis in office today results reviewed clear negative nitrites leukocytes or blood. No indication of an acute urinary tract infection. Ultrasound abdomen scheduled  2. -3.  Chronic condition uncontrolled  We will send in a compound to [facility] to include hormones steroid and oxytocin to promote vaginal health    3. Chronic condition stable  pt reports only 3 flare as in 10 years. Denies any issues at this time. She does not have any current follow-up scheduled. 3. Chronic condition stable  Patient reports that she is doing well on her current regimen. She is currently taking   Symbicort 2 puffs twice a day  Albuterol during th day   O2 via NC 3.5 L at night for SPO2 support. She states that if she does get short of breath during the day she may sometimes put her oxygen on as needed. 4. -6. Chronic condtion stable  Patient states that she has not had any more problems since she had her pacemaker placement. Pacemaker  Follow up with cardiology , was seen in January. Seen q 6 months intervals   Next scheduled appointment Appears to be 5/11/2023  She also reports that she is not having any trouble with her medication regimen. She is currently taking  Metoprolol 50 mg daily  Xarelto 20 mg daily  Hydrochlorothiazide 12.5 mg daily Imdur 30 mg daily      6.  Chroinic conditon stable   Cbc cmp today   Lab Review   Lab Results   Component Value Date/Time     10/07/2022 11:59 AM     08/22/2022 05:59 PM     06/24/2022 12:35 PM    K 4.5 10/07/2022 11:59 AM    K 4.2 08/22/2022 05:59 PM    K 3.9 06/24/2022 12:35 PM    K 4.0 11/02/2021 10:15 AM    CO2 22 10/07/2022 11:59 AM    CO2 29 08/22/2022 05:59 PM    CO2 24 06/24/2022 12:35 PM    BUN 10 10/07/2022 11:59 AM    BUN 22 08/22/2022 05:59 PM    BUN 17 06/24/2022 12:35 PM    CREATININE 0.7 10/07/2022 11:59 AM    CREATININE 0.9 08/22/2022 05:59 PM    CREATININE 1.0 06/24/2022 12:35 PM    GLUCOSE 86 10/07/2022 11:59 AM    GLUCOSE 105 08/22/2022 05:59 PM    GLUCOSE 132 06/24/2022 12:35 PM    CALCIUM 9.0 10/07/2022 11:59 AM    CALCIUM 9.6 08/22/2022 05:59 PM    CALCIUM 9.0 06/24/2022 12:35 PM     Lab Results   Component Value Date/Time    WBC 7.2 08/22/2022 05:57 PM    WBC 12.2 06/24/2022 12:35 PM    WBC 8.3 01/13/2022 12:41 PM    HGB 12.0 08/22/2022 05:57 PM    HGB 11.4 06/24/2022 12:35 PM    HGB 11.6 01/13/2022 12:41 PM    HCT 38.3 08/22/2022 05:57 PM    HCT 36.9 06/24/2022 12:35 PM    HCT 38.8 01/13/2022 12:41 PM    MCV 94.8 08/22/2022 05:57 PM    MCV 93.7 06/24/2022 12:35 PM    MCV 94.6 01/13/2022 12:41 PM     08/22/2022 05:57 PM     06/24/2022 12:35 PM     01/13/2022 12:41 PM     Patient denies any edema to her legs or shortness of breath or orthopnea  7. Chronic condition stable  Lab Results   Component Value Date/Time    BUN 10 10/07/2022 11:59 AM    BUN 22 08/22/2022 05:59 PM    BUN 17 06/24/2022 12:35 PM    CREATININE 0.7 10/07/2022 11:59 AM    CREATININE 0.9 08/22/2022 05:59 PM    CREATININE 1.0 06/24/2022 12:35 PM    LABGLOM >60 10/07/2022 11:59 AM    LABGLOM 59 08/22/2022 05:59 PM    LABGLOM 52 06/24/2022 12:35 PM    K 4.5 10/07/2022 11:59 AM    K 4.2 08/22/2022 05:59 PM    K 3.9 06/24/2022 12:35 PM    K 4.0 11/02/2021 10:15 AM     8. Chronic condition stable  Repeat TSH and T4 today  Lab Results   Component Value Date/Time    TSH 1.730 03/13/2023 02:14 PM    TSH 2.030 07/12/2021 11:28 AM    TSH 1.920 04/23/2019 09:00 AM    T4FREE 1.38 03/13/2023 02:14 PM    T4FREE 1.32 07/12/2021 11:28 AM    T4FREE 1.4 06/29/2017 12:27 PM   Patient is currently taking levothyroxine 25 mcg daily every morning on empty stomach. She denies any side effects or adverse reactions to this. She states she is well controlled on this regimen. Continue levothyroxine 25 mcg daily on empty stomach. Check about    Patient given educational materials -see patient instructions. Discussed use, benefit, and side effects of prescribed medications. All patient questions answered. Pt voiced understanding. Reviewed health maintenance.  Instructed to continue currentmedications, diet and exercise.  Patient agreed with treatment plan. Follow up as directed.   MEDICATIONS:  No orders of the defined types were placed in this encounter.        ORDERS:  Orders Placed This Encounter   Procedures    US ABDOMEN COMPLETE    TSH    T4, Free    POCT Urinalysis no Micro       Follow-up:  Return in about 3 months (around 6/13/2023).    PATIENT INSTRUCTIONS:  Patient Instructions   Check about O2 humidification for night time oxygen   Electronically signed by JAZMIN Reyes NP on 3/17/2023 at 8:00 AM    EMR Dragon/transcription disclaimer:  Much of thisencounter note is electronic transcription/translation of spoken language to printed texts.  The electronic translation of spoken language may be erroneous, or at times, nonsensical words or phrases may be inadvertentlytranscribed.  Although I have reviewed the note for such errors, some may still exist.

## 2023-03-15 ENCOUNTER — TELEPHONE (OUTPATIENT)
Dept: PRIMARY CARE CLINIC | Age: 88
End: 2023-03-15

## 2023-03-16 ENCOUNTER — TELEPHONE (OUTPATIENT)
Dept: PODIATRY | Facility: CLINIC | Age: 88
End: 2023-03-16
Payer: MEDICARE

## 2023-03-17 ENCOUNTER — OFFICE VISIT (OUTPATIENT)
Dept: PODIATRY | Facility: CLINIC | Age: 88
End: 2023-03-17
Payer: MEDICARE

## 2023-03-17 ENCOUNTER — HOSPITAL ENCOUNTER (OUTPATIENT)
Dept: ULTRASOUND IMAGING | Age: 88
Discharge: HOME | End: 2023-03-17
Payer: MEDICARE

## 2023-03-17 VITALS
SYSTOLIC BLOOD PRESSURE: 112 MMHG | OXYGEN SATURATION: 96 % | HEART RATE: 74 BPM | DIASTOLIC BLOOD PRESSURE: 68 MMHG | WEIGHT: 150 LBS | HEIGHT: 63 IN | BODY MASS INDEX: 26.58 KG/M2

## 2023-03-17 DIAGNOSIS — R10.30 LOWER ABDOMINAL PAIN: ICD-10-CM

## 2023-03-17 DIAGNOSIS — M79.672 FOOT PAIN, BILATERAL: Primary | ICD-10-CM

## 2023-03-17 DIAGNOSIS — M20.41 HAMMER TOES OF BOTH FEET: ICD-10-CM

## 2023-03-17 DIAGNOSIS — M20.11 HALLUX VALGUS, RIGHT: ICD-10-CM

## 2023-03-17 DIAGNOSIS — M20.12 HALLUX VALGUS, LEFT: ICD-10-CM

## 2023-03-17 DIAGNOSIS — M79.671 FOOT PAIN, BILATERAL: Primary | ICD-10-CM

## 2023-03-17 DIAGNOSIS — M20.42 HAMMER TOES OF BOTH FEET: ICD-10-CM

## 2023-03-17 DIAGNOSIS — L84 FOOT CALLUS: ICD-10-CM

## 2023-03-17 PROBLEM — N95.2 VAGINAL ATROPHY: Status: ACTIVE | Noted: 2023-03-17

## 2023-03-17 PROCEDURE — 1160F RVW MEDS BY RX/DR IN RCRD: CPT | Performed by: PODIATRIST

## 2023-03-17 PROCEDURE — 99203 OFFICE O/P NEW LOW 30 MIN: CPT | Performed by: PODIATRIST

## 2023-03-17 PROCEDURE — 1159F MED LIST DOCD IN RCRD: CPT | Performed by: PODIATRIST

## 2023-03-17 PROCEDURE — 76700 US EXAM ABDOM COMPLETE: CPT

## 2023-03-17 ASSESSMENT — ENCOUNTER SYMPTOMS
COUGH: 0
CONSTIPATION: 0
RESPIRATORY NEGATIVE: 1
EYES NEGATIVE: 1
ALLERGIC/IMMUNOLOGIC NEGATIVE: 1
NAUSEA: 0
SHORTNESS OF BREATH: 0
WHEEZING: 0
DIARRHEA: 0
ABDOMINAL PAIN: 1
VOMITING: 0

## 2023-05-25 ENCOUNTER — OFFICE VISIT (OUTPATIENT)
Dept: PRIMARY CARE CLINIC | Age: 88
End: 2023-05-25
Payer: MEDICARE

## 2023-05-25 VITALS
TEMPERATURE: 97.5 F | WEIGHT: 149.8 LBS | OXYGEN SATURATION: 97 % | DIASTOLIC BLOOD PRESSURE: 68 MMHG | HEIGHT: 64 IN | SYSTOLIC BLOOD PRESSURE: 102 MMHG | BODY MASS INDEX: 25.57 KG/M2 | HEART RATE: 81 BPM | RESPIRATION RATE: 18 BRPM

## 2023-05-25 DIAGNOSIS — G89.29 CHRONIC RIGHT SHOULDER PAIN: Primary | ICD-10-CM

## 2023-05-25 DIAGNOSIS — Z79.899 MEDICATION MANAGEMENT: ICD-10-CM

## 2023-05-25 DIAGNOSIS — M25.511 ACUTE PAIN OF RIGHT SHOULDER: ICD-10-CM

## 2023-05-25 DIAGNOSIS — M25.511 CHRONIC RIGHT SHOULDER PAIN: Primary | ICD-10-CM

## 2023-05-25 DIAGNOSIS — M54.6 ACUTE RIGHT-SIDED THORACIC BACK PAIN: ICD-10-CM

## 2023-05-25 DIAGNOSIS — M33.90 DERMATOMYOSITIS (HCC): ICD-10-CM

## 2023-05-25 PROBLEM — R73.03 PREDIABETES: Status: ACTIVE | Noted: 2018-08-20

## 2023-05-25 PROBLEM — Z95.0 PACEMAKER: Status: ACTIVE | Noted: 2023-05-25

## 2023-05-25 PROBLEM — G62.9 PERIPHERAL POLYNEUROPATHY: Status: ACTIVE | Noted: 2019-12-26

## 2023-05-25 PROCEDURE — 1036F TOBACCO NON-USER: CPT | Performed by: NURSE PRACTITIONER

## 2023-05-25 PROCEDURE — 80305 DRUG TEST PRSMV DIR OPT OBS: CPT | Performed by: NURSE PRACTITIONER

## 2023-05-25 PROCEDURE — G8427 DOCREV CUR MEDS BY ELIG CLIN: HCPCS | Performed by: NURSE PRACTITIONER

## 2023-05-25 PROCEDURE — G8417 CALC BMI ABV UP PARAM F/U: HCPCS | Performed by: NURSE PRACTITIONER

## 2023-05-25 PROCEDURE — 1090F PRES/ABSN URINE INCON ASSESS: CPT | Performed by: NURSE PRACTITIONER

## 2023-05-25 PROCEDURE — 1123F ACP DISCUSS/DSCN MKR DOCD: CPT | Performed by: NURSE PRACTITIONER

## 2023-05-25 PROCEDURE — 99214 OFFICE O/P EST MOD 30 MIN: CPT | Performed by: NURSE PRACTITIONER

## 2023-05-25 RX ORDER — TRAMADOL HYDROCHLORIDE 50 MG/1
50 TABLET ORAL EVERY 6 HOURS PRN
Qty: 12 TABLET | Refills: 0 | Status: SHIPPED | OUTPATIENT
Start: 2023-05-25 | End: 2023-05-28

## 2023-05-25 RX ORDER — ESTRIOL MICRONIZED 100 %
POWDER (GRAM) MISCELLANEOUS
COMMUNITY
Start: 2023-03-17 | End: 2023-05-25

## 2023-05-25 RX ORDER — METHYLPREDNISOLONE 4 MG/1
TABLET ORAL
Qty: 1 KIT | Refills: 0 | Status: SHIPPED | OUTPATIENT
Start: 2023-05-25 | End: 2023-05-31

## 2023-05-25 ASSESSMENT — ENCOUNTER SYMPTOMS
ABDOMINAL PAIN: 0
COUGH: 0
NAUSEA: 0
GASTROINTESTINAL NEGATIVE: 1
CONSTIPATION: 0
SHORTNESS OF BREATH: 0
WHEEZING: 0
RESPIRATORY NEGATIVE: 1
EYES NEGATIVE: 1
ALLERGIC/IMMUNOLOGIC NEGATIVE: 1
DIARRHEA: 0

## 2023-05-26 ENCOUNTER — NURSE ONLY (OUTPATIENT)
Dept: PRIMARY CARE CLINIC | Age: 88
End: 2023-05-26

## 2023-05-26 LAB
ALBUMIN SERPL-MCNC: 4.6 G/DL (ref 3.5–5.2)
ALP SERPL-CCNC: 107 U/L (ref 35–104)
ALT SERPL-CCNC: 14 U/L (ref 5–33)
ANION GAP SERPL CALCULATED.3IONS-SCNC: 13 MMOL/L (ref 7–19)
AST SERPL-CCNC: 21 U/L (ref 5–32)
BILIRUB SERPL-MCNC: 0.4 MG/DL (ref 0.2–1.2)
BUN SERPL-MCNC: 18 MG/DL (ref 8–23)
CALCIUM SERPL-MCNC: 9.8 MG/DL (ref 8.8–10.2)
CHLORIDE SERPL-SCNC: 98 MMOL/L (ref 98–111)
CK SERPL-CCNC: 88 U/L (ref 26–192)
CO2 SERPL-SCNC: 27 MMOL/L (ref 22–29)
CREAT SERPL-MCNC: 0.8 MG/DL (ref 0.5–0.9)
CRP SERPL HS-MCNC: <0.3 MG/DL (ref 0–0.5)
ERYTHROCYTE [DISTWIDTH] IN BLOOD BY AUTOMATED COUNT: 14.1 % (ref 11.5–14.5)
ERYTHROCYTE [SEDIMENTATION RATE] IN BLOOD BY WESTERGREN METHOD: 9 MM/HR (ref 0–25)
GLUCOSE SERPL-MCNC: 113 MG/DL (ref 74–109)
HCT VFR BLD AUTO: 39.6 % (ref 37–47)
HGB BLD-MCNC: 12.9 G/DL (ref 12–16)
MCH RBC QN AUTO: 29.6 PG (ref 27–31)
MCHC RBC AUTO-ENTMCNC: 32.6 G/DL (ref 33–37)
MCV RBC AUTO: 90.8 FL (ref 81–99)
PLATELET # BLD AUTO: 225 K/UL (ref 130–400)
PMV BLD AUTO: 10.5 FL (ref 9.4–12.3)
POTASSIUM SERPL-SCNC: 4.2 MMOL/L (ref 3.5–5)
PROT SERPL-MCNC: 6.9 G/DL (ref 6.6–8.7)
RBC # BLD AUTO: 4.36 M/UL (ref 4.2–5.4)
SODIUM SERPL-SCNC: 138 MMOL/L (ref 136–145)
WBC # BLD AUTO: 7.6 K/UL (ref 4.8–10.8)

## 2023-05-31 DIAGNOSIS — M54.6 ACUTE RIGHT-SIDED THORACIC BACK PAIN: ICD-10-CM

## 2023-05-31 DIAGNOSIS — M33.90 DERMATOMYOSITIS (HCC): ICD-10-CM

## 2023-05-31 DIAGNOSIS — M25.511 ACUTE PAIN OF RIGHT SHOULDER: Primary | ICD-10-CM

## 2023-05-31 ASSESSMENT — ENCOUNTER SYMPTOMS: COLOR CHANGE: 0

## 2023-06-01 ENCOUNTER — ANCILLARY PROCEDURE (OUTPATIENT)
Dept: PRIMARY CARE CLINIC | Age: 88
End: 2023-06-01
Payer: MEDICARE

## 2023-06-01 DIAGNOSIS — M33.90 DERMATOMYOSITIS (HCC): ICD-10-CM

## 2023-06-01 DIAGNOSIS — M54.6 ACUTE RIGHT-SIDED THORACIC BACK PAIN: ICD-10-CM

## 2023-06-01 DIAGNOSIS — M25.511 ACUTE PAIN OF RIGHT SHOULDER: ICD-10-CM

## 2023-06-01 DIAGNOSIS — M33.90 DERMATOMYOSITIS (HCC): Primary | ICD-10-CM

## 2023-06-01 PROCEDURE — 73030 X-RAY EXAM OF SHOULDER: CPT | Performed by: NURSE PRACTITIONER

## 2023-06-01 PROCEDURE — 72072 X-RAY EXAM THORAC SPINE 3VWS: CPT | Performed by: NURSE PRACTITIONER

## 2023-06-01 PROCEDURE — 72040 X-RAY EXAM NECK SPINE 2-3 VW: CPT | Performed by: NURSE PRACTITIONER

## 2023-06-01 RX ORDER — PREDNISONE 10 MG/1
TABLET ORAL
Qty: 18 TABLET | Refills: 0 | Status: SHIPPED | OUTPATIENT
Start: 2023-06-01

## 2023-06-02 DIAGNOSIS — M54.9 OTHER ACUTE BACK PAIN: ICD-10-CM

## 2023-06-02 DIAGNOSIS — R93.7 ABNORMAL THREE VIEW X-RAY OF CERVICAL SPINE: ICD-10-CM

## 2023-06-02 DIAGNOSIS — M25.511 ACUTE PAIN OF RIGHT SHOULDER: Primary | ICD-10-CM

## 2023-06-07 ENCOUNTER — TELEPHONE (OUTPATIENT)
Dept: NEUROLOGY | Age: 88
End: 2023-06-07

## 2023-06-07 NOTE — TELEPHONE ENCOUNTER
[facility] Neurosurgery New Patient Questionnaire    Diagnosis/Reason for Referral?    M25.511 (ICD-10-CM) - Acute pain of right shoulder   M54.9 (ICD-10-CM) - Other acute back pain   R93.7 (ICD-10-CM) - Abnormal three view x-ray of cervical spine       2. Who is completing questionnaire? Patient  Caregiver Family      3. Has the patient had any previous spinal/brain surgeries? No   A. If yes, what is the name of the facility in which the surgery was performed? B. Procedure/Surgery performed? C. Who was the surgeon? D. When was the surgery? MM/YY       E. Did the patient improve after the surgery? 4. Is this a second opinion? If yes, Dr. Shaye Vizcarra would like to review patient first before making the appointment. No  5. Have MRI Images been obtain within the last year? Yes  No      XR  CT     If yes, where was the imaging performed? If yes, what part of the body? Lumbar  Cervical  Thoracic  Brain     If yes, when was it obtained? 6/1/23 in [place]    Note: if the scan was performed at a facility other than [place], the disc will need to be brought to the appointment or we need to reach out to obtain the disc. A. Was the patient instructed to provide the disc? Yes   No      8. Has the patient had a NCV/EMG within the last year? Yes  No     If yes, where was it performed and date? MM/YY  Location:      9. Has the patient been to Physical Therapy? Yes  No     If yes, what location, how long attended, and last visit? Location:        Therapy Lasted:    Date of Last Visit:      10. Has the patient been to Pain Management? Yes  No     If yes, what location and last visit     Location:   Last Visit:   Is it helping?

## 2023-06-26 ENCOUNTER — OFFICE VISIT (OUTPATIENT)
Dept: NEUROSURGERY | Age: 88
End: 2023-06-26
Payer: MEDICARE

## 2023-06-26 VITALS
BODY MASS INDEX: 25.44 KG/M2 | HEART RATE: 77 BPM | DIASTOLIC BLOOD PRESSURE: 65 MMHG | HEIGHT: 64 IN | SYSTOLIC BLOOD PRESSURE: 110 MMHG | RESPIRATION RATE: 18 BRPM | WEIGHT: 149 LBS

## 2023-06-26 DIAGNOSIS — M50.30 DDD (DEGENERATIVE DISC DISEASE), CERVICAL: Primary | ICD-10-CM

## 2023-06-26 PROCEDURE — G8417 CALC BMI ABV UP PARAM F/U: HCPCS | Performed by: NEUROLOGICAL SURGERY

## 2023-06-26 PROCEDURE — G8427 DOCREV CUR MEDS BY ELIG CLIN: HCPCS | Performed by: NEUROLOGICAL SURGERY

## 2023-06-26 PROCEDURE — 1123F ACP DISCUSS/DSCN MKR DOCD: CPT | Performed by: NEUROLOGICAL SURGERY

## 2023-06-26 PROCEDURE — 99203 OFFICE O/P NEW LOW 30 MIN: CPT | Performed by: NEUROLOGICAL SURGERY

## 2023-06-26 PROCEDURE — 1036F TOBACCO NON-USER: CPT | Performed by: NEUROLOGICAL SURGERY

## 2023-06-26 PROCEDURE — 1090F PRES/ABSN URINE INCON ASSESS: CPT | Performed by: NEUROLOGICAL SURGERY

## 2023-06-26 ASSESSMENT — ENCOUNTER SYMPTOMS
RESPIRATORY NEGATIVE: 1
EYES NEGATIVE: 1
GASTROINTESTINAL NEGATIVE: 1
BACK PAIN: 1

## 2023-07-14 ENCOUNTER — OFFICE VISIT (OUTPATIENT)
Dept: PRIMARY CARE CLINIC | Age: 88
End: 2023-07-14

## 2023-07-14 VITALS
HEIGHT: 63 IN | OXYGEN SATURATION: 96 % | DIASTOLIC BLOOD PRESSURE: 76 MMHG | RESPIRATION RATE: 18 BRPM | WEIGHT: 150.4 LBS | BODY MASS INDEX: 26.65 KG/M2 | SYSTOLIC BLOOD PRESSURE: 128 MMHG | TEMPERATURE: 96.9 F | HEART RATE: 95 BPM

## 2023-07-14 DIAGNOSIS — K64.3 GRADE IV HEMORRHOIDS: ICD-10-CM

## 2023-07-14 DIAGNOSIS — L90.0 LICHEN SCLEROSUS OF ANUS: ICD-10-CM

## 2023-07-14 DIAGNOSIS — N89.8 VAGINAL DISCHARGE: ICD-10-CM

## 2023-07-14 DIAGNOSIS — R30.0 BURNING WITH URINATION: Primary | ICD-10-CM

## 2023-07-14 LAB
APPEARANCE FLUID: CLEAR
BILIRUBIN, POC: NORMAL
BLOOD URINE, POC: NORMAL
CLARITY, POC: CLEAR
COLOR, POC: YELLOW
GLUCOSE URINE, POC: NORMAL
KETONES, POC: NORMAL
LEUKOCYTE EST, POC: NORMAL
NITRITE, POC: NORMAL
PH, POC: NORMAL
PROTEIN, POC: NORMAL
SPECIFIC GRAVITY, POC: NORMAL
UROBILINOGEN, POC: NORMAL

## 2023-07-14 RX ORDER — FLUCONAZOLE 150 MG/1
150 TABLET ORAL
Qty: 2 TABLET | Refills: 0 | Status: SHIPPED | OUTPATIENT
Start: 2023-07-14 | End: 2023-07-20

## 2023-07-14 ASSESSMENT — ENCOUNTER SYMPTOMS
WHEEZING: 0
COUGH: 0
SHORTNESS OF BREATH: 0
RESPIRATORY NEGATIVE: 1
ALLERGIC/IMMUNOLOGIC NEGATIVE: 1
GASTROINTESTINAL NEGATIVE: 1
EYES NEGATIVE: 1

## 2023-07-14 NOTE — PROGRESS NOTES
to printed texts. The electronic translation of spoken language may be erroneous, or at times, nonsensical words or phrases may be inadvertentlytranscribed.   Although I have reviewed the note for such errors, some may still exist.

## 2023-07-16 LAB — BACTERIA UR CULT: NORMAL

## 2023-07-17 ENCOUNTER — TELEPHONE (OUTPATIENT)
Dept: PRIMARY CARE CLINIC | Age: 88
End: 2023-07-17

## 2023-07-17 RX ORDER — HYDROCORTISONE ACETATE 25 MG/1
25 SUPPOSITORY RECTAL EVERY 12 HOURS
Qty: 30 SUPPOSITORY | Refills: 0 | Status: SHIPPED | OUTPATIENT
Start: 2023-07-17 | End: 2023-08-08

## 2023-07-18 LAB
ATOPOBIUM VAGINAE: NORMAL SCORE
C TRACH DNA CVX QL NAA+PROBE: NEGATIVE
CANDIDA ALBICANS, NAA: NEGATIVE
CANDIDA GLABRATA: NEGATIVE
MEGASHAERA: NORMAL SCORE
NEISSERIA GONORRHOEAE, DNA: NEGATIVE
VAGINAL PATHOGENS DNA PANEL: NEGATIVE
VAGINOSIS/VAGINITIS, DNA PROBE: NORMAL SCORE

## 2023-08-08 ENCOUNTER — OFFICE VISIT (OUTPATIENT)
Dept: PRIMARY CARE CLINIC | Age: 88
End: 2023-08-08

## 2023-08-08 VITALS
HEART RATE: 67 BPM | WEIGHT: 149 LBS | TEMPERATURE: 97.9 F | HEIGHT: 64 IN | DIASTOLIC BLOOD PRESSURE: 70 MMHG | OXYGEN SATURATION: 96 % | SYSTOLIC BLOOD PRESSURE: 130 MMHG | BODY MASS INDEX: 25.44 KG/M2 | RESPIRATION RATE: 16 BRPM

## 2023-08-08 DIAGNOSIS — N89.8 VAGINAL IRRITATION: Primary | ICD-10-CM

## 2023-08-08 DIAGNOSIS — D22.9 ATYPICAL MOLE: ICD-10-CM

## 2023-08-08 ASSESSMENT — ENCOUNTER SYMPTOMS
SHORTNESS OF BREATH: 0
COLOR CHANGE: 1
GASTROINTESTINAL NEGATIVE: 1
ALLERGIC/IMMUNOLOGIC NEGATIVE: 1
EYES NEGATIVE: 1
RESPIRATORY NEGATIVE: 1
COUGH: 0

## 2023-08-08 NOTE — PROGRESS NOTES
[facility] PHYSICIAN SERVICES  [id number] [place], [place] [address]  Dept: [phone]  Dept Fax: [phone]  Loc: [phone]    Mariama Roper is a 80 y.o. female who presents today for her medical conditions/complaints as noted below. Mariama Roper is c/o of Follow-up (Patient presents today for follow up. She was seen on 7/14/23 for the same issues. She has vaginal burning, and stinging. She has no vaginal discharge. Her hemorrhoids arent better either.  )        HPI:     HPI This 81 yo female presents for ongoing vaginal pain, swelling and redness. She denies vaginal discharge and stinging. She also has a skin mole that has changed size, color and shape. She states that it is in her panty line so it is irritating. She states that she has been using the vaginal cream nightly as the instructions said but it seem sto make it worse. Chief Complaint   Patient presents with    Follow-up     Patient presents today for follow up. She was seen on 7/14/23 for the same issues. She has vaginal burning, and stinging. She has no vaginal discharge. Her hemorrhoids arent better either.        Past Medical History:   Diagnosis Date    Anemia     Arthritis     Asthma     CHF (congestive heart failure) (HCC)     COPD (chronic obstructive pulmonary disease) (HCC)     Dermatomyositis (HCC)     Diarrhea     Elevated ferritin     Fibromyalgia     Hemorrhoids     Hypertension     Hyperthyroidism     Lichen sclerosus     Lichen sclerosus of anus 2021    Pancreatitis     Sleep apnea     no machine, o2 at night    Supraventricular tachycardia ([address]) 10/19/2016    Weight loss       Past Surgical History:   Procedure Laterality Date    CHOLECYSTECTOMY      COLONOSCOPY  10/27/2009    Dr Jose Garcia:  10 yr recall    COLONOSCOPY N/A 10/07/2022    Dr Tiffanie Dacosta (-)Mirco Colitis, TA (-)dysplasia, Diverticulosis, internal hemorrhoids-Grade 2-3 and external hemorrhoids, no recall needed ,    HYSTERECTOMY

## 2023-08-15 ENCOUNTER — OFFICE VISIT (OUTPATIENT)
Dept: PRIMARY CARE CLINIC | Age: 88
End: 2023-08-15
Payer: MEDICARE

## 2023-08-15 VITALS
WEIGHT: 148.2 LBS | BODY MASS INDEX: 25.3 KG/M2 | DIASTOLIC BLOOD PRESSURE: 74 MMHG | HEART RATE: 87 BPM | HEIGHT: 64 IN | OXYGEN SATURATION: 99 % | SYSTOLIC BLOOD PRESSURE: 130 MMHG | RESPIRATION RATE: 18 BRPM | TEMPERATURE: 97.9 F

## 2023-08-15 DIAGNOSIS — L82.1 SEBORRHEIC KERATOSIS: ICD-10-CM

## 2023-08-15 DIAGNOSIS — N76.0 ACUTE VAGINITIS: Primary | ICD-10-CM

## 2023-08-15 PROCEDURE — 1036F TOBACCO NON-USER: CPT | Performed by: NURSE PRACTITIONER

## 2023-08-15 PROCEDURE — 99213 OFFICE O/P EST LOW 20 MIN: CPT | Performed by: NURSE PRACTITIONER

## 2023-08-15 PROCEDURE — 1123F ACP DISCUSS/DSCN MKR DOCD: CPT | Performed by: NURSE PRACTITIONER

## 2023-08-15 PROCEDURE — 1090F PRES/ABSN URINE INCON ASSESS: CPT | Performed by: NURSE PRACTITIONER

## 2023-08-15 PROCEDURE — G8427 DOCREV CUR MEDS BY ELIG CLIN: HCPCS | Performed by: NURSE PRACTITIONER

## 2023-08-15 PROCEDURE — G8417 CALC BMI ABV UP PARAM F/U: HCPCS | Performed by: NURSE PRACTITIONER

## 2023-08-15 ASSESSMENT — ENCOUNTER SYMPTOMS
COUGH: 0
SHORTNESS OF BREATH: 0
EYES NEGATIVE: 1
RESPIRATORY NEGATIVE: 1
WHEEZING: 0
GASTROINTESTINAL NEGATIVE: 1
ALLERGIC/IMMUNOLOGIC NEGATIVE: 1

## 2023-08-15 NOTE — PROGRESS NOTES
[facility] PHYSICIAN SERVICES  [address] [place] [address]  Dept: [phone]  Dept Fax: [phone]  Loc: [phone]    Za Pabon is a 80 y.o. female who presents today for her medical conditions/complaints as noted below. Za Pabon is c/o of Follow-up (Pt is here for 1 week follow up. Pt states the redness on the outside of her vagina is looking better. The area from where we removed the spot is still looking raw. )        HPI:     HPI this 70-year-old female presents today for follow-up for 1 week on her acute vaginosis. She states that the new cream from [facility] has been very helpful with reducing her redness swelling and pain. She does state that the site that we removed due to skin lesion looks worrisome. She denies any fever or excessive draining. She does report a mild clear drainage to biopsy site. Chief Complaint   Patient presents with    Follow-up     Pt is here for 1 week follow up. Pt states the redness on the outside of her vagina is looking better. The area from where we removed the spot is still looking raw.       Past Medical History:   Diagnosis Date    Anemia     Arthritis     Asthma     CHF (congestive heart failure) (HCC)     COPD (chronic obstructive pulmonary disease) (HCC)     Dermatomyositis (HCC)     Diarrhea     Elevated ferritin     Fibromyalgia     Hemorrhoids     Hypertension     Hyperthyroidism     Lichen sclerosus     Lichen sclerosus of anus 2021    Pancreatitis     Sleep apnea     no machine, o2 at night    Supraventricular tachycardia ([address]) 10/19/2016    Weight loss       Past Surgical History:   Procedure Laterality Date    CHOLECYSTECTOMY      COLONOSCOPY  10/27/2009    Dr Daja Ferguson:  10 yr recall    COLONOSCOPY N/A 10/07/2022    Dr Ute Gutierrez (-)Mirco Colitis, TA (-)dysplasia, Diverticulosis, internal hemorrhoids-Grade 2-3 and external hemorrhoids, no recall needed ,    HYSTERECTOMY (CERVIX STATUS

## 2023-08-21 ENCOUNTER — OFFICE VISIT (OUTPATIENT)
Dept: CARDIOLOGY | Facility: CLINIC | Age: 88
End: 2023-08-21
Payer: MEDICARE

## 2023-08-21 VITALS
SYSTOLIC BLOOD PRESSURE: 125 MMHG | OXYGEN SATURATION: 98 % | BODY MASS INDEX: 26.58 KG/M2 | WEIGHT: 150 LBS | RESPIRATION RATE: 18 BRPM | HEART RATE: 85 BPM | DIASTOLIC BLOOD PRESSURE: 65 MMHG | HEIGHT: 63 IN

## 2023-08-21 DIAGNOSIS — I48.0 PAF (PAROXYSMAL ATRIAL FIBRILLATION): Primary | ICD-10-CM

## 2023-08-21 DIAGNOSIS — Z95.0 PACEMAKER: ICD-10-CM

## 2023-08-21 DIAGNOSIS — E78.2 MIXED HYPERLIPIDEMIA: ICD-10-CM

## 2023-08-21 DIAGNOSIS — I49.5 SICK SINUS SYNDROME: ICD-10-CM

## 2023-08-21 DIAGNOSIS — I10 ESSENTIAL HYPERTENSION: ICD-10-CM

## 2023-08-21 DIAGNOSIS — I47.1 PSVT (PAROXYSMAL SUPRAVENTRICULAR TACHYCARDIA): ICD-10-CM

## 2023-08-21 PROCEDURE — 93000 ELECTROCARDIOGRAM COMPLETE: CPT | Performed by: NURSE PRACTITIONER

## 2023-08-21 PROCEDURE — 99214 OFFICE O/P EST MOD 30 MIN: CPT | Performed by: NURSE PRACTITIONER

## 2023-08-21 RX ORDER — METOPROLOL SUCCINATE 50 MG/1
50 TABLET, EXTENDED RELEASE ORAL DAILY
Qty: 90 TABLET | Refills: 3 | Status: SHIPPED | OUTPATIENT
Start: 2023-08-21

## 2023-08-21 NOTE — PROGRESS NOTES
Subjective:     Encounter Date:08/21/2023      Patient ID: Violet Fajardo is a 89 y.o. female     Chief Complaint:  History of Present Illness  Patient presents today for cardiology follow up. Patient is followed for paroxysmal atrial fibrillation and anticoagulated with Xarelto. She was evaluated by Dr. Vargas with the structural heart team for evaluation of Watchman but did not complete. She was tolerating Xarelto and wished to stay on Xarelto and not have procedure because of her age. Patient has a Medtronic Pacemaker. Patient has paroxysmal SVT, pacemaker, hypertension, hyperlipidemia, remote stroke, anemia and COPD. Follows with Dr. Solis as her PCP. Overall patient is doing well. She has no complaints. She is very active. Denies heart racing or chest pain. She did miss her last in office device check.     The following portions of the patient's history were reviewed and updated as appropriate: allergies, current medications, past medical history, past social history, past and problem list.    Allergies   Allergen Reactions    Latex     Adhesive Tape Other (See Comments)    Amoxicillin-Pot Clavulanate     Barley Grass     Codeine     Contrast Dye (Echo Or Unknown Ct/Mr)     Fentanyl     Midazolam        Current Outpatient Medications:     Budesonide-Formoterol Fumarate (SYMBICORT IN), Inhale., Disp: , Rfl:     ERGOCALCIFEROL PO, Take  by mouth Daily., Disp: , Rfl:     Estrogens Conjugated (Premarin) 0.625 MG/GM vaginal cream, Insert 0.625 mg daily for 5 days then decrease to 3 times a day, Disp: , Rfl:     FOLIC ACID-B6-B12-D PO, Take  by mouth., Disp: , Rfl:     hydrochlorothiazide (MICROZIDE) 12.5 MG capsule, Take 1 capsule by mouth Daily., Disp: 90 capsule, Rfl: 3    ipratropium (ATROVENT HFA) 17 MCG/ACT inhaler, Inhale 2 puffs 4 (Four) Times a Day., Disp: 1 inhaler, Rfl: 11    isosorbide mononitrate (IMDUR) 30 MG 24 hr tablet, Take 1 tablet by mouth once daily, Disp: 90 tablet, Rfl: 1     levothyroxine (SYNTHROID, LEVOTHROID) 25 MCG tablet, Take 1 tablet by mouth Daily., Disp: , Rfl:     metoprolol succinate XL (TOPROL-XL) 50 MG 24 hr tablet, Take 1 tablet by mouth Daily., Disp: 90 tablet, Rfl: 3    O2 (OXYGEN), Inhale Every Night., Disp: , Rfl:     Omega-3 Fatty Acids (FISH OIL) 1000 MG capsule capsule, Take  by mouth Daily With Breakfast., Disp: , Rfl:     potassium chloride (K-DUR,KLOR-CON) 10 MEQ CR tablet, Take 1 tablet by mouth Daily., Disp: 90 tablet, Rfl: 3    rivaroxaban (XARELTO) 20 MG tablet, Take 1 tablet by mouth Daily., Disp: 30 tablet, Rfl: 11    sucralfate (CARAFATE) 1 GM/10ML suspension, Take 10 mL by mouth 4 (Four) Times a Day., Disp: , Rfl:     Zolpidem Tartrate 10 MG sublingual tablet, Place  under the tongue., Disp: , Rfl:     Social History     Socioeconomic History    Marital status:    Tobacco Use    Smoking status: Never    Smokeless tobacco: Never   Vaping Use    Vaping Use: Never used   Substance and Sexual Activity    Alcohol use: No    Drug use: No    Sexual activity: Defer       Review of Systems   Constitutional: Positive for malaise/fatigue. Negative for chills, decreased appetite, fever, weight gain and weight loss.   HENT:  Negative for nosebleeds.    Eyes:  Negative for visual disturbance.   Cardiovascular:  Negative for chest pain, dyspnea on exertion, leg swelling, near-syncope, orthopnea, palpitations, paroxysmal nocturnal dyspnea and syncope.   Respiratory:  Negative for cough, hemoptysis, shortness of breath and snoring.    Endocrine: Negative for cold intolerance and heat intolerance.   Hematologic/Lymphatic: Negative for bleeding problem. Does not bruise/bleed easily.   Skin:  Negative for rash.   Musculoskeletal:  Negative for back pain and falls.   Gastrointestinal:  Negative for abdominal pain, constipation, diarrhea, heartburn, melena, nausea and vomiting.   Genitourinary:  Negative for hematuria.   Neurological:  Negative for dizziness, headaches  "and light-headedness.   Psychiatric/Behavioral:  Negative for altered mental status.    Allergic/Immunologic: Negative for persistent infections.            Objective:     Constitutional:       Appearance: Healthy appearance. Not in distress.   Eyes:      Pupils: Pupils are equal, round, and reactive to light.   HENT:      Head: Normocephalic and atraumatic.      Nose: Nose normal.    Mouth/Throat:      Dentition: Normal.   Pulmonary:      Effort: Pulmonary effort is normal.      Breath sounds: Normal breath sounds.   Chest:      Chest wall: Not tender to palpatation.   Cardiovascular:      PMI at left midclavicular line. Normal rate. Regular rhythm.      Murmurs: There is no murmur.   Pulses:     Intact distal pulses.   Edema:     Peripheral edema absent.   Abdominal:      General: Bowel sounds are normal.      Palpations: Abdomen is soft.   Musculoskeletal: Normal range of motion.      Cervical back: Normal range of motion. Skin:     General: Skin is warm and dry.   Neurological:      Mental Status: Alert, oriented to person, place, and time and oriented to person, place and time.   Psychiatric:         Attention and Perception: Attention normal.         Mood and Affect: Mood normal.           ECG 12 Lead    Date/Time: 8/21/2023 2:44 PM  Performed by: Samy Ramirez APRN  Authorized by: Samy Ramirez APRN   Comparison: compared with previous ECG from 5/16/2022  Similar to previous ECG  Rhythm: paced      /65   Pulse 85   Resp 18   Ht 160 cm (63\")   Wt 68 kg (150 lb)   SpO2 98%   BMI 26.57 kg/mý   Lab Review:   I have reviewed   Lab Results   Component Value Date    GLUCOSE 86 10/07/2022    CALCIUM 9.0 10/07/2022     10/07/2022    K 4.5 10/07/2022    CO2 22 10/07/2022     10/07/2022    BUN 10 10/07/2022    CREATININE 0.7 10/07/2022    EGFR 59.2 (L) 05/02/2022    BCR 15.1 05/02/2022    ANIONGAP 10 10/07/2022          Lab Results   Component Value Date    CHOL 241 (H) 10/19/2016    CHLPL " 221 (H) 04/23/2019    TRIG 93 04/23/2019    HDL 68 04/23/2019     04/23/2019      Results for orders placed during the hospital encounter of 06/08/22    Adult Transesophageal Echo (BALTAZAR) W/ Cont if Necessary Per Protocol    Interpretation Summary  ú Left ventricular ejection fraction appears to be 56 - 60%. Left ventricular systolic function is normal.  ú No evidence of a left atrial appendage thrombus was present.     Assessment:          Diagnosis Plan   1. PAF (paroxysmal atrial fibrillation)        2. PSVT (paroxysmal supraventricular tachycardia)        3. Sick sinus syndrome        4. Pacemaker        5. Essential hypertension        6. Mixed hyperlipidemia               Plan:       Paroxysmal Atrial Fibrillation- denies any symptoms. On Xarelto and tolerating. Not interested in a watchman due to her age and tolerating Xarelto. CrCl 58- continue Xarelto 20. I did advise she take with food.   Paroxysmal SVT - denies palpitations. On Toprol.   Sick Sinus Syndrome - with pacemaker   Pacemaker- due for pacemaker check but not able to wait today due to picking up grandchildren. Will reach out to device clinic to get her scheduled.   Hypertension- blood pressure stable   Hyperlipidemia - managed by PCP.

## 2023-08-22 ENCOUNTER — TELEPHONE (OUTPATIENT)
Dept: CARDIOLOGY | Facility: CLINIC | Age: 88
End: 2023-08-22
Payer: MEDICARE

## 2023-08-22 RX ORDER — NYSTATIN 100000 U/G
CREAM TOPICAL
Qty: 1 EACH | Refills: 0 | Status: SHIPPED | OUTPATIENT
Start: 2023-08-22

## 2023-08-22 NOTE — TELEPHONE ENCOUNTER
----- Message from JON Zhou sent at 8/21/2023  2:40 PM CDT -----  It looks like Ms. Fajardo no showed her last device check. She was not able to wait today to have one because she had to  her grandkids. Can you get her one scheduled. thanks

## 2023-08-22 NOTE — TELEPHONE ENCOUNTER
RN spoke with patient and she requested to send a report using her Medtronic Carelink monitor.  RN advised patient to do so and she stated she would send a report this afternoon.  RN's direct line provided for any questions or concerns.  Plan will be for patient to keep her November appointment for her yearly in office device check.

## 2023-08-25 ENCOUNTER — OFFICE VISIT (OUTPATIENT)
Dept: PRIMARY CARE CLINIC | Age: 88
End: 2023-08-25
Payer: MEDICARE

## 2023-08-25 VITALS
HEART RATE: 106 BPM | RESPIRATION RATE: 18 BRPM | SYSTOLIC BLOOD PRESSURE: 128 MMHG | BODY MASS INDEX: 25.15 KG/M2 | TEMPERATURE: 97.1 F | HEIGHT: 64 IN | OXYGEN SATURATION: 94 % | WEIGHT: 147.3 LBS | DIASTOLIC BLOOD PRESSURE: 62 MMHG

## 2023-08-25 DIAGNOSIS — B37.2 SKIN YEAST INFECTION: Primary | ICD-10-CM

## 2023-08-25 PROCEDURE — 99213 OFFICE O/P EST LOW 20 MIN: CPT | Performed by: NURSE PRACTITIONER

## 2023-08-25 PROCEDURE — 1036F TOBACCO NON-USER: CPT | Performed by: NURSE PRACTITIONER

## 2023-08-25 PROCEDURE — G8427 DOCREV CUR MEDS BY ELIG CLIN: HCPCS | Performed by: NURSE PRACTITIONER

## 2023-08-25 PROCEDURE — 1090F PRES/ABSN URINE INCON ASSESS: CPT | Performed by: NURSE PRACTITIONER

## 2023-08-25 PROCEDURE — 1123F ACP DISCUSS/DSCN MKR DOCD: CPT | Performed by: NURSE PRACTITIONER

## 2023-08-25 PROCEDURE — G8417 CALC BMI ABV UP PARAM F/U: HCPCS | Performed by: NURSE PRACTITIONER

## 2023-08-25 RX ORDER — FLUCONAZOLE 100 MG/1
100 TABLET ORAL DAILY
Qty: 7 TABLET | Refills: 0 | Status: SHIPPED | OUTPATIENT
Start: 2023-08-25 | End: 2023-09-01

## 2023-08-25 ASSESSMENT — ENCOUNTER SYMPTOMS
COUGH: 0
EYES NEGATIVE: 1
ALLERGIC/IMMUNOLOGIC NEGATIVE: 1
GASTROINTESTINAL NEGATIVE: 1
RESPIRATORY NEGATIVE: 1
SHORTNESS OF BREATH: 0
WHEEZING: 0

## 2023-09-01 ENCOUNTER — TELEPHONE (OUTPATIENT)
Dept: PRIMARY CARE CLINIC | Age: 88
End: 2023-09-01

## 2023-09-01 DIAGNOSIS — G47.00 INSOMNIA, UNSPECIFIED TYPE: Primary | ICD-10-CM

## 2023-09-01 RX ORDER — DARIDOREXANT 25 MG/1
25 TABLET, FILM COATED ORAL NIGHTLY
Qty: 30 TABLET | Refills: 1 | Status: SHIPPED | OUTPATIENT
Start: 2023-09-01 | End: 2023-10-05 | Stop reason: SDUPTHER

## 2023-09-01 NOTE — TELEPHONE ENCOUNTER
Reached pt. She has tried Melatonin in the past and will try again while she is awaiting her mail order pharmacy to send medication.   Thank you

## 2023-09-01 NOTE — TELEPHONE ENCOUNTER
Pt called requesting a medication for ongoing insomnia. I attempted to call patient, No answer, Left message to ask if she has been trying Melatonin or anything.

## 2023-09-23 RX ORDER — RIVAROXABAN 20 MG/1
TABLET, FILM COATED ORAL
Qty: 30 TABLET | Refills: 11 | Status: SHIPPED | OUTPATIENT
Start: 2023-09-23

## 2023-09-29 RX ORDER — ISOSORBIDE MONONITRATE 30 MG/1
TABLET, EXTENDED RELEASE ORAL
Qty: 90 TABLET | Refills: 3 | Status: SHIPPED | OUTPATIENT
Start: 2023-09-29

## 2023-10-05 DIAGNOSIS — G47.00 INSOMNIA, UNSPECIFIED TYPE: ICD-10-CM

## 2023-10-05 RX ORDER — DARIDOREXANT 25 MG/1
25 TABLET, FILM COATED ORAL NIGHTLY
Qty: 30 TABLET | Refills: 1 | Status: SHIPPED | OUTPATIENT
Start: 2023-10-05

## 2023-10-05 NOTE — TELEPHONE ENCOUNTER
Provider needs to review PDMP    PDMP Monitoring:    Last PDMP Joe Reilly as Reviewed [facility]):  Review User Review Instant Review Result   Mirza Dry 12/15/2021 12:41 PM Reviewed PDMP [1]     Urine Drug Screenings (1 yr)       POCT Rapid Drug Screen  Collected: 5/25/2023 (Final result)                  Medication Contract and Consent for Opioid Use Documents Filed       Patient Documents       Type of Document Status Date Received Received By Description    Medication Contract Signed 1/8/2016  1:57 PM Vanessa Gallardo

## 2023-12-24 ENCOUNTER — APPOINTMENT (OUTPATIENT)
Dept: GENERAL RADIOLOGY | Age: 88
End: 2023-12-24
Payer: MEDICARE

## 2023-12-24 ENCOUNTER — HOSPITAL ENCOUNTER (EMERGENCY)
Age: 88
Discharge: HOME | End: 2023-12-24
Payer: MEDICARE

## 2023-12-24 VITALS
RESPIRATION RATE: 20 BRPM | SYSTOLIC BLOOD PRESSURE: 156 MMHG | HEIGHT: 63 IN | WEIGHT: 148 LBS | DIASTOLIC BLOOD PRESSURE: 92 MMHG | TEMPERATURE: 99.5 F | HEART RATE: 99 BPM | OXYGEN SATURATION: 95 % | BODY MASS INDEX: 26.22 KG/M2

## 2023-12-24 DIAGNOSIS — J02.8 ACUTE PHARYNGITIS DUE TO OTHER SPECIFIED ORGANISMS: ICD-10-CM

## 2023-12-24 DIAGNOSIS — B34.8 PARAINFLUENZA: Primary | ICD-10-CM

## 2023-12-24 LAB
ALBUMIN SERPL-MCNC: 4 G/DL (ref 3.5–5.2)
ALP SERPL-CCNC: 82 U/L (ref 35–104)
ALT SERPL-CCNC: 12 U/L (ref 5–33)
ANION GAP SERPL CALCULATED.3IONS-SCNC: 12 MMOL/L (ref 7–19)
AST SERPL-CCNC: 27 U/L (ref 5–32)
B PARAP IS1001 DNA NPH QL NAA+NON-PROBE: NOT DETECTED
B PERT.PT PRMT NPH QL NAA+NON-PROBE: NOT DETECTED
BASOPHILS # BLD: 0 K/UL (ref 0–0.2)
BASOPHILS NFR BLD: 0.3 % (ref 0–1)
BILIRUB SERPL-MCNC: 0.6 MG/DL (ref 0.2–1.2)
BUN SERPL-MCNC: 15 MG/DL (ref 8–23)
C PNEUM DNA NPH QL NAA+NON-PROBE: NOT DETECTED
CALCIUM SERPL-MCNC: 8.8 MG/DL (ref 8.8–10.2)
CHLORIDE SERPL-SCNC: 95 MMOL/L (ref 98–111)
CO2 SERPL-SCNC: 24 MMOL/L (ref 22–29)
CREAT SERPL-MCNC: 1 MG/DL (ref 0.5–0.9)
EOSINOPHIL # BLD: 0 K/UL (ref 0–0.6)
EOSINOPHIL NFR BLD: 0.1 % (ref 0–5)
ERYTHROCYTE [DISTWIDTH] IN BLOOD BY AUTOMATED COUNT: 14.2 % (ref 11.5–14.5)
FLUAV RNA NPH QL NAA+NON-PROBE: NOT DETECTED
FLUBV RNA NPH QL NAA+NON-PROBE: NOT DETECTED
GLUCOSE SERPL-MCNC: 107 MG/DL (ref 74–109)
HADV DNA NPH QL NAA+NON-PROBE: NOT DETECTED
HCOV 229E RNA NPH QL NAA+NON-PROBE: NOT DETECTED
HCOV HKU1 RNA NPH QL NAA+NON-PROBE: NOT DETECTED
HCOV NL63 RNA NPH QL NAA+NON-PROBE: NOT DETECTED
HCOV OC43 RNA NPH QL NAA+NON-PROBE: NOT DETECTED
HCT VFR BLD AUTO: 40 % (ref 37–47)
HGB BLD-MCNC: 12.8 G/DL (ref 12–16)
HMPV RNA NPH QL NAA+NON-PROBE: NOT DETECTED
HPIV1 RNA NPH QL NAA+NON-PROBE: DETECTED
HPIV2 RNA NPH QL NAA+NON-PROBE: NOT DETECTED
HPIV3 RNA NPH QL NAA+NON-PROBE: NOT DETECTED
HPIV4 RNA NPH QL NAA+NON-PROBE: NOT DETECTED
IMM GRANULOCYTES # BLD: 0 K/UL
LYMPHOCYTES # BLD: 1.7 K/UL (ref 1.1–4.5)
LYMPHOCYTES NFR BLD: 23.3 % (ref 20–40)
M PNEUMO DNA NPH QL NAA+NON-PROBE: NOT DETECTED
MCH RBC QN AUTO: 29.6 PG (ref 27–31)
MCHC RBC AUTO-ENTMCNC: 32 G/DL (ref 33–37)
MCV RBC AUTO: 92.6 FL (ref 81–99)
MONOCYTES # BLD: 0.9 K/UL (ref 0–0.9)
MONOCYTES NFR BLD: 12.2 % (ref 0–10)
NEUTROPHILS # BLD: 4.5 K/UL (ref 1.5–7.5)
NEUTS SEG NFR BLD: 63.8 % (ref 50–65)
PLATELET # BLD AUTO: 162 K/UL (ref 130–400)
PMV BLD AUTO: 10.4 FL (ref 9.4–12.3)
POTASSIUM SERPL-SCNC: 4.3 MMOL/L (ref 3.5–5)
PROT SERPL-MCNC: 6.6 G/DL (ref 6.6–8.7)
RBC # BLD AUTO: 4.32 M/UL (ref 4.2–5.4)
RSV RNA NPH QL NAA+NON-PROBE: NOT DETECTED
RV+EV RNA NPH QL NAA+NON-PROBE: NOT DETECTED
S PYO AG THROAT QL: NEGATIVE
SARS-COV-2 RNA NPH QL NAA+NON-PROBE: NOT DETECTED
SODIUM SERPL-SCNC: 131 MMOL/L (ref 136–145)
WBC # BLD AUTO: 7.1 K/UL (ref 4.8–10.8)

## 2023-12-24 PROCEDURE — 85025 COMPLETE CBC W/AUTO DIFF WBC: CPT

## 2023-12-24 PROCEDURE — 87880 STREP A ASSAY W/OPTIC: CPT

## 2023-12-24 PROCEDURE — 2580000003 HC RX 258: Performed by: NURSE PRACTITIONER

## 2023-12-24 PROCEDURE — 80053 COMPREHEN METABOLIC PANEL: CPT

## 2023-12-24 PROCEDURE — 71045 X-RAY EXAM CHEST 1 VIEW: CPT

## 2023-12-24 PROCEDURE — 87081 CULTURE SCREEN ONLY: CPT

## 2023-12-24 PROCEDURE — 36415 COLL VENOUS BLD VENIPUNCTURE: CPT

## 2023-12-24 PROCEDURE — 99284 EMERGENCY DEPT VISIT MOD MDM: CPT

## 2023-12-24 PROCEDURE — 0202U NFCT DS 22 TRGT SARS-COV-2: CPT

## 2023-12-24 RX ORDER — 0.9 % SODIUM CHLORIDE 0.9 %
500 INTRAVENOUS SOLUTION INTRAVENOUS ONCE
Status: COMPLETED | OUTPATIENT
Start: 2023-12-24 | End: 2023-12-24

## 2023-12-24 RX ORDER — AZITHROMYCIN 250 MG/1
TABLET, FILM COATED ORAL
Qty: 1 PACKET | Refills: 0 | Status: SHIPPED | OUTPATIENT
Start: 2023-12-24 | End: 2023-12-28

## 2023-12-24 RX ADMIN — SODIUM CHLORIDE 500 ML: 9 INJECTION, SOLUTION INTRAVENOUS at 15:57

## 2023-12-24 NOTE — DISCHARGE INSTRUCTIONS
Use Chloraseptic spray for sore throat. Take all of the antibiotic. Drink plenty of fluids. Return to ER for any new, worsening, or change in condition.

## 2023-12-26 LAB — S PYO THROAT QL CULT: NORMAL

## 2024-01-24 DIAGNOSIS — E03.9 HYPOTHYROIDISM, UNSPECIFIED TYPE: ICD-10-CM

## 2024-01-24 RX ORDER — LEVOTHYROXINE SODIUM 0.03 MG/1
25 TABLET ORAL DAILY
Qty: 90 TABLET | Refills: 1 | Status: SHIPPED | OUTPATIENT
Start: 2024-01-24

## 2024-02-01 ENCOUNTER — ANCILLARY PROCEDURE (OUTPATIENT)
Dept: PRIMARY CARE CLINIC | Age: 89
End: 2024-02-01

## 2024-02-01 ENCOUNTER — OFFICE VISIT (OUTPATIENT)
Dept: PRIMARY CARE CLINIC | Age: 89
End: 2024-02-01
Payer: MEDICARE

## 2024-02-01 VITALS
HEART RATE: 65 BPM | DIASTOLIC BLOOD PRESSURE: 84 MMHG | OXYGEN SATURATION: 98 % | RESPIRATION RATE: 18 BRPM | BODY MASS INDEX: 25.54 KG/M2 | TEMPERATURE: 97.8 F | SYSTOLIC BLOOD PRESSURE: 132 MMHG | WEIGHT: 149.6 LBS | HEIGHT: 64 IN

## 2024-02-01 DIAGNOSIS — Z79.01 CHRONIC ANTICOAGULATION: ICD-10-CM

## 2024-02-01 DIAGNOSIS — S00.83XA CONTUSION OF JAW, INITIAL ENCOUNTER: ICD-10-CM

## 2024-02-01 DIAGNOSIS — S70.02XA TRAUMATIC ECCHYMOSIS OF LEFT HIP, INITIAL ENCOUNTER: ICD-10-CM
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
## 2024-02-01 DIAGNOSIS — S60.219A BRUISING OF WRIST: ICD-10-CM

## 2024-02-01 PROCEDURE — 1036F TOBACCO NON-USER: CPT | Performed by: NURSE PRACTITIONER

## 2024-02-01 PROCEDURE — G8417 CALC BMI ABV UP PARAM F/U: HCPCS | Performed by: NURSE PRACTITIONER

## 2024-02-01 PROCEDURE — G8484 FLU IMMUNIZE NO ADMIN: HCPCS | Performed by: NURSE PRACTITIONER

## 2024-02-01 PROCEDURE — G8427 DOCREV CUR MEDS BY ELIG CLIN: HCPCS | Performed by: NURSE PRACTITIONER

## 2024-02-01 PROCEDURE — 99214 OFFICE O/P EST MOD 30 MIN: CPT | Performed by: NURSE PRACTITIONER

## 2024-02-01 PROCEDURE — 1123F ACP DISCUSS/DSCN MKR DOCD: CPT | Performed by: NURSE PRACTITIONER

## 2024-02-01 PROCEDURE — 1090F PRES/ABSN URINE INCON ASSESS: CPT | Performed by: NURSE PRACTITIONER

## 2024-02-01 ASSESSMENT — ENCOUNTER SYMPTOMS
WHEEZING: 0
COUGH: 0
ALLERGIC/IMMUNOLOGIC NEGATIVE: 1
SHORTNESS OF BREATH: 0
RESPIRATORY NEGATIVE: 1
GASTROINTESTINAL NEGATIVE: 1
EYES NEGATIVE: 1
ABDOMINAL PAIN: 0

## 2024-02-01 ASSESSMENT — PATIENT HEALTH QUESTIONNAIRE - PHQ9
2. FEELING DOWN, DEPRESSED OR HOPELESS: 0
SUM OF ALL RESPONSES TO PHQ QUESTIONS 1-9: 0
1. LITTLE INTEREST OR PLEASURE IN DOING THINGS: 0
SUM OF ALL RESPONSES TO PHQ QUESTIONS 1-9: 0
SUM OF ALL RESPONSES TO PHQ QUESTIONS 1-9: 0
SUM OF ALL RESPONSES TO PHQ9 QUESTIONS 1 & 2: 0
SUM OF ALL RESPONSES TO PHQ QUESTIONS 1-9: 0

## 2024-02-01 NOTE — PATIENT INSTRUCTIONS
Sprain/strain   First 24 hours, use ice to injury site for 15 minutes at a time.  After 48 hours, may alternate ice/heat 15 minutes each.  R.I. C.E. therapy = rest, ice, compression and elevation.  May use ace wrap to injured area for compression.  Use Ibuprofen or other antiinflammatory for pain and inflammation.   If no open skin areas, OTC topical lidocaine such as Icy Hot or capsaicin creams may be applied for pain relief.  Slow stretches of area may help reduce spasms and improve range of motion.   Alternate Voltaren cream with Biofreeze gel to site 2 to 3 times a day .  Patient encouraged to use ice only for the first 48 hours then she can switch to 15 minutes of heat and 15 minutes of ice.

## 2024-02-01 NOTE — PROGRESS NOTES
external hemorrhoids, no recall needed ,    HYSTERECTOMY (CERVIX STATUS UNKNOWN)      PACEMAKER PLACEMENT      UPPER GASTROINTESTINAL ENDOSCOPY  07/05/2005    Dr Mckenzie    UPPER GASTROINTESTINAL ENDOSCOPY N/A 10/07/2022    Dr [id number] obstructing distal esophageal Schatzki ring was noted near the EG junction at 35 cm,     UPPER GASTROINTESTINAL ENDOSCOPY  10/07/2022    Dr [id number] obstructing distal esophageal Schatzki ring was noted near the EG junction at 35 cm,            2/1/2024    11:40 AM 12/24/2023     2:16 PM 8/25/2023     1:27 PM 8/15/2023     2:16 PM 8/8/2023    10:57 AM 7/14/2023     4:10 PM   Vitals   SYSTOLIC 132 156 128 130 130 128   DIASTOLIC 84 92 62 74 70 76   Site Left Upper Arm  Left Upper Arm Left Upper Arm     Position Sitting  Sitting Sitting     Cuff Size Medium Adult  Medium Adult Medium Adult     Pulse 65 99 106 87 67 95   Temp 97.8 °F (36.6 °C) 99.5 °F (37.5 °C) 97.1 °F (36.2 °C) 97.9 °F (36.6 °C) 97.9 °F (36.6 °C) 96.9 °F (36.1 °C)   Respirations 18 20 18 18 16 18   SpO2 98 % 95 % 94 % 99 % 96 % 96 %   Weight - Scale 149 lb 9.6 oz 148 lb 147 lb 4.8 oz 148 lb 3.2 oz 149 lb 150 lb 6.4 oz   Height 5' 3.5\" 5' 3\" 5' 3.5\" 5' 3.5\" 5' 3.5\" 5' 3\"   Body Mass Index 26.08 kg/m2 26.22 kg/m2 25.68 kg/m2 25.84 kg/m2 25.98 kg/m2 26.64 kg/m2   Pain Level  10           Family History   Problem Relation Age of Onset    Colon Cancer Neg Hx     Esophageal Cancer Neg Hx     Liver Cancer Neg Hx     Rectal Cancer Neg Hx     Stomach Cancer Neg Hx        Social History     Tobacco Use    Smoking status: Never    Smokeless tobacco: Never   Substance Use Topics    Alcohol use: No      Current Outpatient Medications on File Prior to Visit   Medication Sig Dispense Refill    levothyroxine (SYNTHROID) 25 MCG tablet Take 1 tablet by mouth daily 90 tablet 1    nystatin (MYCOSTATIN) 778763 UNIT/GM cream Apply topically 2 times daily. 1 each 0    polyethylene

## 2024-02-05 ENCOUNTER — OFFICE VISIT (OUTPATIENT)
Dept: PRIMARY CARE CLINIC | Age: 89
End: 2024-02-05
Payer: MEDICARE

## 2024-02-05 VITALS
OXYGEN SATURATION: 100 % | DIASTOLIC BLOOD PRESSURE: 72 MMHG | WEIGHT: 149.8 LBS | HEART RATE: 70 BPM | SYSTOLIC BLOOD PRESSURE: 124 MMHG | RESPIRATION RATE: 18 BRPM | HEIGHT: 64 IN | TEMPERATURE: 97.7 F | BODY MASS INDEX: 25.57 KG/M2

## 2024-02-05 DIAGNOSIS — J44.1 COPD EXACERBATION (HCC): ICD-10-CM

## 2024-02-05 DIAGNOSIS — I50.9 HEART FAILURE, UNSPECIFIED HF CHRONICITY, UNSPECIFIED HEART FAILURE TYPE (HCC): ICD-10-CM

## 2024-02-05 DIAGNOSIS — I48.0 PAROXYSMAL ATRIAL FIBRILLATION (HCC): ICD-10-CM
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
## 2024-02-05 DIAGNOSIS — N18.31 STAGE 3A CHRONIC KIDNEY DISEASE (HCC): ICD-10-CM

## 2024-02-05 DIAGNOSIS — M33.90 DERMATOMYOSITIS (HCC): ICD-10-CM
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
## 2024-02-05 PROCEDURE — 1090F PRES/ABSN URINE INCON ASSESS: CPT | Performed by: NURSE PRACTITIONER

## 2024-02-05 PROCEDURE — 1036F TOBACCO NON-USER: CPT | Performed by: NURSE PRACTITIONER

## 2024-02-05 PROCEDURE — G8484 FLU IMMUNIZE NO ADMIN: HCPCS | Performed by: NURSE PRACTITIONER

## 2024-02-05 PROCEDURE — 99214 OFFICE O/P EST MOD 30 MIN: CPT | Performed by: NURSE PRACTITIONER

## 2024-02-05 PROCEDURE — G8427 DOCREV CUR MEDS BY ELIG CLIN: HCPCS | Performed by: NURSE PRACTITIONER

## 2024-02-05 PROCEDURE — G8417 CALC BMI ABV UP PARAM F/U: HCPCS | Performed by: NURSE PRACTITIONER

## 2024-02-05 PROCEDURE — 3023F SPIROM DOC REV: CPT | Performed by: NURSE PRACTITIONER

## 2024-02-05 PROCEDURE — 1123F ACP DISCUSS/DSCN MKR DOCD: CPT | Performed by: NURSE PRACTITIONER

## 2024-02-05 ASSESSMENT — ENCOUNTER SYMPTOMS
GASTROINTESTINAL NEGATIVE: 1
ALLERGIC/IMMUNOLOGIC NEGATIVE: 1
WHEEZING: 0
SHORTNESS OF BREATH: 0
RESPIRATORY NEGATIVE: 1
COLOR CHANGE: 1
EYES NEGATIVE: 1
ABDOMINAL DISTENTION: 0
COUGH: 0
ABDOMINAL PAIN: 0

## 2024-02-05 NOTE — PROGRESS NOTES
February 21, 2024.  Continue Xarelto 20 mg daily  Keep all scheduled ointments with cardiology.  Patient does report that her pacemaker has a good battery life.    6.  Chronic condition stable  Lab Results   Component Value Date/Time    BUN 15 12/24/2023 03:51 PM    BUN 18 05/26/2023 10:02 AM    BUN 10 10/07/2022 11:59 AM    CREATININE 1.0 12/24/2023 03:51 PM    CREATININE 0.8 05/26/2023 10:02 AM    CREATININE 0.7 10/07/2022 11:59 AM    LABGLOM 54 12/24/2023 03:51 PM    LABGLOM >60 05/26/2023 10:02 AM    LABGLOM >60 10/07/2022 11:59 AM    LABGLOM 59 08/22/2022 05:59 PM    LABGLOM 52 06/24/2022 12:35 PM    K 4.3 12/24/2023 03:51 PM    K 4.2 05/26/2023 10:02 AM    K 4.5 10/07/2022 11:59 AM    K 4.0 11/02/2021 10:15 AM     Continue to keep kidneys well-hydrated.  Repeat labs in 3 months     Patient given educational materials -see patient instructions.  Discussed use, benefit, and side effects of prescribed medications.  All patient questions answered.  Pt voiced understanding. Reviewed health maintenance.  Instructed to continue currentmedications, diet and exercise.  Patient agreed with treatment plan. Follow up as directed.   MEDICATIONS:  No orders of the defined types were placed in this encounter.        ORDERS:  No orders of the defined types were placed in this encounter.      Follow-up:  Return in about 3 months (around 5/5/2024), or if symptoms worsen or fail to improve.    PATIENT INSTRUCTIONS:  There are no Patient Instructions on file for this visit.  Electronically signed by JAZMIN Reyes NP on 2/5/2024 at 2:34 PM    EMR Dragon/transcription disclaimer:  Much of thisencounter note is electronic transcription/translation of spoken language to printed texts.  The electronic translation of spoken language may be erroneous, or at times, nonsensical words or phrases may be inadvertentlytranscribed.  Although I have reviewed the note for such errors, some may still exist.

## 2024-02-12 ENCOUNTER — TELEPHONE (OUTPATIENT)
Dept: PRIMARY CARE CLINIC | Age: 89
End: 2024-02-12

## 2024-02-21 ENCOUNTER — OFFICE VISIT (OUTPATIENT)
Dept: CARDIOLOGY | Facility: CLINIC | Age: 89
End: 2024-02-21
Payer: MEDICARE

## 2024-02-21 VITALS
HEIGHT: 63 IN | DIASTOLIC BLOOD PRESSURE: 85 MMHG | OXYGEN SATURATION: 96 % | WEIGHT: 150 LBS | SYSTOLIC BLOOD PRESSURE: 138 MMHG | HEART RATE: 81 BPM | BODY MASS INDEX: 26.58 KG/M2 | RESPIRATION RATE: 18 BRPM

## 2024-02-21 DIAGNOSIS — Z95.0 PACEMAKER: ICD-10-CM

## 2024-02-21 DIAGNOSIS — I48.0 PAF (PAROXYSMAL ATRIAL FIBRILLATION): Primary | ICD-10-CM

## 2024-02-21 DIAGNOSIS — I10 ESSENTIAL HYPERTENSION: ICD-10-CM

## 2024-02-21 DIAGNOSIS — I49.5 SICK SINUS SYNDROME: ICD-10-CM

## 2024-02-21 DIAGNOSIS — E78.2 MIXED HYPERLIPIDEMIA: ICD-10-CM

## 2024-02-21 DIAGNOSIS — I47.10 PSVT (PAROXYSMAL SUPRAVENTRICULAR TACHYCARDIA): ICD-10-CM

## 2024-02-21 PROCEDURE — 1160F RVW MEDS BY RX/DR IN RCRD: CPT | Performed by: NURSE PRACTITIONER

## 2024-02-21 PROCEDURE — 93000 ELECTROCARDIOGRAM COMPLETE: CPT | Performed by: NURSE PRACTITIONER

## 2024-02-21 PROCEDURE — 1159F MED LIST DOCD IN RCRD: CPT | Performed by: NURSE PRACTITIONER

## 2024-02-21 PROCEDURE — 99214 OFFICE O/P EST MOD 30 MIN: CPT | Performed by: NURSE PRACTITIONER

## 2024-02-21 RX ORDER — DARIDOREXANT 25 MG/1
TABLET, FILM COATED ORAL
COMMUNITY

## 2024-02-21 RX ORDER — ALBUTEROL SULFATE 90 UG/1
2 AEROSOL, METERED RESPIRATORY (INHALATION) EVERY 4 HOURS PRN
COMMUNITY

## 2024-02-21 NOTE — PROGRESS NOTES
Subjective:     Encounter Date:02/21/2024      Patient ID: Violet Fajardo is a 89 y.o. female     Chief Complaint:  History of Present Illness    Patient presents today for cardiology follow up. Patient is followed for paroxysmal atrial fibrillation. Patient is anticoagulated with Xarelto. Watchman was discussed in the past but she was not interested due to her age. Patient has a Medtronic Pacemaker. Patient has paroxysmal SVT, pacemaker, hypertension, hyperlipidemia, remote stroke, anemia and COPD. Follows with Dr. Solis as her PCP. Overall patient is stable. She notes occasional palpitations. This is rare and brief. Denies chest pain or shortness of breath. She notes in December she was acutely ill and her renal function was up. She is on Xarelto and tolerating.     The following portions of the patient's history were reviewed and updated as appropriate: allergies, current medications, past medical history, past social history, past and problem list.    Allergies   Allergen Reactions    Latex     Adhesive Tape Other (See Comments)    Amoxicillin-Pot Clavulanate     Barley Grass     Codeine     Contrast Dye (Echo Or Unknown Ct/Mr)     Fentanyl     Midazolam        Current Outpatient Medications:     albuterol sulfate  (90 Base) MCG/ACT inhaler, Inhale 2 puffs Every 4 (Four) Hours As Needed for Wheezing., Disp: , Rfl:     Budesonide-Formoterol Fumarate (SYMBICORT IN), Inhale., Disp: , Rfl:     Daridorexant HCl (Quviviq) 25 MG tablet, Take  by mouth., Disp: , Rfl:     ERGOCALCIFEROL PO, Take  by mouth Daily., Disp: , Rfl:     Estrogens Conjugated (Premarin) 0.625 MG/GM vaginal cream, Insert 0.625 mg daily for 5 days then decrease to 3 times a day, Disp: , Rfl:     FOLIC ACID-B6-B12-D PO, Take  by mouth., Disp: , Rfl:     hydrochlorothiazide (MICROZIDE) 12.5 MG capsule, Take 1 capsule by mouth Daily. (Patient taking differently: Take 1 capsule by mouth As Needed.), Disp: 90 capsule, Rfl: 3     ipratropium (ATROVENT HFA) 17 MCG/ACT inhaler, Inhale 2 puffs 4 (Four) Times a Day., Disp: 1 inhaler, Rfl: 11    isosorbide mononitrate (IMDUR) 30 MG 24 hr tablet, Take 1 tablet by mouth once daily, Disp: 90 tablet, Rfl: 3    levothyroxine (SYNTHROID, LEVOTHROID) 25 MCG tablet, Take 1 tablet by mouth Daily., Disp: , Rfl:     metoprolol succinate XL (TOPROL-XL) 50 MG 24 hr tablet, Take 1 tablet by mouth Daily., Disp: 90 tablet, Rfl: 3    O2 (OXYGEN), Inhale Every Night., Disp: , Rfl:     Omega-3 Fatty Acids (FISH OIL) 1000 MG capsule capsule, Take  by mouth Daily With Breakfast., Disp: , Rfl:     potassium chloride (K-DUR,KLOR-CON) 10 MEQ CR tablet, Take 1 tablet by mouth Daily., Disp: 90 tablet, Rfl: 3    sucralfate (CARAFATE) 1 GM/10ML suspension, Take 10 mL by mouth 4 (Four) Times a Day., Disp: , Rfl:     Xarelto 20 MG tablet, Take 1 tablet by mouth once daily, Disp: 30 tablet, Rfl: 11    Social History     Socioeconomic History    Marital status:    Tobacco Use    Smoking status: Never    Smokeless tobacco: Never   Vaping Use    Vaping Use: Never used   Substance and Sexual Activity    Alcohol use: No    Drug use: No    Sexual activity: Defer       Review of Systems   Constitutional: Positive for malaise/fatigue. Negative for chills, decreased appetite, fever, weight gain and weight loss.   HENT:  Negative for nosebleeds.    Eyes:  Negative for visual disturbance.   Cardiovascular:  Positive for palpitations. Negative for chest pain, dyspnea on exertion, leg swelling, near-syncope, orthopnea, paroxysmal nocturnal dyspnea and syncope.   Respiratory:  Negative for cough, hemoptysis, shortness of breath and snoring.    Endocrine: Negative for cold intolerance and heat intolerance.   Hematologic/Lymphatic: Negative for bleeding problem. Does not bruise/bleed easily.   Skin:  Negative for rash.   Musculoskeletal:  Negative for back pain and falls.   Gastrointestinal:  Negative for abdominal pain, constipation,  "diarrhea, heartburn, melena, nausea and vomiting.   Genitourinary:  Negative for hematuria.   Neurological:  Negative for dizziness, headaches and light-headedness.   Psychiatric/Behavioral:  Negative for altered mental status.    Allergic/Immunologic: Negative for persistent infections.              Objective:     Constitutional:       Appearance: Healthy appearance. Not in distress.   Eyes:      Pupils: Pupils are equal, round, and reactive to light.   HENT:      Head: Normocephalic and atraumatic.      Nose: Nose normal.    Mouth/Throat:      Dentition: Normal.   Pulmonary:      Effort: Pulmonary effort is normal.      Breath sounds: Normal breath sounds.   Chest:      Chest wall: Not tender to palpatation.   Cardiovascular:      PMI at left midclavicular line. Normal rate. Regular rhythm.      Murmurs: There is no murmur.   Pulses:     Intact distal pulses.   Edema:     Peripheral edema absent.   Abdominal:      General: Bowel sounds are normal.      Palpations: Abdomen is soft.   Musculoskeletal: Normal range of motion.      Cervical back: Normal range of motion. Skin:     General: Skin is warm and dry.   Neurological:      Mental Status: Alert, oriented to person, place, and time and oriented to person, place and time.   Psychiatric:         Attention and Perception: Attention normal.         Mood and Affect: Mood normal.             ECG 12 Lead    Date/Time: 2/21/2024 9:58 AM  Performed by: Samy Ramirez APRN    Authorized by: Samy Ramirez APRN  Comparison: compared with previous ECG from 8/21/2023  Similar to previous ECG  Rhythm: paced        /85 (BP Location: Right arm, Patient Position: Sitting, Cuff Size: Adult)   Pulse 81   Resp 18   Ht 160 cm (63\")   Wt 68 kg (150 lb)   SpO2 96%   BMI 26.57 kg/m²   Lab Review:   I have reviewed   Lab Results   Component Value Date    GLUCOSE 86 10/07/2022    CALCIUM 9.0 10/07/2022     10/07/2022    K 4.5 10/07/2022    CO2 22 10/07/2022    CL " 107 10/07/2022    BUN 10 10/07/2022    CREATININE 0.7 10/07/2022    EGFR 59.2 (L) 05/02/2022    BCR 15.1 05/02/2022    ANIONGAP 10 10/07/2022          Lab Results   Component Value Date    CHOL 241 (H) 10/19/2016    CHLPL 221 (H) 04/23/2019    TRIG 93 04/23/2019    HDL 68 04/23/2019     04/23/2019      Results for orders placed during the hospital encounter of 06/08/22    Adult Transesophageal Echo (BALTAZAR) W/ Cont if Necessary Per Protocol    Interpretation Summary  · Left ventricular ejection fraction appears to be 56 - 60%. Left ventricular systolic function is normal.  · No evidence of a left atrial appendage thrombus was present.     Assessment:          Diagnosis Plan   1. PAF (paroxysmal atrial fibrillation)        2. Pacemaker        3. Essential hypertension        4. Mixed hyperlipidemia        5. PSVT (paroxysmal supraventricular tachycardia)        6. Sick sinus syndrome                 Plan:       Paroxysmal Atrial Fibrillation - low burden on device check. Longest episode 5 hours. Anticoagulated. She did have ALTON in December. With decline in renal function. Will check BMP today and ensure dosing of Xarelto is correct. On Toprol. Overall appears asymptomatic. She notes occasional palpitations.   Pacemaker - reviewed last interrogation available to me from November. Stable.   Hypertension - blood pressure stable.   Hyperlipidemia- managed by PCP  Paroxysmal SVT - brief on pacer. On Toprol.   Sick Sinus syndrome- stable with pacemaker    Follow up in 6 months with Dr. Torres.

## 2024-02-22 ENCOUNTER — LAB (OUTPATIENT)
Dept: PRIMARY CARE CLINIC | Age: 89
End: 2024-02-22

## 2024-02-22 LAB
ANION GAP SERPL CALCULATED.3IONS-SCNC: 14 MMOL/L (ref 7–19)
BUN SERPL-MCNC: 29 MG/DL (ref 8–23)
CALCIUM SERPL-MCNC: 9.1 MG/DL (ref 8.8–10.2)
CHLORIDE SERPL-SCNC: 103 MMOL/L (ref 98–111)
CO2 SERPL-SCNC: 27 MMOL/L (ref 22–29)
CREAT SERPL-MCNC: 0.8 MG/DL (ref 0.5–0.9)
GLUCOSE SERPL-MCNC: 119 MG/DL (ref 74–109)
POTASSIUM SERPL-SCNC: 4.3 MMOL/L (ref 3.5–5)
SODIUM SERPL-SCNC: 144 MMOL/L (ref 136–145)
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
## 2024-04-03 ENCOUNTER — NURSE ONLY (OUTPATIENT)
Dept: PRIMARY CARE CLINIC | Age: 89
End: 2024-04-03
Payer: MEDICARE

## 2024-04-03 DIAGNOSIS — R30.0 BURNING WITH URINATION: ICD-10-CM

## 2024-04-03 DIAGNOSIS — R35.0 URINARY FREQUENCY: Primary | ICD-10-CM

## 2024-04-03 LAB
BILIRUBIN, POC: NORMAL
BLOOD URINE, POC: NORMAL
CLARITY, POC: CLEAR
COLOR, POC: YELLOW
GLUCOSE URINE, POC: NORMAL
KETONES, POC: NORMAL
LEUKOCYTE EST, POC: NORMAL
NITRITE, POC: NORMAL
PH, POC: 5.5
PROTEIN, POC: NORMAL
SPECIFIC GRAVITY, POC: 1010
UROBILINOGEN, POC: 0.2

## 2024-04-03 PROCEDURE — 81002 URINALYSIS NONAUTO W/O SCOPE: CPT | Performed by: NURSE PRACTITIONER

## 2024-04-08 ENCOUNTER — OFFICE VISIT (OUTPATIENT)
Dept: PRIMARY CARE CLINIC | Age: 89
End: 2024-04-08
Payer: MEDICARE

## 2024-04-08 VITALS
SYSTOLIC BLOOD PRESSURE: 122 MMHG | HEART RATE: 85 BPM | BODY MASS INDEX: 26.19 KG/M2 | RESPIRATION RATE: 18 BRPM | OXYGEN SATURATION: 98 % | DIASTOLIC BLOOD PRESSURE: 80 MMHG | HEIGHT: 64 IN | WEIGHT: 153.4 LBS | TEMPERATURE: 97 F

## 2024-04-08 DIAGNOSIS — L90.0 LICHEN SCLEROSUS OF ANUS: ICD-10-CM

## 2024-04-08 DIAGNOSIS — R35.0 URINARY FREQUENCY: Primary | ICD-10-CM

## 2024-04-08 DIAGNOSIS — R30.0 BURNING WITH URINATION: ICD-10-CM

## 2024-04-08 DIAGNOSIS — L29.9 ITCHING: ICD-10-CM

## 2024-04-08 PROCEDURE — 1036F TOBACCO NON-USER: CPT | Performed by: NURSE PRACTITIONER

## 2024-04-08 PROCEDURE — 99213 OFFICE O/P EST LOW 20 MIN: CPT | Performed by: NURSE PRACTITIONER

## 2024-04-08 PROCEDURE — G8427 DOCREV CUR MEDS BY ELIG CLIN: HCPCS | Performed by: NURSE PRACTITIONER

## 2024-04-08 PROCEDURE — 1090F PRES/ABSN URINE INCON ASSESS: CPT | Performed by: NURSE PRACTITIONER

## 2024-04-08 PROCEDURE — 1123F ACP DISCUSS/DSCN MKR DOCD: CPT | Performed by: NURSE PRACTITIONER

## 2024-04-08 PROCEDURE — G8417 CALC BMI ABV UP PARAM F/U: HCPCS | Performed by: NURSE PRACTITIONER

## 2024-04-08 RX ORDER — FLUCONAZOLE 150 MG/1
150 TABLET ORAL
Qty: 2 TABLET | Refills: 0 | Status: SHIPPED | OUTPATIENT
Start: 2024-04-08 | End: 2024-04-14

## 2024-04-08 SDOH — ECONOMIC STABILITY: INCOME INSECURITY: HOW HARD IS IT FOR YOU TO PAY FOR THE VERY BASICS LIKE FOOD, HOUSING, MEDICAL CARE, AND HEATING?: NOT HARD AT ALL

## 2024-04-08 SDOH — ECONOMIC STABILITY: FOOD INSECURITY: WITHIN THE PAST 12 MONTHS, YOU WORRIED THAT YOUR FOOD WOULD RUN OUT BEFORE YOU GOT MONEY TO BUY MORE.: NEVER TRUE

## 2024-04-08 SDOH — ECONOMIC STABILITY: FOOD INSECURITY: WITHIN THE PAST 12 MONTHS, THE FOOD YOU BOUGHT JUST DIDN'T LAST AND YOU DIDN'T HAVE MONEY TO GET MORE.: NEVER TRUE

## 2024-04-08 ASSESSMENT — ENCOUNTER SYMPTOMS
SHORTNESS OF BREATH: 0
COUGH: 0
ALLERGIC/IMMUNOLOGIC NEGATIVE: 1
GASTROINTESTINAL NEGATIVE: 1
ABDOMINAL DISTENTION: 0
RESPIRATORY NEGATIVE: 1
ABDOMINAL PAIN: 0
EYES NEGATIVE: 1

## 2024-04-08 NOTE — PROGRESS NOTES
[facility]  [place] [id number] [facility] [place] [address]  Dept: [phone]  Dept Fax: [phone]  Loc: [phone]    Giana Lee is a 89 y.o. female who presents today for her medical conditions/complaints as noted below.  Giana Lee is c/o of Vaginal Itching (Pt is here for vaginal itching that has been bothering her for the last couple of weeks. Pt left urine on Thursday for UA and was negative for UTI. Pt states she has itching, burning, aching. Pt akin tin all her previous Rx creams. )        HPI:     HPI this 89-year-old female presents today for vaginal itching.  She states that for several weeks she been having significant issues with frequency itching burning with urination.  She states that she expected to have a urinary tract infection so she stopped by and brought a urine last week but it was clear.  She states that she has been trying to use other previous prescription creams but nothing has helped thus far.  Chief Complaint   Patient presents with    Vaginal Itching     Pt is here for vaginal itching that has been bothering her for the last couple of weeks. Pt left urine on Thursday for UA and was negative for UTI. Pt states she has itching, burning, aching. Pt akin tin all her previous Rx creams.      Past Medical History:   Diagnosis Date    Anemia     Arthritis     Asthma     CHF (congestive heart failure) (HCC)     COPD (chronic obstructive pulmonary disease) (HCC)     Dermatomyositis (HCC)     Diarrhea     Elevated ferritin     Fibromyalgia     Hemorrhoids     Hypertension     Hyperthyroidism     Lichen sclerosus     Lichen sclerosus of anus 2021    Pancreatitis     Sleep apnea     no machine, o2 at night    Supraventricular tachycardia (HCC) 10/19/2016    Weight loss       Past Surgical History:   Procedure Laterality Date    CHOLECYSTECTOMY      COLONOSCOPY  10/27/2009    Dr Mckenzie:  10 yr recall    COLONOSCOPY N/A 10/07/2022

## 2024-04-15 ENCOUNTER — TELEPHONE (OUTPATIENT)
Dept: CARDIOLOGY | Facility: CLINIC | Age: 89
End: 2024-04-15
Payer: MEDICARE

## 2024-04-15 NOTE — TELEPHONE ENCOUNTER
Caller: Violet Fajardo    Relationship: Self    Best call back number: [phone]     What is the best time to reach you: ANYTIME    Who are you requesting to speak with (clinical staff, provider,  specific staff member): [facility]    Do you know the name of the person who called: VIOLET    What was the call regarding: PT WANTS TO KNOW IF SHE CAN USE MOTORIZED FOOT EXERCISER WITH HER PACEMAKER    Is it okay if the provider responds through MyChart: PLEASE CALL      
Heterosexual

## 2024-05-06 ENCOUNTER — OFFICE VISIT (OUTPATIENT)
Dept: PRIMARY CARE CLINIC | Age: 89
End: 2024-05-06
Payer: MEDICARE

## 2024-05-06 VITALS
WEIGHT: 151.2 LBS | RESPIRATION RATE: 18 BRPM | BODY MASS INDEX: 25.81 KG/M2 | TEMPERATURE: 97.2 F | OXYGEN SATURATION: 97 % | DIASTOLIC BLOOD PRESSURE: 74 MMHG | HEART RATE: 92 BPM | SYSTOLIC BLOOD PRESSURE: 122 MMHG | HEIGHT: 64 IN

## 2024-05-06 DIAGNOSIS — E03.9 HYPOTHYROIDISM, UNSPECIFIED TYPE: ICD-10-CM

## 2024-05-06 DIAGNOSIS — Z79.01 CHRONIC ANTICOAGULATION: ICD-10-CM

## 2024-05-06 DIAGNOSIS — R35.0 URINARY FREQUENCY: ICD-10-CM

## 2024-05-06 DIAGNOSIS — N18.31 STAGE 3A CHRONIC KIDNEY DISEASE (HCC): ICD-10-CM

## 2024-05-06 DIAGNOSIS — I10 ESSENTIAL HYPERTENSION: ICD-10-CM

## 2024-05-06 DIAGNOSIS — G47.09 OTHER INSOMNIA: Primary | ICD-10-CM

## 2024-05-06 LAB
ALBUMIN SERPL-MCNC: 4.4 G/DL (ref 3.5–5.2)
ALP SERPL-CCNC: 102 U/L (ref 35–104)
ALT SERPL-CCNC: 22 U/L (ref 5–33)
ANION GAP SERPL CALCULATED.3IONS-SCNC: 15 MMOL/L (ref 7–19)
AST SERPL-CCNC: 37 U/L (ref 5–32)
BASOPHILS # BLD: 0.1 K/UL (ref 0–0.2)
BASOPHILS NFR BLD: 0.9 % (ref 0–1)
BILIRUB SERPL-MCNC: 0.3 MG/DL (ref 0.2–1.2)
BUN SERPL-MCNC: 20 MG/DL (ref 8–23)
CALCIUM SERPL-MCNC: 9.3 MG/DL (ref 8.8–10.2)
CHLORIDE SERPL-SCNC: 100 MMOL/L (ref 98–111)
CO2 SERPL-SCNC: 25 MMOL/L (ref 22–29)
CREAT SERPL-MCNC: 1 MG/DL (ref 0.5–0.9)
EOSINOPHIL # BLD: 0.2 K/UL (ref 0–0.6)
EOSINOPHIL NFR BLD: 3 % (ref 0–5)
ERYTHROCYTE [DISTWIDTH] IN BLOOD BY AUTOMATED COUNT: 13.9 % (ref 11.5–14.5)
GLUCOSE SERPL-MCNC: 103 MG/DL (ref 74–109)
HCT VFR BLD AUTO: 38.4 % (ref 37–47)
HGB BLD-MCNC: 12.5 G/DL (ref 12–16)
IMM GRANULOCYTES # BLD: 0 K/UL
LYMPHOCYTES # BLD: 2.9 K/UL (ref 1.1–4.5)
LYMPHOCYTES NFR BLD: 36.3 % (ref 20–40)
MCH RBC QN AUTO: 29.5 PG (ref 27–31)
MCHC RBC AUTO-ENTMCNC: 32.6 G/DL (ref 33–37)
MCV RBC AUTO: 90.6 FL (ref 81–99)
MONOCYTES # BLD: 0.8 K/UL (ref 0–0.9)
MONOCYTES NFR BLD: 9.6 % (ref 0–10)
NEUTROPHILS # BLD: 4.1 K/UL (ref 1.5–7.5)
NEUTS SEG NFR BLD: 49.8 % (ref 50–65)
PLATELET # BLD AUTO: 233 K/UL (ref 130–400)
PMV BLD AUTO: 10.9 FL (ref 9.4–12.3)
POTASSIUM SERPL-SCNC: 4.5 MMOL/L (ref 3.5–5)
PROT SERPL-MCNC: 6.8 G/DL (ref 6.6–8.7)
RBC # BLD AUTO: 4.24 M/UL (ref 4.2–5.4)
SODIUM SERPL-SCNC: 140 MMOL/L (ref 136–145)
T4 FREE SERPL-MCNC: 1.2 NG/DL (ref 0.93–1.7)
TSH SERPL DL<=0.005 MIU/L-ACNC: 3.4 UIU/ML (ref 0.27–4.2)
WBC # BLD AUTO: 8.1 K/UL (ref 4.8–10.8)

## 2024-05-06 PROCEDURE — G8417 CALC BMI ABV UP PARAM F/U: HCPCS | Performed by: NURSE PRACTITIONER

## 2024-05-06 PROCEDURE — G8427 DOCREV CUR MEDS BY ELIG CLIN: HCPCS | Performed by: NURSE PRACTITIONER

## 2024-05-06 PROCEDURE — 99214 OFFICE O/P EST MOD 30 MIN: CPT | Performed by: NURSE PRACTITIONER

## 2024-05-06 PROCEDURE — 1036F TOBACCO NON-USER: CPT | Performed by: NURSE PRACTITIONER

## 2024-05-06 PROCEDURE — 1123F ACP DISCUSS/DSCN MKR DOCD: CPT | Performed by: NURSE PRACTITIONER

## 2024-05-06 PROCEDURE — 1090F PRES/ABSN URINE INCON ASSESS: CPT | Performed by: NURSE PRACTITIONER

## 2024-05-06 RX ORDER — DOXEPIN HYDROCHLORIDE 10 MG/1
CAPSULE ORAL
Qty: 90 CAPSULE | Refills: 1 | Status: SHIPPED | OUTPATIENT
Start: 2024-05-06

## 2024-05-06 ASSESSMENT — ENCOUNTER SYMPTOMS
ALLERGIC/IMMUNOLOGIC NEGATIVE: 1
ABDOMINAL DISTENTION: 0
VOMITING: 0
COUGH: 0
RESPIRATORY NEGATIVE: 1
WHEEZING: 0
ABDOMINAL PAIN: 0
NAUSEA: 0
CONSTIPATION: 0
EYES NEGATIVE: 1
DIARRHEA: 0
SHORTNESS OF BREATH: 0
GASTROINTESTINAL NEGATIVE: 1

## 2024-05-06 NOTE — PROGRESS NOTES
anticoagulation  TSH    T4, Free    Comprehensive Metabolic Panel    CBC with Auto Differential      5. Stage 3a chronic kidney disease (HCC)  TSH    T4, Free    Comprehensive Metabolic Panel    CBC with Auto Differential      6. Essential hypertension              Plan:   Chronic condition uncontrolled  Start doxepin 10 mg 30 minutes before bed night as needed for sleep  Patient instructed that if 10 mg is ineffective she can then try 2 tablets at night.    Patient states she is having to get up to the bathroom about 4 times a night.  Patient states she is using a bedside commode so she is not having to get up and walk very far.  She does feel safe with this method.    2.  Chronic condition uncontrolled  Patient states her urinary frequency is worse at night.  States that she is having get up 4-5 times a night.  Patient states that she has placed her bedside commode beside her bed that she only had to take about 2 steps.  She does feel safe with the current arrangement.  Consider starting Gemtesa  We will first look at her insomnia.  I do not want to start multiple medications at 1    3.  Chronic condition stable  Patient but has been on current dose of levothyroxine for quite some time.  Will recheck labs in office today.  Lab Results   Component Value Date/Time    TSH 1.730 03/13/2023 02:14 PM    TSH 2.030 07/12/2021 11:28 AM    TSH 1.920 04/23/2019 09:00 AM    T4FREE 1.38 03/13/2023 02:14 PM    T4FREE 1.32 07/12/2021 11:28 AM    T4FREE 1.4 06/29/2017 12:27 PM   Unless otherwise indicated continue levothyroxine 25 mcg every morning on empty stomach    4.  Chronic conditions stable  Patient denies any excessive bleeding or bruising.  Patient denies any bleeding from her gums nose or stool  Continue Xarelto 20 mg every morning    5.  Chronic conditions stable  Repeat labs in office today.  Lab Review   Lab Results   Component Value Date/Time     02/22/2024 04:23 PM     12/24/2023 03:51 PM

## 2024-05-20 ENCOUNTER — OFFICE VISIT (OUTPATIENT)
Dept: PRIMARY CARE CLINIC | Age: 89
End: 2024-05-20
Payer: MEDICARE

## 2024-05-20 VITALS
RESPIRATION RATE: 18 BRPM | HEIGHT: 64 IN | HEART RATE: 92 BPM | WEIGHT: 153.2 LBS | TEMPERATURE: 97.7 F | SYSTOLIC BLOOD PRESSURE: 132 MMHG | DIASTOLIC BLOOD PRESSURE: 74 MMHG | BODY MASS INDEX: 26.15 KG/M2 | OXYGEN SATURATION: 95 %

## 2024-05-20 DIAGNOSIS — G47.09 OTHER INSOMNIA: Primary | ICD-10-CM

## 2024-05-20 DIAGNOSIS — I10 ESSENTIAL HYPERTENSION: ICD-10-CM

## 2024-05-20 DIAGNOSIS — N76.0 ACUTE VAGINITIS: ICD-10-CM

## 2024-05-20 PROCEDURE — 99213 OFFICE O/P EST LOW 20 MIN: CPT | Performed by: NURSE PRACTITIONER

## 2024-05-20 PROCEDURE — 1090F PRES/ABSN URINE INCON ASSESS: CPT | Performed by: NURSE PRACTITIONER

## 2024-05-20 PROCEDURE — G8417 CALC BMI ABV UP PARAM F/U: HCPCS | Performed by: NURSE PRACTITIONER

## 2024-05-20 PROCEDURE — G8427 DOCREV CUR MEDS BY ELIG CLIN: HCPCS | Performed by: NURSE PRACTITIONER

## 2024-05-20 PROCEDURE — 1036F TOBACCO NON-USER: CPT | Performed by: NURSE PRACTITIONER

## 2024-05-20 PROCEDURE — 1123F ACP DISCUSS/DSCN MKR DOCD: CPT | Performed by: NURSE PRACTITIONER

## 2024-05-20 ASSESSMENT — ENCOUNTER SYMPTOMS
ABDOMINAL PAIN: 0
COUGH: 0
WHEEZING: 0
ALLERGIC/IMMUNOLOGIC NEGATIVE: 1
RESPIRATORY NEGATIVE: 1
GASTROINTESTINAL NEGATIVE: 1
SHORTNESS OF BREATH: 0
ABDOMINAL DISTENTION: 0
EYES NEGATIVE: 1

## 2024-05-20 NOTE — PATIENT INSTRUCTIONS
Niranjan and Tail Hoof Maker hand cream       First 24 hours, use ice to injury site for 15 minutes at a time.  After 48 hours, may alternate ice/heat 15 minutes each.  R.I. C.E. therapy = rest, ice, compression and elevation.  May use ace wrap to injured area for compression.  Use Ibuprofen or other antiinflammatory for pain and inflammation.   If no open skin areas, OTC topical lidocaine such as Icy Hot or capsaicin creams may be applied for pain relief.  Slow stretches of area may help reduce spasms and improve range of motion.   Alternate Voltaren cream with Biofreeze gel to site 2 to 3 times a day .

## 2024-05-20 NOTE — PROGRESS NOTES
[facility]  [id number] [facility]  [place] [address]  Dept: [phone]  Dept Fax: [phone]  Loc: [phone]    Giana Lee is a 89 y.o. female who presents today for her medical conditions/complaints as noted below.  Giana Lee is c/o of 2 Week Follow-Up (Pt is here for 2 week follow up on insomnia. Pt states the doxepin is helping with her insomnia. She states she is sleeping through the night. No concerns. )        HPI:     HPI this 89-year-old female presents today for 2-week follow-up.  She states she does not know what is in the medicine but she absolutely loves it.  States that she has to to take it 1.5 hours before sleep because it does take a while to kick in.  She does state that there was 1 night she did try taking 2 tablets but is only needed to do that once.  She is currently only taking 1 tablet nightly and is sleeping through the night.  She does state that she has just a mild sleepiness in the morning but states after couple cups of coffee she feels fine.  She states that her vaginal area is no longer causing her any irritation.  She states that it is doing well.  Dr. Completely cleared up.  She states she did realize that she needed to change her toilet paper and a couple other items that has helped to reduce the symptoms as well.      Chief Complaint   Patient presents with    2 Week Follow-Up     Pt is here for 2 week follow up on insomnia. Pt states the doxepin is helping with her insomnia. She states she is sleeping through the night. No concerns.      Past Medical History:   Diagnosis Date    Anemia     Arthritis     Asthma     CHF (congestive heart failure) (HCC)     COPD (chronic obstructive pulmonary disease) (HCC)     Dermatomyositis (HCC)     Diarrhea     Elevated ferritin     Fibromyalgia     Hemorrhoids     Hypertension     Hyperthyroidism     Lichen sclerosus     Lichen sclerosus of anus 2021    Pancreatitis     Sleep apnea

## 2024-06-13 ENCOUNTER — OFFICE VISIT (OUTPATIENT)
Dept: PRIMARY CARE CLINIC | Age: 89
End: 2024-06-13
Payer: MEDICARE

## 2024-06-13 ENCOUNTER — ANCILLARY PROCEDURE (OUTPATIENT)
Dept: PRIMARY CARE CLINIC | Age: 89
End: 2024-06-13

## 2024-06-13 VITALS
OXYGEN SATURATION: 97 % | WEIGHT: 151 LBS | DIASTOLIC BLOOD PRESSURE: 68 MMHG | BODY MASS INDEX: 25.78 KG/M2 | TEMPERATURE: 97.9 F | SYSTOLIC BLOOD PRESSURE: 122 MMHG | HEART RATE: 78 BPM | RESPIRATION RATE: 18 BRPM | HEIGHT: 64 IN

## 2024-06-13 DIAGNOSIS — K57.92 DIVERTICULITIS: Primary | ICD-10-CM

## 2024-06-13 DIAGNOSIS — R10.32 LLQ PAIN: ICD-10-CM

## 2024-06-13 PROCEDURE — 1123F ACP DISCUSS/DSCN MKR DOCD: CPT | Performed by: NURSE PRACTITIONER

## 2024-06-13 PROCEDURE — G8427 DOCREV CUR MEDS BY ELIG CLIN: HCPCS | Performed by: NURSE PRACTITIONER

## 2024-06-13 PROCEDURE — 1036F TOBACCO NON-USER: CPT | Performed by: NURSE PRACTITIONER

## 2024-06-13 PROCEDURE — G8417 CALC BMI ABV UP PARAM F/U: HCPCS | Performed by: NURSE PRACTITIONER

## 2024-06-13 PROCEDURE — 1090F PRES/ABSN URINE INCON ASSESS: CPT | Performed by: NURSE PRACTITIONER

## 2024-06-13 PROCEDURE — 99214 OFFICE O/P EST MOD 30 MIN: CPT | Performed by: NURSE PRACTITIONER

## 2024-06-13 RX ORDER — CIPROFLOXACIN 250 MG/1
250 TABLET, FILM COATED ORAL 2 TIMES DAILY
Qty: 14 TABLET | Refills: 0 | Status: SHIPPED | OUTPATIENT
Start: 2024-06-13 | End: 2024-06-20

## 2024-06-13 RX ORDER — METRONIDAZOLE 500 MG/1
500 TABLET ORAL 2 TIMES DAILY
Qty: 14 TABLET | Refills: 0 | Status: SHIPPED | OUTPATIENT
Start: 2024-06-13 | End: 2024-06-20

## 2024-06-13 ASSESSMENT — ENCOUNTER SYMPTOMS
RECTAL PAIN: 0
CONSTIPATION: 0
ABDOMINAL DISTENTION: 0
ANAL BLEEDING: 0
NAUSEA: 0
BLOOD IN STOOL: 0
VOMITING: 0
ABDOMINAL PAIN: 1
DIARRHEA: 0

## 2024-06-13 NOTE — PATIENT INSTRUCTIONS
Clear liquid diet for 24 hours to let the stomach rest.  If pain worsens or fever go to the emergency room  Start the antibiotics today to treat diverticulitis.  Make sure your bowels are moving every day if they are not do MiraLAX  If you are not much improved by the morning call first thing and we will get a stat CT scan.

## 2024-07-31 ENCOUNTER — TELEPHONE (OUTPATIENT)
Dept: PRIMARY CARE CLINIC | Age: 89
End: 2024-07-31

## 2024-07-31 RX ORDER — CIPROFLOXACIN 250 MG/1
250 TABLET, FILM COATED ORAL 2 TIMES DAILY
Qty: 14 TABLET | Refills: 0 | Status: CANCELLED | OUTPATIENT
Start: 2024-07-31 | End: 2024-08-07

## 2024-07-31 RX ORDER — METRONIDAZOLE 500 MG/1
500 TABLET ORAL 2 TIMES DAILY
Qty: 14 TABLET | Refills: 0 | Status: CANCELLED | OUTPATIENT
Start: 2024-07-31 | End: 2024-08-07

## 2024-07-31 NOTE — TELEPHONE ENCOUNTER
Pt was seen on 6/13 for diverticulitis. She states it is back and really hurting and asking for flagyl and cipro again.

## 2024-08-19 ENCOUNTER — OFFICE VISIT (OUTPATIENT)
Dept: PRIMARY CARE CLINIC | Age: 89
End: 2024-08-19
Payer: MEDICARE

## 2024-08-19 VITALS
HEART RATE: 105 BPM | WEIGHT: 151.8 LBS | BODY MASS INDEX: 25.92 KG/M2 | DIASTOLIC BLOOD PRESSURE: 72 MMHG | HEIGHT: 64 IN | RESPIRATION RATE: 18 BRPM | TEMPERATURE: 97.7 F | SYSTOLIC BLOOD PRESSURE: 128 MMHG | OXYGEN SATURATION: 97 %

## 2024-08-19 DIAGNOSIS — R82.998 LEUKOCYTES IN URINE: ICD-10-CM

## 2024-08-19 DIAGNOSIS — R30.0 DYSURIA: Primary | ICD-10-CM

## 2024-08-19 DIAGNOSIS — R10.2 PELVIC PAIN: ICD-10-CM

## 2024-08-19 DIAGNOSIS — N89.8 VAGINAL ITCHING: ICD-10-CM

## 2024-08-19 LAB
APPEARANCE FLUID: ABNORMAL
BILIRUBIN, POC: ABNORMAL
BLOOD URINE, POC: ABNORMAL
CLARITY, POC: ABNORMAL
COLOR, POC: YELLOW
GLUCOSE URINE, POC: ABNORMAL
KETONES, POC: ABNORMAL
LEUKOCYTE EST, POC: ABNORMAL
NITRITE, POC: ABNORMAL
PH, POC: 5.5
PROTEIN, POC: ABNORMAL
SPECIFIC GRAVITY, POC: 1.01
UROBILINOGEN, POC: 0.2

## 2024-08-19 PROCEDURE — 1036F TOBACCO NON-USER: CPT | Performed by: NURSE PRACTITIONER

## 2024-08-19 PROCEDURE — 1090F PRES/ABSN URINE INCON ASSESS: CPT | Performed by: NURSE PRACTITIONER

## 2024-08-19 PROCEDURE — 1123F ACP DISCUSS/DSCN MKR DOCD: CPT | Performed by: NURSE PRACTITIONER

## 2024-08-19 PROCEDURE — 99213 OFFICE O/P EST LOW 20 MIN: CPT | Performed by: NURSE PRACTITIONER

## 2024-08-19 PROCEDURE — G8427 DOCREV CUR MEDS BY ELIG CLIN: HCPCS | Performed by: NURSE PRACTITIONER

## 2024-08-19 PROCEDURE — 81002 URINALYSIS NONAUTO W/O SCOPE: CPT | Performed by: NURSE PRACTITIONER

## 2024-08-19 PROCEDURE — G8417 CALC BMI ABV UP PARAM F/U: HCPCS | Performed by: NURSE PRACTITIONER

## 2024-08-19 RX ORDER — PHENAZOPYRIDINE HYDROCHLORIDE 100 MG/1
100 TABLET, FILM COATED ORAL 3 TIMES DAILY PRN
Qty: 9 TABLET | Refills: 0 | Status: SHIPPED | OUTPATIENT
Start: 2024-08-19 | End: 2024-08-22

## 2024-08-19 RX ORDER — NITROFURANTOIN 25; 75 MG/1; MG/1
100 CAPSULE ORAL 2 TIMES DAILY
Qty: 20 CAPSULE | Refills: 0 | Status: SHIPPED | OUTPATIENT
Start: 2024-08-19 | End: 2024-08-29

## 2024-08-19 RX ORDER — FLUCONAZOLE 150 MG/1
150 TABLET ORAL
Qty: 2 TABLET | Refills: 0 | Status: SHIPPED | OUTPATIENT
Start: 2024-08-19 | End: 2024-08-25

## 2024-08-19 ASSESSMENT — ENCOUNTER SYMPTOMS
EYES NEGATIVE: 1
ALLERGIC/IMMUNOLOGIC NEGATIVE: 1
RESPIRATORY NEGATIVE: 1
ABDOMINAL PAIN: 0
WHEEZING: 0
COUGH: 0
VOMITING: 0
CONSTIPATION: 0
ABDOMINAL DISTENTION: 0
NAUSEA: 0
SHORTNESS OF BREATH: 0
GASTROINTESTINAL NEGATIVE: 1
DIARRHEA: 0

## 2024-08-19 NOTE — PROGRESS NOTES
Musculoskeletal:         General: Normal range of motion.      Cervical back: Normal range of motion.   Skin:     General: Skin is warm and dry.      Coloration: Skin is not jaundiced or pale.      Findings: No erythema or rash.   Neurological:      Mental Status: She is alert and oriented to person, place, and time. Mental status is at baseline.   Psychiatric:         Mood and Affect: Mood normal.         Behavior: Behavior normal.         Thought Content: Thought content normal.         Judgment: Judgment normal.       /72 (Site: Left Upper Arm, Position: Sitting, Cuff Size: Medium Adult)   Pulse (!) 105   Temp 97.7 °F (36.5 °C) (Temporal)   Resp 18   Ht 1.613 m (5' 3.5\")   Wt 68.9 kg (151 lb 12.8 oz)   SpO2 97%   BMI 26.47 kg/m²     Assessment:   Assessment & Plan    Diagnosis Orders   1. Dysuria  POCT Urinalysis no Micro    Culture, Urine      2. Vaginal itching  POCT Urinalysis no Micro    Culture, Urine      3. Pelvic pain  POCT Urinalysis no Micro    Culture, Urine      4. Leukocytes in urine  Culture, Urine            Plan:   POCT urinalysis in office today was positive for leukocytes and trace blood.  Sample will be sent for culture and sensitivity.    Empiric coverage with Macrobid take as directed to complete  Antibiotic Therapy  Take your antibiotic as prescribed.  Take all of your antibiotics. You should not have any left over.  Many antibiotics may cause diarrhea.. you can start an OTC probiotic to support normal gut bacteria.  If you are on birth control, you need to use an alternative method of contraceptive for one month as antibiotics can reduce the effectiveness of oral BC.    Always monitor for signs of allergic reaction such as itching, hives or any difficulty swallowing or breathing STOP THE MEDICATION IMMEDIATELY.  If difficulty breathing, call 911 and go to the ER.  If hives and itching, contact provider through MyChart or phone for alternative antibiotics or be seen if

## 2024-08-21 LAB
BACTERIA UR CULT: ABNORMAL
BACTERIA UR CULT: ABNORMAL
ORGANISM: ABNORMAL

## 2024-08-21 RX ORDER — SULFAMETHOXAZOLE AND TRIMETHOPRIM 800; 160 MG/1; MG/1
1 TABLET ORAL 2 TIMES DAILY
Qty: 14 TABLET | Refills: 0 | Status: SHIPPED | OUTPATIENT
Start: 2024-08-21 | End: 2024-08-28

## 2024-09-30 ENCOUNTER — TRANSCRIBE ORDERS (OUTPATIENT)
Dept: ADMINISTRATIVE | Facility: HOSPITAL | Age: 89
End: 2024-09-30
Payer: MEDICARE

## 2024-09-30 ENCOUNTER — OFFICE VISIT (OUTPATIENT)
Dept: PRIMARY CARE CLINIC | Age: 89
End: 2024-09-30
Payer: MEDICARE

## 2024-09-30 DIAGNOSIS — R06.09 DYSPNEA ON EXERTION: ICD-10-CM

## 2024-09-30 DIAGNOSIS — R06.02 SHORTNESS OF BREATH: ICD-10-CM

## 2024-09-30 DIAGNOSIS — I48.0 PAROXYSMAL ATRIAL FIBRILLATION (HCC): Primary | ICD-10-CM

## 2024-09-30 DIAGNOSIS — Z95.0 PACEMAKER: ICD-10-CM

## 2024-09-30 DIAGNOSIS — I48.0 PAROXYSMAL ATRIAL FIBRILLATION: Primary | ICD-10-CM

## 2024-09-30 LAB
ALBUMIN SERPL-MCNC: 4.4 G/DL (ref 3.5–5.2)
ALP SERPL-CCNC: 105 U/L (ref 35–104)
ALT SERPL-CCNC: 15 U/L (ref 5–33)
ANION GAP SERPL CALCULATED.3IONS-SCNC: 11 MMOL/L (ref 7–19)
AST SERPL-CCNC: 25 U/L (ref 5–32)
BASOPHILS # BLD: 0.1 K/UL (ref 0–0.2)
BASOPHILS NFR BLD: 1 % (ref 0–1)
BILIRUB SERPL-MCNC: 0.3 MG/DL (ref 0.2–1.2)
BNP BLD-MCNC: 3167 PG/ML (ref 0–449)
BUN SERPL-MCNC: 21 MG/DL (ref 8–23)
CALCIUM SERPL-MCNC: 9.1 MG/DL (ref 8.8–10.2)
CHLORIDE SERPL-SCNC: 98 MMOL/L (ref 98–111)
CO2 SERPL-SCNC: 26 MMOL/L (ref 22–29)
CREAT SERPL-MCNC: 0.9 MG/DL (ref 0.5–0.9)
EOSINOPHIL # BLD: 0.2 K/UL (ref 0–0.6)
EOSINOPHIL NFR BLD: 1.7 % (ref 0–5)
ERYTHROCYTE [DISTWIDTH] IN BLOOD BY AUTOMATED COUNT: 15 % (ref 11.5–14.5)
GLUCOSE SERPL-MCNC: 109 MG/DL (ref 70–99)
HCT VFR BLD AUTO: 40.1 % (ref 37–47)
HGB BLD-MCNC: 12.6 G/DL (ref 12–16)
IMM GRANULOCYTES # BLD: 0 K/UL
LYMPHOCYTES # BLD: 2.9 K/UL (ref 1.1–4.5)
LYMPHOCYTES NFR BLD: 33.4 % (ref 20–40)
MCH RBC QN AUTO: 29.9 PG (ref 27–31)
MCHC RBC AUTO-ENTMCNC: 31.4 G/DL (ref 33–37)
MCV RBC AUTO: 95.2 FL (ref 81–99)
MONOCYTES # BLD: 0.9 K/UL (ref 0–0.9)
MONOCYTES NFR BLD: 10 % (ref 0–10)
NEUTROPHILS # BLD: 4.6 K/UL (ref 1.5–7.5)
NEUTS SEG NFR BLD: 53.6 % (ref 50–65)
PLATELET # BLD AUTO: 278 K/UL (ref 130–400)
PMV BLD AUTO: 10.3 FL (ref 9.4–12.3)
POTASSIUM SERPL-SCNC: 4.8 MMOL/L (ref 3.5–5)
PROT SERPL-MCNC: 6.8 G/DL (ref 6.4–8.3)
RBC # BLD AUTO: 4.21 M/UL (ref 4.2–5.4)
SODIUM SERPL-SCNC: 135 MMOL/L (ref 136–145)
TROPONIN, HIGH SENSITIVITY: 24 NG/L (ref 0–14)
WBC # BLD AUTO: 8.6 K/UL (ref 4.8–10.8)

## 2024-09-30 PROCEDURE — 1090F PRES/ABSN URINE INCON ASSESS: CPT | Performed by: NURSE PRACTITIONER

## 2024-09-30 PROCEDURE — 99215 OFFICE O/P EST HI 40 MIN: CPT | Performed by: NURSE PRACTITIONER

## 2024-09-30 PROCEDURE — 1036F TOBACCO NON-USER: CPT | Performed by: NURSE PRACTITIONER

## 2024-09-30 PROCEDURE — G8417 CALC BMI ABV UP PARAM F/U: HCPCS | Performed by: NURSE PRACTITIONER

## 2024-09-30 PROCEDURE — G8427 DOCREV CUR MEDS BY ELIG CLIN: HCPCS | Performed by: NURSE PRACTITIONER

## 2024-09-30 PROCEDURE — 1123F ACP DISCUSS/DSCN MKR DOCD: CPT | Performed by: NURSE PRACTITIONER

## 2024-09-30 RX ORDER — METOPROLOL SUCCINATE 50 MG/1
75 TABLET, EXTENDED RELEASE ORAL DAILY
Qty: 45 TABLET | Refills: 0 | Status: SHIPPED | OUTPATIENT
Start: 2024-09-30

## 2024-09-30 RX ORDER — POTASSIUM CHLORIDE 750 MG/1
10 TABLET, EXTENDED RELEASE ORAL DAILY
Qty: 60 TABLET | Refills: 0 | Status: SHIPPED | OUTPATIENT
Start: 2024-09-30

## 2024-09-30 RX ORDER — FUROSEMIDE 40 MG
40 TABLET ORAL DAILY
Qty: 60 TABLET | Refills: 3 | Status: SHIPPED | OUTPATIENT
Start: 2024-09-30

## 2024-09-30 RX ORDER — HYDROCHLOROTHIAZIDE 12.5 MG/1
12.5 CAPSULE ORAL PRN
Qty: 90 CAPSULE | Refills: 1 | Status: SHIPPED | OUTPATIENT
Start: 2024-09-30

## 2024-09-30 ASSESSMENT — ENCOUNTER SYMPTOMS
GASTROINTESTINAL NEGATIVE: 1
COUGH: 0
SHORTNESS OF BREATH: 1
ALLERGIC/IMMUNOLOGIC NEGATIVE: 1
EYES NEGATIVE: 1

## 2024-09-30 NOTE — PROGRESS NOTES
Called [facility] to get patient appt with Dr Zee and they will see  her at the [place] office 10/22/24 at 4 pm

## 2024-09-30 NOTE — PROGRESS NOTES
[facility]  [id number] [place] [address]  Dept: [phone]  Dept Fax: [phone]  Loc: [phone]    Giana Lee is a 89 y.o. female who presents today for her medical conditions/complaints as noted below.  Giana Lee is c/o of Congestive Heart Failure (Short of breath, heart feels irregular, swelling started a few weeks ago, pt says her belly is very bloated and swollen and feels like it makesit harder to breath)        HPI:     HPI this 89-year-old female brings herself in today for what she feels is congestive heart failure.  She states she can tell that her heart is irregular.  She states she is short of breath she is felt swelling around her abdomen for a few weeks.  She states it started just getting bloated and now it feels much more swollen and it is making it difficult to breathe.  States she normally can sleep basically flat but she is had to add a wedge and then out extra pillow to laying back upon.  States that she thought about getting up and just sleeping in the recliner.  She does report that she has had her pacemaker interrogated and that she has at least 3 more years on her battery.  She states that it is showing that she is almost pacemaker dependent.  Patient states she has not been having any swelling in her legs at all in her abdomen.  States that she recently saw her cardiologist about 3 to 4 weeks ago and was not having the symptoms at that time.  She states that she does take her medication as prescribed.  She is currently on isosorbide mono, metoprolol ER and Xarelto.  She states she is also been taking her inhaler but it is not helped her shortness of breath at all.  Chief Complaint   Patient presents with    Congestive Heart Failure     Short of breath, heart feels irregular, swelling started a few weeks ago, pt says her belly is very bloated and swollen and feels like it makesit harder to breath     Past Medical

## 2024-10-01 ENCOUNTER — TELEPHONE (OUTPATIENT)
Dept: PRIMARY CARE CLINIC | Age: 89
End: 2024-10-01

## 2024-10-01 VITALS
DIASTOLIC BLOOD PRESSURE: 82 MMHG | SYSTOLIC BLOOD PRESSURE: 124 MMHG | OXYGEN SATURATION: 98 % | WEIGHT: 152.6 LBS | RESPIRATION RATE: 16 BRPM | TEMPERATURE: 98.3 F | HEART RATE: 49 BPM | HEIGHT: 64 IN | BODY MASS INDEX: 26.05 KG/M2

## 2024-10-02 ENCOUNTER — APPOINTMENT (OUTPATIENT)
Dept: GENERAL RADIOLOGY | Facility: HOSPITAL | Age: 89
End: 2024-10-02
Payer: MEDICARE

## 2024-10-02 ENCOUNTER — HOSPITAL ENCOUNTER (EMERGENCY)
Facility: HOSPITAL | Age: 89
Discharge: HOME | End: 2024-10-02
Attending: EMERGENCY MEDICINE
Payer: MEDICARE

## 2024-10-02 VITALS
HEART RATE: 94 BPM | RESPIRATION RATE: 16 BRPM | DIASTOLIC BLOOD PRESSURE: 71 MMHG | OXYGEN SATURATION: 98 % | SYSTOLIC BLOOD PRESSURE: 114 MMHG | BODY MASS INDEX: 26.4 KG/M2 | TEMPERATURE: 97.5 F | WEIGHT: 149 LBS | HEIGHT: 63 IN

## 2024-10-02 DIAGNOSIS — R06.00 DYSPNEA, UNSPECIFIED TYPE: Primary | ICD-10-CM

## 2024-10-02 DIAGNOSIS — J44.9 CHRONIC OBSTRUCTIVE PULMONARY DISEASE, UNSPECIFIED COPD TYPE: ICD-10-CM

## 2024-10-02 DIAGNOSIS — I50.9 CONGESTIVE HEART FAILURE, UNSPECIFIED HF CHRONICITY, UNSPECIFIED HEART FAILURE TYPE: ICD-10-CM

## 2024-10-02 LAB
ALBUMIN SERPL-MCNC: 4.2 G/DL (ref 3.5–5.2)
ALBUMIN/GLOB SERPL: 1.8 G/DL
ALP SERPL-CCNC: 102 U/L (ref 39–117)
ALT SERPL W P-5'-P-CCNC: 13 U/L (ref 1–33)
ANION GAP SERPL CALCULATED.3IONS-SCNC: 12 MMOL/L (ref 5–15)
AST SERPL-CCNC: 22 U/L (ref 1–32)
BASOPHILS # BLD AUTO: 0.07 10*3/MM3 (ref 0–0.2)
BASOPHILS NFR BLD AUTO: 0.9 % (ref 0–1.5)
BILIRUB SERPL-MCNC: 0.4 MG/DL (ref 0–1.2)
BUN SERPL-MCNC: 19 MG/DL (ref 8–23)
BUN/CREAT SERPL: 20 (ref 7–25)
CALCIUM SPEC-SCNC: 8.8 MG/DL (ref 8.2–9.6)
CHLORIDE SERPL-SCNC: 91 MMOL/L (ref 98–107)
CO2 SERPL-SCNC: 28 MMOL/L (ref 22–29)
CREAT SERPL-MCNC: 0.95 MG/DL (ref 0.57–1)
DEPRECATED RDW RBC AUTO: 49 FL (ref 37–54)
EGFRCR SERPLBLD CKD-EPI 2021: 57 ML/MIN/1.73
EOSINOPHIL # BLD AUTO: 0.18 10*3/MM3 (ref 0–0.4)
EOSINOPHIL NFR BLD AUTO: 2.4 % (ref 0.3–6.2)
ERYTHROCYTE [DISTWIDTH] IN BLOOD BY AUTOMATED COUNT: 14.9 % (ref 12.3–15.4)
GLOBULIN UR ELPH-MCNC: 2.4 GM/DL
GLUCOSE SERPL-MCNC: 113 MG/DL (ref 65–99)
HCT VFR BLD AUTO: 36.7 % (ref 34–46.6)
HGB BLD-MCNC: 12 G/DL (ref 12–15.9)
IMM GRANULOCYTES # BLD AUTO: 0.04 10*3/MM3 (ref 0–0.05)
IMM GRANULOCYTES NFR BLD AUTO: 0.5 % (ref 0–0.5)
LYMPHOCYTES # BLD AUTO: 2.77 10*3/MM3 (ref 0.7–3.1)
LYMPHOCYTES NFR BLD AUTO: 37.6 % (ref 19.6–45.3)
MCH RBC QN AUTO: 29.3 PG (ref 26.6–33)
MCHC RBC AUTO-ENTMCNC: 32.7 G/DL (ref 31.5–35.7)
MCV RBC AUTO: 89.7 FL (ref 79–97)
MONOCYTES # BLD AUTO: 0.91 10*3/MM3 (ref 0.1–0.9)
MONOCYTES NFR BLD AUTO: 12.3 % (ref 5–12)
NEUTROPHILS NFR BLD AUTO: 3.4 10*3/MM3 (ref 1.7–7)
NEUTROPHILS NFR BLD AUTO: 46.3 % (ref 42.7–76)
NRBC BLD AUTO-RTO: 0 /100 WBC (ref 0–0.2)
NT-PROBNP SERPL-MCNC: 2929 PG/ML (ref 0–1800)
PLATELET # BLD AUTO: 247 10*3/MM3 (ref 140–450)
PMV BLD AUTO: 9.5 FL (ref 6–12)
POTASSIUM SERPL-SCNC: 3.9 MMOL/L (ref 3.5–5.2)
PROT SERPL-MCNC: 6.6 G/DL (ref 6–8.5)
QT INTERVAL: 380 MS
QTC INTERVAL: 452 MS
RBC # BLD AUTO: 4.09 10*6/MM3 (ref 3.77–5.28)
SODIUM SERPL-SCNC: 131 MMOL/L (ref 136–145)
WBC NRBC COR # BLD AUTO: 7.37 10*3/MM3 (ref 3.4–10.8)

## 2024-10-02 PROCEDURE — 93005 ELECTROCARDIOGRAM TRACING: CPT

## 2024-10-02 PROCEDURE — 99284 EMERGENCY DEPT VISIT MOD MDM: CPT

## 2024-10-02 PROCEDURE — 93005 ELECTROCARDIOGRAM TRACING: CPT | Performed by: EMERGENCY MEDICINE

## 2024-10-02 PROCEDURE — 25010000002 FUROSEMIDE PER 20 MG: Performed by: EMERGENCY MEDICINE

## 2024-10-02 PROCEDURE — 83880 ASSAY OF NATRIURETIC PEPTIDE: CPT | Performed by: EMERGENCY MEDICINE

## 2024-10-02 PROCEDURE — 71045 X-RAY EXAM CHEST 1 VIEW: CPT

## 2024-10-02 PROCEDURE — 85025 COMPLETE CBC W/AUTO DIFF WBC: CPT | Performed by: EMERGENCY MEDICINE

## 2024-10-02 PROCEDURE — 80053 COMPREHEN METABOLIC PANEL: CPT | Performed by: EMERGENCY MEDICINE

## 2024-10-02 PROCEDURE — 96374 THER/PROPH/DIAG INJ IV PUSH: CPT

## 2024-10-02 RX ORDER — FUROSEMIDE 10 MG/ML
40 INJECTION INTRAMUSCULAR; INTRAVENOUS ONCE
Status: COMPLETED | OUTPATIENT
Start: 2024-10-02 | End: 2024-10-02

## 2024-10-02 RX ORDER — FUROSEMIDE 40 MG
40 TABLET ORAL 2 TIMES DAILY
COMMUNITY

## 2024-10-02 RX ADMIN — FUROSEMIDE 40 MG: 10 INJECTION, SOLUTION INTRAVENOUS at 14:23

## 2024-10-02 NOTE — DISCHARGE INSTRUCTIONS
Please follow-up with your primary MD as requested and needed return the ER for any worsening symptoms.  And keep a diary for your daily weights.

## 2024-10-02 NOTE — ED PROVIDER NOTES
Subjective   History of Present Illness  Patient with shortness of breath and fluid accumulation.    Shortness of Breath  Severity:  Moderate  Onset quality:  Gradual  Duration:  3 days  Timing:  Constant  Progression:  Worsening  Chronicity:  New  Context: not activity, not animal exposure, not emotional upset, not occupational exposure, not pollens, not strong odors and not URI    Relieved by:  Nothing  Worsened by:  Nothing  Ineffective treatments:  None tried  Associated symptoms: no abdominal pain, no chest pain, no cough, no diaphoresis, no ear pain, no fever, no headaches, no hemoptysis, no PND, no rash, no sore throat, no sputum production and no vomiting    Risk factors: obesity    Risk factors: no recent surgery        Review of Systems   Constitutional: Negative.  Negative for activity change, appetite change, chills, diaphoresis, fatigue and fever.   HENT:  Negative for congestion, drooling, ear pain, facial swelling, hearing loss, sinus pressure and sore throat.    Eyes: Negative.  Negative for discharge.   Respiratory:  Positive for shortness of breath. Negative for cough, hemoptysis and sputum production.    Cardiovascular:  Negative for chest pain and PND.   Gastrointestinal:  Negative for abdominal distention, abdominal pain, blood in stool, diarrhea, nausea and vomiting.   Endocrine: Negative.  Negative for cold intolerance, heat intolerance, polydipsia, polyphagia and polyuria.   Genitourinary: Negative.  Negative for dysuria, flank pain and urgency.   Musculoskeletal: Negative.  Negative for arthralgias, back pain, myalgias and neck stiffness.   Skin: Negative.  Negative for color change, pallor and rash.   Allergic/Immunologic: Negative.    Neurological: Negative.  Negative for dizziness, seizures, speech difficulty, weakness, numbness and headaches.   Hematological: Negative.  Negative for adenopathy.   All other systems reviewed and are negative.      Past Medical History:   Diagnosis Date     Asthma     CHF (congestive heart failure)     COPD (chronic obstructive pulmonary disease)     Dermatomyositis     Dermatomyositis     Dyspnea     E-coli UTI     Fibromyalgia     Hyperlipemia, mixed     Hyperlipidemia     Hypothyroidism     Mitral valve disorder     History of MVP    Mitral valve disorder     Pacemaker     Pacemaker     PAF (paroxysmal atrial fibrillation) (CMS/[facility]) new onset 8/6/2018    Respiratory infection     Sick sinus syndrome     Sleep apnea     Stroke     SVT (supraventricular tachycardia)        Allergies   Allergen Reactions    Latex     Adhesive Tape Other (See Comments)    Amoxicillin-Pot Clavulanate     Barley Grass     Codeine     Contrast Dye (Echo Or Unknown Ct/Mr)     Fentanyl     Midazolam        Past Surgical History:   Procedure Laterality Date    CARDIAC CATHETERIZATION      CARDIOVERSION      CHOLECYSTECTOMY      ESOPHAGUS SURGERY      FOOT SURGERY Right     has ankle hardware    HYSTERECTOMY      INSERT / REPLACE / REMOVE PACEMAKER      PACEMAKER IMPLANTATION  12/15/2011    EnRhythm (4/16/2012)-lead repositioning       Family History   Problem Relation Age of Onset    Coronary artery disease Mother     Heart attack Mother     Coronary artery disease Father        Social History     Socioeconomic History    Marital status:    Tobacco Use    Smoking status: Never    Smokeless tobacco: Never   Vaping Use    Vaping status: Never Used   Substance and Sexual Activity    Alcohol use: No    Drug use: No    Sexual activity: Defer           Objective   Physical Exam  Vitals and nursing note reviewed. Exam conducted with a chaperone present.   Constitutional:       General: She is not in acute distress.     Appearance: Normal appearance. She is well-developed. She is not toxic-appearing or diaphoretic.   HENT:      Head: Normocephalic and atraumatic.      Right Ear: External ear normal.      Nose: Nose normal.      Mouth/Throat:      Mouth: Mucous membranes are moist.   Eyes:       Conjunctiva/sclera: Conjunctivae normal.      Pupils: Pupils are equal, round, and reactive to light.   Cardiovascular:      Rate and Rhythm: Normal rate and regular rhythm.      Chest Wall: PMI is not displaced.      Pulses: Normal pulses. No decreased pulses.      Heart sounds: Normal heart sounds. No murmur heard.  Pulmonary:      Effort: Pulmonary effort is normal. No tachypnea, accessory muscle usage or respiratory distress.      Breath sounds: Normal breath sounds. No stridor. Examination of the right-lower field reveals rales. Examination of the left-lower field reveals rales. No decreased breath sounds, wheezing, rhonchi or rales.   Chest:      Chest wall: No tenderness or crepitus.   Abdominal:      General: Bowel sounds are normal. There is no distension.      Palpations: Abdomen is soft. There is no shifting dullness or fluid wave.      Tenderness: There is no abdominal tenderness.   Musculoskeletal:         General: No swelling or tenderness. Normal range of motion.      Cervical back: Normal range of motion and neck supple. No rigidity.      Right lower leg: No edema.      Left lower leg: No edema.      Comments: Lower extremity exam bilaterally is unremarkable.  There is no right or left calf tenderness .  There is no palpable venous cord.  No obvious difference in the size of the legs.  No pitting edema.  The dorsalis pedis and posterior tibial femoral and popliteal pulses are palpable and +2 bilaterally.  Homans sign is negative   Skin:     General: Skin is warm.      Capillary Refill: Capillary refill takes less than 2 seconds.      Coloration: Skin is not jaundiced.      Findings: No erythema or rash.   Neurological:      General: No focal deficit present.      Mental Status: She is alert and oriented to person, place, and time. Mental status is at baseline.      Cranial Nerves: No cranial nerve deficit.      Motor: No weakness.      Coordination: Coordination normal.      Deep Tendon Reflexes:  Reflexes are normal and symmetric. Reflexes normal.   Psychiatric:         Mood and Affect: Mood normal.         Procedures           ED Course  ED Course as of 10/02/24 1453   Wed Oct 02, 2024   1204 EKG shows normal sinus rhythm with PVCs and [TS]   1449 I do not see any clinical evidence of florid pulmonary edema she does have elevated BNP which has been elevated in the past also.  Chest x-ray is negative for any acute abnormity pathology and she recently has been put on Lasix which would be appropriate thing for her to do.  I did a bedside point-of-care ultrasound because she is concerned about the fluid in her belly there was no ascites or fluid noted in the belly and showed her the images of point-of-care sonogram.  Advised to keep her weight diary and follow-up with his primary MD she is on home oxygen.  Is not hypoxic at this time. [TS]      ED Course User Index  [TS] Toni Tate MD                                             Medical Decision Making  Differential Diagnosis:  I considered pulmonary etiology, asthma, chronic obstructive pulmonary disease, pneumonia, pulmonary embolism, adult respiratory distress syndrome, pneumothorax, pleural effusion, pulmonary fibrosis, cardiac etiology, congestive heart failure, myocardial infarction, metabolic etiology, diabetic ketoacidosis, uremia, acidosis, sepsis, anemia, drug related etiology, hyperventilation and CNS disease as a possible cause of dyspnea in this patient. This is a partial list of diagnoses considered.           Problems Addressed:  Chronic obstructive pulmonary disease, unspecified COPD type: chronic illness or injury  Congestive heart failure, unspecified HF chronicity, unspecified heart failure type: chronic illness or injury  Dyspnea, unspecified type: complicated acute illness or injury    Amount and/or Complexity of Data Reviewed  Labs: ordered.     Details: Labs reviewed  Radiology: ordered.     Details: Severe pulmonary  edema  ECG/medicine tests: ordered.    Risk  Prescription drug management.  Risk Details: This patient presents with shortness of breath patient arrived hemodynamically stable was placed on the monitor and IV access obtained EKG was obtained and did not reveal any malignant/unstable dysrhythmias any acute ST elevation, no evidence of Brugada, or significantly prolonged QT .  Presentation not consistent with other acute, emergent cause of shortness of breath at this time.  Low suspicion for acute PE is low risk per Wells and Years criteria and no evidence of DVT such as calf swelling, tenderness, palpable tortuous lower extremity vein, or Homans' sign.  Low suspicion for pneumothorax as the patient is saturating well and has no radiographic evidence of a pneumothorax.  Low suspicion for dissection there is no widened mediastinum, hypotension, pulses deficit, and no tearing back/abdominal pain.  Low suspicion for tamponade as there is no JVD, muffled heart sounds, electrical alternans on EKG, and no hypotension.  Low suspicion for pericarditis as there is no diffuse ST elevation or SC depression and the patient is afebrile.  No evidence of GI bleed per patient's history.   Low suspicion for pneumonia since the patient has no fever no productive cough no chest x-ray findings of pneumonia and no leukocytosis.    Patient has some elevated BNP she already is on diuretics.  And is to follow-up with her primary MD        Final diagnoses:   Dyspnea, unspecified type   Chronic obstructive pulmonary disease, unspecified COPD type   Congestive heart failure, unspecified HF chronicity, unspecified heart failure type       ED Disposition  ED Disposition       ED Disposition   Discharge    Condition   Stable    Comment   --               Josette Solis MD  [address] [facility] [address]  [phone]    Schedule an appointment as soon as possible for a visit in 2 days           Medication List         Changed      hydroCHLOROthiazide 12.5 MG capsule  Commonly known as: MICROZIDE  Take 1 capsule by mouth Daily.  What changed:   when to take this  reasons to take this                 Toni Tate MD  10/02/24 1203       Toni Tate MD  10/02/24 1204       Toni Tate MD  10/02/24 1451       Toni Tate MD  10/02/24 1453       Toni Tate MD  10/02/24 145

## 2024-10-07 ENCOUNTER — OFFICE VISIT (OUTPATIENT)
Dept: PRIMARY CARE CLINIC | Age: 89
End: 2024-10-07
Payer: MEDICARE

## 2024-10-07 VITALS
WEIGHT: 153 LBS | DIASTOLIC BLOOD PRESSURE: 72 MMHG | HEART RATE: 73 BPM | RESPIRATION RATE: 18 BRPM | BODY MASS INDEX: 26.12 KG/M2 | TEMPERATURE: 97.5 F | SYSTOLIC BLOOD PRESSURE: 116 MMHG | OXYGEN SATURATION: 100 % | HEIGHT: 64 IN

## 2024-10-07 DIAGNOSIS — I48.0 PAROXYSMAL ATRIAL FIBRILLATION (HCC): Primary | ICD-10-CM

## 2024-10-07 DIAGNOSIS — R06.09 DYSPNEA ON EXERTION: ICD-10-CM

## 2024-10-07 DIAGNOSIS — Z95.0 PACEMAKER: ICD-10-CM

## 2024-10-07 LAB
ANION GAP SERPL CALCULATED.3IONS-SCNC: 10 MMOL/L (ref 7–19)
BNP BLD-MCNC: 3109 PG/ML (ref 0–449)
BUN SERPL-MCNC: 28 MG/DL (ref 8–23)
CALCIUM SERPL-MCNC: 9.2 MG/DL (ref 8.8–10.2)
CHLORIDE SERPL-SCNC: 95 MMOL/L (ref 98–111)
CO2 SERPL-SCNC: 29 MMOL/L (ref 22–29)
CREAT SERPL-MCNC: 1.2 MG/DL (ref 0.5–0.9)
GLUCOSE SERPL-MCNC: 114 MG/DL (ref 70–99)
POTASSIUM SERPL-SCNC: 4.8 MMOL/L (ref 3.5–5)
SODIUM SERPL-SCNC: 134 MMOL/L (ref 136–145)

## 2024-10-07 PROCEDURE — G8417 CALC BMI ABV UP PARAM F/U: HCPCS | Performed by: NURSE PRACTITIONER

## 2024-10-07 PROCEDURE — 99214 OFFICE O/P EST MOD 30 MIN: CPT | Performed by: NURSE PRACTITIONER

## 2024-10-07 PROCEDURE — 1123F ACP DISCUSS/DSCN MKR DOCD: CPT | Performed by: NURSE PRACTITIONER

## 2024-10-07 PROCEDURE — 1036F TOBACCO NON-USER: CPT | Performed by: NURSE PRACTITIONER

## 2024-10-07 PROCEDURE — G8427 DOCREV CUR MEDS BY ELIG CLIN: HCPCS | Performed by: NURSE PRACTITIONER

## 2024-10-07 PROCEDURE — G8484 FLU IMMUNIZE NO ADMIN: HCPCS | Performed by: NURSE PRACTITIONER

## 2024-10-07 PROCEDURE — 1090F PRES/ABSN URINE INCON ASSESS: CPT | Performed by: NURSE PRACTITIONER

## 2024-10-07 RX ORDER — METOPROLOL SUCCINATE 50 MG/1
75 TABLET, EXTENDED RELEASE ORAL DAILY
Qty: 120 TABLET | Refills: 0 | Status: SHIPPED | OUTPATIENT
Start: 2024-10-07

## 2024-10-07 RX ORDER — ALBUTEROL SULFATE 90 UG/1
2 INHALANT RESPIRATORY (INHALATION) EVERY 6 HOURS PRN
Qty: 3 EACH | Refills: 3 | Status: SHIPPED | OUTPATIENT
Start: 2024-10-07 | End: 2025-01-05

## 2024-10-07 RX ORDER — METOPROLOL SUCCINATE 50 MG/1
100 TABLET, EXTENDED RELEASE ORAL DAILY
Qty: 120 TABLET | Refills: 0 | Status: SHIPPED | OUTPATIENT
Start: 2024-10-07 | End: 2024-10-07 | Stop reason: SDUPTHER

## 2024-10-07 ASSESSMENT — ENCOUNTER SYMPTOMS
COUGH: 0
EYES NEGATIVE: 1
DIARRHEA: 0
ALLERGIC/IMMUNOLOGIC NEGATIVE: 1
RESPIRATORY NEGATIVE: 1
WHEEZING: 0
ABDOMINAL DISTENTION: 1
ABDOMINAL PAIN: 0
SHORTNESS OF BREATH: 0
VOMITING: 0
CONSTIPATION: 0
NAUSEA: 0

## 2024-10-07 NOTE — PATIENT INSTRUCTIONS
Continue with metoprolol at 75 mg daily  Continue Lasix 40 mg daily  Continue potassium 10 meq daily

## 2024-10-07 NOTE — PROGRESS NOTES
[facility]  [place] [id number] [facility] [place] [address]  Dept: [phone]  Dept Fax: [phone]  Loc: [phone]    Giana Lee is a 89 y.o. female who presents today for her medical conditions/complaints as noted below.  Giana Lee is c/o of Follow-up (Pt is here for 1 week follow up. Pt states she still feels the same and still SOB. Pt went to ER at [facility] and they just did an EKG and give her Lasix. )        HPI:     HPI this 89-year-old female presents today for 1 week follow-up.  She did go to the ER at [facility] said that they did an EKG and gave her some Lasix.  States that she did not feel like she had had any significant water loss.  She does reports she is breathing a little bit easier.  She does have her portable oxygen with her in the office today.  She is converted a carry-on luggage portable home to carry her oxygen since it is easier for her to maneuver than the metal stand.  She does state that she did increase her metoprolol to 75 mg daily.  Chief Complaint   Patient presents with    Follow-up     Pt is here for 1 week follow up. Pt states she still feels the same and still SOB. Pt went to ER at [facility] and they just did an EKG and give her Lasix.      Past Medical History:   Diagnosis Date    Anemia     Arthritis     Asthma     CHF (congestive heart failure) (HCC)     COPD (chronic obstructive pulmonary disease) (HCC)     Dermatomyositis (HCC)     Diarrhea     Elevated ferritin     Fibromyalgia     Hemorrhoids     Hypertension     Hyperthyroidism     Lichen sclerosus     Lichen sclerosus of anus 2021    Pancreatitis     Sleep apnea     no machine, o2 at night    Supraventricular tachycardia (HCC) 10/19/2016    Weight loss       Past Surgical History:   Procedure Laterality Date    CHOLECYSTECTOMY      COLONOSCOPY  10/27/2009    Dr Mckenzie:  10 yr recall    COLONOSCOPY N/A 10/07/2022    Dr Ruiz (-)Mirco Colitis, TA (-)dysplasia,

## 2024-10-11 ENCOUNTER — LAB (OUTPATIENT)
Dept: PRIMARY CARE CLINIC | Age: 89
End: 2024-10-11

## 2024-10-11 DIAGNOSIS — I48.0 PAROXYSMAL ATRIAL FIBRILLATION (HCC): Primary | ICD-10-CM

## 2024-10-11 DIAGNOSIS — N18.31 STAGE 3A CHRONIC KIDNEY DISEASE (HCC): ICD-10-CM

## 2024-10-11 DIAGNOSIS — I50.9 HEART FAILURE, UNSPECIFIED HF CHRONICITY, UNSPECIFIED HEART FAILURE TYPE (HCC): Primary | ICD-10-CM

## 2024-10-11 LAB
ANION GAP SERPL CALCULATED.3IONS-SCNC: 11 MMOL/L (ref 7–19)
BNP BLD-MCNC: 3256 PG/ML (ref 0–449)
BUN SERPL-MCNC: 25 MG/DL (ref 8–23)
CALCIUM SERPL-MCNC: 9 MG/DL (ref 8.8–10.2)
CHLORIDE SERPL-SCNC: 97 MMOL/L (ref 98–111)
CO2 SERPL-SCNC: 29 MMOL/L (ref 22–29)
CREAT SERPL-MCNC: 1.1 MG/DL (ref 0.5–0.9)
GLUCOSE SERPL-MCNC: 106 MG/DL (ref 70–99)
POTASSIUM SERPL-SCNC: 4.4 MMOL/L (ref 3.5–5)
SODIUM SERPL-SCNC: 137 MMOL/L (ref 136–145)

## 2024-10-17 LAB
QT INTERVAL: 380 MS
QTC INTERVAL: 452 MS

## 2024-10-23 ENCOUNTER — TELEPHONE (OUTPATIENT)
Dept: CARDIOLOGY | Facility: CLINIC | Age: 89
End: 2024-10-23
Payer: MEDICARE

## 2024-10-23 NOTE — TELEPHONE ENCOUNTER
RECORDS RECEIVED FROM [facility] ON PATIENT NEEDING CARDIOVERSION NEXT WEEK    PLEASE REVIEW / ORDER

## 2024-10-24 DIAGNOSIS — I48.0 AF (PAROXYSMAL ATRIAL FIBRILLATION): Primary | ICD-10-CM

## 2024-10-30 ENCOUNTER — ANESTHESIA EVENT (OUTPATIENT)
Dept: CARDIOLOGY | Facility: HOSPITAL | Age: 89
End: 2024-10-30
Payer: MEDICARE

## 2024-10-30 ENCOUNTER — ANESTHESIA (OUTPATIENT)
Dept: CARDIOLOGY | Facility: HOSPITAL | Age: 89
End: 2024-10-30
Payer: MEDICARE

## 2024-10-30 ENCOUNTER — HOSPITAL ENCOUNTER (OUTPATIENT)
Dept: CARDIOLOGY | Facility: HOSPITAL | Age: 89
Setting detail: HOSPITAL OUTPATIENT SURGERY
Discharge: HOME | End: 2024-10-30
Payer: MEDICARE

## 2024-10-30 VITALS
WEIGHT: 144.3 LBS | HEART RATE: 65 BPM | BODY MASS INDEX: 25.57 KG/M2 | HEIGHT: 63 IN | SYSTOLIC BLOOD PRESSURE: 114 MMHG | DIASTOLIC BLOOD PRESSURE: 58 MMHG | OXYGEN SATURATION: 100 % | RESPIRATION RATE: 16 BRPM | TEMPERATURE: 98.4 F

## 2024-10-30 VITALS
RESPIRATION RATE: 18 BRPM | DIASTOLIC BLOOD PRESSURE: 63 MMHG | HEART RATE: 63 BPM | OXYGEN SATURATION: 100 % | SYSTOLIC BLOOD PRESSURE: 116 MMHG

## 2024-10-30 DIAGNOSIS — I48.0 AF (PAROXYSMAL ATRIAL FIBRILLATION): ICD-10-CM

## 2024-10-30 LAB
QT INTERVAL: 386 MS
QTC INTERVAL: 445 MS

## 2024-10-30 PROCEDURE — 25010000002 LIDOCAINE PF 2% 2 % SOLUTION: Performed by: NURSE ANESTHETIST, CERTIFIED REGISTERED

## 2024-10-30 PROCEDURE — 25010000002 PROPOFOL 10 MG/ML EMULSION: Performed by: NURSE ANESTHETIST, CERTIFIED REGISTERED

## 2024-10-30 PROCEDURE — 93005 ELECTROCARDIOGRAM TRACING: CPT | Performed by: INTERNAL MEDICINE

## 2024-10-30 PROCEDURE — 92960 CARDIOVERSION ELECTRIC EXT: CPT

## 2024-10-30 RX ORDER — SODIUM CHLORIDE 0.9 % (FLUSH) 0.9 %
10 SYRINGE (ML) INJECTION EVERY 12 HOURS SCHEDULED
Status: DISCONTINUED | OUTPATIENT
Start: 2024-10-30 | End: 2024-10-31 | Stop reason: HOSPADM

## 2024-10-30 RX ORDER — SODIUM CHLORIDE 0.9 % (FLUSH) 0.9 %
10 SYRINGE (ML) INJECTION AS NEEDED
Status: DISCONTINUED | OUTPATIENT
Start: 2024-10-30 | End: 2024-10-31 | Stop reason: HOSPADM

## 2024-10-30 RX ORDER — PROPOFOL 10 MG/ML
VIAL (ML) INTRAVENOUS AS NEEDED
Status: DISCONTINUED | OUTPATIENT
Start: 2024-10-30 | End: 2024-10-30 | Stop reason: SURG

## 2024-10-30 RX ORDER — LIDOCAINE HYDROCHLORIDE 20 MG/ML
INJECTION, SOLUTION EPIDURAL; INFILTRATION; INTRACAUDAL; PERINEURAL AS NEEDED
Status: DISCONTINUED | OUTPATIENT
Start: 2024-10-30 | End: 2024-10-30 | Stop reason: SURG

## 2024-10-30 RX ADMIN — LIDOCAINE HYDROCHLORIDE 50 MG: 20 INJECTION, SOLUTION EPIDURAL; INFILTRATION; INTRACAUDAL; PERINEURAL at 13:07

## 2024-10-30 RX ADMIN — PROPOFOL 50 MG: 10 INJECTION, EMULSION INTRAVENOUS at 13:07

## 2024-10-30 NOTE — ANESTHESIA PREPROCEDURE EVALUATION
Anesthesia Evaluation     Patient summary reviewed and Nursing notes reviewed   no history of anesthetic complications:   NPO Solid Status: > 8 hours  NPO Liquid Status: > 8 hours           Airway   Mallampati: II  TM distance: >3 FB  Neck ROM: full  Dental    (+) upper dentures and partials    Comment: Unable to remove lower partial. Pt understands dental damage risk with procedure    Pulmonary    (+) COPD, asthma,shortness of breath, sleep apnea on CPAP  Cardiovascular     (+) pacemaker, hypertension, valvular problems/murmurs MVP, dysrhythmias, CHF , hyperlipidemia    ROS comment: · Left ventricular ejection fraction appears to be 56 - 60%. Left ventricular systolic function is normal.  · No evidence of a left atrial appendage thrombus was present.      Neuro/Psych  (+) CVA, numbness  GI/Hepatic/Renal/Endo    (+) thyroid problem hypothyroidism    Musculoskeletal (-) negative ROS    Abdominal    Substance History - negative use     OB/GYN negative ob/gyn ROS         Other          Other Comment: Fibromyalgia                Anesthesia Plan    ASA 3     MAC       Anesthetic plan, risks, benefits, and alternatives have been provided, discussed and informed consent has been obtained with: patient.      CODE STATUS:

## 2024-10-30 NOTE — ANESTHESIA POSTPROCEDURE EVALUATION
"Patient: Violet Fajardo    Procedure Summary       Date: 10/30/24 Room / Location: [facility] CATH LAB    Anesthesia Start: 1255 Anesthesia Stop:     Procedure: CARDIOVERSION EXTERNAL IN CARDIOLOGY DEPARTMENT Diagnosis:       AF (paroxysmal atrial fibrillation)      (AF)    Scheduled Providers: Chalo Torres MD Provider: Abilio Mays CRNA    Anesthesia Type: MAC ASA Status: 3            Anesthesia Type: MAC    Vitals  Vitals Value Taken Time   /63 10/30/24 1313   Temp     Pulse 63 10/30/24 1314   Resp 18 10/30/24 1314   SpO2 100 % 10/30/24 1314           Post Anesthesia Care and Evaluation    Patient location during evaluation: PHASE II  Patient participation: complete - patient participated  Level of consciousness: awake and alert  Pain management: adequate    Airway patency: patent  Anesthetic complications: No anesthetic complications  PONV Status: none  Cardiovascular status: acceptable  Respiratory status: acceptable  Hydration status: acceptable    Comments: Blood pressure 131/76, pulse 92, temperature 98.4 °F (36.9 °C), temperature source Temporal, resp. rate 18, height 160 cm (63\"), weight 65.5 kg (144 lb 4.8 oz), SpO2 96%.    Pt discharged from PACU based on gricelda score >8    "

## 2024-11-01 ENCOUNTER — TELEPHONE (OUTPATIENT)
Dept: CARDIOLOGY | Facility: CLINIC | Age: 89
End: 2024-11-01
Payer: MEDICARE

## 2024-11-01 ENCOUNTER — NURSE TRIAGE (OUTPATIENT)
Dept: CALL CENTER | Facility: HOSPITAL | Age: 89
End: 2024-11-01
Payer: MEDICARE

## 2024-11-01 NOTE — TELEPHONE ENCOUNTER
Cardioverted 2 days ago. Red spot left and is burning where the device was located. Slightly itchy. Tried neosporin. Nothing working. Advised to try some OTC meds and cold compress. Not to scratch. If not improving, will call back.     Reason for Disposition   Minor thermal burn    Additional Information   Negative: [1] Difficulty breathing AND [2] exposure to fire, smoke, or fumes   Negative: Shock suspected (e.g., cold/pale/clammy skin, too weak to stand, low BP, rapid pulse)   Negative: Difficult to awaken or acting confused (e.g., disoriented, slurred speech)   Negative: [1] Burn area larger than 20 palms of hand (> 10% BSA) AND [2] blisters   Negative: Sounds like a life-threatening emergency to the triager   Negative: Smoke inhalation is main concern   Negative: Sunburn   Negative: Electrical burn   Negative: Chemical burn   Negative: Burn area larger than 8 palms of hand (> 4% BSA)   Negative: Burn completely circles an arm or leg   Negative: Caused by explosion or gunpowder   Negative: [1] Caused by very hot substance AND [2] center of burn is white (or charred)   Negative: [1] Blister (intact or ruptured) AND [2] larger than 2 inches (5 cm)   Negative: [1] Blister (intact or ruptured) on the hand AND [2] larger  than 1 inch (2.5 cm)   Negative: [1] Blister (intact or ruptured) AND [2] on the face, neck, or genitals   Negative: [1] Headache or nausea AND [2] exposure to fire, smoke, or fumes   Negative: Sounds like a serious injury to the triager   Negative: [1] SEVERE pain (e.g., excruciating) AND [2] not improved 2 hours after pain medicine   Negative: [1] Looks infected (spreading redness, red streak, pus) AND [2] fever   Negative: Suspicious history for the burn   Negative: [1] Broken (ruptured) blister AND [2] caller doesn't want to trim the dead skin   Negative: [1] Looks infected (spreading redness, pus) AND [2] no fever   Negative: [1] Minor thermal burn AND [2] no prior tetanus shots (or is not  "fully vaccinated)   Negative: [1] Minor thermal burn AND [2] HIV positive or severe immunodeficiency (severely weak immune system)   Negative: [1] Minor thermal burn AND [2] last tetanus shot > 5 years ago   Negative: [1] Minor thermal burn of lower leg or foot AND [2] diabetes (diabetes mellitus)   Negative: [1] Minor thermal burn AND [2] from self-injury (e.g., burning, self-harm) AND [3] stable (i.e., not suicidal, not out of control)   Negative: [1] After 10 days AND [2] burn isn't healed    Answer Assessment - Initial Assessment Questions  1. ONSET: \"When did it happen?\" If happened < 3 hours ago, ask: \"Did you apply cold water?\" If not, give First Aid Advice immediately.       Cardioverted 2 days ago. Red spot left and is burning where the device was located. Slightly itchy. Tried neosporin. Nothing working.  2. LOCATION: \"Where is the burn located?\"       Where pads were placed of procedure   3. BURN SIZE: \"How large is the burn?\"  The palm is roughly 1% of the total body surface area (BSA).      Paddle size  4. SEVERITY OF THE BURN: \"Are there any blisters?\"       No blisters and slightly pink in [place] around but not even red   5. MECHANISM: \"Tell me how it happened.\"      Cardioversion   6. PAIN: \"Are you having any pain?\" \"How bad is the pain?\" (Scale 1-10; or mild, moderate, severe)    - MILD (1-3): doesn't interfere with normal activities     - MODERATE (4-7): interferes with normal activities or awakens from sleep     - SEVERE (8-10): excruciating pain, unable to do any normal activities       Mod to severe. Waking her up at night   7. INHALATION INJURY: \"Were you exposed to any smoke or fumes?\" If Yes, ask: \"Do you have any cough or difficulty breathing?\"      no  8. OTHER SYMPTOMS: \"Do you have any other symptoms?\" (e.g., headache, nausea)      no  9. PREGNANCY: \"Is there any chance you are pregnant?\" \"When was your last menstrual period?\"      no    Protocols used: Burns - Thermal-ADULT-AH    "

## 2024-11-01 NOTE — TELEPHONE ENCOUNTER
LPN spoke with patient to advise that since she is following with Dr. Torres in [facility], her device checks will need to be done there now. Patient was scheduled for an appointment with device clinic on 11.4.2024, advised that this appt will be cancelled. Advised patient that her carelink transmissions have been transferred to the clinic in [facility]. Patient voiced understanding.

## 2024-11-06 LAB — QT INTERVAL: 386 MS

## 2024-11-11 ENCOUNTER — OFFICE VISIT (OUTPATIENT)
Dept: PRIMARY CARE CLINIC | Age: 89
End: 2024-11-11

## 2024-11-11 VITALS
BODY MASS INDEX: 25.95 KG/M2 | OXYGEN SATURATION: 96 % | TEMPERATURE: 98 F | WEIGHT: 152 LBS | HEART RATE: 78 BPM | RESPIRATION RATE: 20 BRPM | HEIGHT: 64 IN | SYSTOLIC BLOOD PRESSURE: 130 MMHG | DIASTOLIC BLOOD PRESSURE: 80 MMHG

## 2024-11-11 DIAGNOSIS — G47.09 OTHER INSOMNIA: ICD-10-CM

## 2024-11-11 DIAGNOSIS — R35.0 URINARY FREQUENCY: Primary | ICD-10-CM

## 2024-11-11 DIAGNOSIS — I48.0 PAROXYSMAL ATRIAL FIBRILLATION (HCC): ICD-10-CM

## 2024-11-11 DIAGNOSIS — K64.3 GRADE IV HEMORRHOIDS: ICD-10-CM

## 2024-11-11 DIAGNOSIS — R10.30 LOWER ABDOMINAL PAIN: ICD-10-CM

## 2024-11-11 LAB
ALBUMIN SERPL-MCNC: 4.5 G/DL (ref 3.5–5.2)
ALP SERPL-CCNC: 93 U/L (ref 35–104)
ALT SERPL-CCNC: 13 U/L (ref 5–33)
ANION GAP SERPL CALCULATED.3IONS-SCNC: 12 MMOL/L (ref 7–19)
AST SERPL-CCNC: 22 U/L (ref 5–32)
BASOPHILS # BLD: 0.1 K/UL (ref 0–0.2)
BASOPHILS NFR BLD: 0.6 % (ref 0–1)
BILIRUB SERPL-MCNC: 0.7 MG/DL (ref 0.2–1.2)
BUN SERPL-MCNC: 23 MG/DL (ref 8–23)
CALCIUM SERPL-MCNC: 9.1 MG/DL (ref 8.8–10.2)
CHLORIDE SERPL-SCNC: 91 MMOL/L (ref 98–111)
CO2 SERPL-SCNC: 28 MMOL/L (ref 22–29)
CREAT SERPL-MCNC: 1 MG/DL (ref 0.5–0.9)
EOSINOPHIL # BLD: 0.1 K/UL (ref 0–0.6)
EOSINOPHIL NFR BLD: 0.8 % (ref 0–5)
ERYTHROCYTE [DISTWIDTH] IN BLOOD BY AUTOMATED COUNT: 14 % (ref 11.5–14.5)
GLUCOSE SERPL-MCNC: 118 MG/DL (ref 70–99)
HCT VFR BLD AUTO: 37.4 % (ref 37–47)
HGB BLD-MCNC: 11.8 G/DL (ref 12–16)
IMM GRANULOCYTES # BLD: 0.1 K/UL
LYMPHOCYTES # BLD: 3 K/UL (ref 1.1–4.5)
LYMPHOCYTES NFR BLD: 22.7 % (ref 20–40)
MCH RBC QN AUTO: 30 PG (ref 27–31)
MCHC RBC AUTO-ENTMCNC: 31.6 G/DL (ref 33–37)
MCV RBC AUTO: 95.2 FL (ref 81–99)
MONOCYTES # BLD: 1.5 K/UL (ref 0–0.9)
MONOCYTES NFR BLD: 11.3 % (ref 0–10)
NEUTROPHILS # BLD: 8.6 K/UL (ref 1.5–7.5)
NEUTS SEG NFR BLD: 64.2 % (ref 50–65)
PLATELET # BLD AUTO: 243 K/UL (ref 130–400)
PMV BLD AUTO: 10.7 FL (ref 9.4–12.3)
POTASSIUM SERPL-SCNC: 4.2 MMOL/L (ref 3.5–5)
PROT SERPL-MCNC: 6.9 G/DL (ref 6.4–8.3)
RBC # BLD AUTO: 3.93 M/UL (ref 4.2–5.4)
SODIUM SERPL-SCNC: 131 MMOL/L (ref 136–145)
WBC # BLD AUTO: 13.3 K/UL (ref 4.8–10.8)

## 2024-11-11 RX ORDER — TIZANIDINE 2 MG/1
2 TABLET ORAL NIGHTLY PRN
Qty: 10 TABLET | Refills: 0 | Status: SHIPPED | OUTPATIENT
Start: 2024-11-11

## 2024-11-11 ASSESSMENT — ENCOUNTER SYMPTOMS
SHORTNESS OF BREATH: 0
NAUSEA: 0
CONSTIPATION: 1
ANAL BLEEDING: 1
ABDOMINAL PAIN: 1
DIARRHEA: 0
COUGH: 0
EYES NEGATIVE: 1
VOMITING: 0
WHEEZING: 0
ALLERGIC/IMMUNOLOGIC NEGATIVE: 1
RESPIRATORY NEGATIVE: 1
ABDOMINAL DISTENTION: 0

## 2024-11-11 NOTE — PROGRESS NOTES
[facility]  [id number] [facility]  [place] [address]  Dept: [phone]  Dept Fax: [phone]  Loc: [phone]    Giana Lee is a 89 y.o. female who presents today for her medical conditions/complaints as noted below.  Giana Lee is c/o of Follow-up (SOB.  5-week-f/u.  Patient had a cardiac test with Dr. Zee.  ), Urinary Frequency (Would like urine checked in office today.  ), Insomnia (Current medicine doesn't feel very effective.  Sleeping 4 to 5 hours at night.  ), and Hemorrhoids (Known hemorrhoid per patient and taking miralax.  )        HPI:     HPI   This 89-year-old female presents for 5-week follow-up.  States that she was seen by her cardiologist and they did do a cardioversion.  States that she is feeling much better.  States she did have a significant burn from the cardioversion pads.  States that aloe vera did help to take that away.  States that since then she does have a known issue with hemorrhoids and she does have one that is bleeding pretty bad.  States she did have an episode of constipation and that is what seem to got it started up.  She does take Xarelto for her A-fib.  Also reports she is having issues with sleeping.  States that she could not take the doxepin.  States she tried it on 3 different occasions but states it took 3 hours to kick in but then when it did she was groggy the whole next day.  LASIX NOW  only AS NEEDED   Chief Complaint   Patient presents with    Follow-up     SOB.  5-week-f/u.  Patient had a cardiac test with Dr. Zee.      Urinary Frequency     Would like urine checked in office today.      Insomnia     Current medicine doesn't feel very effective.  Sleeping 4 to 5 hours at night.      Hemorrhoids     Known hemorrhoid per patient and taking miralax.       Past Medical History:   Diagnosis Date    Anemia     Arthritis     Asthma     CHF (congestive heart failure) (HCC)     COPD (chronic obstructive pulmonary

## 2024-11-11 NOTE — PATIENT INSTRUCTIONS
MAGNESIUM GLYCINATE 200 - 400 MG     Sleep Hygiene      Avoid bright overhead lights in the evenings.  Use lamps and reduced lighting as it gets dark outside.  Keep sleeping area cool and use only for sleep.  Avoid screen time for one to two hours before sleep.  Avoid caffeine four hours before bedtime.  Develop bedtime routine and be consistent with it.  Such as warm shower/bath.   Ice pack to forehead may help calm.  Calming habit such as Reading/meditation/music therapy.  May take Melatonin one hour before bedtime.

## 2024-11-12 DIAGNOSIS — D64.9 ANEMIA, UNSPECIFIED TYPE: Primary | ICD-10-CM

## 2024-11-12 DIAGNOSIS — K64.9 BLEEDING HEMORRHOIDS: ICD-10-CM

## 2024-11-13 LAB — BACTERIA UR CULT: NORMAL

## 2024-11-18 ENCOUNTER — TELEPHONE (OUTPATIENT)
Dept: PRIMARY CARE CLINIC | Age: 89
End: 2024-11-18

## 2024-11-18 RX ORDER — CIPROFLOXACIN 500 MG/1
500 TABLET, FILM COATED ORAL 2 TIMES DAILY
Qty: 20 TABLET | Refills: 0 | Status: SHIPPED | OUTPATIENT
Start: 2024-11-18 | End: 2024-11-28

## 2024-11-18 RX ORDER — METRONIDAZOLE 500 MG/1
500 TABLET ORAL 2 TIMES DAILY
Qty: 14 TABLET | Refills: 0 | Status: SHIPPED | OUTPATIENT
Start: 2024-11-18 | End: 2024-11-25

## 2024-11-18 NOTE — TELEPHONE ENCOUNTER
----- Message from JAZMIN Conteh NP sent at 11/18/2024  8:29 AM CST -----  Can you contact Ms. Faria and see exactly what symptoms she is having make sure she is not having any fever?

## 2024-11-18 NOTE — TELEPHONE ENCOUNTER
Pt states she is having a Diverticulitis flare up and would like med. Also asking for med for Yeast Infection.

## 2024-11-19 ENCOUNTER — TELEPHONE (OUTPATIENT)
Dept: CARDIOLOGY | Facility: CLINIC | Age: 89
End: 2024-11-19
Payer: MEDICARE

## 2024-11-19 NOTE — TELEPHONE ENCOUNTER
CALL PLACED TO PT REGARDING F/U S/P CV ON 11/20/24 WITH EILEEN ADAMSON NOTES, PT WANTED TO F/U IN [facility] LOCATION    VM LEFT TO RETURN CALL AND SCHEDULE APPT IN THE [place] [facility] WITH EULOGIO NEXT WEEK.      OKAY FOR HUB TO RELAY / SCHEDULE

## 2024-11-21 ENCOUNTER — OFFICE VISIT (OUTPATIENT)
Dept: PRIMARY CARE CLINIC | Age: 88
End: 2024-11-21
Payer: MEDICARE

## 2024-11-21 VITALS
RESPIRATION RATE: 18 BRPM | BODY MASS INDEX: 25.27 KG/M2 | HEIGHT: 64 IN | HEART RATE: 63 BPM | SYSTOLIC BLOOD PRESSURE: 112 MMHG | OXYGEN SATURATION: 98 % | WEIGHT: 148 LBS | TEMPERATURE: 97.2 F | DIASTOLIC BLOOD PRESSURE: 64 MMHG

## 2024-11-21 DIAGNOSIS — G47.09 OTHER INSOMNIA: ICD-10-CM

## 2024-11-21 DIAGNOSIS — Z00.00 MEDICARE ANNUAL WELLNESS VISIT, SUBSEQUENT: Primary | ICD-10-CM

## 2024-11-21 DIAGNOSIS — Z79.899 MEDICATION MANAGEMENT: ICD-10-CM

## 2024-11-21 LAB
ALCOHOL URINE: NORMAL
AMPHETAMINE SCREEN URINE: NORMAL
BARBITURATE SCREEN URINE: NORMAL
BENZODIAZEPINE SCREEN, URINE: NORMAL
BUPRENORPHINE URINE: NORMAL
COCAINE METABOLITE SCREEN URINE: NORMAL
FENTANYL SCREEN, URINE: NORMAL
GABAPENTIN SCREEN, URINE: NORMAL
MDMA, URINE: NORMAL
METHADONE SCREEN, URINE: NORMAL
METHAMPHETAMINE, URINE: NORMAL
OPIATE SCREEN URINE: NORMAL
OXYCODONE SCREEN URINE: NORMAL
PHENCYCLIDINE SCREEN URINE: NORMAL
PROPOXYPHENE SCREEN, URINE: NORMAL
SYNTHETIC CANNABINOIDS(K2) SCREEN, URINE: NORMAL
THC SCREEN, URINE: NORMAL
TRAMADOL SCREEN URINE: NORMAL
TRICYCLIC ANTIDEPRESSANTS, UR: NORMAL

## 2024-11-21 PROCEDURE — 1123F ACP DISCUSS/DSCN MKR DOCD: CPT | Performed by: NURSE PRACTITIONER

## 2024-11-21 PROCEDURE — 1160F RVW MEDS BY RX/DR IN RCRD: CPT | Performed by: NURSE PRACTITIONER

## 2024-11-21 PROCEDURE — G8484 FLU IMMUNIZE NO ADMIN: HCPCS | Performed by: NURSE PRACTITIONER

## 2024-11-21 PROCEDURE — G0439 PPPS, SUBSEQ VISIT: HCPCS | Performed by: NURSE PRACTITIONER

## 2024-11-21 PROCEDURE — 80305 DRUG TEST PRSMV DIR OPT OBS: CPT | Performed by: NURSE PRACTITIONER

## 2024-11-21 PROCEDURE — 1159F MED LIST DOCD IN RCRD: CPT | Performed by: NURSE PRACTITIONER

## 2024-11-21 ASSESSMENT — PATIENT HEALTH QUESTIONNAIRE - PHQ9
SUM OF ALL RESPONSES TO PHQ QUESTIONS 1-9: 0
SUM OF ALL RESPONSES TO PHQ9 QUESTIONS 1 & 2: 0
SUM OF ALL RESPONSES TO PHQ QUESTIONS 1-9: 0
1. LITTLE INTEREST OR PLEASURE IN DOING THINGS: NOT AT ALL
2. FEELING DOWN, DEPRESSED OR HOPELESS: NOT AT ALL

## 2024-11-21 NOTE — PATIENT INSTRUCTIONS
to [email or web address] and enter P600 to learn more about \"Learning About Being Active as an Older Adult.\"  Current as of: June 5, 2023  Content Version: 14.2  © 2024 [employer].   Care instructions adapted under license by [facility]. If you have questions about a medical condition or this instruction, always ask your healthcare professional. [employer] disclaims any warranty or liability for your use of this information.           Learning About Dental Care for Older Adults  Dental care for older adults: Overview  Dental care for older people is much the same as for younger adults. But older adults do have concerns that younger adults do not. Older adults may have problems with gum disease and decay on the roots of their teeth. They may need missing teeth replaced or broken fillings fixed. Or they may have dentures that need to be cared for. Some older adults may have trouble holding a toothbrush.  You can help remind the person you are caring for to brush and floss their teeth or to clean their dentures. In some cases, you may need to do the brushing and other dental care tasks. People who have trouble using their hands or who have dementia may need this extra help.  How can you help with dental care?  Normal dental care  To keep the teeth and gums healthy:  Brush the teeth with fluoride toothpaste twice a day--in the morning and at night--and floss at least once a day. Plaque can quickly build up on the teeth of older adults.  Watch for the signs of gum disease. These signs include gums that bleed after brushing or after eating hard foods, such as apples.  See a dentist regularly. Many experts recommend checkups every 6 months.  Keep the dentist up to date on any new medications the person is taking.  Encourage a balanced diet that includes whole grains, vegetables, and fruits, and that is low in saturated fat and sodium.  Encourage the person you're caring for not to

## 2024-11-21 NOTE — PROGRESS NOTES
Medicare Annual Wellness Visit    Giana Lee is here for Medicare AWV (Pt is here for annual wellness visit. No concerns. Pt states she is better from her diverticulitis because of the antibiotics. Pt had labs done on 11/11.)    Assessment & Plan   Medicare annual wellness visit, subsequent    Recommendations for Preventive Services Due: see orders and patient instructions/AVS.  Recommended screening schedule for the next 5-10 years is provided to the patient in written form: see Patient Instructions/AVS.     Return in 7 weeks (on 1/9/2025) for 1st or 2 nd week of Jan .     Subjective       Patient's complete Health Risk Assessment and screening values have been reviewed and are found in Flowsheets. The following problems were reviewed today and where indicated follow up appointments were made and/or referrals ordered.    Positive Risk Factor Screenings with Interventions:    Fall Risk:  Do you feel unsteady or are you worried about falling? : (!) yes  2 or more falls in past year?: no  Fall with injury in past year?: no     Interventions:    Reviewed medications, home hazards, visual acuity, and co-morbidities that can increase risk for falls    Cognitive:   Clock Drawing Test (CDT): (!) Abnormal  Words recalled: 3 Words Recalled  Total Score: 3  Total Score Interpretation: Normal Mini-Cog  Interventions:  Patient comments: pt recognized clock error immediately .       Drug Use:   Substance and Sexual Activity   Drug Use Yes    Comment: CBD oil     Interventions:  Patient declined any further intervention or treatment         Inactivity:  On average, how many days per week do you engage in moderate to strenuous exercise (like a brisk walk)?: 0 days (!) Abnormal  On average, how many minutes do you engage in exercise at this level?: 0 min  Interventions:  Patient comments: pt has recently had rapid heart beat and afib so she takes things at her own pace.       Dentist Screen:  Have you seen the dentist

## 2024-12-03 ENCOUNTER — TELEPHONE (OUTPATIENT)
Dept: PRIMARY CARE CLINIC | Age: 88
End: 2024-12-03

## 2024-12-03 NOTE — TELEPHONE ENCOUNTER
Pt requesting compound med from [facility] for Clobetasol-lid-zinc-aloe. She states you have prescribed before.

## 2024-12-05 RX ORDER — CLOBETASOL PROPIONATE 0.5 MG/G
OINTMENT TOPICAL
Qty: 1 EACH | Refills: 1 | Status: SHIPPED | OUTPATIENT
Start: 2024-12-05

## 2024-12-10 ENCOUNTER — TELEPHONE (OUTPATIENT)
Dept: PRIMARY CARE CLINIC | Age: 88
End: 2024-12-10

## 2024-12-10 NOTE — TELEPHONE ENCOUNTER
Patient was seen 12/5, a cream was supposed to be sent to  pharm to be compounded but it got sent to  instead so its not a compounded cream. Pt is asking can she just use what was sent to ?

## 2024-12-10 NOTE — TELEPHONE ENCOUNTER
Spoke with pharmacist and got medicine ordered. Naveed's daughter notified and voiced understanding.

## 2024-12-27 RX ORDER — RIVAROXABAN 20 MG/1
TABLET, FILM COATED ORAL
Qty: 90 TABLET | Refills: 3 | Status: SHIPPED | OUTPATIENT
Start: 2024-12-27

## 2024-12-27 RX ORDER — ISOSORBIDE MONONITRATE 30 MG/1
TABLET, EXTENDED RELEASE ORAL
Qty: 90 TABLET | Refills: 0 | OUTPATIENT
Start: 2024-12-27

## 2024-12-30 RX ORDER — ISOSORBIDE MONONITRATE 30 MG/1
TABLET, EXTENDED RELEASE ORAL
Qty: 90 TABLET | Refills: 0 | OUTPATIENT
Start: 2024-12-30

## 2025-01-30 ENCOUNTER — TELEPHONE (OUTPATIENT)
Dept: PRIMARY CARE CLINIC | Age: 89
End: 2025-01-30

## 2025-02-13 ENCOUNTER — TELEPHONE (OUTPATIENT)
Dept: PRIMARY CARE CLINIC | Age: 89
End: 2025-02-13

## 2025-02-13 RX ORDER — MECLIZINE HCL 12.5 MG 12.5 MG/1
12.5 TABLET ORAL 3 TIMES DAILY PRN
Qty: 45 TABLET | Refills: 0 | Status: SHIPPED | OUTPATIENT
Start: 2025-02-13 | End: 2025-02-23

## 2025-02-26 DIAGNOSIS — E03.9 HYPOTHYROIDISM, UNSPECIFIED TYPE: ICD-10-CM

## 2025-02-27 RX ORDER — LEVOTHYROXINE SODIUM 25 UG/1
25 TABLET ORAL DAILY
Qty: 90 TABLET | Refills: 0 | Status: SHIPPED | OUTPATIENT
Start: 2025-02-27

## 2025-03-03 RX ORDER — LEVOTHYROXINE SODIUM 25 UG/1
25 TABLET ORAL DAILY
Qty: 90 TABLET | Refills: 0 | OUTPATIENT
Start: 2025-03-03

## 2025-03-27 RX ORDER — METOPROLOL SUCCINATE 50 MG/1
TABLET, EXTENDED RELEASE ORAL
Qty: 120 TABLET | Refills: 0 | Status: SHIPPED | OUTPATIENT
Start: 2025-03-27

## 2025-04-13 ENCOUNTER — APPOINTMENT (OUTPATIENT)
Dept: GENERAL RADIOLOGY | Facility: HOSPITAL | Age: OVER 89
End: 2025-04-13
Payer: MEDICARE

## 2025-04-13 ENCOUNTER — APPOINTMENT (OUTPATIENT)
Dept: CT IMAGING | Facility: HOSPITAL | Age: OVER 89
End: 2025-04-13
Payer: MEDICARE

## 2025-04-13 ENCOUNTER — HOSPITAL ENCOUNTER (EMERGENCY)
Facility: HOSPITAL | Age: OVER 89
Discharge: HOME | End: 2025-04-13
Attending: FAMILY MEDICINE | Admitting: FAMILY MEDICINE
Payer: MEDICARE

## 2025-04-13 VITALS
SYSTOLIC BLOOD PRESSURE: 131 MMHG | OXYGEN SATURATION: 96 % | BODY MASS INDEX: 24.7 KG/M2 | TEMPERATURE: 97.6 F | HEART RATE: 70 BPM | DIASTOLIC BLOOD PRESSURE: 88 MMHG | RESPIRATION RATE: 16 BRPM | WEIGHT: 139.4 LBS | HEIGHT: 63 IN

## 2025-04-13 DIAGNOSIS — B96.0 INFECTION DUE TO MYCOPLASMA PNEUMONIAE: Primary | ICD-10-CM

## 2025-04-13 DIAGNOSIS — J40 BRONCHITIS: ICD-10-CM

## 2025-04-13 LAB
ALBUMIN SERPL-MCNC: 4 G/DL (ref 3.5–5.2)
ALBUMIN/GLOB SERPL: 1.6 G/DL
ALP SERPL-CCNC: 80 U/L (ref 39–117)
ALT SERPL W P-5'-P-CCNC: 12 U/L (ref 1–33)
ANION GAP SERPL CALCULATED.3IONS-SCNC: 12 MMOL/L (ref 5–15)
AST SERPL-CCNC: 23 U/L (ref 1–32)
B PARAPERT DNA SPEC QL NAA+PROBE: NOT DETECTED
B PERT DNA SPEC QL NAA+PROBE: NOT DETECTED
BASOPHILS # BLD AUTO: 0.05 10*3/MM3 (ref 0–0.2)
BASOPHILS NFR BLD AUTO: 0.6 % (ref 0–1.5)
BILIRUB SERPL-MCNC: 0.5 MG/DL (ref 0–1.2)
BUN SERPL-MCNC: 23 MG/DL (ref 8–23)
BUN/CREAT SERPL: 23.5 (ref 7–25)
C PNEUM DNA NPH QL NAA+NON-PROBE: NOT DETECTED
CALCIUM SPEC-SCNC: 8.7 MG/DL (ref 8.2–9.6)
CHLORIDE SERPL-SCNC: 99 MMOL/L (ref 98–107)
CO2 SERPL-SCNC: 25 MMOL/L (ref 22–29)
CREAT SERPL-MCNC: 0.98 MG/DL (ref 0.57–1)
D DIMER PPP FEU-MCNC: 1.42 MCGFEU/ML (ref 0–0.91)
DEPRECATED RDW RBC AUTO: 47.9 FL (ref 37–54)
EGFRCR SERPLBLD CKD-EPI 2021: 54.6 ML/MIN/1.73
EOSINOPHIL # BLD AUTO: 0.01 10*3/MM3 (ref 0–0.4)
EOSINOPHIL NFR BLD AUTO: 0.1 % (ref 0.3–6.2)
ERYTHROCYTE [DISTWIDTH] IN BLOOD BY AUTOMATED COUNT: 14.6 % (ref 12.3–15.4)
FLUAV SUBTYP SPEC NAA+PROBE: NOT DETECTED
FLUBV RNA ISLT QL NAA+PROBE: NOT DETECTED
GEN 5 1HR TROPONIN T REFLEX: 26 NG/L
GLOBULIN UR ELPH-MCNC: 2.5 GM/DL
GLUCOSE SERPL-MCNC: 160 MG/DL (ref 65–99)
HADV DNA SPEC NAA+PROBE: NOT DETECTED
HCOV 229E RNA SPEC QL NAA+PROBE: NOT DETECTED
HCOV HKU1 RNA SPEC QL NAA+PROBE: NOT DETECTED
HCOV NL63 RNA SPEC QL NAA+PROBE: NOT DETECTED
HCOV OC43 RNA SPEC QL NAA+PROBE: NOT DETECTED
HCT VFR BLD AUTO: 40.1 % (ref 34–46.6)
HGB BLD-MCNC: 12.8 G/DL (ref 12–15.9)
HMPV RNA NPH QL NAA+NON-PROBE: NOT DETECTED
HOLD SPECIMEN: NORMAL
HPIV1 RNA ISLT QL NAA+PROBE: NOT DETECTED
HPIV2 RNA SPEC QL NAA+PROBE: NOT DETECTED
HPIV3 RNA NPH QL NAA+PROBE: NOT DETECTED
HPIV4 P GENE NPH QL NAA+PROBE: NOT DETECTED
IMM GRANULOCYTES # BLD AUTO: 0.05 10*3/MM3 (ref 0–0.05)
IMM GRANULOCYTES NFR BLD AUTO: 0.6 % (ref 0–0.5)
LYMPHOCYTES # BLD AUTO: 1.64 10*3/MM3 (ref 0.7–3.1)
LYMPHOCYTES NFR BLD AUTO: 18.2 % (ref 19.6–45.3)
M PNEUMO IGG SER IA-ACNC: DETECTED
MAGNESIUM SERPL-MCNC: 2.1 MG/DL (ref 1.7–2.3)
MCH RBC QN AUTO: 28.6 PG (ref 26.6–33)
MCHC RBC AUTO-ENTMCNC: 31.9 G/DL (ref 31.5–35.7)
MCV RBC AUTO: 89.5 FL (ref 79–97)
MONOCYTES # BLD AUTO: 1.06 10*3/MM3 (ref 0.1–0.9)
MONOCYTES NFR BLD AUTO: 11.8 % (ref 5–12)
NEUTROPHILS NFR BLD AUTO: 6.19 10*3/MM3 (ref 1.7–7)
NEUTROPHILS NFR BLD AUTO: 68.7 % (ref 42.7–76)
NRBC BLD AUTO-RTO: 0 /100 WBC (ref 0–0.2)
NT-PROBNP SERPL-MCNC: 1334 PG/ML (ref 0–1800)
PLATELET # BLD AUTO: 191 10*3/MM3 (ref 140–450)
PMV BLD AUTO: 9.9 FL (ref 6–12)
POTASSIUM SERPL-SCNC: 4 MMOL/L (ref 3.5–5.2)
PROT SERPL-MCNC: 6.5 G/DL (ref 6–8.5)
RBC # BLD AUTO: 4.48 10*6/MM3 (ref 3.77–5.28)
RHINOVIRUS RNA SPEC NAA+PROBE: NOT DETECTED
RSV RNA NPH QL NAA+NON-PROBE: NOT DETECTED
SARS-COV-2 RNA RESP QL NAA+PROBE: NOT DETECTED
SODIUM SERPL-SCNC: 136 MMOL/L (ref 136–145)
TROPONIN T % DELTA: -7
TROPONIN T NUMERIC DELTA: -2 NG/L
TROPONIN T SERPL HS-MCNC: 28 NG/L
WBC NRBC COR # BLD AUTO: 9 10*3/MM3 (ref 3.4–10.8)

## 2025-04-13 PROCEDURE — 93005 ELECTROCARDIOGRAM TRACING: CPT

## 2025-04-13 PROCEDURE — 96375 TX/PRO/DX INJ NEW DRUG ADDON: CPT

## 2025-04-13 PROCEDURE — 85379 FIBRIN DEGRADATION QUANT: CPT | Performed by: FAMILY MEDICINE

## 2025-04-13 PROCEDURE — 96374 THER/PROPH/DIAG INJ IV PUSH: CPT

## 2025-04-13 PROCEDURE — 83735 ASSAY OF MAGNESIUM: CPT | Performed by: FAMILY MEDICINE

## 2025-04-13 PROCEDURE — 0202U NFCT DS 22 TRGT SARS-COV-2: CPT | Performed by: FAMILY MEDICINE

## 2025-04-13 PROCEDURE — 25510000001 IOPAMIDOL PER 1 ML: Performed by: FAMILY MEDICINE

## 2025-04-13 PROCEDURE — 25010000002 METHYLPREDNISOLONE PER 125 MG: Performed by: FAMILY MEDICINE

## 2025-04-13 PROCEDURE — 71045 X-RAY EXAM CHEST 1 VIEW: CPT

## 2025-04-13 PROCEDURE — 36415 COLL VENOUS BLD VENIPUNCTURE: CPT

## 2025-04-13 PROCEDURE — 93005 ELECTROCARDIOGRAM TRACING: CPT | Performed by: FAMILY MEDICINE

## 2025-04-13 PROCEDURE — 99285 EMERGENCY DEPT VISIT HI MDM: CPT

## 2025-04-13 PROCEDURE — 83880 ASSAY OF NATRIURETIC PEPTIDE: CPT | Performed by: FAMILY MEDICINE

## 2025-04-13 PROCEDURE — 71275 CT ANGIOGRAPHY CHEST: CPT

## 2025-04-13 PROCEDURE — 84484 ASSAY OF TROPONIN QUANT: CPT | Performed by: FAMILY MEDICINE

## 2025-04-13 PROCEDURE — 80053 COMPREHEN METABOLIC PANEL: CPT | Performed by: FAMILY MEDICINE

## 2025-04-13 PROCEDURE — 25010000002 DIPHENHYDRAMINE PER 50 MG: Performed by: FAMILY MEDICINE

## 2025-04-13 PROCEDURE — 85025 COMPLETE CBC W/AUTO DIFF WBC: CPT | Performed by: FAMILY MEDICINE

## 2025-04-13 RX ORDER — METHYLPREDNISOLONE 4 MG/1
TABLET ORAL
Qty: 21 EACH | Refills: 0 | Status: SHIPPED | OUTPATIENT
Start: 2025-04-13

## 2025-04-13 RX ORDER — AZITHROMYCIN 250 MG/1
TABLET, FILM COATED ORAL
Qty: 6 TABLET | Refills: 0 | Status: SHIPPED | OUTPATIENT
Start: 2025-04-13

## 2025-04-13 RX ORDER — DIPHENHYDRAMINE HYDROCHLORIDE 50 MG/ML
25 INJECTION, SOLUTION INTRAMUSCULAR; INTRAVENOUS ONCE
Status: COMPLETED | OUTPATIENT
Start: 2025-04-13 | End: 2025-04-13

## 2025-04-13 RX ORDER — IOPAMIDOL 755 MG/ML
100 INJECTION, SOLUTION INTRAVASCULAR
Status: COMPLETED | OUTPATIENT
Start: 2025-04-13 | End: 2025-04-13

## 2025-04-13 RX ORDER — METHYLPREDNISOLONE SODIUM SUCCINATE 125 MG/2ML
125 INJECTION, POWDER, LYOPHILIZED, FOR SOLUTION INTRAMUSCULAR; INTRAVENOUS ONCE
Status: COMPLETED | OUTPATIENT
Start: 2025-04-13 | End: 2025-04-13

## 2025-04-13 RX ORDER — SODIUM CHLORIDE 0.9 % (FLUSH) 0.9 %
10 SYRINGE (ML) INJECTION AS NEEDED
Status: DISCONTINUED | OUTPATIENT
Start: 2025-04-13 | End: 2025-04-14 | Stop reason: HOSPADM

## 2025-04-13 RX ADMIN — IOPAMIDOL 69 ML: 755 INJECTION, SOLUTION INTRAVENOUS at 21:38

## 2025-04-13 RX ADMIN — METHYLPREDNISOLONE SODIUM SUCCINATE 125 MG: 125 INJECTION, POWDER, FOR SOLUTION INTRAMUSCULAR; INTRAVENOUS at 20:20

## 2025-04-13 RX ADMIN — DIPHENHYDRAMINE HYDROCHLORIDE 25 MG: 50 INJECTION, SOLUTION INTRAMUSCULAR; INTRAVENOUS at 20:20

## 2025-04-14 ENCOUNTER — OFFICE VISIT (OUTPATIENT)
Dept: PRIMARY CARE CLINIC | Age: 89
End: 2025-04-14

## 2025-04-14 VITALS
TEMPERATURE: 98.5 F | SYSTOLIC BLOOD PRESSURE: 150 MMHG | BODY MASS INDEX: 24.82 KG/M2 | RESPIRATION RATE: 18 BRPM | OXYGEN SATURATION: 97 % | DIASTOLIC BLOOD PRESSURE: 100 MMHG | HEIGHT: 64 IN | HEART RATE: 81 BPM | WEIGHT: 145.4 LBS

## 2025-04-14 DIAGNOSIS — J15.7 PNEUMONIA OF RIGHT LOWER LOBE DUE TO MYCOPLASMA PNEUMONIAE: Primary | ICD-10-CM

## 2025-04-14 DIAGNOSIS — I48.91 ATRIAL FIBRILLATION, UNSPECIFIED TYPE (HCC): ICD-10-CM

## 2025-04-14 LAB
QT INTERVAL: 372 MS
QTC INTERVAL: 455 MS

## 2025-04-14 RX ORDER — METHYLPREDNISOLONE 4 MG/1
4 TABLET ORAL
COMMUNITY
Start: 2025-04-13

## 2025-04-14 RX ORDER — AZITHROMYCIN 250 MG/1
250 TABLET, FILM COATED ORAL
COMMUNITY
Start: 2025-04-13

## 2025-04-14 NOTE — PROGRESS NOTES
Giana Lee (:  2/10/1934) is a 89 y.o. female,Established patient, here for evaluation of the following chief complaint(s):  Follow-up (Pt here for ER f/u. She was dx with Mycoplasma pneumoniae, Bronchitis and Afib)      Assessment & Plan     Assessment & Plan  1. Pneumonia.  - Diagnosed with pneumonia at [facility] and received a Rocephin injection.  - Oxygen saturation is currently at 97% while sitting, but it may decrease with activity.  - Advised to start taking azithromycin and Medrol Dosepak as prescribed; these medications have anti-inflammatory properties that will help with her symptoms.  - Minimize contact with susceptible individuals, maintain hand hygiene, ensure adequate rest, increase fluid intake, and use portable oxygen to prevent desaturation, especially when moving around.    2. Bronchitis.  - Diagnosed with bronchitis at [facility].  - Prescribed azithromycin and Medrol Dosepak, which have anti-inflammatory properties.  - Advised to start these medications as prescribed.  - If preferred, a prescription for cough medicine can be provided.    3. Atrial Fibrillation.  - Diagnosed with atrial fibrillation at [facility].  - Saw cardiologist this morning, who checked her pacemaker.  - No changes to current cardiac management were discussed.  - Continue current medications and follow-up as needed.    ASSESSMENT/PLAN:  1. Pneumonia of right lower lobe due to Mycoplasma pneumoniae  2. Atrial fibrillation, unspecified type (HCC)      Return if symptoms worsen or fail to improve.         Subjective   SUBJECTIVE/OBJECTIVE:  History of Present Illness  The patient presents for evaluation of pneumonia.    She was diagnosed with pneumonia at [facility] on 2025. An improvement in her condition is reported today. The prescribed azithromycin and Medrol Dosepak have not yet been started. Inquiry about the necessity of cough medication was made. Advised to wear a mask and avoid exposure to germs

## 2025-04-14 NOTE — ED PROVIDER NOTES
Subjective   History of Present Illness    Review of Systems    Past Medical History:   Diagnosis Date    Asthma     CHF (congestive heart failure)     COPD (chronic obstructive pulmonary disease)     Dermatomyositis     Dermatomyositis     Dyspnea     E-coli UTI     Fibromyalgia     Hyperlipemia, mixed     Hyperlipidemia     Hypothyroidism     Mitral valve disorder     History of MVP    Mitral valve disorder     Pacemaker     Pacemaker     PAF (paroxysmal atrial fibrillation) (CMS/HCC) new onset 8/6/2018    Respiratory infection     Sick sinus syndrome     Sleep apnea     Stroke     SVT (supraventricular tachycardia)        Allergies   Allergen Reactions    Latex     Adhesive Tape Other (See Comments)    Amoxicillin-Pot Clavulanate     Barley Grass     Codeine     Contrast Dye (Echo Or Unknown Ct/Mr)     Fentanyl     Midazolam        Past Surgical History:   Procedure Laterality Date    CARDIAC CATHETERIZATION      CARDIOVERSION      CHOLECYSTECTOMY      ESOPHAGUS SURGERY      FOOT SURGERY Right     has ankle hardware    HYSTERECTOMY      INSERT / REPLACE / REMOVE PACEMAKER      PACEMAKER IMPLANTATION  12/15/2011    [employer] (4/16/2012)-lead repositioning       Family History   Problem Relation Age of Onset    Coronary artery disease Mother     Heart attack Mother     Coronary artery disease Father        Social History     Socioeconomic History    Marital status:    Tobacco Use    Smoking status: Never    Smokeless tobacco: Never   Vaping Use    Vaping status: Never Used   Substance and Sexual Activity    Alcohol use: No    Drug use: No    Sexual activity: Defer           Objective   Physical Exam    Procedures           ED Course  ED Course as of 04/13/25 2230   Sun Apr 13, 2025   2135 Assummed care of patient at this time from Dr. Pierre Flores.  Pending results of CT imaging will reevaluate and disposition accordingly.  Please see Dr. Flores's note above for HPI, ROS, physical examination findings. [JS]    2205 Chest CTA showed: No pulmonary emboli, patchy areas of subpleural lower nodularity opacity in both lower lobes posteriorly likely infectious in etiology recommend 3-month follow-up, bronchial wall thickening on right worrisome for bronchitis. [JS]   2206 Upon reevaluation patient resting comfortably in the bed in no acute distress.  Patient is on 2 L nasal cannula oxygen which she states is her baseline at home.  Reviewed with patient lab and imaging findings.  Discussed need for completion of antibiotics and steroids.  Advised symptomatic treatment, PCP follow-up within the next 48 hours for close reevaluation, strict return precautions, need for immediate return to ED should she develop any new or worsening symptoms.  Patient was very appreciative with no further questions, concerns, needs at this time and is stable for discharge. [JS]      ED Course User Index  [JS] Demar Thompson PA-C                                                       Medical Decision Making  Problems Addressed:  Bronchitis: complicated acute illness or injury  Infection due to Mycoplasma pneumoniae: complicated acute illness or injury    Amount and/or Complexity of Data Reviewed  Independent Historian:      Details: Granddaughter  Labs: ordered. Decision-making details documented in ED Course.  Radiology: ordered. Decision-making details documented in ED Course.  ECG/medicine tests: ordered. Decision-making details documented in ED Course.  Discussion of management or test interpretation with external provider(s): Dr. Pierre Flores (attending)    Risk  Prescription drug management.        Final diagnoses:   Infection due to Mycoplasma pneumoniae   Bronchitis       ED Disposition  ED Disposition       ED Disposition   Discharge    Condition   Stable    Comment   --               Bailey Dyson, APRN  [address] [facility] [id number] [phone]    Schedule an appointment as soon as possible for a visit in 2 days      [facility]  [place] EMERGENCY DEPARTMENT  [address]  [facility] [id number] [phone]    As needed         Medication List        New Prescriptions      azithromycin 250 MG tablet  Commonly known as: ZITHROMAX  Take 2 tablets the first day, then 1 tablet daily for 4 days.     methylPREDNISolone 4 MG dose pack  Commonly known as: MEDROL  Take as directed on package instructions.            Changed      hydroCHLOROthiazide 12.5 MG capsule  Commonly known as: MICROZIDE  Take 1 capsule by mouth Daily.  What changed:   when to take this  reasons to take this               Where to Get Your Medications        These medications were sent to [facility] - [place], [place] - [address] - [phone] [facility] [phone]   [address] [facility] [address]      Phone: [phone]   azithromycin 250 MG tablet  methylPREDNISolone 4 MG dose pack            Demar Thompson PA-C  04/13/25 6523

## 2025-04-14 NOTE — ED PROVIDER NOTES
Subjective   History of Present Illness  Patient states that she has been coughing and feeling short of breath since Friday.  She also had some fevers.  She has no improvement of her symptoms.  She states that a family member who is a nurse listen to her and said that they heard some rales in her lungs.  They also listen to her heart and told her she was in A-fib.  Patient does have a history of A-fib.  Patient denies any other symptoms at this time.      Review of Systems   Respiratory:  Positive for cough and shortness of breath.    All other systems reviewed and are negative.      Past Medical History:   Diagnosis Date    Asthma     CHF (congestive heart failure)     COPD (chronic obstructive pulmonary disease)     Dermatomyositis     Dermatomyositis     Dyspnea     E-coli UTI     Fibromyalgia     Hyperlipemia, mixed     Hyperlipidemia     Hypothyroidism     Mitral valve disorder     History of MVP    Mitral valve disorder     Pacemaker     Pacemaker     PAF (paroxysmal atrial fibrillation) (CMS/[facility]) new onset 8/6/2018    Respiratory infection     Sick sinus syndrome     Sleep apnea     Stroke     SVT (supraventricular tachycardia)        Allergies   Allergen Reactions    Latex     Adhesive Tape Other (See Comments)    Amoxicillin-Pot Clavulanate     Barley Grass     Codeine     Contrast Dye (Echo Or Unknown Ct/Mr)     Fentanyl     Midazolam        Past Surgical History:   Procedure Laterality Date    CARDIAC CATHETERIZATION      CARDIOVERSION      CHOLECYSTECTOMY      ESOPHAGUS SURGERY      FOOT SURGERY Right     has ankle hardware    HYSTERECTOMY      INSERT / REPLACE / REMOVE PACEMAKER      PACEMAKER IMPLANTATION  12/15/2011    [employer]hythm (4/16/2012)-lead repositioning       Family History   Problem Relation Age of Onset    Coronary artery disease Mother     Heart attack Mother     Coronary artery disease Father        Social History     Socioeconomic History    Marital status:    Tobacco Use    Smoking  status: Never    Smokeless tobacco: Never   Vaping Use    Vaping status: Never Used   Substance and Sexual Activity    Alcohol use: No    Drug use: No    Sexual activity: Defer           Objective   Physical Exam  Vitals and nursing note reviewed.   Constitutional:       Appearance: She is well-developed.   HENT:      Head: Normocephalic and atraumatic.      Mouth/Throat:      Mouth: Mucous membranes are moist.   Eyes:      Extraocular Movements: Extraocular movements intact.      Pupils: Pupils are equal, round, and reactive to light.   Cardiovascular:      Rate and Rhythm: Normal rate. Rhythm irregular.      Heart sounds: Normal heart sounds.   Pulmonary:      Effort: Pulmonary effort is normal.      Breath sounds: Normal breath sounds.   Skin:     General: Skin is warm and dry.   Neurological:      General: No focal deficit present.      Mental Status: She is alert and oriented to person, place, and time.   Psychiatric:         Mood and Affect: Mood normal.         Behavior: Behavior normal.         Procedures           ED Course  ED Course as of 04/15/25 1019   Sun Apr 13, 2025   2135 Assummed care of patient at this time from Dr. Pierre Flores.  Pending results of CT imaging will reevaluate and disposition accordingly.  Please see Dr. Flores's note above for HPI, ROS, physical examination findings. [JS]   2205 Chest CTA showed: No pulmonary emboli, patchy areas of subpleural lower nodularity opacity in both lower lobes posteriorly likely infectious in etiology recommend 3-month follow-up, bronchial wall thickening on right worrisome for bronchitis. [JS]   2206 Upon reevaluation patient resting comfortably in the bed in no acute distress.  Patient is on 2 L nasal cannula oxygen which she states is her baseline at home.  Reviewed with patient lab and imaging findings.  Discussed need for completion of antibiotics and steroids.  Advised symptomatic treatment, PCP follow-up within the next 48 hours for close  reevaluation, strict return precautions, need for immediate return to ED should she develop any new or worsening symptoms.  Patient was very appreciative with no further questions, concerns, needs at this time and is stable for discharge. [JS]      ED Course User Index  [JS] Demar Thompson PA-C                                                     Lab Results (last 24 hours)       ** No results found for the last 24 hours. **          CT Angiogram Chest   Final Result   1. No pulmonary embolism identified.   2. Patchy areas of subpleural nodular opacity and both lower lobes   posteriorly likely infectious in etiology. Short interval follow-up CT   chest in 3 months is recommended.   3. Bronchial wall thickening on the right worrisome for bronchitis.       This report was signed and finalized on 4/13/2025 10:00 PM by Ok Conroy.          XR Chest 1 View   Final Result       1. No active disease in the chest.       This report was signed and finalized on 4/13/2025 7:34 PM by Ok Conroy.            Medications   diphenhydrAMINE (BENADRYL) injection 25 mg (25 mg Intravenous Given 4/13/25 2020)   methylPREDNISolone sodium succinate (SOLU-Medrol) injection 125 mg (125 mg Intravenous Given 4/13/25 2020)   iopamidol (ISOVUE-370) 76 % injection 100 mL (69 mL Intravenous Given 4/13/25 2138)       Medical Decision Making  Problems Addressed:  Bronchitis: complicated acute illness or injury  Infection due to Mycoplasma pneumoniae: complicated acute illness or injury    Amount and/or Complexity of Data Reviewed  Labs: ordered.  Radiology: ordered.  ECG/medicine tests: ordered.    Risk  Prescription drug management.        Final diagnoses:   Infection due to Mycoplasma pneumoniae   Bronchitis       ED Disposition  ED Disposition       ED Disposition   Discharge    Condition   Stable    Comment   --               Bailey Dyson, APRN  [address] [facility] [id number] [phone]    Schedule an appointment as  soon as possible for a visit in 2 days      [facility] EMERGENCY DEPARTMENT  [address]  [facility] [address] [phone]    As needed         Medication List        New Prescriptions      azithromycin 250 MG tablet  Commonly known as: ZITHROMAX  Take 2 tablets the first day, then 1 tablet daily for 4 days.     methylPREDNISolone 4 MG dose pack  Commonly known as: MEDROL  Take as directed on package instructions.            Changed      hydroCHLOROthiazide 12.5 MG capsule  Commonly known as: MICROZIDE  Take 1 capsule by mouth Daily.  What changed:   when to take this  reasons to take this               Where to Get Your Medications        These medications were sent to [facility] - [place], [place] - [address] - [phone] [facility] [phone]   [address] [facility] [address]      Phone: [phone]   azithromycin 250 MG tablet  methylPREDNISolone 4 MG dose pack            Pierre Flores MD  04/15/25 8687

## 2025-04-14 NOTE — DISCHARGE INSTRUCTIONS
Today you are testing positive for mycoplasma pneumonia and have evidence of bronchitis.  Please complete your antibiotics and steroids in their entirety even if you begin to feel better.  You will need to follow-up with your primary care provider within the next 48 hours for close reevaluation however should you develop any new or worsening symptoms please return to the ER for further evaluation.

## 2025-04-15 ASSESSMENT — ENCOUNTER SYMPTOMS
SHORTNESS OF BREATH: 1
WHEEZING: 0
BLOOD IN STOOL: 0
COUGH: 1
ABDOMINAL PAIN: 0
CHEST TIGHTNESS: 0

## 2025-04-25 ENCOUNTER — RESULTS FOLLOW-UP (OUTPATIENT)
Dept: PRIMARY CARE CLINIC | Age: 89
End: 2025-04-25

## 2025-04-25 ENCOUNTER — OFFICE VISIT (OUTPATIENT)
Dept: PRIMARY CARE CLINIC | Age: 89
End: 2025-04-25

## 2025-04-25 ENCOUNTER — ANCILLARY PROCEDURE (OUTPATIENT)
Dept: PRIMARY CARE CLINIC | Age: 89
End: 2025-04-25
Payer: MEDICARE

## 2025-04-25 VITALS
BODY MASS INDEX: 25.87 KG/M2 | WEIGHT: 146 LBS | TEMPERATURE: 98.4 F | HEIGHT: 63 IN | OXYGEN SATURATION: 96 % | HEART RATE: 80 BPM | DIASTOLIC BLOOD PRESSURE: 78 MMHG | SYSTOLIC BLOOD PRESSURE: 132 MMHG | RESPIRATION RATE: 16 BRPM

## 2025-04-25 DIAGNOSIS — J15.7 PNEUMONIA OF RIGHT LOWER LOBE DUE TO MYCOPLASMA PNEUMONIAE: ICD-10-CM

## 2025-04-25 DIAGNOSIS — Z77.120 EXPOSURE TO MOLD: ICD-10-CM

## 2025-04-25 DIAGNOSIS — J15.7 PNEUMONIA OF RIGHT LOWER LOBE DUE TO MYCOPLASMA PNEUMONIAE: Primary | ICD-10-CM

## 2025-04-25 PROCEDURE — 71046 X-RAY EXAM CHEST 2 VIEWS: CPT | Performed by: FAMILY MEDICINE

## 2025-04-25 RX ORDER — DOXYCYCLINE HYCLATE 100 MG
100 TABLET ORAL 2 TIMES DAILY
Qty: 14 TABLET | Refills: 0 | Status: SHIPPED | OUTPATIENT
Start: 2025-04-25 | End: 2025-05-02

## 2025-04-25 ASSESSMENT — ENCOUNTER SYMPTOMS
COUGH: 1
CHEST TIGHTNESS: 1
WHEEZING: 1
BLOOD IN STOOL: 0
SHORTNESS OF BREATH: 1
ABDOMINAL PAIN: 0

## 2025-04-25 NOTE — PROGRESS NOTES
Giana Lee (:  2/10/1934) is a 89 y.o. female,Established patient, here for evaluation of the following chief complaint(s):  Pneumonia (Pt was in ER for pneumonia and had f/u with Dr Gutiérrez on  and she is not doing any better. She feels worse. Pt has Afib and her pacemaker has been going crazy.)      Assessment & Plan     Assessment & Plan  1. Mycoplasma infection.  - Symptoms include shortness of breath, wheezing, body aches, chills, and sweating.  - Physical exam reveals very few crackles and reduced air movement on the right side.  - A repeat chest x-ray was completed and overall appeared normal.  Will pend final review to radiology and manage accordingly  - Doxycycline 100 mg will be prescribed, to be taken twice daily for 7 days. She is advised to protect her skin from sun exposure due to increased photosensitivity caused by doxycycline. If the chest x-ray shows significant findings, a steroid course may be considered, keeping in mind the potential risk of adrenal suppression.  - Her son did pull me aside and mentioned that patient's house is infested with mold.  Patient refuses to do anything about this and did not want me to be made aware.  I discussed with him I would strongly recommend have this treated and minimizing exposure in the meantime otherwise seems to may have the symptoms recurring leak.  I did advise patient to get plenty of fresh air    2. Atrial fibrillation.  - The patient reports that her pacemaker was not interrogated during her last visit with Dr. Zee's team.  - Physical exam reveals a fairly good rhythm.  - She is advised to follow up with her cardiologist for a pacemaker interrogation to ensure it is functioning properly.  - The cardiology office can use their reader device to check the pacemaker's functionality and provide detailed information if needed    ASSESSMENT/PLAN:  1. Pneumonia of right lower lobe due to Mycoplasma pneumoniae  -     XR CHEST STANDARD (2 VW);

## 2025-04-30 ENCOUNTER — APPOINTMENT (OUTPATIENT)
Dept: GENERAL RADIOLOGY | Facility: HOSPITAL | Age: OVER 89
End: 2025-04-30
Payer: MEDICARE

## 2025-04-30 ENCOUNTER — APPOINTMENT (OUTPATIENT)
Dept: CT IMAGING | Facility: HOSPITAL | Age: OVER 89
End: 2025-04-30
Payer: MEDICARE

## 2025-04-30 ENCOUNTER — HOSPITAL ENCOUNTER (EMERGENCY)
Facility: HOSPITAL | Age: OVER 89
Discharge: HOME | End: 2025-04-30
Payer: MEDICARE

## 2025-04-30 VITALS
SYSTOLIC BLOOD PRESSURE: 140 MMHG | BODY MASS INDEX: 24.63 KG/M2 | RESPIRATION RATE: 17 BRPM | HEIGHT: 63 IN | HEART RATE: 74 BPM | OXYGEN SATURATION: 94 % | DIASTOLIC BLOOD PRESSURE: 61 MMHG | TEMPERATURE: 98.5 F | WEIGHT: 139 LBS

## 2025-04-30 DIAGNOSIS — R51.9 ACUTE NONINTRACTABLE HEADACHE, UNSPECIFIED HEADACHE TYPE: Primary | ICD-10-CM

## 2025-04-30 DIAGNOSIS — M54.2 NECK PAIN: ICD-10-CM

## 2025-04-30 LAB
ALBUMIN SERPL-MCNC: 3.7 G/DL (ref 3.5–5.2)
ALBUMIN/GLOB SERPL: 1.3 G/DL
ALP SERPL-CCNC: 91 U/L (ref 39–117)
ALT SERPL W P-5'-P-CCNC: 11 U/L (ref 1–33)
ANION GAP SERPL CALCULATED.3IONS-SCNC: 14 MMOL/L (ref 5–15)
AST SERPL-CCNC: 15 U/L (ref 1–32)
BASOPHILS # BLD AUTO: 0.09 10*3/MM3 (ref 0–0.2)
BASOPHILS NFR BLD AUTO: 0.8 % (ref 0–1.5)
BILIRUB SERPL-MCNC: 0.4 MG/DL (ref 0–1.2)
BUN SERPL-MCNC: 20 MG/DL (ref 8–23)
BUN/CREAT SERPL: 24.7 (ref 7–25)
CALCIUM SPEC-SCNC: 8.8 MG/DL (ref 8.2–9.6)
CHLORIDE SERPL-SCNC: 94 MMOL/L (ref 98–107)
CO2 SERPL-SCNC: 24 MMOL/L (ref 22–29)
CREAT SERPL-MCNC: 0.81 MG/DL (ref 0.57–1)
D DIMER PPP FEU-MCNC: 0.82 MCGFEU/ML (ref 0–0.91)
D-LACTATE SERPL-SCNC: 1.5 MMOL/L (ref 0.5–2)
DEPRECATED RDW RBC AUTO: 46.2 FL (ref 37–54)
EGFRCR SERPLBLD CKD-EPI 2021: 68.6 ML/MIN/1.73
EOSINOPHIL # BLD AUTO: 0.31 10*3/MM3 (ref 0–0.4)
EOSINOPHIL NFR BLD AUTO: 2.7 % (ref 0.3–6.2)
ERYTHROCYTE [DISTWIDTH] IN BLOOD BY AUTOMATED COUNT: 14.1 % (ref 12.3–15.4)
GEN 5 1HR TROPONIN T REFLEX: 18 NG/L
GLOBULIN UR ELPH-MCNC: 2.8 GM/DL
GLUCOSE SERPL-MCNC: 117 MG/DL (ref 65–99)
HCT VFR BLD AUTO: 34.6 % (ref 34–46.6)
HGB BLD-MCNC: 11 G/DL (ref 12–15.9)
IMM GRANULOCYTES # BLD AUTO: 0.07 10*3/MM3 (ref 0–0.05)
IMM GRANULOCYTES NFR BLD AUTO: 0.6 % (ref 0–0.5)
LYMPHOCYTES # BLD AUTO: 2.72 10*3/MM3 (ref 0.7–3.1)
LYMPHOCYTES NFR BLD AUTO: 23.9 % (ref 19.6–45.3)
MCH RBC QN AUTO: 28.6 PG (ref 26.6–33)
MCHC RBC AUTO-ENTMCNC: 31.8 G/DL (ref 31.5–35.7)
MCV RBC AUTO: 89.9 FL (ref 79–97)
MONOCYTES # BLD AUTO: 1.29 10*3/MM3 (ref 0.1–0.9)
MONOCYTES NFR BLD AUTO: 11.4 % (ref 5–12)
NEUTROPHILS NFR BLD AUTO: 6.88 10*3/MM3 (ref 1.7–7)
NEUTROPHILS NFR BLD AUTO: 60.6 % (ref 42.7–76)
NRBC BLD AUTO-RTO: 0 /100 WBC (ref 0–0.2)
PLATELET # BLD AUTO: 274 10*3/MM3 (ref 140–450)
PMV BLD AUTO: 9.5 FL (ref 6–12)
POTASSIUM SERPL-SCNC: 4.2 MMOL/L (ref 3.5–5.2)
PROT SERPL-MCNC: 6.5 G/DL (ref 6–8.5)
RBC # BLD AUTO: 3.85 10*6/MM3 (ref 3.77–5.28)
SODIUM SERPL-SCNC: 132 MMOL/L (ref 136–145)
TROPONIN T % DELTA: -5
TROPONIN T NUMERIC DELTA: -1 NG/L
TROPONIN T SERPL HS-MCNC: 19 NG/L
WBC NRBC COR # BLD AUTO: 11.36 10*3/MM3 (ref 3.4–10.8)

## 2025-04-30 PROCEDURE — 72125 CT NECK SPINE W/O DYE: CPT

## 2025-04-30 PROCEDURE — 85379 FIBRIN DEGRADATION QUANT: CPT

## 2025-04-30 PROCEDURE — 85025 COMPLETE CBC W/AUTO DIFF WBC: CPT

## 2025-04-30 PROCEDURE — 80053 COMPREHEN METABOLIC PANEL: CPT

## 2025-04-30 PROCEDURE — 71045 X-RAY EXAM CHEST 1 VIEW: CPT

## 2025-04-30 PROCEDURE — 84484 ASSAY OF TROPONIN QUANT: CPT

## 2025-04-30 PROCEDURE — 70450 CT HEAD/BRAIN W/O DYE: CPT

## 2025-04-30 PROCEDURE — 83605 ASSAY OF LACTIC ACID: CPT

## 2025-04-30 PROCEDURE — 93005 ELECTROCARDIOGRAM TRACING: CPT

## 2025-04-30 PROCEDURE — 36415 COLL VENOUS BLD VENIPUNCTURE: CPT

## 2025-04-30 PROCEDURE — 99284 EMERGENCY DEPT VISIT MOD MDM: CPT

## 2025-04-30 PROCEDURE — 93010 ELECTROCARDIOGRAM REPORT: CPT | Performed by: INTERNAL MEDICINE

## 2025-04-30 RX ORDER — BROMPHENIRAMINE MALEATE, PSEUDOEPHEDRINE HYDROCHLORIDE, AND DEXTROMETHORPHAN HYDROBROMIDE 2; 30; 10 MG/5ML; MG/5ML; MG/5ML
2.5 SYRUP ORAL 2 TIMES DAILY PRN
Qty: 118 ML | Refills: 0 | Status: SHIPPED | OUTPATIENT
Start: 2025-04-30 | End: 2025-05-10

## 2025-04-30 RX ORDER — SODIUM CHLORIDE 0.9 % (FLUSH) 0.9 %
10 SYRINGE (ML) INJECTION AS NEEDED
Status: DISCONTINUED | OUTPATIENT
Start: 2025-04-30 | End: 2025-04-30 | Stop reason: HOSPADM

## 2025-04-30 RX ORDER — ACETAMINOPHEN 500 MG
1000 TABLET ORAL ONCE
Status: COMPLETED | OUTPATIENT
Start: 2025-04-30 | End: 2025-04-30

## 2025-04-30 RX ADMIN — ACETAMINOPHEN 1000 MG: 500 TABLET, FILM COATED ORAL at 18:48

## 2025-04-30 NOTE — ED PROVIDER NOTES
Subjective   History of Present Illness  Patient is a 89-year-old female who presents emergency department today for complaint of headache and neck pain.  She states that she was recently diagnosed with bronchitis and pneumonia, she has been taking doxycycline and is almost finished with her entire course.  She states that over the last 3 days she has started having neck pain that feels like a charley horse in her neck.  She states that her head hurts along the backside of it and is a stabbing pain.  She states that when she coughs it worsens.  She states that her neck feels tight and is throbbing.  She does have some shortness of breath, but denies any chest pain.  Denies any fever or chills at home, denies any nausea, vomiting, diarrhea, abdominal pain, and any other symptoms.  Patient has past medical history of asthma, CHF, COPD, dermatophytosis, dyspnea, E. coli UTI, fibromyalgia, hyperlipidemia, hyperlipidemia hypothyroidism, mitral valve disorder, pacemaker, PAF, respiratory infection, sick sinus syndrome, sleep apnea, stroke, and SVT.          Review of Systems   Constitutional: Negative.    Eyes: Negative.    Respiratory:  Positive for cough and shortness of breath.    Cardiovascular: Negative.    Gastrointestinal: Negative.    Endocrine: Negative.    Genitourinary: Negative.    Musculoskeletal:  Positive for neck pain and neck stiffness.   Skin: Negative.    Allergic/Immunologic: Negative.    Neurological:  Positive for headaches.   Hematological: Negative.    Psychiatric/Behavioral: Negative.     All other systems reviewed and are negative.      Past Medical History:   Diagnosis Date    Asthma     CHF (congestive heart failure)     COPD (chronic obstructive pulmonary disease)     Dermatomyositis     Dermatomyositis     Dyspnea     E-coli UTI     Fibromyalgia     Hyperlipemia, mixed     Hyperlipidemia     Hypothyroidism     Mitral valve disorder     History of MVP    Mitral valve disorder     Pacemaker      Pacemaker     PAF (paroxysmal atrial fibrillation) (CMS/[facility]) new onset 8/6/2018    Respiratory infection     Sick sinus syndrome     Sleep apnea     Stroke     SVT (supraventricular tachycardia)        Allergies   Allergen Reactions    Latex     Adhesive Tape Other (See Comments)    Amoxicillin-Pot Clavulanate     Barley Grass     Codeine     Contrast Dye (Echo Or Unknown Ct/Mr)     Fentanyl     Midazolam        Past Surgical History:   Procedure Laterality Date    CARDIAC CATHETERIZATION      CARDIOVERSION      CHOLECYSTECTOMY      ESOPHAGUS SURGERY      FOOT SURGERY Right     has ankle hardware    HYSTERECTOMY      INSERT / REPLACE / REMOVE PACEMAKER      PACEMAKER IMPLANTATION  12/15/2011    [employer]hythm (4/16/2012)-lead repositioning       Family History   Problem Relation Age of Onset    Coronary artery disease Mother     Heart attack Mother     Coronary artery disease Father        Social History     Socioeconomic History    Marital status:    Tobacco Use    Smoking status: Never    Smokeless tobacco: Never   Vaping Use    Vaping status: Never Used   Substance and Sexual Activity    Alcohol use: No    Drug use: No    Sexual activity: Defer           Objective   Physical Exam  Vitals and nursing note reviewed.   Constitutional:       General: She is not in acute distress.     Appearance: Normal appearance. She is normal weight. She is not ill-appearing, toxic-appearing or diaphoretic.   HENT:      Head: Normocephalic and atraumatic.      Right Ear: Tympanic membrane, ear canal and external ear normal. There is no impacted cerumen.      Left Ear: Tympanic membrane, ear canal and external ear normal. There is no impacted cerumen.      Nose: Nose normal. No congestion or rhinorrhea.      Mouth/Throat:      Mouth: Mucous membranes are moist.      Pharynx: Oropharynx is clear. No oropharyngeal exudate or posterior oropharyngeal erythema.   Eyes:      Extraocular Movements: Extraocular movements intact.       Conjunctiva/sclera: Conjunctivae normal.      Pupils: Pupils are equal, round, and reactive to light.   Neck:      Vascular: No carotid bruit.      Comments: Neck is supple, decreased range of motion, movement pain, muscular tenderness noted.  Neurologically intact, no loss of bowel or bladder, no radiculopathy, no paresthesias.  Cardiovascular:      Rate and Rhythm: Normal rate and regular rhythm.      Pulses: Normal pulses.      Heart sounds: Normal heart sounds. No murmur heard.     No gallop.   Pulmonary:      Effort: Pulmonary effort is normal. No respiratory distress.      Breath sounds: Normal breath sounds. No stridor. No wheezing, rhonchi or rales.   Chest:      Chest wall: No tenderness.   Abdominal:      General: Abdomen is flat. Bowel sounds are normal. There is no distension.      Palpations: Abdomen is soft. There is no mass.      Tenderness: There is no abdominal tenderness. There is no right CVA tenderness, left CVA tenderness, guarding or rebound.      Hernia: No hernia is present.   Musculoskeletal:         General: Tenderness present. No swelling, deformity or signs of injury. Normal range of motion.      Cervical back: Normal range of motion and neck supple. Tenderness present. No rigidity. Pain with movement and muscular tenderness present. Decreased range of motion.      Right lower leg: No edema.      Left lower leg: No edema.      Comments: Neurologically intact, no loss of bowel or bladder, no tenderness noted thoracic area or lumbar area.  Patient is ambulatory.  No radiculopathy, no paresthesias, neck is supple, no step-off, no rigidity.   Lymphadenopathy:      Cervical: No cervical adenopathy.   Skin:     General: Skin is warm and dry.      Capillary Refill: Capillary refill takes less than 2 seconds.      Coloration: Skin is not jaundiced or pale.      Findings: No bruising, erythema, lesion or rash.   Neurological:      General: No focal deficit present.      Mental Status: She is alert  and oriented to person, place, and time. Mental status is at baseline.      Cranial Nerves: No cranial nerve deficit.      Sensory: No sensory deficit.      Motor: No weakness.      Coordination: Coordination normal.      Gait: Gait normal.      Deep Tendon Reflexes: Reflexes normal.   Psychiatric:         Mood and Affect: Mood normal.         Behavior: Behavior normal.         Thought Content: Thought content normal.         Judgment: Judgment normal.         Procedures           ED Course  ED Course as of 05/02/25 1122   Wed Apr 30, 2025   1626 Troponin, lactic, D-dimer, CBC, CMP, EKG, CT cervical spine, CT head without contrast, chest x-ray ordered. [BS]   1717 CT head without contrast     IMPRESSION:  1. No acute intracranial process.   [BS]   1717 Chest x-ray  IMPRESSION:     1. No active disease in the chest.   [BS]   1717 WBCs 11.36, hemoglobin 11, initial troponin 19, glucose 117, sodium 132, lactic 1.5, D-dimer 0.82. [BS]   1718 CT cervical spine without contrast     IMPRESSION:     Multilevel spondylosis and facet arthropathy without acute fracture or  spondylolisthesis.      [BS]   1811 Repeat troponin 18, troponin delta -1. [BS]   1851 Discussed case extensively with patient's family and patient.  Also discussed with Dr. Flores, who was in agreement with the plan.  They verbalized understanding and will follow-up with PCP. [BS]      ED Course User Index  [BS] Susanna Cerda, APRN                                                       Medical Decision Making  Problems Addressed:  Acute nonintractable headache, unspecified headache type: complicated acute illness or injury  Neck pain: complicated acute illness or injury    Amount and/or Complexity of Data Reviewed  Labs: ordered.  Radiology: ordered.  ECG/medicine tests: ordered.    Risk  OTC drugs.  Prescription drug management.    Patient is a 89-year-old female who presents emergency department today for complaint of headache and neck pain.  She states  that she was recently diagnosed with bronchitis and pneumonia, she has been taking doxycycline and is almost finished with her entire course.  She states that over the last 3 days she has started having neck pain that feels like a charley horse in her neck.  She states that her head hurts along the backside of it and is a stabbing pain.  She states that when she coughs it worsens.  She states that her neck feels tight and is throbbing.  She does have some shortness of breath, but denies any chest pain.  Denies any fever or chills at home, denies any nausea, vomiting, diarrhea, abdominal pain, and any other symptoms.  Patient has past medical history of asthma, CHF, COPD, dermatophytosis, dyspnea, E. coli UTI, fibromyalgia, hyperlipidemia, hyperlipidemia hypothyroidism, mitral valve disorder, pacemaker, PAF, respiratory infection, sick sinus syndrome, sleep apnea, stroke, and SVT.    Differential diagnoses include but are not limited to A-fib, pulmonary embolism, stroke, intracranial hemorrhage, rib fracture, pneumothorax, and other etiologies.    CT Head Without Contrast   Final Result   1. No acute intracranial process.       This report was signed and finalized on 4/30/2025 5:13 PM by Dr Orlando Suarez.          CT Cervical Spine Without Contrast   Final Result       Multilevel spondylosis and facet arthropathy without acute fracture or   spondylolisthesis.       This report was signed and finalized on 4/30/2025 5:14 PM by Ok Conroy.          XR Chest 1 View   Final Result       1. No active disease in the chest.       This report was signed and finalized on 4/30/2025 4:42 PM by Ok Conroy.             Labs Reviewed   COMPREHENSIVE METABOLIC PANEL - Abnormal; Notable for the following components:       Result Value    Glucose 117 (*)     Sodium 132 (*)     Chloride 94 (*)     All other components within normal limits    Narrative:     GFR Categories in Chronic Kidney Disease (CKD)               GFR Category          GFR (mL/min/1.73)    Interpretation  G1                    90 or greater        Normal or high (1)  G2                    60-89                Mild decrease (1)  G3a                   45-59                Mild to moderate decrease  G3b                   30-44                Moderate to severe decrease  G4                    15-29                Severe decrease  G5                    14 or less           Kidney failure    (1)In the absence of evidence of kidney disease, neither GFR category G1 or G2 fulfill the criteria for CKD.    eGFR calculation 2021 CKD-EPI creatinine equation, which does not include race as a factor   TROPONIN - Abnormal; Notable for the following components:    HS Troponin T 19 (*)     All other components within normal limits    Narrative:     High Sensitive Troponin T Reference Range:  <14.0 ng/L- Negative Female for AMI  <22.0 ng/L- Negative Male for AMI  >=14 - Abnormal Female indicating possible myocardial injury.  >=22 - Abnormal Male indicating possible myocardial injury.   Clinicians would have to utilize clinical acumen, EKG, Troponin, and serial changes to determine if it is an Acute Myocardial Infarction or myocardial injury due to an underlying chronic condition.        CBC WITH AUTO DIFFERENTIAL - Abnormal; Notable for the following components:    WBC 11.36 (*)     Hemoglobin 11.0 (*)     Immature Grans % 0.6 (*)     Monocytes, Absolute 1.29 (*)     Immature Grans, Absolute 0.07 (*)     All other components within normal limits   HIGH SENSITIVITIY TROPONIN T 1HR - Abnormal; Notable for the following components:    HS Troponin T 18 (*)     All other components within normal limits    Narrative:     High Sensitive Troponin T Reference Range:  <14.0 ng/L- Negative Female for AMI  <22.0 ng/L- Negative Male for AMI  >=14 - Abnormal Female indicating possible myocardial injury.  >=22 - Abnormal Male indicating possible myocardial injury.   Clinicians would have to  "utilize clinical acumen, EKG, Troponin, and serial changes to determine if it is an Acute Myocardial Infarction or myocardial injury due to an underlying chronic condition.        D-DIMER, QUANTITATIVE - Normal    Narrative:     According to the assay 's published package insert, a normal (<0.50 MCGFEU/mL) D-dimer result in conjunction with a non-high clinical probability assessment, excludes deep vein thrombosis (DVT) and pulmonary embolism (PE) with high sensitivity.    D-dimer values increase with age and this can make VTE exclusion of an older population difficult. To address this, the [employer] College [employer], based on best available evidence and recent guidelines, recommends that clinicians use age-adjusted D-dimer thresholds in patients greater than 50 years of age with: a) a low probability of PE who do not meet all Pulmonary Embolism Rule Out Criteria, or b) in those with intermediate probability of PE.   The formula for an age-adjusted D-dimer cut-off is \"age/100\".  For example, a 60 year old patient would have an age-adjusted cut-off of 0.60 MCGFEU/mL and an 80 year old 0.80 MCGFEU/mL.   LACTIC ACID, PLASMA - Normal   CBC AND DIFFERENTIAL    Narrative:     The following orders were created for panel order CBC & Differential.  Procedure                               Abnormality         Status                     ---------                               -----------         ------                     CBC Auto Differential[[id number]]        Abnormal            Final result                 Please view results for these tests on the individual orders.      Patient is a 89-year-old female who presents emergency department today for complaint of headache and neck pain.  She states that she was recently diagnosed with bronchitis and pneumonia, she has been taking doxycycline and is almost finished with her entire course.  She states that over the last 3 days she has started having neck pain that " feels like a charley horse in her neck.  She states that her head hurts along the backside of it and is a stabbing pain.  She states that when she coughs it worsens.  She states that her neck feels tight and is throbbing.  She does have some shortness of breath, but denies any chest pain.  On physical exam patient is neurologically intact.  No loss of bowel or bladder, no tenderness noted thoracic area or lumbar area.  She is ambulatory.  No radiculopathy, no paresthesias, neck is supple, no step-off, no rigidity.  She does have a decreased range of motion, movement pain, and muscular tenderness noted in the cervical spine.  White count 11.36, hemoglobin 11, D-dimer 0.82, initial troponin 19, repeat troponin 18.  Lactic acid 1.5.  Glucose 117, sodium 132.  EKG showed normal sinus rhythm with a rate of 77.  CT cervical spine without contrast shows multilevel spondylosis and facet arthropathy without acute fracture or spondylolithiesis.  CT head without contrast shows no acute intercranial process.  Chest x-ray shows no active disease in the chest.  The patient was requesting pain medication, 1 g Tylenol was given.  She was concerned about her coughing at night, I did discuss this with the patient and told her that there is a cough syrup that I could send her but that she has to be very careful with getting up at night because it could make her drowsy.  She states that she has had it in the past, it does not make her sleepy and she is wanting this.  I did discuss with her that it is important that she follows up with her primary care doctor in the next couple days.  I did speak with Dr. Flores regarding this case, he was in agreement with the plan.  The patient stated that the Tylenol did relieve her headache.  Spoke extensively with patient's family and patient regarding discharge, they are in agreement with the plan.  Instructed her to come back to the ER with any new or worsening symptoms.  Patient will be  discharged home in a stable condition.    Final diagnoses:   Acute nonintractable headache, unspecified headache type   Neck pain       ED Disposition  ED Disposition       ED Disposition   Discharge    Condition   Stable    Comment   --               Bailey Dyson, APRN  [address] [address]  [phone]    Schedule an appointment as soon as possible for a visit in 2 days  follow up    [facility] EMERGENCY DEPARTMENT  [address] [facility] [id number] [phone]    If symptoms worsen, As needed         Medication List        New Prescriptions      brompheniramine-pseudoephedrine-DM 30-2-10 MG/5ML syrup  Take 2.5 mL by mouth 2 (Two) Times a Day As Needed for Allergies or Cough for up to 10 days.            Changed      hydroCHLOROthiazide 12.5 MG capsule  Commonly known as: MICROZIDE  Take 1 capsule by mouth Daily.  What changed:   when to take this  reasons to take this               Where to Get Your Medications        These medications were sent to [facility] - [place], [place] - [address] - [phone] [facility] [phone]   [address] [facility] [address]      Phone: [phone]   brompheniramine-pseudoephedrine-DM 30-2-10 MG/5ML syrup            Susanna Cerda, APRN  05/02/25 1120

## 2025-04-30 NOTE — DISCHARGE INSTRUCTIONS
It was very nice to meet you, Violet. Thank you for allowing us to take care of you today at [facility].    Please understand that an ER evaluation is just the start of your evaluation. We will do what we can, but we are often unable to fully figure out what is causing your symptoms from one evaluation. Thus, our primary goal is to determine whether you need to be evaluated in the hospital or if it is safe for you to go home and see other doctors such as a primary care physician or a specialist on an outpatient basis.     Like we discussed, it is VERY IMPORTANT that you follow up with your primary care doctor (call them to set up an appointment) within the next few days or as soon as possible so that you can be re-evaluated for improvement in your symptoms or for any other questions.     Please return to the emergency room within 12-48 hours if you experience fever, chills, chest pain or shortness of breath, pain with inspiration/expiration, pain that travels to your arms, neck or back, nausea, vomiting, severe headache, tearing pain in your chest, dizziness, feel as though you are about to pass out, have any worsening symptoms, or any other concerns.

## 2025-05-01 LAB
QT INTERVAL: 370 MS
QTC INTERVAL: 418 MS

## 2025-05-08 ENCOUNTER — OFFICE VISIT (OUTPATIENT)
Dept: PRIMARY CARE CLINIC | Age: 89
End: 2025-05-08
Payer: MEDICARE

## 2025-05-08 VITALS
TEMPERATURE: 97.9 F | OXYGEN SATURATION: 97 % | HEIGHT: 63 IN | HEART RATE: 86 BPM | SYSTOLIC BLOOD PRESSURE: 134 MMHG | RESPIRATION RATE: 18 BRPM | DIASTOLIC BLOOD PRESSURE: 80 MMHG | WEIGHT: 146 LBS | BODY MASS INDEX: 25.87 KG/M2

## 2025-05-08 DIAGNOSIS — M54.2 NECK PAIN: Primary | ICD-10-CM

## 2025-05-08 PROCEDURE — G8417 CALC BMI ABV UP PARAM F/U: HCPCS | Performed by: NURSE PRACTITIONER

## 2025-05-08 PROCEDURE — 99214 OFFICE O/P EST MOD 30 MIN: CPT | Performed by: NURSE PRACTITIONER

## 2025-05-08 PROCEDURE — 1159F MED LIST DOCD IN RCRD: CPT | Performed by: NURSE PRACTITIONER

## 2025-05-08 PROCEDURE — 1123F ACP DISCUSS/DSCN MKR DOCD: CPT | Performed by: NURSE PRACTITIONER

## 2025-05-08 PROCEDURE — 1090F PRES/ABSN URINE INCON ASSESS: CPT | Performed by: NURSE PRACTITIONER

## 2025-05-08 PROCEDURE — G8427 DOCREV CUR MEDS BY ELIG CLIN: HCPCS | Performed by: NURSE PRACTITIONER

## 2025-05-08 PROCEDURE — 1036F TOBACCO NON-USER: CPT | Performed by: NURSE PRACTITIONER

## 2025-05-08 RX ORDER — BACLOFEN 10 MG/1
10 TABLET ORAL 2 TIMES DAILY
Qty: 60 TABLET | Refills: 1 | Status: SHIPPED | OUTPATIENT
Start: 2025-05-08

## 2025-05-08 SDOH — ECONOMIC STABILITY: FOOD INSECURITY: WITHIN THE PAST 12 MONTHS, THE FOOD YOU BOUGHT JUST DIDN'T LAST AND YOU DIDN'T HAVE MONEY TO GET MORE.: NEVER TRUE

## 2025-05-08 SDOH — ECONOMIC STABILITY: FOOD INSECURITY: WITHIN THE PAST 12 MONTHS, YOU WORRIED THAT YOUR FOOD WOULD RUN OUT BEFORE YOU GOT MONEY TO BUY MORE.: NEVER TRUE

## 2025-05-08 ASSESSMENT — PATIENT HEALTH QUESTIONNAIRE - PHQ9
1. LITTLE INTEREST OR PLEASURE IN DOING THINGS: NOT AT ALL
SUM OF ALL RESPONSES TO PHQ QUESTIONS 1-9: 0
SUM OF ALL RESPONSES TO PHQ QUESTIONS 1-9: 0
2. FEELING DOWN, DEPRESSED OR HOPELESS: NOT AT ALL
SUM OF ALL RESPONSES TO PHQ QUESTIONS 1-9: 0
SUM OF ALL RESPONSES TO PHQ QUESTIONS 1-9: 0

## 2025-05-08 ASSESSMENT — ENCOUNTER SYMPTOMS
GASTROINTESTINAL NEGATIVE: 1
COUGH: 1
SHORTNESS OF BREATH: 0
EYES NEGATIVE: 1
WHEEZING: 0
ALLERGIC/IMMUNOLOGIC NEGATIVE: 1

## 2025-05-08 NOTE — PATIENT INSTRUCTIONS
Tylenol arthritis 650 mg 3 times a day    Baclofen muscle relaxer twice a day      Analgesic bee venom and snake oil cream alternating with Voltaren cream       Muscle massage .one local provider is but you can google others   [facility]    Address: [address], [place] [address]  Phone: [phone]  Hours: Open ? Closes 5?PM

## 2025-05-08 NOTE — PROGRESS NOTES
[facility]  [id number] [facility] [address]  [facility] [id number] [phone]       Giana Lee is a 89 y.o. female who presents today for her medical conditions/complaints as noted below.  Giana Lee is c/o of Follow-Up from Hospital (From ER related to headache with neck pain.  Patient is still with pain in back of her head/neck.  Taking Tylenol as needed.  )        HPI:     History of Present Illness  The patient presents for evaluation of neck pain.    She sought emergency care on 04/13/2025 due to a persistent cough, which was diagnosed as mycoplasma pneumonia and bronchitis. Concurrently, she experienced an episode of atrial fibrillation and exacerbation of her asthma. Despite receiving prescriptions and subsequent improvement, the cough persisted. On 04/30/2025, she presented with severe neck pain radiating upwards and across her head. She was diagnosed with multiple spondylitis, characterized by spurs on her spine at C1 and C2, and atrophy across the spurring at C4, C5, and C6. She also has bilateral carotid lung exostosis. The pain has been so severe that it has disrupted her sleep, leading her to take Tylenol. She has been using Voltaren cream and Vicks on the back of her head for relief. She is uncertain about her allergy to bee stings. She has previously used Biofreeze and has found CBD oil beneficial. She has an electric heating pad designed for neck use, which she used yesterday. She has a history of curvature of the spine. Her sleep is frequently interrupted due to discomfort, necessitating position changes. She uses small round pillows for support during sleep. She has previously taken baclofen, which she tolerated well, but is not currently on this medication. She is not taking any NSAIDs such as ibuprofen, Advil, or Aleve. She is currently on Xarelto.       Chief Complaint   Patient presents with    Follow-Up from Hospital     From ER

## 2025-05-25 ENCOUNTER — APPOINTMENT (OUTPATIENT)
Dept: CT IMAGING | Facility: HOSPITAL | Age: OVER 89
End: 2025-05-25
Payer: MEDICARE

## 2025-05-25 ENCOUNTER — HOSPITAL ENCOUNTER (EMERGENCY)
Facility: HOSPITAL | Age: OVER 89
Discharge: HOME | End: 2025-05-25
Admitting: EMERGENCY MEDICINE
Payer: MEDICARE

## 2025-05-25 VITALS
SYSTOLIC BLOOD PRESSURE: 153 MMHG | HEIGHT: 63 IN | HEART RATE: 88 BPM | TEMPERATURE: 98.4 F | RESPIRATION RATE: 16 BRPM | WEIGHT: 146 LBS | BODY MASS INDEX: 25.87 KG/M2 | DIASTOLIC BLOOD PRESSURE: 74 MMHG | OXYGEN SATURATION: 98 %
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
## 2025-05-25 DIAGNOSIS — S13.9XXA CERVICAL SPRAIN, INITIAL ENCOUNTER: ICD-10-CM

## 2025-05-25 DIAGNOSIS — S09.90XA INJURY OF HEAD, INITIAL ENCOUNTER: ICD-10-CM

## 2025-05-25 PROCEDURE — 72125 CT NECK SPINE W/O DYE: CPT

## 2025-05-25 PROCEDURE — 99284 EMERGENCY DEPT VISIT MOD MDM: CPT

## 2025-05-25 PROCEDURE — 70450 CT HEAD/BRAIN W/O DYE: CPT

## 2025-05-25 NOTE — DISCHARGE INSTRUCTIONS
Your head and neck CT were negative.  You will be able to be discharged home today and you can take Tylenol for pain.  If there is any change in mental status or any persistent vomiting, return to ER immediately for evaluation.  Also, if you see any blood in the stool or urine would recommend you return to ER immediately for evaluation as well.  Call family doctor to schedule follow-up appointment.
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
## 2025-07-09 NOTE — TELEPHONE ENCOUNTER
1-14-22    Kevin Rivera, I spoke to this patient today @ 12:15 pm, I gave her the updated information from you on her labs, she was pleased and will await the results of the pending lab still out there. Patient said she wants to wait at present on the EGD till all the labs are back and then make a decision on having the Egd or not. Patient said thanks for the news and the return call.  Jose Antonio corbett d/c from [facility] 

## 2025-08-18 RX ORDER — METOPROLOL SUCCINATE 50 MG/1
TABLET, EXTENDED RELEASE ORAL
Qty: 120 TABLET | Refills: 0 | Status: SHIPPED | OUTPATIENT
Start: 2025-08-18

## (undated) DEVICE — FORCEPS BX L240CM JAW DIA2.4MM ORNG L CAP W/ NDL DISP RAD

## (undated) DEVICE — SNARE ENDOSCP L240CM LOOP W13MM SHTH DIA2.4MM SM OVL FLX

## (undated) DEVICE — ENDO KIT,LOURDES HOSPITAL: Brand: MEDLINE INDUSTRIES, INC.